# Patient Record
Sex: FEMALE | Race: WHITE | Employment: OTHER | URBAN - METROPOLITAN AREA
[De-identification: names, ages, dates, MRNs, and addresses within clinical notes are randomized per-mention and may not be internally consistent; named-entity substitution may affect disease eponyms.]

---

## 2017-03-07 DIAGNOSIS — F41.9 ANXIETY: ICD-10-CM

## 2017-03-07 NOTE — TELEPHONE ENCOUNTER
sertraline (ZOLOFT) 25 MG     Last Written Prescription Date: 8/2/16  Last Fill Quantity: 90, # refills: 1  Last Office Visit with G primary care provider:  8/2/16        Last PHQ-9 score on record=   PHQ-9 SCORE 8/2/2016   Total Score 3

## 2017-03-08 RX ORDER — SERTRALINE HYDROCHLORIDE 25 MG/1
25 TABLET, FILM COATED ORAL DAILY
Qty: 90 TABLET | Refills: 1 | Status: SHIPPED | OUTPATIENT
Start: 2017-03-08 | End: 2017-09-18

## 2017-03-08 NOTE — TELEPHONE ENCOUNTER
Med for anxiety.  Depression not on problem list  .Prescription approved per [facility] Refill Protocol.  Kate Santillan RN

## 2017-04-20 ENCOUNTER — OFFICE VISIT (OUTPATIENT)
Dept: FAMILY MEDICINE | Facility: CLINIC | Age: 67
End: 2017-04-20
Payer: COMMERCIAL

## 2017-04-20 VITALS
HEART RATE: 60 BPM | SYSTOLIC BLOOD PRESSURE: 118 MMHG | BODY MASS INDEX: 23.57 KG/M2 | TEMPERATURE: 98.3 F | OXYGEN SATURATION: 99 % | WEIGHT: 135.2 LBS | DIASTOLIC BLOOD PRESSURE: 74 MMHG

## 2017-04-20 DIAGNOSIS — R10.13 ABDOMINAL PAIN, EPIGASTRIC: Primary | ICD-10-CM

## 2017-04-20 PROCEDURE — 99213 OFFICE O/P EST LOW 20 MIN: CPT | Performed by: NURSE PRACTITIONER

## 2017-04-20 NOTE — PATIENT INSTRUCTIONS
Monitor symptoms closely.  If symptoms persist you can complete the labs we discussed and see Dr. Purcell for follow up.

## 2017-04-20 NOTE — NURSING NOTE
"Chief Complaint   Patient presents with     Gastrointestinal Problem       Initial /74  Pulse 60  Temp 98.3  F (36.8  C) (Oral)  Wt 135 lb 3.2 oz (61.3 kg)  SpO2 99%  BMI 23.57 kg/m2 Estimated body mass index is 23.57 kg/(m^2) as calculated from the following:    Height as of 8/2/16: 5' 3.5\" (1.613 m).    Weight as of this encounter: 135 lb 3.2 oz (61.3 kg).  Medication Reconciliation: complete   Marjorie HOPKINS M.A.      "

## 2017-04-20 NOTE — MR AVS SNAPSHOT
After Visit Summary   4/20/2017    Anali Sanchez    MRN: [id number]           Patient Information     Date Of Birth          1950        Visit Information        Provider Department      4/20/2017 9:00 AM Summer Stevens Ra, APRN CNP [facility]        Today's Diagnoses     Abdominal pain, epigastric    -  1      Care Instructions    Monitor symptoms closely.  If symptoms persist you can complete the labs we discussed and see Dr. Purcell for follow up.        Follow-ups after your visit        Future tests that were ordered for you today     Open Future Orders        Priority Expected Expires Ordered    CBC with platelets Routine  4/20/2018 4/20/2017    H Pylori antigen stool Routine  5/20/2017 4/20/2017    Comprehensive metabolic panel Routine  4/20/2018 4/20/2017            Who to contact     If you have questions or need follow up information about today's clinic visit or your schedule please contact [facility] directly at [phone].  Normal or non-critical lab and imaging results will be communicated to you by MyChart, letter or phone within 4 business days after the clinic has received the results. If you do not hear from us within 7 days, please contact the clinic through [employer] or phone. If you have a critical or abnormal lab result, we will notify you by phone as soon as possible.  Submit refill requests through [employer] or call your pharmacy and they will forward the refill request to us. Please allow 3 business days for your refill to be completed.          Additional Information About Your Visit        [employer] Information     [employer] gives you secure access to your electronic health record. If you see a primary care provider, you can also send messages to your care team and make appointments. If you have questions, please call your primary care clinic.  If you do not have a primary care provider, please call [phone] and they will assist  you.        Care EveryWhere ID     This is your Care EveryWhere ID. This could be used by other organizations to access your [place] medical records  [id number]        Your Vitals Were     Pulse Temperature Pulse Oximetry BMI (Body Mass Index)          60 98.3  F (36.8  C) (Oral) 99% 23.57 kg/m2         Blood Pressure from Last 3 Encounters:   04/20/17 118/74   08/02/16 126/66   05/03/16 118/68    Weight from Last 3 Encounters:   04/20/17 135 lb 3.2 oz (61.3 kg)   08/02/16 136 lb 6.4 oz (61.9 kg)   05/03/16 139 lb 4.8 oz (63.2 kg)               Primary Care Provider Office Phone # Fax #    Vianey Purcell [phone]       [facility] [address]  [facility] [address]        Thank you!     Thank you for choosing [facility]  for your care. Our goal is always to provide you with excellent care. Hearing back from our patients is one way we can continue to improve our services. Please take a few minutes to complete the written survey that you may receive in the mail after your visit with us. Thank you!             Your Updated Medication List - Protect others around you: Learn how to safely use, store and throw away your medicines at www.disposemymeds.org.          This list is accurate as of: 4/20/17 11:50 AM.  Always use your most recent med list.                   Brand Name Dispense Instructions for use    Calcium-Vitamin D-Vitamin K 500-200-40 MG-UNT-MCG Chew      Takes 3 chewables daily       conjugated estrogens cream    PREMARIN    30 g    Place 0.5 g vaginally twice a week       sertraline 25 MG tablet    ZOLOFT    90 tablet    Take 1 tablet (25 mg) by mouth daily

## 2017-04-20 NOTE — PROGRESS NOTES
SUBJECTIVE:                                                    Anali Sanchez is a 66 year old female who presents to clinic today for the following health issues:      GERD/Heartburn      Duration: 2 weeks    Description (location/character/radiation): Upper gastric area    Intensity:  moderate    Accompanying signs and symptoms:  food getting stuck: no   nausea/vomiting/blood: no  abdominal pain: no   black/tarry or bloody stools: no :    History (similar episodes/previous evaluation): No colon    Precipitating or alleviating factors:  worse with no particular food or drink.  current NSAID/Aspirin use: no     Therapies tried and outcome: antacids without relief     Pt presents with 2 weeks of symptoms.  Changing over time.  Started with reflux.  Noted she was with her father in the hospital, lots of stress.  The discomfort resolved, but she still felt some regurgitation on occasion.  This too has resolved.  Now just feels a little uneasiness in the epigastric area.  No pain.  She has a distant hx of a colectomy due to slow transit.  She now reports loose bowel movements, several per day.  This is her normal and it has not changed.  No black or bloody stools.  No vomiting.  No fevers.  No weight change.  No chronic reflux.  She has not treated.  She is eating and drinking without problem.  [place] in the near future and reports she would've waited, but thought because of her trip she should check in.    Problem list and histories reviewed & adjusted, as indicated.  Additional history: as documented    Patient Active Problem List   Diagnosis     Anxiety     CARDIOVASCULAR SCREENING; LDL GOAL LESS THAN 160     Past Surgical History:   Procedure Laterality Date     APPENDECTOMY       CARDIAC SURGERY  age 37    WPW surg; open ablation; complicated with hemorrhage     COLECTOMY  age 52    7-8 inches rectum remain. cx with hemorrhage. no cancer. slow transit.     DILATION AND  CURETTAGE, HYSTEROSCOPY DIAGNOSTIC, COMBINED  2/21/2014    Procedure: COMBINED DILATION AND CURETTAGE, HYSTEROSCOPY DIAGNOSTIC;   DILATION AND CURETTAGE, HYSTEROSCOPY DIAGNOSTIC ;  Surgeon: Willie Osborn MD;  Location: [facility] OR     ENT SURGERY      t and a       Social History   Substance Use Topics     Smoking status: Never Smoker     Smokeless tobacco: Not on file     Alcohol use Yes      Comment: occas     Family History   Problem Relation Age of Onset     Bronchitis Mother      copd     Alcoholism Mother      DIABETES Father      Coronary Artery Disease Father            Reviewed and updated as needed this visit by clinical staff       Reviewed and updated as needed this visit by Provider         ROS:  SEE HPI.    OBJECTIVE:                                                    /74  Pulse 60  Temp 98.3  F (36.8  C) (Oral)  Wt 135 lb 3.2 oz (61.3 kg)  SpO2 99%  BMI 23.57 kg/m2  Body mass index is 23.57 kg/(m^2).  GENERAL: healthy, alert and no distress  EYES: Eyes grossly normal to inspection, PERRL and conjunctivae and sclerae normal  HENT: ear canals and TM's normal, nose and mouth without ulcers or lesions  NECK: no adenopathy, no asymmetry, masses, or scars and thyroid normal to palpation  RESP: lungs clear to auscultation - no rales, rhonchi or wheezes  CV: regular rate and rhythm, normal S1 S2, no S3 or S4, no murmur, click or rub, no peripheral edema and peripheral pulses strong  ABDOMEN: soft, nontender, no hepatosplenomegaly, no masses and bowel sounds normal  NEURO: Normal strength and tone, mentation intact and speech normal  PSYCH: mentation appears normal, affect normal/bright    Diagnostic Test Results:  none      ASSESSMENT/PLAN:                                                    1. Abdominal pain, epigastric  66 y.o. Female, hx of colectomy, slow transit, here today with concerns with recent onset reflux, epigastric symptoms.  Tolerating well.  No bowel changes.  If needed, could try  zantac prn for a brief time if reflux recurs.  She does use probiotics on occasion but finds this causes some GI issues so will use with caution.  Labs were placed in case the symptoms did persist after her trip.  She will then complete and follow up with PCP.  Pt agrees with plan and verbalized understanding.  - CBC with platelets; Future  - H Pylori antigen stool; Future  - Comprehensive metabolic panel; Future    LEONOR Marrufo Ra, CNP  [facility]

## 2017-06-01 ENCOUNTER — MYC MEDICAL ADVICE (OUTPATIENT)
Dept: FAMILY MEDICINE | Facility: CLINIC | Age: 67
End: 2017-06-01

## 2017-06-01 DIAGNOSIS — Z78.0 MENOPAUSE: ICD-10-CM

## 2017-06-01 DIAGNOSIS — Z13.820 SCREENING FOR OSTEOPOROSIS: Primary | ICD-10-CM

## 2017-06-01 NOTE — TELEPHONE ENCOUNTER
Please let her know that they have moved the machine to [place].    Can you help her schedule?   Or give her the phone number?    (this is what I have:):    Please call [phone] to schedule your bone density exam, or DXA, at the [facility].

## 2017-06-21 ENCOUNTER — RADIANT APPOINTMENT (OUTPATIENT)
Dept: BONE DENSITY | Facility: CLINIC | Age: 67
End: 2017-06-21
Attending: FAMILY MEDICINE
Payer: COMMERCIAL

## 2017-06-21 ENCOUNTER — RADIANT APPOINTMENT (OUTPATIENT)
Dept: MAMMOGRAPHY | Facility: CLINIC | Age: 67
End: 2017-06-21
Attending: FAMILY MEDICINE
Payer: COMMERCIAL

## 2017-06-21 DIAGNOSIS — Z12.31 VISIT FOR SCREENING MAMMOGRAM: ICD-10-CM

## 2017-06-21 DIAGNOSIS — Z13.820 SCREENING FOR OSTEOPOROSIS: ICD-10-CM

## 2017-06-21 DIAGNOSIS — Z78.0 MENOPAUSE: ICD-10-CM

## 2017-06-21 PROCEDURE — 77080 DXA BONE DENSITY AXIAL: CPT | Performed by: FAMILY MEDICINE

## 2017-06-21 PROCEDURE — G0202 SCR MAMMO BI INCL CAD: HCPCS | Mod: TC

## 2017-06-21 PROCEDURE — 77063 BREAST TOMOSYNTHESIS BI: CPT | Mod: TC

## 2017-08-03 ENCOUNTER — OFFICE VISIT (OUTPATIENT)
Dept: FAMILY MEDICINE | Facility: CLINIC | Age: 67
End: 2017-08-03
Payer: COMMERCIAL

## 2017-08-03 VITALS
HEIGHT: 63 IN | BODY MASS INDEX: 23.87 KG/M2 | SYSTOLIC BLOOD PRESSURE: 120 MMHG | HEART RATE: 55 BPM | OXYGEN SATURATION: 98 % | RESPIRATION RATE: 16 BRPM | WEIGHT: 134.7 LBS | TEMPERATURE: 97.9 F | DIASTOLIC BLOOD PRESSURE: 66 MMHG

## 2017-08-03 DIAGNOSIS — Z13.1 SCREENING FOR DIABETES MELLITUS: ICD-10-CM

## 2017-08-03 DIAGNOSIS — Z00.00 ENCOUNTER FOR ROUTINE ADULT HEALTH EXAMINATION WITHOUT ABNORMAL FINDINGS: Primary | ICD-10-CM

## 2017-08-03 DIAGNOSIS — Z13.6 CARDIOVASCULAR SCREENING; LDL GOAL LESS THAN 160: ICD-10-CM

## 2017-08-03 DIAGNOSIS — N95.2 ATROPHIC VAGINITIS: ICD-10-CM

## 2017-08-03 PROCEDURE — G0439 PPPS, SUBSEQ VISIT: HCPCS | Performed by: FAMILY MEDICINE

## 2017-08-03 PROCEDURE — G0009 ADMIN PNEUMOCOCCAL VACCINE: HCPCS | Performed by: FAMILY MEDICINE

## 2017-08-03 PROCEDURE — 90471 IMMUNIZATION ADMIN: CPT | Performed by: FAMILY MEDICINE

## 2017-08-03 PROCEDURE — 90670 PCV13 VACCINE IM: CPT | Performed by: FAMILY MEDICINE

## 2017-08-03 PROCEDURE — 99212 OFFICE O/P EST SF 10 MIN: CPT | Mod: 25 | Performed by: FAMILY MEDICINE

## 2017-08-03 ASSESSMENT — ANXIETY QUESTIONNAIRES
2. NOT BEING ABLE TO STOP OR CONTROL WORRYING: SEVERAL DAYS
6. BECOMING EASILY ANNOYED OR IRRITABLE: NOT AT ALL
5. BEING SO RESTLESS THAT IT IS HARD TO SIT STILL: NEARLY EVERY DAY
IF YOU CHECKED OFF ANY PROBLEMS ON THIS QUESTIONNAIRE, HOW DIFFICULT HAVE THESE PROBLEMS MADE IT FOR YOU TO DO YOUR WORK, TAKE CARE OF THINGS AT HOME, OR GET ALONG WITH OTHER PEOPLE: SOMEWHAT DIFFICULT
7. FEELING AFRAID AS IF SOMETHING AWFUL MIGHT HAPPEN: SEVERAL DAYS
3. WORRYING TOO MUCH ABOUT DIFFERENT THINGS: SEVERAL DAYS
1. FEELING NERVOUS, ANXIOUS, OR ON EDGE: SEVERAL DAYS
GAD7 TOTAL SCORE: 10

## 2017-08-03 ASSESSMENT — PATIENT HEALTH QUESTIONNAIRE - PHQ9: 5. POOR APPETITE OR OVEREATING: NEARLY EVERY DAY

## 2017-08-03 NOTE — NURSING NOTE
"Chief Complaint   Patient presents with     Physical       Initial /66 (BP Location: Right arm, Cuff Size: Adult Regular)  Pulse 55  Temp 97.9  F (36.6  C) (Oral)  Resp 16  Ht 5' 3.25\" (1.607 m)  Wt 134 lb 11.2 oz (61.1 kg)  SpO2 98%  BMI 23.67 kg/m2 Estimated body mass index is 23.67 kg/(m^2) as calculated from the following:    Height as of this encounter: 5' 3.25\" (1.607 m).    Weight as of this encounter: 134 lb 11.2 oz (61.1 kg).  Medication Reconciliation: complete   Brunilda Solano, CMA'    "

## 2017-08-03 NOTE — MR AVS SNAPSHOT
After Visit Summary   8/3/2017    Anali Sanchez    MRN: [id number]           Patient Information     Date Of Birth          1950        Visit Information        Provider Department      8/3/2017 10:10 AM Vianey Purcell MD [facility]        Today's Diagnoses     Encounter for routine adult health examination without abnormal findings    -  1    Atrophic vaginitis        CARDIOVASCULAR SCREENING; LDL GOAL LESS THAN 160        Screening for diabetes mellitus          Care Instructions      Preventive Health Recommendations    Female Ages 65 +    Yearly exam:     See your health care provider every year in order to  o Review health changes.   o Discuss preventive care.    o Review your medicines if your doctor has prescribed any.      You no longer need a yearly Pap test unless you've had an abnormal Pap test in the past 10 years. If you have vaginal symptoms, such as bleeding or discharge, be sure to talk with your provider about a Pap test.      Every 1 to 2 years, have a mammogram.  If you are over 69, talk with your health care provider about whether or not you want to continue having screening mammograms.      Every 10 years, have a colonoscopy. Or, have a yearly FIT test (stool test). These exams will check for colon cancer.       Have a cholesterol test every 5 years, or more often if your doctor advises it.       Have a diabetes test (fasting glucose) every three years. If you are at risk for diabetes, you should have this test more often.       At age 65, have a bone density scan (DEXA) to check for osteoporosis (brittle bone disease).    Shots:    Get a flu shot each year.    Get a tetanus shot every 10 years.    Talk to your doctor about your pneumonia vaccines. There are now two you should receive - Pneumovax (PPSV 23) and Prevnar (PCV 13).    Talk to your doctor about the shingles vaccine.    Talk to your doctor about the hepatitis B vaccine.    Nutrition:     Eat  at least 5 servings of fruits and vegetables each day.      Eat whole-grain bread, whole-wheat pasta and brown rice instead of white grains and rice.      Talk to your provider about Calcium and Vitamin D.     Lifestyle    Exercise at least 150 minutes a week (30 minutes a day, 5 days a week). This will help you control your weight and prevent disease.      Limit alcohol to one drink per day.      No smoking.       Wear sunscreen to prevent skin cancer.       See your dentist twice a year for an exam and cleaning.      See your eye doctor every 1 to 2 years to screen for conditions such as glaucoma, macular degeneration and cataracts.    ----------------------------------------------------------------------    General recommendations for sleep problems (Insomnia)   (Allow 2-4 weeks to see results)   Establish a regular sleep schedule   Go to bed at same time each night   Get up at same time each day   Avoid sleeping-in on Sunday morning   Cut down time in bed (if not asleep, get up)   Avoid trying to force yourself to sleep   Use your bed only for sleep and sex   Do not read or watch Television in bed   Make the bedroom comfortable   Keep temperature in your bedroom comfortable   Keep bedroom quiet when sleeping   Consider ear plugs (silicon)   Keep bedroom dark enough   Use dark blinds or wear an eye mask if needed   Relax at bedtime   Relax each muscle group individually   Begin with your feet   Work toward your head   Deal with your worries before bedtime   Set aside a worry time for 30 minutes earlier   Listen to relaxation tapes   Classical Music or Nature sounds   Imagine a tranquil scene (e.g. waterfall or beach)   Back Massage   Gentle 5-minute back rub prior to bedtime   Perform measures to make you tired at bedtime   Get regular Exercise each day (6 hours before bedtime)   Take medications only as directed   Eat a light bedtime snack or warm drink   Warm milk   Warm herbal tea (non-caffeinated)   Special  Therapy Measures   Sleep Restriction Therapy   Limit time in bed for 15 minutes more than sleep   Do not set limit under 4 hours   Increase time by 15 minutes per effective sleep trial   Effective sleep suggests >90% of bedtime asleep   Stimulus Control   Do not lie awake for more than 30 minutes   Get out of bed and perform a quiet activity   Return to bed when sleepy   Repeat as many times per night as needed   No napping during day   Things to avoid   Do not Exercise just before bedtime   No overstimulating activities just before bed   No competitive games before bedtime   No exciting Television programs before bedtime   Avoid caffeine after lunchtime   Avoid chocolate   Avoid coffee, tea, or soda   Do not use alcohol to induce sleep (worsens Insomnia)   Do not take someone else's sleeping pills   Do not look at the clock when awakening   Do not turn on light when getting up to use bathroom   Read the book Say Good Night To Insomnia                Follow-ups after your visit        Future tests that were ordered for you today     Open Future Orders        Priority Expected Expires Ordered    **Lipid panel reflex to direct LDL FUTURE anytime Routine 8/3/2017 8/3/2018 8/3/2017    Glucose Routine  12/1/2017 8/3/2017            Who to contact     If you have questions or need follow up information about today's clinic visit or your schedule please contact [facility] directly at [phone].  Normal or non-critical lab and imaging results will be communicated to you by [employer], letter or phone within 4 business days after the clinic has received the results. If you do not hear from us within 7 days, please contact the clinic through [employer] or phone. If you have a critical or abnormal lab result, we will notify you by phone as soon as possible.  Submit refill requests through [employer] or call your pharmacy and they will forward the refill request to us. Please allow 3 business days for your refill to  "be completed.          Additional Information About Your Visit        [employer] Information     [employer] gives you secure access to your electronic health record. If you see a primary care provider, you can also send messages to your care team and make appointments. If you have questions, please call your primary care clinic.  If you do not have a primary care provider, please call [phone] and they will assist you.        Care EveryWhere ID     This is your Care EveryWhere ID. This could be used by other organizations to access your [place] medical records  [id number]        Your Vitals Were     Pulse Temperature Respirations Height Pulse Oximetry BMI (Body Mass Index)    55 97.9  F (36.6  C) (Oral) 16 5' 3.25\" (1.607 m) 98% 23.67 kg/m2       Blood Pressure from Last 3 Encounters:   08/03/17 120/66   04/20/17 118/74   08/02/16 126/66    Weight from Last 3 Encounters:   08/03/17 134 lb 11.2 oz (61.1 kg)   04/20/17 135 lb 3.2 oz (61.3 kg)   08/02/16 136 lb 6.4 oz (61.9 kg)               Primary Care Provider Office Phone # Fax #    Vianey Purcell [phone]       [facility] [address] [facility] [address]        Equal Access to Services     ENDY [place] : Hadii aad ku hadasho Soomaali, waaxda luqadaha, qaybta kaalmada adeegyada, waxay tasha haydara diaz ah. So [facility] [phone].    ATENCIÓN: Si habla español, tiene a guajardo disposición servicios gratuitos de asistencia lingüística. Llame al [phone].    We comply with applicable federal civil rights laws and [place] laws. We do not discriminate on the basis of race, color, national origin, age, disability sex, sexual orientation or gender identity.            Thank you!     Thank you for choosing [facility]  for your care. Our goal is always to provide you with excellent care. Hearing back from our patients is one way we can continue to improve our services. Please take a few minutes to " complete the written survey that you may receive in the mail after your visit with us. Thank you!             Your Updated Medication List - Protect others around you: Learn how to safely use, store and throw away your medicines at www.disposemymeds.org.          This list is accurate as of: 8/3/17 11:09 AM.  Always use your most recent med list.                   Brand Name Dispense Instructions for use Diagnosis    Calcium-Vitamin D-Vitamin K 500-200-40 MG-UNT-MCG Chew      Takes 3 chewables daily        sertraline 25 MG tablet    ZOLOFT    90 tablet    Take 1 tablet (25 mg) by mouth daily    Anxiety

## 2017-08-03 NOTE — PROGRESS NOTES
SUBJECTIVE:   Anali Sanchez is a 66 year old female who presents for Preventive Visit.      Are you in the first 12 months of your Medicare coverage?  No    Physical   Annual:     Getting at least 3 servings of Calcium per day::  Yes    Bi-annual eye exam::  Yes    Dental care twice a year::  Yes    Sleep apnea or symptoms of sleep apnea::  None    Diet::  Regular (no restrictions)    Frequency of exercise::  2-3 days/week    Duration of exercise::  30-45 minutes    Taking medications regularly::  Yes    Medication side effects::  None    Additional concerns today::  YES      COGNITIVE SCREEN  1) Repeat 3 items (Banana, Sunrise, Chair)    2) Clock draw:   3) 3 item recall: Recalls 3 objects  Results: 3 items recalled: COGNITIVE IMPAIRMENT LESS LIKELY    Mini-CogTM Copyright S Swati. Licensed by the author for use in [facility]; reprinted with permission (apple@[facility]). All rights reserved.          Reviewed and updated as needed this visit by clinical staff         Reviewed and updated as needed this visit by Provider      Social History   Substance Use Topics     Smoking status: Never Smoker     Smokeless tobacco: Not on file     Alcohol use Yes      Comment: occas       The patient does not drink >3 drinks per day nor >7 drinks per week.          Discuss Premarin.    Today's PHQ-2 Score: PHQ-2 ( 1999 Pfizer) 8/2/2017   Q1: Little interest or pleasure in doing things 0   Q2: Feeling down, depressed or hopeless 0   PHQ-2 Score 0   Q1: Little interest or pleasure in doing things Not at all   Q2: Feeling down, depressed or hopeless Not at all   PHQ-2 Score 0       Do you feel safe in your environment - Yes    Do you have a Health Care Directive?: Yes: Patient states has Advance Directive and will bring in a copy to clinic.    Current providers sharing in care for this patient include:   Patient Care Team:  Vianey Purcell MD as PCP - General (Family Practice)      Hearing impairment: No    Ability  to successfully perform activities of daily living: Yes, no assistance needed     Fall risk:         Home safety:  none identified      The following health maintenance items are reviewed in Epic and correct as of today:Health Maintenance   Topic Date Due     HEPATITIS C SCREENING  10/31/1968     LIPID SCREEN Q5 YR FEMALE (SYSTEM ASSIGNED)  10/31/1995     ADVANCE DIRECTIVE PLANNING Q5 YRS  10/31/2005     PNEUMOCOCCAL (1 of 2 - PCV13) 10/31/2015     FALL RISK ASSESSMENT  05/03/2017     INFLUENZA VACCINE (SYSTEM ASSIGNED)  09/01/2017     TETANUS IMMUNIZATION (SYSTEM ASSIGNED)  10/21/2018     MAMMO SCREEN Q2 YR (SYSTEM ASSIGNED)  06/21/2019     DEXA SCAN SCREENING (SYSTEM ASSIGNED)  Completed     Patient Active Problem List   Diagnosis     Anxiety     CARDIOVASCULAR SCREENING; LDL GOAL LESS THAN 160       Past Medical History:   Diagnosis Date     Anemia      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 cx with hemorrhage. no cancer. slow transit.     DILATION AND CURETTAGE, HYSTEROSCOPY DIAGNOSTIC, COMBINED  2/21/2014    Procedure: COMBINED DILATION AND CURETTAGE, HYSTEROSCOPY DIAGNOSTIC;   DILATION AND CURETTAGE, HYSTEROSCOPY DIAGNOSTIC ;  Surgeon: Willie Osborn MD;  Location: [facility] OR     ENT SURGERY      t and a       Obstetric History     No data available          Current Outpatient Prescriptions   Medication Sig Dispense Refill     sertraline (ZOLOFT) 25 MG tablet Take 1 tablet (25 mg) by mouth daily 90 tablet 1     Calcium-Vitamin D-Vitamin K 500-200-40 MG-UNT-MCG CHEW  "Takes 3 chewables daily         Family History   Problem Relation Age of Onset     Bronchitis Mother      copd     Alcoholism Mother      DIABETES Father      Coronary Artery Disease Father        Social History   Substance Use Topics     Smoking status: Never Smoker     Smokeless tobacco: Never Used     Alcohol use Yes      Comment: occas; glass of wine with dinner; usually one.        Immunization History   Administered Date(s) Administered     Influenza (IIV3) 09/25/1992, 09/30/1993, 10/17/1994, 12/01/1995, 12/30/1996, 12/18/2001, 01/31/2005, 11/07/2006, 10/14/2008, 10/13/2009     Influenza Vaccine IM 3yrs+ 4 Valent IIV4 02/09/2017     Pneumococcal (PCV 13) 08/03/2017     TD (ADULT, 7+) 04/13/1988, 09/24/1999     Tdap (Adacel,Boostrix) 10/21/2008           ROS:  C: NEGATIVE for fever, chills, change in weight  E/M: NEGATIVE for ear, mouth and throat problems  R: NEGATIVE for significant cough or short of breath.  Had been having cough at night; now better. It does happen intermittently.  CV: NEGATIVE for chest pain, palpitations or peripheral edema  No nausea, vomitting or change in bowel habits. (does have to get to br p eating)  No urinary symptoms.    Recently went off premarin. She notes she kept getting notes from insurance regarding concern.   It was the cream she was on.    [place] more challenging. Some discomfort.    Some challenge with falling asleep.  Melatonin is helping.    Anticipating starting Silver Sneakers.    OBJECTIVE:   /66 (BP Location: Right arm, Cuff Size: Adult Regular)  Pulse 55  Temp 97.9  F (36.6  C) (Oral)  Resp 16  Ht 5' 3.25\" (1.607 m)  Wt 134 lb 11.2 oz (61.1 kg)  SpO2 98%  BMI 23.67 kg/m2 Estimated body mass index is 23.67 kg/(m^2) as calculated from the following:    Height as of this encounter: 5' 3.25\" (1.607 m).    Weight as of this encounter: 134 lb 11.2 oz (61.1 kg).  EXAM:   GENERAL APPEARANCE: alert and no distress  EYES: Eyes grossly normal to inspection, " PERRL and conjunctivae and sclerae normal  HENT: ear canals and TM's normal, nose and mouth without ulcers or lesions, oropharynx clear and oral mucous membranes moist  NECK: no adenopathy, no asymmetry, masses, or scars and thyroid normal to palpation  RESP: lungs clear to auscultation - no rales, rhonchi or wheezes  BREAST: normal without masses, tenderness or nipple discharge and no palpable axillary masses or adenopathy  CV: regular rates and rhythm, no peripheral edema and peripheral pulses strong  ABDOMEN: soft, nontender, no hepatosplenomegaly, no masses and bowel sounds normal  MS: no musculoskeletal defects are noted and gait is age appropriate without ataxia  SKIN: no suspicious lesions or rashes  NEURO: Normal strength and tone, sensory exam grossly normal, mentation intact and speech normal  PSYCH: mentation appears normal and affect normal/bright    No results for input(s): CHOL, HDL, LDL, TRIG, CHOLHDLRATIO in the last 47017 hours.      ASSESSMENT / PLAN:       Encounter for routine adult health examination without abnormal findings    - Pneumococcal vaccine 13 valent PCV13 IM (Prevnar) [47475]  - ADMIN: Vaccine, Initial (16908)    Atrophic vaginitis  discussed vaginal estrogen. Discussed moisturizers and lubricants. She will try this; consider estrogen again in future.  Given patient education materials from UpToDate    CARDIOVASCULAR SCREENING; LDL GOAL LESS THAN 160    - **Lipid panel reflex to direct LDL FUTURE anytime; Future    Screening for diabetes mellitus    - Glucose; Future          End of Life Planning:  Patient currently has an advanced directive: Yes.  Will bring to clinic.    COUNSELING:  Reviewed preventive health counseling, as reflected in patient instructions       Regular exercise       Healthy diet/nutrition       Vision screening       Dental care       Osteoporosis Prevention/Bone Health      BP Screening:   Last 3 BP Readings:    BP Readings from Last 3 Encounters:   08/03/17  "120/66   04/20/17 118/74   08/02/16 126/66       The following was recommended to the patient:  Re-screen BP within a year and recommended lifestyle modifications  Estimated body mass index is 23.67 kg/(m^2) as calculated from the following:    Height as of this encounter: 5' 3.25\" (1.607 m).    Weight as of this encounter: 134 lb 11.2 oz (61.1 kg).     reports that she has never smoked. She does not have any smokeless tobacco history on file.        Appropriate preventive services were discussed with this patient, including applicable screening as appropriate for cardiovascular disease, diabetes, osteopenia/osteoporosis, and glaucoma.  As appropriate for age/gender, discussed screening for colorectal cancer, prostate cancer, breast cancer, and cervical cancer. Checklist reviewing preventive services available has been given to the patient.    Reviewed patients plan of care and provided an AVS. The Basic Care Plan (routine screening as documented in Health Maintenance) for Anali meets the Care Plan requirement. This Care Plan has been established and reviewed with the Patient.    Counseling Resources:  ATP IV Guidelines  Pooled Cohorts Equation Calculator  Breast Cancer Risk Calculator  FRAX Risk Assessment  ICSI Preventive Guidelines  Dietary Guidelines for Americans, 2010  [employer]'s MyPlate  ASA Prophylaxis  Lung CA Screening    Vianey Purcell MD, MD  [facility] [place] for HPI/ROS submitted by the patient on 8/2/2017   PHQ-2 Score: 0    "

## 2017-08-03 NOTE — PATIENT INSTRUCTIONS
Preventive Health Recommendations    Female Ages 65 +    Yearly exam:     See your health care provider every year in order to  o Review health changes.   o Discuss preventive care.    o Review your medicines if your doctor has prescribed any.      You no longer need a yearly Pap test unless you've had an abnormal Pap test in the past 10 years. If you have vaginal symptoms, such as bleeding or discharge, be sure to talk with your provider about a Pap test.      Every 1 to 2 years, have a mammogram.  If you are over 69, talk with your health care provider about whether or not you want to continue having screening mammograms.      Every 10 years, have a colonoscopy. Or, have a yearly FIT test (stool test). These exams will check for colon cancer.       Have a cholesterol test every 5 years, or more often if your doctor advises it.       Have a diabetes test (fasting glucose) every three years. If you are at risk for diabetes, you should have this test more often.       At age 65, have a bone density scan (DEXA) to check for osteoporosis (brittle bone disease).    Shots:    Get a flu shot each year.    Get a tetanus shot every 10 years.    Talk to your doctor about your pneumonia vaccines. There are now two you should receive - Pneumovax (PPSV 23) and Prevnar (PCV 13).    Talk to your doctor about the shingles vaccine.    Talk to your doctor about the hepatitis B vaccine.    Nutrition:     Eat at least 5 servings of fruits and vegetables each day.      Eat whole-grain bread, whole-wheat pasta and brown rice instead of white grains and rice.      Talk to your provider about Calcium and Vitamin D.     Lifestyle    Exercise at least 150 minutes a week (30 minutes a day, 5 days a week). This will help you control your weight and prevent disease.      Limit alcohol to one drink per day.      No smoking.       Wear sunscreen to prevent skin cancer.       See your dentist twice a year for an exam and cleaning.      See your  eye doctor every 1 to 2 years to screen for conditions such as glaucoma, macular degeneration and cataracts.    ----------------------------------------------------------------------    General recommendations for sleep problems (Insomnia)   (Allow 2-4 weeks to see results)   Establish a regular sleep schedule   Go to bed at same time each night   Get up at same time each day   Avoid sleeping-in on [place] morning   Cut down time in bed (if not asleep, get up)   Avoid trying to force yourself to sleep   Use your bed only for sleep and sex   Do not read or watch Television in bed   Make the bedroom comfortable   Keep temperature in your bedroom comfortable   Keep bedroom quiet when sleeping   Consider ear plugs (silicon)   Keep bedroom dark enough   Use dark blinds or wear an eye mask if needed   Relax at bedtime   Relax each muscle group individually   Begin with your feet   Work toward your head   Deal with your worries before bedtime   Set aside a worry time for 30 minutes earlier   Listen to relaxation tapes   Classical Music or Nature sounds   Imagine a tranquil scene (e.g. waterfall or beach)   Back Massage   Gentle 5-minute back rub prior to bedtime   Perform measures to make you tired at bedtime   Get regular Exercise each day (6 hours before bedtime)   Take medications only as directed   Eat a light bedtime snack or warm drink   Warm milk   Warm herbal tea (non-caffeinated)   Special Therapy Measures   Sleep Restriction Therapy   Limit time in bed for 15 minutes more than sleep   Do not set limit under 4 hours   Increase time by 15 minutes per effective sleep trial   Effective sleep suggests >90% of bedtime asleep   Stimulus Control   Do not lie awake for more than 30 minutes   Get out of bed and perform a quiet activity   Return to bed when sleepy   Repeat as many times per night as needed   No napping during day   Things to avoid   Do not Exercise just before bedtime   No overstimulating activities just  before bed   No competitive games before bedtime   No exciting Television programs before bedtime   Avoid caffeine after lunchtime   Avoid chocolate   Avoid coffee, tea, or soda   Do not use alcohol to induce sleep (worsens Insomnia)   Do not take someone else's sleeping pills   Do not look at the clock when awakening   Do not turn on light when getting up to use bathroom   Read the book Say Good Night To Insomnia        Preventive Health Recommendations    Female Ages 65 +    Yearly exam:     See your health care provider every year in order to  o Review health changes.   o Discuss preventive care.    o Review your medicines if your doctor has prescribed any.      You no longer need a yearly Pap test unless you've had an abnormal Pap test in the past 10 years. If you have vaginal symptoms, such as bleeding or discharge, be sure to talk with your provider about a Pap test.      Every 1 to 2 years, have a mammogram.  If you are over 69, talk with your health care provider about whether or not you want to continue having screening mammograms.      Every 10 years, have a colonoscopy. Or, have a yearly FIT test (stool test). These exams will check for colon cancer.       Have a cholesterol test every 5 years, or more often if your doctor advises it.       Have a diabetes test (fasting glucose) every three years. If you are at risk for diabetes, you should have this test more often.       At age 65, have a bone density scan (DEXA) to check for osteoporosis (brittle bone disease).    Shots:    Get a flu shot each year.    Get a tetanus shot every 10 years.    Talk to your doctor about your pneumonia vaccines. There are now two you should receive - Pneumovax (PPSV 23) and Prevnar (PCV 13).    Talk to your doctor about the shingles vaccine.    Talk to your doctor about the hepatitis B vaccine.    Nutrition:     Eat at least 5 servings of fruits and vegetables each day.      Eat whole-grain bread, whole-wheat pasta and brown  rice instead of white grains and rice.      Talk to your provider about Calcium and Vitamin D.     Lifestyle    Exercise at least 150 minutes a week (30 minutes a day, 5 days a week). This will help you control your weight and prevent disease.      Limit alcohol to one drink per day.      No smoking.       Wear sunscreen to prevent skin cancer.       See your dentist twice a year for an exam and cleaning.      See your eye doctor every 1 to 2 years to screen for conditions such as glaucoma, macular degeneration and cataracts.

## 2017-08-04 ASSESSMENT — PATIENT HEALTH QUESTIONNAIRE - PHQ9: SUM OF ALL RESPONSES TO PHQ QUESTIONS 1-9: 7

## 2017-08-04 ASSESSMENT — ANXIETY QUESTIONNAIRES: GAD7 TOTAL SCORE: 10

## 2017-09-18 DIAGNOSIS — F41.9 ANXIETY: ICD-10-CM

## 2017-09-18 NOTE — TELEPHONE ENCOUNTER
sertraline (ZOLOFT) 25 MG     Last Written Prescription Date: 3/8/17  Last Fill Quantity: 90, # refills: 1  Last Office Visit with G primary care provider:  8/3/17        Last PHQ-9 score on record=   PHQ-9 SCORE 8/3/2017   Total Score 7

## 2017-09-18 NOTE — TELEPHONE ENCOUNTER
Pt is calling about the medication. She needs it filled ASAP because she is leaving town on Wednesday.

## 2017-09-19 RX ORDER — SERTRALINE HYDROCHLORIDE 25 MG/1
TABLET, FILM COATED ORAL
Qty: 90 TABLET | Refills: 3 | Status: SHIPPED | OUTPATIENT
Start: 2017-09-19 | End: 2018-09-19

## 2017-10-25 ENCOUNTER — ALLIED HEALTH/NURSE VISIT (OUTPATIENT)
Dept: NURSING | Facility: CLINIC | Age: 67
End: 2017-10-25
Payer: COMMERCIAL

## 2017-10-25 DIAGNOSIS — Z13.6 CARDIOVASCULAR SCREENING; LDL GOAL LESS THAN 160: ICD-10-CM

## 2017-10-25 DIAGNOSIS — Z13.1 SCREENING FOR DIABETES MELLITUS: ICD-10-CM

## 2017-10-25 DIAGNOSIS — Z23 NEED FOR PROPHYLACTIC VACCINATION AND INOCULATION AGAINST INFLUENZA: Primary | ICD-10-CM

## 2017-10-25 LAB
CHOLEST SERPL-MCNC: 217 MG/DL
GLUCOSE SERPL-MCNC: 88 MG/DL (ref 70–99)
HDLC SERPL-MCNC: 120 MG/DL
LDLC SERPL CALC-MCNC: 85 MG/DL
NONHDLC SERPL-MCNC: 97 MG/DL
TRIGL SERPL-MCNC: 62 MG/DL

## 2017-10-25 PROCEDURE — G0008 ADMIN INFLUENZA VIRUS VAC: HCPCS

## 2017-10-25 PROCEDURE — 82947 ASSAY GLUCOSE BLOOD QUANT: CPT | Performed by: FAMILY MEDICINE

## 2017-10-25 PROCEDURE — 99207 ZZC NO CHARGE NURSE ONLY: CPT

## 2017-10-25 PROCEDURE — 90662 IIV NO PRSV INCREASED AG IM: CPT

## 2017-10-25 PROCEDURE — 80061 LIPID PANEL: CPT | Performed by: FAMILY MEDICINE

## 2017-10-25 PROCEDURE — 36415 COLL VENOUS BLD VENIPUNCTURE: CPT | Performed by: FAMILY MEDICINE

## 2017-10-25 NOTE — PROGRESS NOTES

## 2017-10-25 NOTE — MR AVS SNAPSHOT
After Visit Summary   10/25/2017    Anali Sanchez    MRN: [id number]           Patient Information     Date Of Birth          1950        Visit Information        Provider Department      10/25/2017 9:30 AM  NURSE [facility]        Today's Diagnoses     Need for prophylactic vaccination and inoculation against influenza    -  1       Follow-ups after your visit        Follow-up notes from your care team     Return for Lab Work, flu shot.      Your next 10 appointments already scheduled     Oct 25, 2017  9:30 AM CDT   Nurse Only with  NURSE   [facility] ([facility])    [address] [address] [phone]              Who to contact     If you have questions or need follow up information about today's clinic visit or your schedule please contact [facility] directly at [phone].  Normal or non-critical lab and imaging results will be communicated to you by [employer], letter or phone within 4 business days after the clinic has received the results. If you do not hear from us within 7 days, please contact the clinic through [employer] or phone. If you have a critical or abnormal lab result, we will notify you by phone as soon as possible.  Submit refill requests through [employer] or call your pharmacy and they will forward the refill request to us. Please allow 3 business days for your refill to be completed.          Additional Information About Your Visit        MyChart Information     [employer] gives you secure access to your electronic health record. If you see a primary care provider, you can also send messages to your care team and make appointments. If you have questions, please call your primary care clinic.  If you do not have a primary care provider, please call [phone] and they will assist you.        Care EveryWhere ID     This is your Care EveryWhere ID. This could be used by other  organizations to access your [facility] medical records  [id number]         Blood Pressure from Last 3 Encounters:   08/03/17 120/66   04/20/17 118/74   08/02/16 126/66    Weight from Last 3 Encounters:   08/03/17 134 lb 11.2 oz (61.1 kg)   04/20/17 135 lb 3.2 oz (61.3 kg)   08/02/16 136 lb 6.4 oz (61.9 kg)              We Performed the Following     ADMIN INFLUENZA (For MEDICARE Patients ONLY) []     FLU VACCINE, INCREASED ANTIGEN, PRESV FREE, AGE 65+ [40962]        Primary Care Provider Office Phone # Fax #    Vianey Purcell [phone]       [address] [facility] [address]        Equal Access to Services     ENDY [place] : Hadii jennifer martínez hadasho Soomaali, waaxda luqadaha, qaybta kaalmada adeegyada, polly diaz . So [facility] [phone].    ATENCIÓN: Si habla español, tiene a guajardo disposición servicios gratuitos de asistencia lingüística. Llame al [phone].    We comply with applicable federal civil rights laws and [place] laws. We do not discriminate on the basis of race, color, national origin, age, disability, sex, sexual orientation, or gender identity.            Thank you!     Thank you for choosing [facility]  for your care. Our goal is always to provide you with excellent care. Hearing back from our patients is one way we can continue to improve our services. Please take a few minutes to complete the written survey that you may receive in the mail after your visit with us. Thank you!             Your Updated Medication List - Protect others around you: Learn how to safely use, store and throw away your medicines at www.disposemymeds.org.          This list is accurate as of: 10/25/17  9:07 AM.  Always use your most recent med list.                   Brand Name Dispense Instructions for use Diagnosis    Calcium-Vitamin D-Vitamin K 500-200-40 MG-UNT-MCG Chew      Takes 3 chewables daily        sertraline 25 MG tablet    ZOLOFT    90  tablet    TAKE 1 TABLET BY MOUTH DAILY    Anxiety

## 2018-09-19 ENCOUNTER — MYC MEDICAL ADVICE (OUTPATIENT)
Dept: FAMILY MEDICINE | Facility: CLINIC | Age: 68
End: 2018-09-19

## 2018-09-19 DIAGNOSIS — F41.9 ANXIETY: ICD-10-CM

## 2018-09-20 NOTE — TELEPHONE ENCOUNTER
Routing refill request to provider for review/approval because:  Pt is over due for an office visit.    Kristin Salmon RN

## 2018-09-21 RX ORDER — SERTRALINE HYDROCHLORIDE 25 MG/1
TABLET, FILM COATED ORAL
Qty: 90 TABLET | Refills: 0 | Status: SHIPPED | OUTPATIENT
Start: 2018-09-21 | End: 2018-12-04

## 2018-09-21 NOTE — TELEPHONE ENCOUNTER
1st attempt, called and talked to pt.  Notified one refill sent and px scheduled for 10/29 with MH.  Katie Best CMA ([facility])

## 2018-10-30 ENCOUNTER — HOSPITAL ENCOUNTER (OUTPATIENT)
Dept: MAMMOGRAPHY | Facility: CLINIC | Age: 68
Discharge: HOME | End: 2018-10-30
Attending: FAMILY MEDICINE | Admitting: FAMILY MEDICINE
Payer: MEDICARE

## 2018-10-30 DIAGNOSIS — Z12.31 VISIT FOR SCREENING MAMMOGRAM: ICD-10-CM

## 2018-10-30 PROCEDURE — 77063 BREAST TOMOSYNTHESIS BI: CPT

## 2018-10-31 NOTE — PATIENT INSTRUCTIONS
Preventive Health Recommendations    See your health care provider every year to    Review health changes.     Discuss preventive care.      Review your medicines if your doctor has prescribed any.      You no longer need a yearly Pap test unless you've had an abnormal Pap test in the past 10 years. If you have vaginal symptoms, such as bleeding or discharge, be sure to talk with your provider about a Pap test.      Every 1 to 2 years, have a mammogram.  If you are over 69, talk with your health care provider about whether or not you want to continue having screening mammograms.      Every 10 years, have a colonoscopy. Or, have a yearly FIT test (stool test). These exams will check for colon cancer.       Have a cholesterol test every 5 years, or more often if your doctor advises it.       Have a diabetes test (fasting glucose) every three years. If you are at risk for diabetes, you should have this test more often.       At age 65, have a bone density scan (DEXA) to check for osteoporosis (brittle bone disease).    Shots:    Get a flu shot each year.    Get a tetanus shot every 10 years.    Talk to your doctor about your pneumonia vaccines. There are now two you should receive - Pneumovax (PPSV 23) and Prevnar (PCV 13).    Talk to your pharmacist about the shingles vaccine.    Talk to your doctor about the hepatitis B vaccine.    Nutrition:     Eat at least 5 servings of fruits and vegetables each day.      Eat whole-grain bread, whole-wheat pasta and brown rice instead of white grains and rice.      Get adequate Calcium and Vitamin D.     Lifestyle    Exercise at least 150 minutes a week (30 minutes a day, 5 days a week). This will help you control your weight and prevent disease.      Limit alcohol to one drink per day.      No smoking.       Wear sunscreen to prevent skin cancer.       See your dentist twice a year for an exam and cleaning.      See your eye doctor every 1 to 2 years to screen for conditions  such as glaucoma, macular degeneration and cataracts.    Personalized Prevention Plan  You are due for the preventive services outlined below.  Your care team is available to assist you in scheduling these services.  If you have already completed any of these items, please share that information with your care team to update in your medical record.  Health Maintenance Due   Topic Date Due     Hepatitis C Screening  10/31/1968     Discuss Advance Directive Planning  10/31/2005     FALL RISK ASSESSMENT  08/03/2018     Pneumococcal Vaccine (2 of 2 - PPSV23) 08/03/2018     Depression Assessment 2 - yearly  08/03/2018     Flu Vaccine (1) 09/01/2018     Tetanus Vaccine - every 10 years  10/21/2018       Preventive Health Recommendations    See your health care provider every year to    Review health changes.     Discuss preventive care.      Review your medicines if your doctor has prescribed any.      You no longer need a yearly Pap test unless you've had an abnormal Pap test in the past 10 years. If you have vaginal symptoms, such as bleeding or discharge, be sure to talk with your provider about a Pap test.      Every 1 to 2 years, have a mammogram.  If you are over 69, talk with your health care provider about whether or not you want to continue having screening mammograms.      Every 10 years, have a colonoscopy. Or, have a yearly FIT test (stool test). These exams will check for colon cancer.       Have a cholesterol test every 5 years, or more often if your doctor advises it.       Have a diabetes test (fasting glucose) every three years. If you are at risk for diabetes, you should have this test more often.       At age 65, have a bone density scan (DEXA) to check for osteoporosis (brittle bone disease).    Shots:    Get a flu shot each year.    Get a tetanus shot every 10 years.    Talk to your doctor about your pneumonia vaccines. There are now two you should receive - Pneumovax (PPSV 23) and Prevnar (PCV  13).    Talk to your pharmacist about the shingles vaccine.    Talk to your doctor about the hepatitis B vaccine.    Nutrition:     Eat at least 5 servings of fruits and vegetables each day.      Eat whole-grain bread, whole-wheat pasta and brown rice instead of white grains and rice.      Get adequate Calcium and Vitamin D.     Lifestyle    Exercise at least 150 minutes a week (30 minutes a day, 5 days a week). This will help you control your weight and prevent disease.      Limit alcohol to one drink per day.      No smoking.       Wear sunscreen to prevent skin cancer.       See your dentist twice a year for an exam and cleaning.      See your eye doctor every 1 to 2 years to screen for conditions such as glaucoma, macular degeneration and cataracts.    Personalized Prevention Plan  You are due for the preventive services outlined below.  Your care team is available to assist you in scheduling these services.  If you have already completed any of these items, please share that information with your care team to update in your medical record.  Health Maintenance Due   Topic Date Due     Hepatitis C Screening  10/31/1968     Discuss Advance Directive Planning  10/31/2005     FALL RISK ASSESSMENT  08/03/2018     Pneumococcal Vaccine (2 of 2 - PPSV23) 08/03/2018     Depression Assessment 2 - yearly  08/03/2018     Flu Vaccine (1) 09/01/2018     Tetanus Vaccine - every 10 years  10/21/2018

## 2018-10-31 NOTE — PROGRESS NOTES
"SUBJECTIVE:   Anali Sanchez is a 68 year old female who presents for Preventive Visit.  {PVP to remind patient that this is not necessarily a physical exam; physical exam may or may not be done:495368::\"click delete button to remove this line now\"}  {PVP to inform patient that additional E&M charge may apply, if additional problems addressed:630555::\"click delete button to remove this line now\"}  Are you in the first 12 months of your Medicare coverage?  {No Yes:773477::\"No\"}    Annual Wellness Visit     In general, how would you rate your overall health?  Good    Frequency of exercise:  2-3 days/week    Duration of exercise:  30-45 minutes    Do you usually eat at least 4 servings of fruit and vegetables a day, include whole grains    & fiber and avoid regularly eating high fat or \"junk\" foods?  Yes    Taking medications regularly:  Yes    Medication side effects:  Not applicable    Ability to successfully perform activities of daily living:  No assistance needed    Home Safety:  No safety concerns identified    Hearing Impairment:  Need to ask people to speak up or repeat themselves    In the past 6 months, have you been bothered by leaking of urine?  No    In general, how would you rate your overall mental or emotional health?  Good    PHQ-2 Total Score: 0    Additional concerns today:  No    {Hearing Test Done (Optional):510083}  {Add if <65 person on Medicare  - Required Questions (Optional):672658}  Fall risk:  {Document Fall Risk in the Assessments Section of the Navigator:661643}  {If any of the above assessments are answered yes, consider ordering appropriate referrals (Optional):022746::\"click delete button to remove this line now\"}  {AWV Cognitive Screenin}    Reviewed and updated as needed this visit by clinical staff         Reviewed and updated as needed this visit by Provider        Social History   Substance Use Topics     Smoking status: Never Smoker     Smokeless tobacco: Never Used "     Alcohol use Yes      Comment: occas; glass of wine with dinner; usually one.        No flowsheet data found.{add AUDIT responses (Optional) (A score of 7 for adult men is an indication of hazardous drinking; a score of 8 or more is an indication of an alcohol use disorder.  A score of 7 or more for adult women is an indication of hazardous drinking or an alchohol use disorder):726840}    {Outside tests to abstract? :364246}    {additional problems to add (Optional):667332}    Do you feel safe in your environment - {YES/NO/NA:454313}    Do you have a Health Care Directive?: {HEALTHCARE DIRECTIVE STATUS:819732}    Current providers sharing in care for this patient include:   Patient Care Team:  Vianey Purcell MD as PCP - General (Family Practice)    The following health maintenance items are reviewed in Epic and correct as of today:  Health Maintenance   Topic Date Due     HEPATITIS C SCREENING  10/31/1968     ADVANCE DIRECTIVE PLANNING Q5 YRS  10/31/2005     FALL RISK ASSESSMENT  08/03/2018     PNEUMOCOCCAL (2 of 2 - PPSV23) 08/03/2018     PHQ-2 Q1 YR  08/03/2018     INFLUENZA VACCINE (1) 09/01/2018     TETANUS IMMUNIZATION (SYSTEM ASSIGNED)  10/21/2018     MAMMO SCREEN Q2 YR (SYSTEM ASSIGNED)  10/30/2020     LIPID SCREEN Q5 YR FEMALE (SYSTEM ASSIGNED)  10/25/2022     DEXA SCAN SCREENING (SYSTEM ASSIGNED)  Completed     {Chronicprobdata (Optional):148549}    {Decision Support (Optional):820375}    Review of Systems   Constitutional: Negative for chills and fever.   HENT: Negative for congestion, ear pain, hearing loss and sore throat.    Eyes: Negative for pain and visual disturbance.   Respiratory: Negative for cough and shortness of breath.    Cardiovascular: Negative for chest pain, palpitations and peripheral edema.   Gastrointestinal: Negative for abdominal pain, constipation, diarrhea, heartburn, hematochezia and nausea.   Breasts:  Negative for tenderness, breast mass and discharge.   Genitourinary:  "Negative for dysuria, frequency, genital sores, hematuria, pelvic pain, urgency, vaginal bleeding and vaginal discharge.   Musculoskeletal: Negative for arthralgias, joint swelling and myalgias.   Skin: Positive for rash.   Neurological: Negative for dizziness, weakness, headaches and paresthesias.   Psychiatric/Behavioral: Negative for mood changes. The patient is nervous/anxious.      {ROS COMP (Optional):387864}    OBJECTIVE:   There were no vitals taken for this visit. Estimated body mass index is 23.67 kg/(m^2) as calculated from the following:    Height as of 8/3/17: 5' 3.25\" (1.607 m).    Weight as of 8/3/17: 134 lb 11.2 oz (61.1 kg).  Physical Exam  {Exam (Optional) :453547}    {Diagnostic Test Results (Optional):924108::\"Diagnostic Test Results:\",\"none \"}    ASSESSMENT / PLAN:   {Dia Picklist:255008}    End of Life Planning:  Patient currently has an advanced directive: { :844309}    COUNSELING:  {Medicare Counselin}    BP Readings from Last 1 Encounters:   17 120/66     Estimated body mass index is 23.67 kg/(m^2) as calculated from the following:    Height as of 8/3/17: 5' 3.25\" (1.607 m).    Weight as of 8/3/17: 134 lb 11.2 oz (61.1 kg).    {BP Counseling- Complete if BP >= 120/80  (Optional):497081}  {Weight Management Plan (ACO) Complete if BMI is abnormal-  Ages 18-64  BMI >24.9.  Age 65+ with BMI <23 or >30 (Optional):912180}     reports that she has never smoked. She has never used smokeless tobacco.  {Tobacco Cessation -- Complete if patient is a smoker (Optional):275767}    Appropriate preventive services were discussed with this patient, including applicable screening as appropriate for cardiovascular disease, diabetes, osteopenia/osteoporosis, and glaucoma.  As appropriate for age/gender, discussed screening for colorectal cancer, prostate cancer, breast cancer, and cervical cancer. Checklist reviewing preventive services available has been given to the patient.    Reviewed " patients plan of care and provided an AVS. The {CarePlan:626247} for Anali meets the Care Plan requirement. This Care Plan has been established and reviewed with the {PATIENT, FAMILY MEMBER, CAREGIVER:975098}.    Counseling Resources:  ATP IV Guidelines  Pooled Cohorts Equation Calculator  Breast Cancer Risk Calculator  FRAX Risk Assessment  ICSI Preventive Guidelines  Dietary Guidelines for Americans, 2010  [employer]'s MyPlate  ASA Prophylaxis  Lung CA Screening    Vianey Purcell MD, MD  [facility]

## 2018-11-12 ENCOUNTER — OFFICE VISIT (OUTPATIENT)
Dept: FAMILY MEDICINE | Facility: CLINIC | Age: 68
End: 2018-11-12
Payer: COMMERCIAL

## 2018-11-12 VITALS — DIASTOLIC BLOOD PRESSURE: 78 MMHG | SYSTOLIC BLOOD PRESSURE: 122 MMHG | OXYGEN SATURATION: 99 % | HEART RATE: 56 BPM

## 2018-11-12 DIAGNOSIS — L72.0 EPIDERMOID CYST OF FACE: ICD-10-CM

## 2018-11-12 DIAGNOSIS — D18.01 HEMANGIOMA OF SKIN AND SUBCUTANEOUS TISSUE: ICD-10-CM

## 2018-11-12 DIAGNOSIS — L82.1 SEBORRHEIC KERATOSES: ICD-10-CM

## 2018-11-12 DIAGNOSIS — D48.5 NEOPLASM OF UNCERTAIN BEHAVIOR OF SKIN: Primary | ICD-10-CM

## 2018-11-12 DIAGNOSIS — D22.9 MULTIPLE MELANOCYTIC NEVI: ICD-10-CM

## 2018-11-12 DIAGNOSIS — L81.4 SOLAR LENTIGO: ICD-10-CM

## 2018-11-12 DIAGNOSIS — I83.93 VARICOSE VEINS OF BOTH LOWER EXTREMITIES, UNSPECIFIED WHETHER COMPLICATED: ICD-10-CM

## 2018-11-12 PROCEDURE — 11305 SHAVE SKIN LESION 0.5 CM/<: CPT | Performed by: FAMILY MEDICINE

## 2018-11-12 PROCEDURE — 88305 TISSUE EXAM BY PATHOLOGIST: CPT | Mod: TC | Performed by: FAMILY MEDICINE

## 2018-11-12 PROCEDURE — 99213 OFFICE O/P EST LOW 20 MIN: CPT | Mod: 25 | Performed by: FAMILY MEDICINE

## 2018-11-12 NOTE — MR AVS SNAPSHOT
"              After Visit Summary   11/12/2018    Anali Sanchez    MRN: [id number]           Patient Information     Date Of Birth          1950        Visit Information        Provider Department      11/12/2018 10:40 AM Leticia Madrigal MD [facility] [place]        Today's Diagnoses     Neoplasm of uncertain behavior of skin    -  1    Hemangioma of skin and subcutaneous tissue        Epidermoid cyst of face        Seborrheic keratoses        Multiple melanocytic nevi        Solar lentigo        Varicose veins of both lower extremities, unspecified whether complicated          Care Instructions    FUTURE APPOINTMENTS  Follow up per pathology report.    WOUND CARE INSTRUCTIONS  1. Wash hands before every dressing change.  2. After 24 hours, change dressing daily.  3. Wash the wound area with a mild soap, then rinse.  4. Gently pat dry with a sterile gauze or Q-tip.  5. Using a Q-tip, apply Vaseline or Aquaphor only over entire wound. Do NOT use Neosporin - as many people react to neomycin.  6. Finally, cover with a bandage or sterile non-stick gauze with micropore paper tape.  7. Repeat once daily until wound has healed.      Soap, water and shampoo will not hurt this area.    Do not go swimming or take baths, but showering is encouraged.    Limit use of the area where the procedure was done for a few days to allow for optimal healing.    If you experience bleeding:  Wash hands and hold firm pressure on the area for 10 minutes without checking to see if the bleeding has stopped. \"Checking\" pulls off the protective wound clot and restarts the bleeding all over again. Re-apply pressure for 10 minutes if necessary to stop bleeding.  Use additional sterile gauze and tape to maintain pressure once bleeding has stopped.  If bleeding continues, then call back to clinic at [phone].    Signs of Infection:  Infection can occur in any area where skin has been disrupted.  If you notice " persistent redness, swelling, colored drainage, increasing pain, fever or other signs of infection, please call us at: [phone] and ask to have me or my colleague paged. We will call you back to discuss.    Pathology Results:  You will be notified, generally via letter or MyChart, in approximately 10 days. If there is anything we need to discuss or further treatment needed, I will call you to discuss it.    PATIENT INFORMATION : WOUNDS  During the healing process you will notice a number of changes. All wounds develop a small halo of redness surrounding the wound.  This means healing is occurring. Severe itching with extensive redness usually indicates sensitivity to the ointment or bandage tape used to dress the wound.  You should call our office if this develops.      Swelling  and/or discoloration around your surgical site is common, particularly when performed around the eye.    All wounds normally drain.  The larger the wound the more drainage there will be.  After 7-10 days, you will notice the wound beginning to shrink and new skin will begin to grow.  The wound is healed when you can see skin has formed over the entire area.  A healed wound has a healthy, shiny look to the surface and is red to dark pink in color to normalize.  Wounds may take approximately 4-6 weeks to heal.  Larger wounds may take 6-8 weeks. After the wound is healed you may discontinue dressing changes.    You may experience a sensation of tightness as your wound heals. This is normal and will gradually subside.    Your healed wound may be sensitive to temperature changes. This sensitivity improves with time, but if you re having a lot of discomfort, try to avoid temperature extremes.    Patients frequently experience itching after their wound appears to have healed because of the continue healing under the skin.  Plain Vaseline will help relieve the itching.    SUN PROTECTION INSTRUCTIONS  Sun damage can lead to skin cancer and  "premature aging of the skin.      The best way to protect from sun damage to your skin is to avoid the sun during peak hours (10 am - 2 pm) even on overcast days.      Use UPF sun-protective clothing, which while more expensive initially provides longer lasting coverage without having to worry about remembering to re-apply.  1. Wear a wide-brimmed hat and sunglasses.   2. Wear sun-protective clothing.  [employer] and other [employer] make sun protective clothing that are stylish, comfortable and cool. [employer] and other [employer] make UV arm sleeves suitable for golfing, gardening and other activities.      Sunscreen instructions:  1. Use sunscreens with Zinc Oxide, Titanium Dioxide or Avobenzone to protect from UVA rays.  2. Use SPF 30-50+ to protect from UVB rays.  3. Re-apply every 2 hours even if water resistant.  4. Apply on your face every day even when cloudy and even in the winter. UVA \"aging rays\" penetrate window glass and are just as strong in the winter as in the summer.    Product Recommendations:    Consider use of sunscreen sticks with Zinc Oxide and Titanium Dioxide active ingredients such as Neutrogena Pure&Free Baby Sunscreen Stick.    Good examples include: Blue Lizard, EltaMD, Solbar    Good daily moisturizers with SPF: Vanicream, CeraVe.    For sensitive skin, consider : SkinMedica Essential Defense Mineral Shield Broad Spectrum SPF 35    Men: consider use of Neutrogena Triple Protect Facial Lotion      Never use tanning beds. Tanning beds are associated with much higher risks of skin cancer.    All tanning damages the skin. Aim for ivory skin year round and you will have less trouble with your skin in years to come. There is no merit in getting \"a base tan\" before a warm weather vacation, as any tanning indicates your body's response to sun damage.    Stop smoking. Smokers have higher rates of skin cancer and also have premature skin wrinkling.    FYI  You should use about 3 " tablespoons of sunscreen to protect your whole body. Thus a typical eight ounce bottle of sunscreen should last 4 applications. Remember, that the SPF rating is compromised if you don t apply enough. Most people only apply 1/2 - 1/3 of the amount they need. Also don t forget areas such as your ears, feet, upper back and harder to reach places. Keep in mind that these amounts should be increased for larger body sizes.    Sunscreens with titanium dioxide and/or zinc oxide in the active ingredients are physical blockers as opposed to chemical blockers. Chemical-free sunscreens should not irritate the skin.    Spray-on sunscreens may be used for touch-up application only, not as a base layer. Also, use with caution around small children due to inhalation risk.    Avoid retinyl palmitate products.    Avoid combination products that include both sunscreen and insect repellant, as sunscreen should be applied every 2 hours, but insect repellant should not be applied as frequently.    SPF means sun protection factor, which is just the degree to which the sunscreen can protect against UVB rays. There is no rating system for UVA rays. SPF is calculated as the time skin will burn when sunscreen is applied vs. skin without sunscreen.    Water resistant sunscreens should be re-applied every 1-2 hours.    For more information:  http://www.skincancer.org/prevention/sun-protection/sunscreen/sunscreens-safe-and-effective          Follow-ups after your visit        Your next 10 appointments already scheduled     Dec 04, 2018 10:30 AM CST   PHYSICAL with Vianey Purcell MD   [facility] ([facility]    [address] [address] [address] [phone]              Who to contact     If you have questions or need follow up information about today's clinic visit or your schedule please contact [facility] [place] directly at [phone].  Normal or non-critical lab and imaging results  will be communicated to you by MyChart, letter or phone within 4 business days after the clinic has received the results. If you do not hear from us within 7 days, please contact the clinic through [employer] or phone. If you have a critical or abnormal lab result, we will notify you by phone as soon as possible.  Submit refill requests through [employer] or call your pharmacy and they will forward the refill request to us. Please allow 3 business days for your refill to be completed.          Additional Information About Your Visit        [employer] Information     [employer] gives you secure access to your electronic health record. If you see a primary care provider, you can also send messages to your care team and make appointments. If you have questions, please call your primary care clinic.  If you do not have a primary care provider, please call [phone] and they will assist you.        Care EveryWhere ID     This is your Care EveryWhere ID. This could be used by other organizations to access your [place] medical records  [id number]        Your Vitals Were     Pulse Pulse Oximetry                56 99%           Blood Pressure from Last 3 Encounters:   11/12/18 122/78   08/03/17 120/66   04/20/17 118/74    Weight from Last 3 Encounters:   08/03/17 134 lb 11.2 oz (61.1 kg)   04/20/17 135 lb 3.2 oz (61.3 kg)   08/02/16 136 lb 6.4 oz (61.9 kg)              We Performed the Following     Dermatological path order and indications     SHAV SKIN LESION SCLP/NCK/HNDS/FEET/GEN <=0.5 CM        Primary Care Provider Office Phone # Fax #    Vianey Purcell [phone]       [address]  [facility] [address]        Equal Access to Services     [facility]: Hadii aad ku hadasho Soomaali, waaxda luqadaha, qaybta kaalmada adeegyada, polly sanchez. So [facility] [phone].    ATENCIÓN: Si habla español, tiene a guajardo disposición servicios gratuitos de asistencia lingüística. Llame al  [phone].    We comply with applicable federal civil rights laws and [place] laws. We do not discriminate on the basis of race, color, national origin, age, disability, sex, sexual orientation, or gender identity.            Thank you!     Thank you for choosing [facility] [place]  for your care. Our goal is always to provide you with excellent care. Hearing back from our patients is one way we can continue to improve our services. Please take a few minutes to complete the written survey that you may receive in the mail after your visit with us. Thank you!             Your Updated Medication List - Protect others around you: Learn how to safely use, store and throw away your medicines at www.disposemymeds.org.          This list is accurate as of 11/12/18 10:41 AM.  Always use your most recent med list.                   Brand Name Dispense Instructions for use Diagnosis    Calcium-Vitamin D-Vitamin K 500-200-40 MG-UNT-MCG Chew      Takes 3 chewables daily        sertraline 25 MG tablet    ZOLOFT    90 tablet    TAKE 1 TABLET BY MOUTH DAILY    Anxiety

## 2018-11-12 NOTE — PATIENT INSTRUCTIONS
"FUTURE APPOINTMENTS  Follow up per pathology report.    WOUND CARE INSTRUCTIONS  1. Wash hands before every dressing change.  2. After 24 hours, change dressing daily.  3. Wash the wound area with a mild soap, then rinse.  4. Gently pat dry with a sterile gauze or Q-tip.  5. Using a Q-tip, apply Vaseline or Aquaphor only over entire wound. Do NOT use Neosporin - as many people react to neomycin.  6. Finally, cover with a bandage or sterile non-stick gauze with micropore paper tape.  7. Repeat once daily until wound has healed.      Soap, water and shampoo will not hurt this area.    Do not go swimming or take baths, but showering is encouraged.    Limit use of the area where the procedure was done for a few days to allow for optimal healing.    If you experience bleeding:  Wash hands and hold firm pressure on the area for 10 minutes without checking to see if the bleeding has stopped. \"Checking\" pulls off the protective wound clot and restarts the bleeding all over again. Re-apply pressure for 10 minutes if necessary to stop bleeding.  Use additional sterile gauze and tape to maintain pressure once bleeding has stopped.  If bleeding continues, then call back to clinic at [phone].    Signs of Infection:  Infection can occur in any area where skin has been disrupted.  If you notice persistent redness, swelling, colored drainage, increasing pain, fever or other signs of infection, please call us at: [phone] and ask to have me or my colleague paged. We will call you back to discuss.    Pathology Results:  You will be notified, generally via letter or MyChart, in approximately 10 days. If there is anything we need to discuss or further treatment needed, I will call you to discuss it.    PATIENT INFORMATION : WOUNDS  During the healing process you will notice a number of changes. All wounds develop a small halo of redness surrounding the wound.  This means healing is occurring. Severe itching with extensive " redness usually indicates sensitivity to the ointment or bandage tape used to dress the wound.  You should call our office if this develops.      Swelling  and/or discoloration around your surgical site is common, particularly when performed around the eye.    All wounds normally drain.  The larger the wound the more drainage there will be.  After 7-10 days, you will notice the wound beginning to shrink and new skin will begin to grow.  The wound is healed when you can see skin has formed over the entire area.  A healed wound has a healthy, shiny look to the surface and is red to dark pink in color to normalize.  Wounds may take approximately 4-6 weeks to heal.  Larger wounds may take 6-8 weeks. After the wound is healed you may discontinue dressing changes.    You may experience a sensation of tightness as your wound heals. This is normal and will gradually subside.    Your healed wound may be sensitive to temperature changes. This sensitivity improves with time, but if you re having a lot of discomfort, try to avoid temperature extremes.    Patients frequently experience itching after their wound appears to have healed because of the continue healing under the skin.  Plain Vaseline will help relieve the itching.    SUN PROTECTION INSTRUCTIONS  Sun damage can lead to skin cancer and premature aging of the skin.      The best way to protect from sun damage to your skin is to avoid the sun during peak hours (10 am - 2 pm) even on overcast days.      Use UPF sun-protective clothing, which while more expensive initially provides longer lasting coverage without having to worry about remembering to re-apply.  1. Wear a wide-brimmed hat and sunglasses.   2. Wear sun-protective clothing.  [employer] and other Admitt[employer] make sun protective clothing that are stylish, comfortable and cool. [employer] and other Admittor make UV arm sleeves suitable for golfing, gardening and other activities.      Sunscreen  "instructions:  1. Use sunscreens with Zinc Oxide, Titanium Dioxide or Avobenzone to protect from UVA rays.  2. Use SPF 30-50+ to protect from UVB rays.  3. Re-apply every 2 hours even if water resistant.  4. Apply on your face every day even when cloudy and even in the winter. UVA \"aging rays\" penetrate window glass and are just as strong in the winter as in the summer.    Product Recommendations:    Consider use of sunscreen sticks with Zinc Oxide and Titanium Dioxide active ingredients such as Neutrogena Pure&Free Baby Sunscreen Stick.    Good examples include: Blue Lizard, EltaMD, Solbar    Good daily moisturizers with SPF: Vanicream, CeraVe.    For sensitive skin, consider : SkinMedica Essential Defense Mineral Shield Broad Spectrum SPF 35    Men: consider use of Neutrogena Triple Protect Facial Lotion      Never use tanning beds. Tanning beds are associated with much higher risks of skin cancer.    All tanning damages the skin. Aim for ivory skin year round and you will have less trouble with your skin in years to come. There is no merit in getting \"a base tan\" before a warm weather vacation, as any tanning indicates your body's response to sun damage.    Stop smoking. Smokers have higher rates of skin cancer and also have premature skin wrinkling.    FYI  You should use about 3 tablespoons of sunscreen to protect your whole body. Thus a typical eight ounce bottle of sunscreen should last 4 applications. Remember, that the SPF rating is compromised if you don t apply enough. Most people only apply 1/2 - 1/3 of the amount they need. Also don t forget areas such as your ears, feet, upper back and harder to reach places. Keep in mind that these amounts should be increased for larger body sizes.    Sunscreens with titanium dioxide and/or zinc oxide in the active ingredients are physical blockers as opposed to chemical blockers. Chemical-free sunscreens should not irritate the skin.    Spray-on sunscreens may be " used for touch-up application only, not as a base layer. Also, use with caution around small children due to inhalation risk.    Avoid retinyl palmitate products.    Avoid combination products that include both sunscreen and insect repellant, as sunscreen should be applied every 2 hours, but insect repellant should not be applied as frequently.    SPF means sun protection factor, which is just the degree to which the sunscreen can protect against UVB rays. There is no rating system for UVA rays. SPF is calculated as the time skin will burn when sunscreen is applied vs. skin without sunscreen.    Water resistant sunscreens should be re-applied every 1-2 hours.    For more information:  http://www.skincancer.org/prevention/sun-protection/sunscreen/sunscreens-safe-and-effective

## 2018-11-12 NOTE — LETTER
"    11/12/2018         RE: Anali Sanchez  [address] [facility] [address]        Dear Colleague,    Thank you for referring your patient, Anali Sanchez, to the [facility] [place]. Please see a copy of my visit note below.    [facility] - PRIMARY CARE SKIN    CC : skin cancer screening (full-body)  SUBJECTIVE:                                                    Anali Sanchez is a 68 year old female who presents to clinic today for a full-body skin exam because of lesion on the face and neck.    Bothersome lesions noticed by the patient or other skin concerns :  Issue One : Lesions on face and neck. A lesion on the left neck has been irritated by friction from clothing.  Onset : present for years.  Enlarging : NO.  Bleeding : NO  Itchy or irritating : NO.  Pain or tenderness : NO.  Changing color : NO.    Issue Two : A bump on the right scalp has been present since age 19. It was diagnosed at that time has \"a mass of blood vessels.\" It has not changed since the initial onset.    Personal history of skin cancer : NO.  Family history of skin cancer : NO.    Personal Medical History  Eczema Psoriasis Autoimmune   NO NO NO     Family Medical History  Eczema Psoriasis Autoimmune   NO NO NO     Occupation : retired, previously teacher (indoor).    Refer to electronic medical record (EMR) for past medical history and medications.    INTEGUMENTARY/SKIN: NEGATIVE for worrisome rashes, moles or lesions  ROS : 14 point review of systems was negative except the symptoms listed above in the HPI.    This document serves as a record of the services and decisions personally performed and made by Mouna Madrigal MD. It was created on her behalf by Marquis Mcpherson, a trained medical scribe.  The creation of this document is based on the scribe's personal observations and the provider's statements to the medical scribe.  aMrquis Mcpherson, November 12, 2018 10:30 AM      OBJECTIVE:                          "                           GENERAL: healthy, alert and no distress  SKIN: [place] Skin Type - I-II.  This patient was examined from the top of the head to the bottom of the feet  including scalp, face, neck, back, chest, breasts, buttocks, both arms, both legs, both hands, both feet, all 10 fingers and all 10 toes. The dermatoscope was used to help evaluate pigmented lesions.  Skin Pertinent Findings:  Right lateral frontal scalp : 4 x 3 cm soft smooth subepidermal mass present for years. Previously diagnosed as hemangioma.    Mid-forehead, left of midline : 6 mm in size subepidermal mass most consistent with cyst.    Left lateral forehead : 7 mm in size stuck-on appearing papules, raised, brown, coarse-textured, round lesion(s) most consistent with seborrheic keratoses.    Right arm(s) and left arm(s) : brown, macule(s) most consistent with benign solar lentigo. brown macules of various sizes and shapes most consistent with (benign) melanocytic nevi.    Abdomen : Scattered stuck-on appearing papules, raised, brown, coarse-textured, round lesion(s) most consistent with seborrheic keratoses.    Lower legs and dorsal foot : Multiple varicosities    Back : Few scattered brown macules of various sizes and shapes most consistent with (benign) melanocytic nevi.    Significant Findings:  Left mid-lateral neck : 5 mm in size, smooth, raised, benign-appearing lesion. ? Benign compound nevus ? Neuroma    Diagnostic Test Results:  none           ASSESSMENT:                                                      Encounter Diagnoses   Name Primary?     Neoplasm of uncertain behavior of skin Yes     Hemangioma of skin and subcutaneous tissue      Epidermoid cyst of face      Seborrheic keratoses      Multiple melanocytic nevi      Solar lentigo      Varicose veins of both lower extremities, unspecified whether complicated          PLAN:                                                    Patient Instructions   FUTURE  "APPOINTMENTS  Follow up per pathology report.    WOUND CARE INSTRUCTIONS  1. Wash hands before every dressing change.  2. After 24 hours, change dressing daily.  3. Wash the wound area with a mild soap, then rinse.  4. Gently pat dry with a sterile gauze or Q-tip.  5. Using a Q-tip, apply Vaseline or Aquaphor only over entire wound. Do NOT use Neosporin - as many people react to neomycin.  6. Finally, cover with a bandage or sterile non-stick gauze with micropore paper tape.  7. Repeat once daily until wound has healed.      Soap, water and shampoo will not hurt this area.    Do not go swimming or take baths, but showering is encouraged.    Limit use of the area where the procedure was done for a few days to allow for optimal healing.    If you experience bleeding:  Wash hands and hold firm pressure on the area for 10 minutes without checking to see if the bleeding has stopped. \"Checking\" pulls off the protective wound clot and restarts the bleeding all over again. Re-apply pressure for 10 minutes if necessary to stop bleeding.  Use additional sterile gauze and tape to maintain pressure once bleeding has stopped.  If bleeding continues, then call back to clinic at [phone].    Signs of Infection:  Infection can occur in any area where skin has been disrupted.  If you notice persistent redness, swelling, colored drainage, increasing pain, fever or other signs of infection, please call us at: [phone] and ask to have me or my colleague paged. We will call you back to discuss.    Pathology Results:  You will be notified, generally via letter or MyChart, in approximately 10 days. If there is anything we need to discuss or further treatment needed, I will call you to discuss it.    PATIENT INFORMATION : WOUNDS  During the healing process you will notice a number of changes. All wounds develop a small halo of redness surrounding the wound.  This means healing is occurring. Severe itching with extensive redness " usually indicates sensitivity to the ointment or bandage tape used to dress the wound.  You should call our office if this develops.      Swelling  and/or discoloration around your surgical site is common, particularly when performed around the eye.    All wounds normally drain.  The larger the wound the more drainage there will be.  After 7-10 days, you will notice the wound beginning to shrink and new skin will begin to grow.  The wound is healed when you can see skin has formed over the entire area.  A healed wound has a healthy, shiny look to the surface and is red to dark pink in color to normalize.  Wounds may take approximately 4-6 weeks to heal.  Larger wounds may take 6-8 weeks. After the wound is healed you may discontinue dressing changes.    You may experience a sensation of tightness as your wound heals. This is normal and will gradually subside.    Your healed wound may be sensitive to temperature changes. This sensitivity improves with time, but if you re having a lot of discomfort, try to avoid temperature extremes.    Patients frequently experience itching after their wound appears to have healed because of the continue healing under the skin.  Plain Vaseline will help relieve the itching.    SUN PROTECTION INSTRUCTIONS  Sun damage can lead to skin cancer and premature aging of the skin.      The best way to protect from sun damage to your skin is to avoid the sun during peak hours (10 am - 2 pm) even on overcast days.      Use UPF sun-protective clothing, which while more expensive initially provides longer lasting coverage without having to worry about remembering to re-apply.  1. Wear a wide-brimmed hat and sunglasses.   2. Wear sun-protective clothing.  [employer] and other [employer] make sun protective clothing that are stylish, comfortable and cool. [employer] and other [employer] make UV arm sleeves suitable for golfing, gardening and other activities.      Sunscreen  "instructions:  1. Use sunscreens with Zinc Oxide, Titanium Dioxide or Avobenzone to protect from UVA rays.  2. Use SPF 30-50+ to protect from UVB rays.  3. Re-apply every 2 hours even if water resistant.  4. Apply on your face every day even when cloudy and even in the winter. UVA \"aging rays\" penetrate window glass and are just as strong in the winter as in the summer.    Product Recommendations:    Consider use of sunscreen sticks with Zinc Oxide and Titanium Dioxide active ingredients such as Neutrogena Pure&Free Baby Sunscreen Stick.    Good examples include: Blue Lizard, EltaMD, Solbar    Good daily moisturizers with SPF: Vanicream, CeraVe.    For sensitive skin, consider : SkinMedica Essential Defense Mineral Shield Broad Spectrum SPF 35    Men: consider use of Neutrogena Triple Protect Facial Lotion      Never use tanning beds. Tanning beds are associated with much higher risks of skin cancer.    All tanning damages the skin. Aim for ivory skin year round and you will have less trouble with your skin in years to come. There is no merit in getting \"a base tan\" before a warm weather vacation, as any tanning indicates your body's response to sun damage.    Stop smoking. Smokers have higher rates of skin cancer and also have premature skin wrinkling.    FYI  You should use about 3 tablespoons of sunscreen to protect your whole body. Thus a typical eight ounce bottle of sunscreen should last 4 applications. Remember, that the SPF rating is compromised if you don t apply enough. Most people only apply 1/2 - 1/3 of the amount they need. Also don t forget areas such as your ears, feet, upper back and harder to reach places. Keep in mind that these amounts should be increased for larger body sizes.    Sunscreens with titanium dioxide and/or zinc oxide in the active ingredients are physical blockers as opposed to chemical blockers. Chemical-free sunscreens should not irritate the skin.    Spray-on sunscreens may be " used for touch-up application only, not as a base layer. Also, use with caution around small children due to inhalation risk.    Avoid retinyl palmitate products.    Avoid combination products that include both sunscreen and insect repellant, as sunscreen should be applied every 2 hours, but insect repellant should not be applied as frequently.    SPF means sun protection factor, which is just the degree to which the sunscreen can protect against UVB rays. There is no rating system for UVA rays. SPF is calculated as the time skin will burn when sunscreen is applied vs. skin without sunscreen.    Water resistant sunscreens should be re-applied every 1-2 hours.    For more information:  http://www.skincancer.org/prevention/sun-protection/sunscreen/sunscreens-safe-and-effective    The patient was counseled about sunscreens and sun avoidance. The patient was counseled to check the skin regularly and report any lesion that is new, changing, itching, scabbing, bleeding or otherwise bothersome. The patient was discharged ambulatory and in stable condition.      PROCEDURES:                                                    Name : Shave Excision  Indication : Excision of tissue for pathology evaluation.  Location(s) : Left mid-lateral neck : 5 mm in size, smooth, raised, benign-appearing lesion. ? Benign compound nevus ? Neuroma.  Completed by : Mouna Madrigal MD  Photo Taken : no.  Anesthesia : Patient was anesthetized by infiltrating the area surrounding the lesion with 1% lidocaine.   epinephrine [id number] : Yes.  Note : Discussed the risk of pain, infection, scarring, hypo- or hyperpigmentation and recurrence or need for re-treatment. The benefits of treatment and alternative treatments were also discussed.    During this procedure, the universal protocol was utilized. The patient's identity was confirmed by no less than two patient identifiers, correct procedure was verified, correct site was verified and marked as  applicable and a final pause was completed.    Sterile technique was used throughout the procedure. The skin was cleaned and prepped with surgical cleanser. Once adequate anesthesia was obtained, the lesion was removed with a deep scallop shave procedure. The specimen was sent to pathology.    Direct pressure and aluminum chloride and monopolar cautery was applied for hemostasis. No bleeding was present upon the completion of the procedure. The wound was coated with antibacterial ointment. A dry sterile dressing was applied. Patient tolerated the procedure well and left in satisfactory condition.    Primary provider and referring provider will be informed regarding the tissue report when it returns.      The information in this document, created by the medical scribe for me, accurately reflects the services I personally performed and the decisions made by me. I have reviewed and approved this document for accuracy prior to leaving the patient care area.  November 12, 2018 10:30 AM  Leticia Madrigal MD  [facility] [place]    Again, thank you for allowing me to participate in the care of your patient.        Sincerely,        Leticia Madrigal MD

## 2018-11-14 LAB — COPATH REPORT: NORMAL

## 2018-11-15 ENCOUNTER — TELEPHONE (OUTPATIENT)
Dept: FAMILY MEDICINE | Facility: CLINIC | Age: 68
End: 2018-11-15

## 2018-11-15 NOTE — TELEPHONE ENCOUNTER
Left message on answering machine for patient to call back.    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     Thank you,  Leticia Madrigal M.D.

## 2018-12-04 ENCOUNTER — TELEPHONE (OUTPATIENT)
Dept: FAMILY MEDICINE | Facility: CLINIC | Age: 68
End: 2018-12-04

## 2018-12-04 ENCOUNTER — OFFICE VISIT (OUTPATIENT)
Dept: FAMILY MEDICINE | Facility: CLINIC | Age: 68
End: 2018-12-04
Payer: COMMERCIAL

## 2018-12-04 VITALS
TEMPERATURE: 97.9 F | HEIGHT: 63 IN | WEIGHT: 133.3 LBS | HEART RATE: 69 BPM | OXYGEN SATURATION: 100 % | BODY MASS INDEX: 23.62 KG/M2 | RESPIRATION RATE: 16 BRPM | SYSTOLIC BLOOD PRESSURE: 120 MMHG | DIASTOLIC BLOOD PRESSURE: 78 MMHG

## 2018-12-04 DIAGNOSIS — Z11.59 NEED FOR HEPATITIS C SCREENING TEST: ICD-10-CM

## 2018-12-04 DIAGNOSIS — Z23 NEED FOR TDAP VACCINATION: ICD-10-CM

## 2018-12-04 DIAGNOSIS — Z00.00 ENCOUNTER FOR ROUTINE ADULT HEALTH EXAMINATION WITHOUT ABNORMAL FINDINGS: Primary | ICD-10-CM

## 2018-12-04 DIAGNOSIS — F41.9 ANXIETY: ICD-10-CM

## 2018-12-04 PROCEDURE — 90471 IMMUNIZATION ADMIN: CPT | Performed by: FAMILY MEDICINE

## 2018-12-04 PROCEDURE — 99397 PER PM REEVAL EST PAT 65+ YR: CPT | Mod: 25 | Performed by: FAMILY MEDICINE

## 2018-12-04 PROCEDURE — 90715 TDAP VACCINE 7 YRS/> IM: CPT | Performed by: FAMILY MEDICINE

## 2018-12-04 RX ORDER — SERTRALINE HYDROCHLORIDE 25 MG/1
25 TABLET, FILM COATED ORAL DAILY
Qty: 90 TABLET | Refills: 3 | Status: SHIPPED | OUTPATIENT
Start: 2018-12-04 | End: 2019-11-18

## 2018-12-04 ASSESSMENT — ENCOUNTER SYMPTOMS
MYALGIAS: 0
NAUSEA: 0
EYE PAIN: 0
ABDOMINAL PAIN: 0
PALPITATIONS: 0
ARTHRALGIAS: 0
SHORTNESS OF BREATH: 0
DIZZINESS: 0
BREAST MASS: 0
COUGH: 0
HEADACHES: 0
PARESTHESIAS: 0
CONSTIPATION: 0
HEMATURIA: 0
DYSURIA: 0
HEARTBURN: 0
FEVER: 0
SORE THROAT: 0
HEMATOCHEZIA: 0
WEAKNESS: 0
DIARRHEA: 0
NERVOUS/ANXIOUS: 1
JOINT SWELLING: 0
CHILLS: 0
FREQUENCY: 0

## 2018-12-04 ASSESSMENT — ACTIVITIES OF DAILY LIVING (ADL): CURRENT_FUNCTION: NO ASSISTANCE NEEDED

## 2018-12-04 NOTE — TELEPHONE ENCOUNTER
Order for fasting lab appt at 8:10am 12/4/18. Patient is coming back for physical at 10:30am and would like to do labs first.

## 2018-12-04 NOTE — PROGRESS NOTES
"SUBJECTIVE:   Anali Sanchez is a 68 year old female who presents for Preventive Visit.      Are you in the first 12 months of your Medicare coverage?  No    Annual Wellness Visit     In general, how would you rate your overall health?  Good    Frequency of exercise:  2-3 days/week    Do you usually eat at least 4 servings of fruit and vegetables a day, include whole grains    & fiber and avoid regularly eating high fat or \"junk\" foods?  Yes    Taking medications regularly:  Yes    Medication side effects:  Not applicable    Ability to successfully perform activities of daily living:  No assistance needed    Home Safety:  No safety concerns identified    Hearing Impairment:  Need to ask people to speak up or repeat themselves    In the past 6 months, have you been bothered by leaking of urine?  No    In general, how would you rate your overall mental or emotional health?  Good    PHQ-2 Total Score: 0    Additional concerns today:  No    Do you feel safe in your environment? Yes    Do you have a Health Care Directive? Yes: Advance Directive has been received and scanned.      Fall risk  Fallen 2 or more times in the past year?: No  Any fall with injury in the past year?: No    Cognitive Screening   1) Repeat 3 items (Leader, Season, Table)    2) Clock draw: NORMAL  3) 3 item recall: Recalls 3 objects  Results: 3 items recalled: COGNITIVE IMPAIRMENT LESS LIKELY    Mini-CogTM Copyright TY Longoria. Licensed by the author for use in [facility]; reprinted with permission (apple@.[email or web address]). All rights reserved.      Do you have sleep apnea, excessive snoring or daytime drowsiness?: no    Reviewed and updated as needed this visit by clinical staff  Tobacco  Allergies  Meds  Med Hx  Surg Hx  Fam Hx  Soc Hx        Reviewed and updated as needed this visit by Provider        Social History   Substance Use Topics     Smoking status: Never Smoker     Smokeless tobacco: Never Used     Alcohol use Yes      " Comment: occas; glass of wine with dinner; usually one.        Alcohol Use 12/4/2018   If you drink alcohol do you typically have greater than 3 drinks per day OR greater than 7 drinks per week? No         Current providers sharing in care for this patient include:   Patient Care Team:  Vianey Purcell MD as PCP - General (Family Practice)    The following health maintenance items are reviewed in Epic and correct as of today:  Health Maintenance   Topic Date Due     HEPATITIS C SCREENING  10/31/1968     ADVANCE DIRECTIVE PLANNING Q5 YRS  10/31/2005     FALL RISK ASSESSMENT  08/03/2018     PNEUMOCOCCAL (2 of 2 - PPSV23) 08/03/2018     PHQ-2 Q1 YR  08/03/2018     INFLUENZA VACCINE (1) 09/01/2018     TETANUS IMMUNIZATION (SYSTEM ASSIGNED)  10/21/2018     MAMMO SCREEN Q2 YR (SYSTEM ASSIGNED)  10/30/2020     LIPID SCREEN Q5 YR FEMALE (SYSTEM ASSIGNED)  10/25/2022     DEXA SCAN SCREENING (SYSTEM ASSIGNED)  Completed     Patient Active Problem List   Diagnosis     Anxiety     CARDIOVASCULAR SCREENING; LDL GOAL LESS THAN 160       Past Medical History:   Diagnosis Date     Anemia      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 cx with hemorrhage. no cancer. slow transit.(2 surgeries; first for low transit; second due to the bleeding)     DILATION AND CURETTAGE, HYSTEROSCOPY DIAGNOSTIC, COMBINED  2/21/2014    Procedure: COMBINED DILATION AND CURETTAGE, HYSTEROSCOPY DIAGNOSTIC;   DILATION AND CURETTAGE, HYSTEROSCOPY DIAGNOSTIC ;  Surgeon: Willie Osborn MD;  Location:  OR      ENT SURGERY      t and a       Obstetric History     No data available          Current Outpatient Medications   Medication Sig Dispense Refill     Calcium-Vitamin D-Vitamin K 500-200-40 MG-UNT-MCG CHEW Takes 3 chewables daily       sertraline (ZOLOFT) 25 MG tablet Take 1 tablet (25 mg) by mouth daily 90 tablet 3       Family History   Problem Relation Age of Onset     Bronchitis Mother         copd     Alcoholism Mother      Diabetes Father      Coronary Artery Disease Father      Cancer Brother         myelofibrosis; will get BMT 2019     Leukemia Sister         chronic       Social History     Tobacco Use     Smoking status: Never Smoker     Smokeless tobacco: Never Used   Substance Use Topics     Alcohol use: Yes     Comment: occas; glass of wine with dinner; usually one.        Immunization History   Administered Date(s) Administered     Influenza (High Dose) 3 valent vaccine 10/25/2017, 11/09/2018     Influenza (IIV3) PF 09/25/1992, 09/30/1993, 10/17/1994, 12/01/1995, 12/30/1996, 12/18/2001, 01/31/2005, 11/07/2006, 10/14/2008, 10/13/2009     Influenza Vaccine IM 3yrs+ 4 Valent IIV4 02/09/2017     Pneumo Conj 13-V (2010&after) 08/03/2017     TD (ADULT, 7+) 04/13/1988, 09/24/1999     TDAP Vaccine (Adacel) 12/04/2018     Tdap (Adacel,Boostrix) 10/21/2008           Review of Systems   Constitutional: Negative for chills and fever.   HENT: Negative for congestion, ear pain, hearing loss and sore throat.    Eyes: Negative for pain and visual disturbance.   Respiratory: Negative for cough and shortness of breath.    Cardiovascular: Negative for chest pain, palpitations and peripheral edema.   Gastrointestinal: Negative for abdominal pain, constipation, diarrhea, heartburn, hematochezia and nausea.   Breasts:  Negative for tenderness, breast mass and discharge.   Genitourinary: Negative for dysuria, frequency, genital sores, hematuria, pelvic pain, urgency, vaginal bleeding and vaginal discharge.   Musculoskeletal:  "Negative for arthralgias, joint swelling and myalgias.   Skin: Positive for rash (Around neck; improving. ).   Neurological: Negative for dizziness, weakness, headaches and paresthesias.   Psychiatric/Behavioral: Negative for mood changes. The patient is nervous/anxious.          OBJECTIVE:   /78 (BP Location: Right arm, Cuff Size: Adult Regular)  Pulse 69  Temp 97.9  F (36.6  C) (Oral)  Resp 16  Ht 5' 3\" (1.6 m)  Wt 133 lb 4.8 oz (60.5 kg)  SpO2 100%  BMI 23.61 kg/m2 Estimated body mass index is 23.61 kg/(m^2) as calculated from the following:    Height as of this encounter: 5' 3\" (1.6 m).    Weight as of this encounter: 133 lb 4.8 oz (60.5 kg).  Physical Exam  GENERAL APPEARANCE: alert and no distress  EYES: Eyes grossly normal to inspection, PERRL and conjunctivae and sclerae normal  HENT: ear canals and TM's normal, nose and mouth without ulcers or lesions, oropharynx clear and oral mucous membranes moist  NECK: no adenopathy, no asymmetry, masses, or scars and thyroid normal to palpation  RESP: lungs clear to auscultation - no rales, rhonchi or wheezes  BREAST: normal without masses, tenderness or nipple discharge and no palpable axillary masses or adenopathy  CV: regular rates and rhythm and no peripheral edema  ABDOMEN: soft, nontender, no hepatosplenomegaly, no masses and bowel sounds normal  MS: no musculoskeletal defects are noted and gait is age appropriate without ataxia  SKIN: no suspicious lesions or rashes  NEURO: Normal strength and tone, sensory exam grossly normal, mentation intact and speech normal  PSYCH: mentation appears normal and affect normal/bright    Recent Labs   Lab Test  10/25/17   0857   CHOL  217*   HDL  120   LDL  85   TRIG  62     The ASCVD Risk score (Artemas PEPE Jr, et al., 2013) failed to calculate for the following reasons:    The valid HDL cholesterol range is 20 to 100 mg/dL            ASSESSMENT / PLAN:     1. Encounter for routine adult health examination without " "abnormal findings      2. Anxiety  Stable. Would like to continue medication  - sertraline (ZOLOFT) 25 MG tablet; Take 1 tablet (25 mg) by mouth daily  Dispense: 90 tablet; Refill: 3    3. Need for hepatitis C screening test    - **Hepatitis C Screen Reflex to RNA FUTURE anytime; Future    4. Need for Tdap vaccination    - TDAP, IM (10 - 64 YRS) - Adacel      End of Life Planning:  Patient currently has an advanced directive: Yes.  Practitioner is supportive of decision.    COUNSELING:  Reviewed preventive health counseling, as reflected in patient instructions       Regular exercise       Healthy diet/nutrition       Vision screening       Dental care       Osteoporosis Prevention/Bone Health    BP Readings from Last 1 Encounters:   12/04/18 120/78     Estimated body mass index is 23.61 kg/m  as calculated from the following:    Height as of this encounter: 1.6 m (5' 3\").    Weight as of this encounter: 60.5 kg (133 lb 4.8 oz).           reports that  has never smoked. she has never used smokeless tobacco.      Appropriate preventive services were discussed with this patient, including applicable screening as appropriate for cardiovascular disease, diabetes, osteopenia/osteoporosis, and glaucoma.  As appropriate for age/gender, discussed screening for colorectal cancer, prostate cancer, breast cancer, and cervical cancer. Checklist reviewing preventive services available has been given to the patient.    Reviewed patients plan of care and provided an AVS. The Basic Care Plan (routine screening as documented in Health Maintenance) for Anali meets the Care Plan requirement. This Care Plan has been established and reviewed with the Patient.    Counseling Resources:  ATP IV Guidelines  Pooled Cohorts Equation Calculator  Breast Cancer Risk Calculator  FRAX Risk Assessment  ICSI Preventive Guidelines  Dietary Guidelines for Americans, 2010  USDA's MyPlate  ASA Prophylaxis  Lung CA Screening    Vianey Purcell MD, " MD  [facility]

## 2019-02-14 ENCOUNTER — TELEPHONE (OUTPATIENT)
Dept: FAMILY MEDICINE | Facility: CLINIC | Age: 69
End: 2019-02-14

## 2019-02-14 NOTE — TELEPHONE ENCOUNTER
Pt called an dwould like nurse to call  She injured her leg last week and deep pain in right leg that is consistant. Would like to discuss this with a nurse today. Please call patient back

## 2019-02-14 NOTE — TELEPHONE ENCOUNTER
Called patient back: last weekend she noticed pain on Sunday (intense). Has been resting Mon/Tues. Feeling better, but pain returned on Wed. No obvious injury/fall. States she does have bad varicose veins. Pain located rt leg from groin and down front of leg to knee - that muscle but deep. Asked if she slipped on the ice and caught her self. She states she did. Pain started next day. No redness or increased warmth in that area. No hard lumps or bruises noted. Treating with Tylenol and heat therapy - notice some relief with both of these. Since Sunday. The pain keeps her from her daily activities.     Huddled with Dr. Purcell: Okay to use BRIAN for tomorrow.  Next 5 appointments (look out 90 days)    Feb 15, 2019  2:50 PM CST  Office Visit with Vianey Purcell MD  [facility] ([facility] [address] [facility] [address] [phone]        Clarisse SHEEHAN, Triage RN

## 2019-02-15 ENCOUNTER — OFFICE VISIT (OUTPATIENT)
Dept: FAMILY MEDICINE | Facility: CLINIC | Age: 69
End: 2019-02-15
Payer: COMMERCIAL

## 2019-02-15 ENCOUNTER — ANCILLARY PROCEDURE (OUTPATIENT)
Dept: GENERAL RADIOLOGY | Facility: CLINIC | Age: 69
End: 2019-02-15
Attending: FAMILY MEDICINE
Payer: COMMERCIAL

## 2019-02-15 VITALS
HEART RATE: 60 BPM | TEMPERATURE: 98.2 F | WEIGHT: 135.7 LBS | BODY MASS INDEX: 24.04 KG/M2 | SYSTOLIC BLOOD PRESSURE: 128 MMHG | RESPIRATION RATE: 16 BRPM | HEIGHT: 63 IN | DIASTOLIC BLOOD PRESSURE: 70 MMHG | OXYGEN SATURATION: 100 %

## 2019-02-15 DIAGNOSIS — R10.31 RIGHT GROIN PAIN: ICD-10-CM

## 2019-02-15 DIAGNOSIS — Z90.49 HISTORY OF COLECTOMY: ICD-10-CM

## 2019-02-15 DIAGNOSIS — M79.604 RIGHT LEG PAIN: Primary | ICD-10-CM

## 2019-02-15 PROCEDURE — 73502 X-RAY EXAM HIP UNI 2-3 VIEWS: CPT | Mod: FY

## 2019-02-15 PROCEDURE — 99213 OFFICE O/P EST LOW 20 MIN: CPT | Performed by: FAMILY MEDICINE

## 2019-02-15 ASSESSMENT — MIFFLIN-ST. JEOR: SCORE: 1114.66

## 2019-02-15 NOTE — PROGRESS NOTES
SUBJECTIVE:   Anali Sanchez is a 68 year old female who presents to clinic today for the following health issues:      Musculoskeletal problem/pain      Duration: Sunday    Description  Location: right upper leg -groin and thigh and sometimes radiate into the knee/calf     Intensity:  moderate    Accompanying signs and symptoms: none    History  Previous similar problem: no   Previous evaluation:  none    Precipitating or alleviating factors:  Trauma or overuse: YES- overuse - has been doing a lot of squatting, stairs, bending- taking care of father   Aggravating factors include: standing, walking and climbing stairs    Therapies tried and outcome: rest/inactivity, heat and acetaminophen          Problem list and histories reviewed & adjusted, as indicated.  Additional history:     See under ROS    Patient Active Problem List   Diagnosis     Anxiety     CARDIOVASCULAR SCREENING; LDL GOAL LESS THAN 160       Current Outpatient Medications   Medication Sig Dispense Refill     Calcium-Vitamin D-Vitamin K 500-200-40 MG-UNT-MCG CHEW Takes 3 chewables daily       sertraline (ZOLOFT) 25 MG tablet Take 1 tablet (25 mg) by mouth daily 90 tablet 3         Reviewed and updated as needed this visit by clinical staff       Reviewed and updated as needed this visit by Provider         ROS:  CONSTITUTIONAL:NEGATIVE for fever, chills, change in weight  MUSCULOSKELETAL: see below  PSYCHIATRIC: NEGATIVE for changes in mood or affect    Right leg pain.  Slipped last Thursday. No pain afterwards.  Friday, was caring for father; cleaning. Getting down on floor. Stairs.    On the way way home, Sunday, her right leg started hurting.   4 hour drive due to weather. ([place]).    Got up Monday, thought ok, but couldn't do as much.   That day and Tuesday, put up and heat. Improved.   Was more active on Wednesday; but developed pain at the end of the day.  Thursday, rested again. Now today, better again.    Next Friday, [facility]  [facility]    Pain starts in groin and goes to thigh, knee. Occasionally into the calf.   No back pain.      OBJECTIVE:     /70 (BP Location: Right arm, Cuff Size: Adult Regular)   Pulse 60   Temp 98.2  F (36.8  C) (Oral)   Resp 16   Ht 1.6 m (5' 3\")   Wt 61.6 kg (135 lb 11.2 oz)   SpO2 100%   BMI 24.04 kg/m    Body mass index is 24.04 kg/m .  GENERAL APPEARANCE: alert and no distress  MS: Back is nontender to palpation.  Forward bending normal.  Extension is normal. Right lateral flexion is normal.  Left lateral flexion is normal.  Rotation normal.  Negative straight leg raising test bilaterally.    Pain is in the right anterior thigh. nontender to palpation. She did have pain with flexion at the right hip and external rotation. She was more stiff at this hip than the left.   No bulge in the groin.  Did not detect abnormality at the knee.   PSYCH: mentation appears normal and affect normal/bright    X ray unremarkable.  IMPRESSION: No acute bony abnormality or degenerative changes related  to the hips. Degenerative changes are seen in the visualized lower  lumbar spine. Remainder of the pelvic bones are normal.    ASSESSMENT/PLAN:     1. Right leg pain  Right hip and thigh. Some radiation to knee. Doubt this is related to the back.   At this time, referred to physical therapy to help get her going on some exercises, body mechanics.  Discussed getting out and moving around during her drive to [facility].   - [facility] REFERRAL; Future    2. Right groin pain  As above.   - XR Pelvis and Hip Right 1 View; Future  - [facility] REFERRAL; Future      3. History of colectomy  Notes that she needs to stop frequently anyways due to this; no problem getting out and walking around during her trip.    Vianey Purcell MD, MD  [facility]  "

## 2019-02-19 ENCOUNTER — THERAPY VISIT (OUTPATIENT)
Dept: PHYSICAL THERAPY | Facility: CLINIC | Age: 69
End: 2019-02-19
Attending: FAMILY MEDICINE
Payer: COMMERCIAL

## 2019-02-19 DIAGNOSIS — M25.551 HIP PAIN, RIGHT: ICD-10-CM

## 2019-02-19 DIAGNOSIS — R10.31 RIGHT GROIN PAIN: ICD-10-CM

## 2019-02-19 PROCEDURE — 97161 PT EVAL LOW COMPLEX 20 MIN: CPT | Mod: GP | Performed by: PHYSICAL THERAPIST

## 2019-02-19 PROCEDURE — 97110 THERAPEUTIC EXERCISES: CPT | Mod: GP | Performed by: PHYSICAL THERAPIST

## 2019-02-19 NOTE — PROGRESS NOTES
"[facility] Initial Evaluation  Subjective:  The history is provided by the patient.           Onset - 2/7/2019.  Fell getting out of truck. 2/10 pain started after a lot of activity, stairs helping her dad.     Groin pain, ant. Thigh, post calf, knee would ache.  Calf is better, now mainly ant thigh.  .    Patient reports pain:  Groin and anterior (ant. thigh).  Radiates to: rt calf pain stopped.  Pain is described as aching  and reported as 2/10.     Symptoms are exacerbated by ascending stairs, descending stairs, sitting and activity (up is worse.  Sitting, 15+\", aching with activity.  Standing, pivoting. )   Since onset symptoms are gradually improving.  Special tests:  X-ray.      General health as reported by patient is good.  Pertinent medical history includes:  Heart problems (cardiac surgery 1984.  Colon surgery 2004.).  Medical allergies: no.    Medication history: Zoloft for anxiety.  Current occupation is Retired..        Barriers include:  None as reported by the patient.    Red flags:  None as reported by the patient.                        Objective:  System         Lumbar/SI Evaluation  ROM:  Arom wnl lumbar: flex wnls, flex 25%, \"stiff\", rt SG reproduced groin pain.      Lumbar Myotomes:  normal                                                              Hip Evaluation  HIP AROM:    Flexion: Left: wnls    Right:  Wnls                  Hip PROM:    Flexion: Left: wnls   Right: wnls    Abduction: Left: wnls    Right: 35 pain at end range    Internal Rotation: Left: 20    Right: 25, pain at end range  External Rotation: Left: 35    Right: 35  Knee Flexion: Left:  Wnls     Right:  Wnls  Knee Extension:  Left: wnls   Right:  Wnls          Hip Strength:  : RIT hip flex, knee ext +ve and rt SLR +ve.  Rt PNB +ve for sx on rt.  B CHD c SLS but hip abd 5/5 c MMT.                                Functional Testing:  Functional test hip: able to do SLS on rt, lt 30 sec 0 TDs.  Unable to do " SLS eyes closed.                         General     ROS    Assessment/Plan:    Patient is a 68 year old female with right side hip complaints.    Patient has the following significant findings with corresponding treatment plan.                Diagnosis 1:  Rt hip strain, rt quad strain  Pain -  manual therapy, splint/taping/bracing/orthotics, self management, education and home program  Decreased ROM/flexibility - manual therapy, therapeutic exercise and home program  Decreased joint mobility - manual therapy, therapeutic exercise, therapeutic activity and home program  Decreased strength - therapeutic exercise, therapeutic activities and home program  Decreased function - therapeutic activities and home program    Therapy Evaluation Codes:   1) Decision-Making    Low complexity using standardized patient assessment instrument and/or measureable assessment of functional outcome.  Cumulative Therapy Evaluation is: Low complexity.    Previous and current functional limitations:  (See Goal Flow Sheet for this information)    Short term and Long term goals: (See Goal Flow Sheet for this information)     Communication ability:  Patient appears to be able to clearly communicate and understand verbal and written communication and follow directions correctly.  Treatment Explanation - The following has been discussed with the patient:   RX ordered/plan of care  Anticipated outcomes  Possible risks and side effects  This patient would benefit from PT intervention to resume normal activities.   Rehab potential is good.    Frequency:  1 X week, once daily  Duration:  for 3 weeks  Discharge Plan:  Achieve all LTG.  Independent in home treatment program.  Reach maximal therapeutic benefit.    Please refer to the daily flowsheet for treatment today, total treatment time and time spent performing 1:1 timed codes.

## 2019-02-20 ASSESSMENT — ACTIVITIES OF DAILY LIVING (ADL)
WALKING_15_MINUTES_OR_GREATER: MODERATE DIFFICULTY
PUTTING_ON_SOCKS_AND_SHOES: SLIGHT DIFFICULTY
GOING_UP_1_FLIGHT_OF_STAIRS: MODERATE DIFFICULTY
STANDING_FOR_15_MINUTES: SLIGHT DIFFICULTY
HEAVY_WORK: MODERATE DIFFICULTY
WALKING_INITIALLY: SLIGHT DIFFICULTY
RECREATIONAL_ACTIVITIES: MODERATE DIFFICULTY
HOW_WOULD_YOU_RATE_YOUR_CURRENT_LEVEL_OF_FUNCTION_DURING_YOUR_USUAL_ACTIVITIES_OF_DAILY_LIVING_FROM_0_TO_100_WITH_100_BEING_YOUR_LEVEL_OF_FUNCTION_PRIOR_TO_YOUR_HIP_PROBLEM_AND_0_BEING_THE_INABILITY_TO_PERFORM_ANY_OF_YOUR_USUAL_DAILY_ACTIVITIES?: 75
HOS_ADL_COUNT: 15
WALKING_APPROXIMATELY_10_MINUTES: MODERATE DIFFICULTY
HOS_ADL_HIGHEST_POTENTIAL_SCORE: 60
GETTING_INTO_AND_OUT_OF_A_BATHTUB: SLIGHT DIFFICULTY
STEPPING_UP_AND_DOWN_CURBS: SLIGHT DIFFICULTY
HOS_ADL_ITEM_SCORE_TOTAL: 37
TWISTING/PIVOTING_ON_INVOLVED_LEG: MODERATE DIFFICULTY
GETTING_INTO_AND_OUT_OF_AN_AVERAGE_CAR: SLIGHT DIFFICULTY
ROLLING_OVER_IN_BED: SLIGHT DIFFICULTY
GOING_DOWN_1_FLIGHT_OF_STAIRS: MODERATE DIFFICULTY
DEEP_SQUATTING: MODERATE DIFFICULTY
HOS_ADL_SCORE(%): 61.67
SITTING_FOR_15_MINUTES: SLIGHT DIFFICULTY
LIGHT_TO_MODERATE_WORK: SLIGHT DIFFICULTY

## 2019-02-21 ENCOUNTER — THERAPY VISIT (OUTPATIENT)
Dept: PHYSICAL THERAPY | Facility: CLINIC | Age: 69
End: 2019-02-21
Payer: COMMERCIAL

## 2019-02-21 DIAGNOSIS — M25.551 HIP PAIN, RIGHT: ICD-10-CM

## 2019-02-21 PROCEDURE — 97110 THERAPEUTIC EXERCISES: CPT | Mod: GP | Performed by: PHYSICAL THERAPIST

## 2019-02-21 NOTE — PROGRESS NOTES
Anali Sanchez was seen 2 times for evaluation and treatment..  Due to short treatment duration, will discharge as [place].  Please see goal flow sheet from episode noted date below and initial evaluation for further information.    Linked Episodes   Type: Episode: Status: Noted: Resolved: Last update: Updated by:   PHYSICAL THERAPY Rt hip pain2/19/2019 Active 2/19/2019 2/21/2019  8:37 AM Dl Leblanc, PT      Comments:

## 2019-02-23 PROBLEM — Z90.49 HISTORY OF COLECTOMY: Status: ACTIVE | Noted: 2019-02-23

## 2019-11-06 ENCOUNTER — ALLIED HEALTH/NURSE VISIT (OUTPATIENT)
Dept: NURSING | Facility: CLINIC | Age: 69
End: 2019-11-06
Payer: COMMERCIAL

## 2019-11-06 DIAGNOSIS — Z11.59 NEED FOR HEPATITIS C SCREENING TEST: ICD-10-CM

## 2019-11-06 DIAGNOSIS — Z23 NEED FOR PROPHYLACTIC VACCINATION AND INOCULATION AGAINST INFLUENZA: Primary | ICD-10-CM

## 2019-11-06 PROCEDURE — 99207 ZZC NO CHARGE NURSE ONLY: CPT

## 2019-11-06 PROCEDURE — 36415 COLL VENOUS BLD VENIPUNCTURE: CPT | Performed by: FAMILY MEDICINE

## 2019-11-06 PROCEDURE — G0008 ADMIN INFLUENZA VIRUS VAC: HCPCS

## 2019-11-06 PROCEDURE — 90662 IIV NO PRSV INCREASED AG IM: CPT

## 2019-11-06 PROCEDURE — 86803 HEPATITIS C AB TEST: CPT | Performed by: FAMILY MEDICINE

## 2019-11-07 LAB — HCV AB SERPL QL IA: NONREACTIVE

## 2019-11-18 DIAGNOSIS — F41.9 ANXIETY: ICD-10-CM

## 2019-11-18 NOTE — TELEPHONE ENCOUNTER
"Requested Prescriptions   Pending Prescriptions Disp Refills     sertraline (ZOLOFT) 25 MG tablet [Pharmacy Med Name: Sertraline HCl Oral Tablet 25 MG] 90 tablet 2     Sig: TAKE 1 TABLET BY MOUTH ONE TIME DAILY   Last Written Prescription Date:  12/4/18  Last Fill Quantity: 90,  # refills: 3   Last office visit: 2/15/2019 with prescribing provider:  Vianey Purcell MD   Future Office Visit:   Next 5 appointments (look out 90 days)    Marquez 10, 2020  1:30 PM CST  Office Visit with Vianey Purcell MD  [facility] ([facility] [address] [facility] [address]  [phone]             SSRIs Protocol Passed - 11/18/2019  7:02 AM   PHQ-9 SCORE 5/3/2016 8/2/2016 8/3/2017   PHQ-9 Total Score 6 3 7     MATEUS-7 SCORE 5/3/2016 8/2/2016 8/3/2017   Total Score 18 6 10            Passed - Recent (12 mo) or future (30 days) visit within the authorizing provider's specialty     Patient has had an office visit with the authorizing provider or a provider within the authorizing providers department within the previous 12 mos or has a future within next 30 days. See \"Patient Info\" tab in inbasket, or \"Choose Columns\" in Meds & Orders section of the refill encounter.              Passed - Medication is active on med list        Passed - Patient is age 18 or older        Passed - No active pregnancy on record        Passed - No positive pregnancy test in last 12 months         "

## 2019-11-20 RX ORDER — SERTRALINE HYDROCHLORIDE 25 MG/1
TABLET, FILM COATED ORAL
Qty: 90 TABLET | Refills: 2 | Status: SHIPPED | OUTPATIENT
Start: 2019-11-20 | End: 2020-01-10

## 2019-12-02 ENCOUNTER — NURSE TRIAGE (OUTPATIENT)
Dept: FAMILY MEDICINE | Facility: CLINIC | Age: 69
End: 2019-12-02

## 2019-12-02 ENCOUNTER — HOSPITAL ENCOUNTER (EMERGENCY)
Facility: CLINIC | Age: 69
Discharge: HOME | End: 2019-12-02
Attending: EMERGENCY MEDICINE | Admitting: EMERGENCY MEDICINE
Payer: COMMERCIAL

## 2019-12-02 VITALS
RESPIRATION RATE: 11 BRPM | OXYGEN SATURATION: 100 % | TEMPERATURE: 97.9 F | DIASTOLIC BLOOD PRESSURE: 67 MMHG | SYSTOLIC BLOOD PRESSURE: 137 MMHG | HEART RATE: 55 BPM

## 2019-12-02 DIAGNOSIS — R00.2 PALPITATIONS: ICD-10-CM

## 2019-12-02 LAB
ANION GAP SERPL CALCULATED.3IONS-SCNC: 4 MMOL/L (ref 3–14)
BASOPHILS # BLD AUTO: 0 10E9/L (ref 0–0.2)
BASOPHILS NFR BLD AUTO: 0.5 %
BUN SERPL-MCNC: 20 MG/DL (ref 7–30)
CALCIUM SERPL-MCNC: 8.7 MG/DL (ref 8.5–10.1)
CHLORIDE SERPL-SCNC: 108 MMOL/L (ref 94–109)
CO2 SERPL-SCNC: 28 MMOL/L (ref 20–32)
CREAT SERPL-MCNC: 0.7 MG/DL (ref 0.52–1.04)
DIFFERENTIAL METHOD BLD: NORMAL
EOSINOPHIL # BLD AUTO: 0.2 10E9/L (ref 0–0.7)
EOSINOPHIL NFR BLD AUTO: 2.9 %
ERYTHROCYTE [DISTWIDTH] IN BLOOD BY AUTOMATED COUNT: 12.4 % (ref 10–15)
GFR SERPL CREATININE-BSD FRML MDRD: 88 ML/MIN/{1.73_M2}
GLUCOSE SERPL-MCNC: 92 MG/DL (ref 70–99)
HCT VFR BLD AUTO: 38.1 % (ref 35–47)
HGB BLD-MCNC: 12.6 G/DL (ref 11.7–15.7)
IMM GRANULOCYTES # BLD: 0 10E9/L (ref 0–0.4)
IMM GRANULOCYTES NFR BLD: 0.2 %
LYMPHOCYTES # BLD AUTO: 1.9 10E9/L (ref 0.8–5.3)
LYMPHOCYTES NFR BLD AUTO: 24.2 %
MCH RBC QN AUTO: 31 PG (ref 26.5–33)
MCHC RBC AUTO-ENTMCNC: 33.1 G/DL (ref 31.5–36.5)
MCV RBC AUTO: 94 FL (ref 78–100)
MONOCYTES # BLD AUTO: 0.6 10E9/L (ref 0–1.3)
MONOCYTES NFR BLD AUTO: 6.9 %
NEUTROPHILS # BLD AUTO: 5.2 10E9/L (ref 1.6–8.3)
NEUTROPHILS NFR BLD AUTO: 65.3 %
NRBC # BLD AUTO: 0 10*3/UL
NRBC BLD AUTO-RTO: 0 /100
PLATELET # BLD AUTO: 239 10E9/L (ref 150–450)
POTASSIUM SERPL-SCNC: 4 MMOL/L (ref 3.4–5.3)
RBC # BLD AUTO: 4.06 10E12/L (ref 3.8–5.2)
SODIUM SERPL-SCNC: 140 MMOL/L (ref 133–144)
TROPONIN I SERPL-MCNC: 0.02 UG/L (ref 0–0.04)
WBC # BLD AUTO: 8 10E9/L (ref 4–11)

## 2019-12-02 PROCEDURE — 84484 ASSAY OF TROPONIN QUANT: CPT | Performed by: EMERGENCY MEDICINE

## 2019-12-02 PROCEDURE — 99284 EMERGENCY DEPT VISIT MOD MDM: CPT

## 2019-12-02 PROCEDURE — 80048 BASIC METABOLIC PNL TOTAL CA: CPT | Performed by: EMERGENCY MEDICINE

## 2019-12-02 PROCEDURE — 85025 COMPLETE CBC W/AUTO DIFF WBC: CPT | Performed by: EMERGENCY MEDICINE

## 2019-12-02 PROCEDURE — 93005 ELECTROCARDIOGRAM TRACING: CPT

## 2019-12-02 ASSESSMENT — ENCOUNTER SYMPTOMS
FEVER: 0
LIGHT-HEADEDNESS: 1
FATIGUE: 1
COUGH: 1
PALPITATIONS: 1

## 2019-12-02 NOTE — TELEPHONE ENCOUNTER
"Pt calls.  She has had a cold virus.  When she coughs, it affects her heart rhythm.  She says it is irregular.  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   Five years ago she had a racing heart and she saw Dr. Knox at [facility].  See note from 5/28/14.      Denies chest pain.  She is a little light headed when that happens.  This happens every time she has a coughing spell.  This is happening several times a day.  She feels like she is not quite getting enough air.      Advised to go to the ER per protocol and have someone else drive her.        Reason for Disposition    Dizziness, lightheadedness, or weakness    Difficulty breathing     States not quite getting enough air.    Additional Information    Negative: Passed out (i.e., fainted, collapsed and was not responding)    Negative: Shock suspected (e.g., cold/pale/clammy skin, too weak to stand, low BP, rapid pulse)    Negative: Difficult to awaken or acting confused (e.g., disoriented, slurred speech)    Negative: Visible sweat on face or sweat dripping down face    Negative: Unable to walk, or can only walk with assistance (e.g., requires support)    Negative: Received SHOCK from implantable cardiac defibrillator and has persisting symptoms (i.e., palpitations, lightheadedness)    Negative: Sounds like a life-threatening emergency to the triager    Negative: Chest pain    Answer Assessment - Initial Assessment Questions  1. DESCRIPTION: \"Please describe your heart rate or heart beat that you are having\" (e.g., fast/slow, regular/irregular, skipped or extra beats, \"palpitations\")      Irregular, it just feels like it is skipping  2. ONSET: \"When did it start?\" (Minutes, hours or days)       About 3 weeks ago  3. DURATION: \"How long does it last\" (e.g., seconds, minutes, hours)      Sometimes it can take hours  4. PATTERN \"Does it come and go, or has it been constant since it started?\"  \"Does it get worse with exertion?\"   " "\"Are you feeling it now?\"      It comes and goes, it does get worse with exertion, she is feeling it right now  5. TAP: \"Using your hand, can you tap out what you are feeling on a chair or table in front of you, so that I can hear?\" (Note: not all patients can do this)        no  6. HEART RATE: \"Can you tell me your heart rate?\" \"How many beats in 15 seconds?\"  (Note: not all patients can do this)        She says she can't  7. RECURRENT SYMPTOM: \"Have you ever had this before?\" If so, ask: \"When was the last time?\" and \"What happened that time?\"       Yes, 5 years ago - has a history of figueroa parkinson white  8. CAUSE: \"What do you think is causing the palpitations?\"      History of figueroa parkinson white, had an abalation in 1987  9. CARDIAC HISTORY: \"Do you have any history of heart disease?\" (e.g., heart attack, angina, bypass surgery, angioplasty, arrhythmia)       History of figueroa parkinson white, ablation  10. OTHER SYMPTOMS: \"Do you have any other symptoms?\" (e.g., dizziness, chest pain, sweating, difficulty breathing)        A little light headed with this, feels like she is not quite getting enough air  11. PREGNANCY: \"Is there any chance you are pregnant?\" \"When was your last menstrual period?\"        no    Protocols used: HEART RATE AND HEARTBEAT [id number]      "

## 2019-12-02 NOTE — ED PROVIDER NOTES
History     Chief Complaint:  Palpitations    HPI   Anali Sanchez is a 69 year old female with a history of Gabino-Parkinson-White syndrome and anxiety who presents with palpitations. She reports that she has a history of ventricular tachycardia arrhythmia and had an open heart ablation surgery to treat this over 30 years ago. She reports that this surgery was complicated for hemorrhage into her lungs and states that she needed two more surgeries to drain this effusion but relieved her arrhythmias for a while. She reports that she had another bout of ventricular tachycardia and was hospitalized for this but that this episode resolved without medical or surgical intervention. She reports that she has been getting over a viral cold for the last 3 weeks. She states that she had been lightheaded, fatigued, coughing a lot and has experienced a feeling like her heart is jumping and skipping beats. She denies fever or chest pain. She reports that her current palpitations are different from her history of ventricular tachycardia because she doesn't feel like her heart is racing. She reports that her episodes of palpitations have been getting longer since they developed 3 weeks ago, prompting her to call her primary care provider this morning. She reports that the nurse told her to present to the emergency room to receive a EKG workup.    Allergies:  Benadryl  Morphine sulfate  Protamine  Tylox  Ciprofloxacin     Medications:    Zoloft    Past Medical History:    Anxiety  Gabino-Parkinson-White  Coagulation disorder    Past Surgical History:    Appendectomy  Cardial ablation  Colectomy  D & C  Tonsillectomy & adenoidectomy    Family History:    COPD  Alcoholism  Diabetes  CAD  Myelofibrosis  Chronic leukemia    Social History:  Smoking status: Never  Alcohol use: Occasional glass of wine with dinner    Marital Status:       Review of Systems   Constitutional: Positive for fatigue. Negative for fever.    Respiratory: Positive for cough.    Cardiovascular: Positive for palpitations. Negative for chest pain.   Neurological: Positive for light-headedness.   All other systems reviewed and are negative.    Physical Exam     Patient Vitals for the past 24 hrs:   BP Temp Temp src Pulse Heart Rate Resp SpO2   12/02/19 1130 137/67 -- -- -- -- 11 --   12/02/19 1128 137/67 -- -- -- 56 -- 100 %   12/02/19 1115 (!) 114/105 -- -- 55 54 14 98 %   12/02/19 1100 (!) 152/80 -- -- 57 60 25 100 %   12/02/19 1045 139/71 -- -- 55 53 10 100 %   12/02/19 1041 -- -- -- -- 56 9 --   12/02/19 1039 -- -- -- -- 56 9 100 %   12/02/19 1038 -- -- -- -- 55 12 100 %   12/02/19 1037 -- -- -- -- -- -- 100 %   12/02/19 1036 (!) 152/78 97.9  F (36.6  C) Oral 55 -- 18 100 %   12/02/19 1030 (!) 152/78 -- -- -- -- -- --     Physical Exam  General: Patient is alert and cooperative.  HENT:  Normal nose, oropharynx. Moist oral mucosa.  Eyes: EOMI. Normal conjunctiva.  Neck:  Normal range of motion and appearance.   Cardiovascular:  Normal rate, regular rhythm and normal heart sounds.   Pulmonary/Chest:  Effort normal. No wheezing or crackles.  Abdominal: Soft. No distension or tenderness.     Musculoskeletal: Normal range of motion. No edema or tenderness.   Neurological: oriented, normal strength, sensation, and coordination.   Skin: Warm and dry. No rash or bruising.   Psychiatric: Normal mood and affect. Normal behavior and judgement.      Emergency Department Course   ECG:  Indication: Palpitations  Time: 1033  Vent. Rate 56 bpm. IN interval 138. QRS duration 96. QT/QTc 438/422. P-R-T axis 54 74 59. Sinus bradycardia. V3 artifact. Abnormal ECG. Read time: 1242    Laboratory:  CBC: WBC: 8.0, HGB: 12.6, PLT: 239    BMP: Glucose 92, o/w WNL (Creatinine: 0.70)    1042 Troponin: 0.019    Emergency Department Course:  Nursing notes and vitals reviewed. (1041) I performed an exam of the patient as documented above.     IV inserted. Blood drawn. This was sent  to the lab for further testing, results above.    Findings and plan explained to the Patient. Patient discharged home with instructions regarding supportive care and reasons to return. The importance of close follow-up was reviewed.    I personally reviewed the laboratory results with the Patient and answered all related questions prior to discharge.    Impression & Plan    Medical Decision Making:  Anali Sanchez is a 69 year old female presented with a sensation of palpitations.  She has a remote history of Gabino-Parkinson-White syndrome, s/p ablation with no recurrences of arrhythmias since then. The work up in the Emergency Department is negative, monitoring and EKG have not shown any abnormal heart rhythms or concerning morphologies.  Low suspicion for dysrhythmia, such as recurrent WPW, or a fib. No EKG changes or troponin elevation concerning for acute coronary syndrome. No serious etiology for the palpitations were detected today during this visit and I feel the patient is safe for discharge.   Close follow up with primary care is indicated should the symptoms continue, as further work up may be performed; this was made clear to the patient, who understands.     Diagnosis:    ICD-10-CM    1. Palpitations R00.2        Disposition:  discharged to home    Chapo Palomo  12/2/2019   [facility] EMERGENCY DEPARTMENT  Scribe Disclosure:  I, Chapo Palomo, am serving as a scribe on 12/2/2019 at 10:41 AM to personally document services performed by Harshal Davenport MD based on my observations and the provider's statements to me.     Harshal Davenport MD  12/03/19 0903

## 2019-12-02 NOTE — ED TRIAGE NOTES
Pt A&Ox4. ABCs intact. Pt Hx of taylor parkinson s white syndrome and had ablasion done. Pt was suffering from cold and when she cough she experiences palpitations and arrythmias.

## 2019-12-02 NOTE — ED AVS SNAPSHOT
[facility]  [address]  [facility] [address]  Phone:  [phone]  Fax:  [phone]                                    Anali Sanchez   MRN: [id number]    Department:  [facility]   Date of Visit:  12/2/2019           After Visit Summary Signature Page    I have received my discharge instructions, and my questions have been answered. I have discussed any challenges I see with this plan with the nurse or doctor.    ..........................................................................................................................................  Patient/Patient Representative Signature      ..........................................................................................................................................  Patient Representative Print Name and Relationship to Patient    ..................................................               ................................................  Date                                   Time    ..........................................................................................................................................  Reviewed by Signature/Title    ...................................................              ..............................................  Date                                               Time          22EPIC Rev 08/18

## 2019-12-03 LAB — INTERPRETATION ECG - MUSE: NORMAL

## 2019-12-10 ENCOUNTER — HOSPITAL ENCOUNTER (OUTPATIENT)
Dept: MAMMOGRAPHY | Facility: CLINIC | Age: 69
Discharge: HOME | End: 2019-12-10
Attending: FAMILY MEDICINE | Admitting: FAMILY MEDICINE
Payer: COMMERCIAL

## 2019-12-10 DIAGNOSIS — Z12.31 VISIT FOR SCREENING MAMMOGRAM: ICD-10-CM

## 2019-12-10 PROCEDURE — 77063 BREAST TOMOSYNTHESIS BI: CPT

## 2020-01-10 ENCOUNTER — OFFICE VISIT (OUTPATIENT)
Dept: FAMILY MEDICINE | Facility: CLINIC | Age: 70
End: 2020-01-10
Payer: COMMERCIAL

## 2020-01-10 VITALS
BODY MASS INDEX: 24.82 KG/M2 | HEIGHT: 63 IN | DIASTOLIC BLOOD PRESSURE: 70 MMHG | OXYGEN SATURATION: 97 % | RESPIRATION RATE: 12 BRPM | WEIGHT: 140.1 LBS | TEMPERATURE: 97.8 F | SYSTOLIC BLOOD PRESSURE: 122 MMHG | HEART RATE: 63 BPM

## 2020-01-10 DIAGNOSIS — H91.93 DECREASED HEARING OF BOTH EARS: ICD-10-CM

## 2020-01-10 DIAGNOSIS — Z00.00 ENCOUNTER FOR MEDICARE ANNUAL WELLNESS EXAM: Primary | ICD-10-CM

## 2020-01-10 DIAGNOSIS — R00.2 PALPITATIONS: ICD-10-CM

## 2020-01-10 DIAGNOSIS — H61.23 BILATERAL IMPACTED CERUMEN: ICD-10-CM

## 2020-01-10 DIAGNOSIS — T17.308D CHOKING, SUBSEQUENT ENCOUNTER: ICD-10-CM

## 2020-01-10 DIAGNOSIS — F41.9 ANXIETY: ICD-10-CM

## 2020-01-10 PROCEDURE — 99397 PER PM REEVAL EST PAT 65+ YR: CPT | Mod: 25 | Performed by: FAMILY MEDICINE

## 2020-01-10 PROCEDURE — 69209 REMOVE IMPACTED EAR WAX UNI: CPT | Mod: 50 | Performed by: FAMILY MEDICINE

## 2020-01-10 PROCEDURE — 96127 BRIEF EMOTIONAL/BEHAV ASSMT: CPT | Performed by: FAMILY MEDICINE

## 2020-01-10 PROCEDURE — 99213 OFFICE O/P EST LOW 20 MIN: CPT | Mod: 25 | Performed by: FAMILY MEDICINE

## 2020-01-10 RX ORDER — SERTRALINE HYDROCHLORIDE 25 MG/1
TABLET, FILM COATED ORAL
Qty: 90 TABLET | Refills: 3 | Status: SHIPPED | OUTPATIENT
Start: 2020-01-10 | End: 2021-03-09

## 2020-01-10 ASSESSMENT — ENCOUNTER SYMPTOMS
PALPITATIONS: 1
GASTROINTESTINAL NEGATIVE: 1
HEMATOLOGIC/LYMPHATIC NEGATIVE: 1
CHOKING: 1
CHEST TIGHTNESS: 0
NEUROLOGICAL NEGATIVE: 1
CONSTITUTIONAL NEGATIVE: 1
SHORTNESS OF BREATH: 0
FACIAL SWELLING: 0
EYES NEGATIVE: 1
ENDOCRINE NEGATIVE: 1
RHINORRHEA: 0
SINUS PAIN: 0
TROUBLE SWALLOWING: 0
PSYCHIATRIC NEGATIVE: 1
MUSCULOSKELETAL NEGATIVE: 1
VOICE CHANGE: 0
ALLERGIC/IMMUNOLOGIC NEGATIVE: 1
SORE THROAT: 0
COUGH: 0
SINUS PRESSURE: 0

## 2020-01-10 ASSESSMENT — MIFFLIN-ST. JEOR: SCORE: 1129.62

## 2020-01-10 ASSESSMENT — ANXIETY QUESTIONNAIRES
1. FEELING NERVOUS, ANXIOUS, OR ON EDGE: SEVERAL DAYS
IF YOU CHECKED OFF ANY PROBLEMS ON THIS QUESTIONNAIRE, HOW DIFFICULT HAVE THESE PROBLEMS MADE IT FOR YOU TO DO YOUR WORK, TAKE CARE OF THINGS AT HOME, OR GET ALONG WITH OTHER PEOPLE: SOMEWHAT DIFFICULT
2. NOT BEING ABLE TO STOP OR CONTROL WORRYING: NOT AT ALL
5. BEING SO RESTLESS THAT IT IS HARD TO SIT STILL: SEVERAL DAYS
GAD7 TOTAL SCORE: 4
3. WORRYING TOO MUCH ABOUT DIFFERENT THINGS: NOT AT ALL
7. FEELING AFRAID AS IF SOMETHING AWFUL MIGHT HAPPEN: NOT AT ALL
6. BECOMING EASILY ANNOYED OR IRRITABLE: SEVERAL DAYS

## 2020-01-10 ASSESSMENT — PATIENT HEALTH QUESTIONNAIRE - PHQ9
SUM OF ALL RESPONSES TO PHQ QUESTIONS 1-9: 4
5. POOR APPETITE OR OVEREATING: SEVERAL DAYS

## 2020-01-10 ASSESSMENT — ACTIVITIES OF DAILY LIVING (ADL): CURRENT_FUNCTION: NO ASSISTANCE NEEDED

## 2020-01-10 NOTE — NURSING NOTE
Patient identified using two patient identifiers.  Ear exam showing wax occlusion completed by provider.  H202/H20 was placed in the bilateral ear(s) via irrigation tool: elephant ear.  Left ear successful - right ear started to give pt discomfort while doing procedure-stopped  Instructed pt to use Debrox at home and if ear flush still needed make nurse only appointment. Also suggested if too uncomfortable could try ENT -they use a vacuum instead of water flushing.   Pt verbalized understanding.  Brunilda Solano, CMA

## 2020-01-10 NOTE — PATIENT INSTRUCTIONS
Patient Education   Personalized Prevention Plan  You are due for the preventive services outlined below.  Your care team is available to assist you in scheduling these services.  If you have already completed any of these items, please share that information with your care team to update in your medical record.  Health Maintenance Due   Topic Date Due     Discuss Advance Care Planning  1950     Zoster (Shingles) Vaccine (1 of 2) 10/31/2000     Pneumococcal Vaccine (2 of 2 - PPSV23) 08/03/2018     Annual Wellness Visit  12/04/2019     FALL RISK ASSESSMENT  12/04/2019     PHQ-2  01/01/2020       Signs of Hearing Loss     Hearing much better with one ear can be a sign of hearing loss.     Hearing loss is a problem shared by many people. In fact, it is one of the most common health conditions, particularly as people age. Most people over age 65 have some hearing loss, and by age 80, almost everyone does. Because hearing loss usually occurs slowly over the years, you may not realize your hearing ability has gotten worse.  Have your hearing checked  Contact your healthcare provider if you:    Have to strain to hear normal conversation    Have to watch other people s faces very carefully to follow what they re saying    Need to ask people to repeat what they ve said    Often misunderstand what people are saying    Turn the volume of the television or radio up so high that others complain    Feel that people are mumbling when they re talking to you    Find that the effort to hear leaves you feeling tired and irritated    Notice, when using the phone, that you hear better with one ear than the other  Date Last Reviewed: 12/1/2016 2000-2019 [employer]. [address] [place] [address], [place] [address]. All rights reserved. This information is not intended as a substitute for professional medical care. Always follow your healthcare professional's instructions.

## 2020-01-10 NOTE — PROGRESS NOTES
"SUBJECTIVE:   Anali Sanchez is a 69 year old female who presents for Preventive Visit.      Are you in the first 12 months of your Medicare coverage?  No    Healthy Habits:    In general, how would you rate your overall health?  Very good    Frequency of exercise:  2-3 days/week    Duration of exercise:  45-60 minutes    Do you usually eat at least 4 servings of fruit and vegetables a day, include whole grains    & fiber and avoid regularly eating high fat or \"junk\" foods?  Yes    Taking medications regularly:  Yes    Barriers to taking medications:  None    Medication side effects:  None    Ability to successfully perform activities of daily living:  No assistance needed    Home Safety:  Lack of grab bars in the bathroom    Hearing Impairment:  Difficulty understanding speech on the telephone, need to ask people to speak up or repeat themselves and difficulty understanding soft or whispered speech    In the past 6 months, have you been bothered by leaking of urine?  No    In general, how would you rate your overall mental or emotional health?  Very good      PHQ-2 Total Score:    Additional concerns today:  Yes    Do you feel safe in your environment? Yes    Have you ever done Advance Care Planning? (For example, a Health Directive, POLST, or a discussion with a medical provider or your loved ones about your wishes): Yes, advance care planning is on file.      Fall risk  Fallen 2 or more times in the past year?: No  Any fall with injury in the past year?: No    Cognitive Screening   1) Repeat 3 items (Leader, Season, Table)    2) Clock draw: NORMAL  3) 3 item recall: Recalls 3 objects  Results: 3 items recalled: COGNITIVE IMPAIRMENT LESS LIKELY    Mini-CogTM Copyright TY Longoria. Licensed by the author for use in [facility]; reprinted with permission (apple@.[facility]). All rights reserved.      Do you have sleep apnea, excessive snoring or daytime drowsiness?: no    Reviewed and updated as needed " this visit by clinical staff         Reviewed and updated as needed this visit by Provider        Social History     Tobacco Use     Smoking status: Never Smoker     Smokeless tobacco: Never Used   Substance Use Topics     Alcohol use: Yes     Comment: occas; glass of wine with dinner; usually one.      If you drink alcohol do you typically have >3 drinks per day or >7 drinks per week? No    Alcohol Use 12/4/2018   Prescreen: >3 drinks/day or >7 drinks/week? No   Prescreen: >3 drinks/day or >7 drinks/week? -           Would like discuss choking on liquids x 1 year. Comes and goes. Is having difficulty hearing    Current providers sharing in care for this patient include:   Patient Care Team:  Vianey Purcell MD as PCP - General (Family Practice)  Vianey Purcell MD as Assigned PCP    The following health maintenance items are reviewed in Epic and correct as of today:  Health Maintenance   Topic Date Due     ADVANCE CARE PLANNING  1950     ZOSTER IMMUNIZATION (1 of 2) 10/31/2000     PNEUMOCOCCAL IMMUNIZATION 65+ LOW/MEDIUM RISK (2 of 2 - PPSV23) 08/03/2018     MEDICARE ANNUAL WELLNESS VISIT  12/04/2019     FALL RISK ASSESSMENT  12/04/2019     PHQ-2  01/01/2020     MAMMO SCREENING  12/10/2021     LIPID  10/25/2022     DTAP/TDAP/TD IMMUNIZATION (5 - Td) 12/04/2028     DEXA  Completed     HEPATITIS C SCREENING  Completed     INFLUENZA VACCINE  Completed     IPV IMMUNIZATION  Aged Out     MENINGITIS IMMUNIZATION  Aged Out     COLONOSCOPY  Discontinued       Patient Active Problem List   Diagnosis     Anxiety     CARDIOVASCULAR SCREENING; LDL GOAL LESS THAN 160     History of colectomy       Past Medical History:   Diagnosis Date     Anemia      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37     Coagulation disorder (H)     hx hemorrhage after heart and after colon surg     History of blood transfusion      Other and unspecified nonspecific immunological findings     anti Jka red cell  antibody       Past Surgical History:   Procedure Laterality Date     APPENDECTOMY  1968     CARDIAC SURGERY  age 37    [facility] surg; open ablation; complicated with hemorrhage     COLECTOMY  age 52    7-8 inches rectum remain. cx with hemorrhage. no cancer. slow transit.(2 surgeries; first for low transit; second due to the bleeding)     DILATION AND CURETTAGE, HYSTEROSCOPY DIAGNOSTIC, COMBINED  2/21/2014    Procedure: COMBINED DILATION AND CURETTAGE, HYSTEROSCOPY DIAGNOSTIC;   DILATION AND CURETTAGE, HYSTEROSCOPY DIAGNOSTIC ;  Surgeon: Willie Osborn MD;  Location: [facility] OR     ENT SURGERY      t and a       OB History   No obstetric history on file.       Current Outpatient Medications   Medication Sig Dispense Refill     sertraline (ZOLOFT) 25 MG tablet TAKE 1 TABLET BY MOUTH ONE TIME DAILY 90 tablet 3     Calcium-Vitamin D-Vitamin K 500-200-40 MG-UNT-MCG CHEW Takes 3 chewables daily         Family History   Problem Relation Age of Onset     Bronchitis Mother         copd     Alcoholism Mother      Diabetes Father      Coronary Artery Disease Father      Cancer Brother         myelofibrosis; got BMT 2019     Leukemia Sister         chronic       Social History     Tobacco Use     Smoking status: Never Smoker     Smokeless tobacco: Never Used   Substance Use Topics     Alcohol use: Yes     Comment: occas; glass of wine with dinner; usually one.        Immunization History   Administered Date(s) Administered     Influenza (High Dose) 3 valent vaccine 10/25/2017, 11/09/2018, 11/06/2019     Influenza (IIV3) PF 09/25/1992, 09/30/1993, 10/17/1994, 12/01/1995, 12/30/1996, 12/18/2001, 01/31/2005, 11/07/2006, 10/14/2008, 10/13/2009     Influenza Vaccine IM > 6 months Valent IIV4 02/09/2017     Pneumo Conj 13-V (2010&after) 08/03/2017     TD (ADULT, 7+) 04/13/1988, 09/24/1999     TDAP Vaccine (Adacel) 12/04/2018     Tdap (Adacel,Boostrix) 10/21/2008       Review of Systems   Constitutional: Negative.    HENT: Positive for  "hearing loss (harder time on phone; asking folks to repeat.). Negative for congestion, dental problem, drooling, ear discharge, ear pain, facial swelling, nosebleeds, postnasal drip, rhinorrhea, sinus pressure, sinus pain, sneezing, sore throat, tinnitus, trouble swallowing and voice change.    Eyes: Negative.    Respiratory: Positive for choking (with fluids at times. ). Negative for cough, chest tightness and shortness of breath.    Cardiovascular: Positive for palpitations (this was more when she had a URI; has cleared up since. did go to ER.). Negative for chest pain and peripheral edema.   Gastrointestinal: Negative.    Endocrine: Negative.    Breasts:  negative.    Genitourinary: Negative.    Musculoskeletal: Negative.    Skin: Negative.    Allergic/Immunologic: Negative.    Neurological: Negative.    Hematological: Negative.    Psychiatric/Behavioral: Negative.      Hearing, choking. Has been with fluids only; not consistently.    Had colonoscopy 10 years ago. Due now.     OBJECTIVE:   /70 (BP Location: Right arm, Cuff Size: Adult Regular)   Pulse 63   Temp 97.8  F (36.6  C) (Oral)   Resp 12   Ht 1.6 m (5' 3\")   Wt 63.5 kg (140 lb 1.6 oz)   SpO2 97%   BMI 24.82 kg/m   Estimated body mass index is 24.82 kg/m  as calculated from the following:    Height as of this encounter: 1.6 m (5' 3\").    Weight as of this encounter: 63.5 kg (140 lb 1.6 oz).  Physical Exam  GENERAL APPEARANCE: healthy, alert and no distress  EYES: Eyes grossly normal to inspection, PERRL and conjunctivae and sclerae normal  HENT: ear canals and TM's normal, nose and mouth without ulcers or lesions, oropharynx clear and oral mucous membranes moist  X small amount wax; not obscuring when pulling her ear back, but I suspect it could be otherwise.  NECK: no adenopathy, no asymmetry, masses, or scars and thyroid normal to palpation  RESP: lungs clear to auscultation - no rales, rhonchi or wheezes  BREAST: normal without masses, " tenderness or nipple discharge and no palpable axillary masses or adenopathy  CV: regular rates and rhythm and no peripheral edema  ABDOMEN: soft, nontender, no hepatosplenomegaly, no masses and bowel sounds normal  MS: no musculoskeletal defects are noted and gait is age appropriate without ataxia  SKIN: no suspicious lesions or rashes  NEURO: Normal strength and tone, sensory exam grossly normal, mentation intact and speech normal  PSYCH: mentation appears normal and affect normal/bright        PHQ-9 SCORE 8/2/2016 8/3/2017 1/10/2020   PHQ-9 Total Score 3 7 4       MATEUS-7 SCORE 8/2/2016 8/3/2017 1/10/2020   Total Score 6 10 4       Recent Labs   Lab Test 10/25/17  0857   CHOL 217*      LDL 85   TRIG 62     The ASCVD Risk score (Robert CANTU Jr., et al., 2013) failed to calculate for the following reasons:    The valid HDL cholesterol range is 20 to 100 mg/dL  CV risk 7%. (done on the sandi on my phone)        ASSESSMENT / PLAN:   1. Encounter for Medicare annual wellness exam  She did get a note from GI regarding screening; she is considering. She notes she does not like having to do the full prep when she does not have much colon.   Did review; she did have a flex sig 10 years ago.    2. Anxiety  Doing well on low dose sertraline. She would like to continue.   She is scoring satisfactorily.  - sertraline (ZOLOFT) 25 MG tablet; TAKE 1 TABLET BY MOUTH ONE TIME DAILY  Dispense: 90 tablet; Refill: 3    3. Decreased hearing of both ears  Uncertain etiology. Possibly wax. We did try to clear.  She does not feel bad enough to pursue ENT or audiology.   May consider returning for a hearing test.    4. Bilateral impacted cerumen  As above. Was difficult from one ear. She could return for more attempts.  - REMOVE IMPACTED CERUMEN    5. Palpitations  She did have URI at the time. After discussion, she might have had a decongestant.   Currently not an issue.    6. Choking, subsequent encounter  Discussed some possibilities.  "It is with fluids; not solids; nothing gets stuck.  At this time, she will pay more attention; it might be more when distracted. Discussed head position as well.  Could consider a swallow study if more consistent, concerning in the future.       COUNSELING:  Reviewed preventive health counseling, as reflected in patient instructions       Regular exercise       Healthy diet/nutrition       Vision screening       Dental care       Colon cancer screening    Estimated body mass index is 24.82 kg/m  as calculated from the following:    Height as of this encounter: 1.6 m (5' 3\").    Weight as of this encounter: 63.5 kg (140 lb 1.6 oz).         reports that she has never smoked. She has never used smokeless tobacco.      Appropriate preventive services were discussed with this patient, including applicable screening as appropriate for cardiovascular disease, diabetes, osteopenia/osteoporosis, and glaucoma.  As appropriate for age/gender, discussed screening for colorectal cancer, prostate cancer, breast cancer, and cervical cancer. Checklist reviewing preventive services available has been given to the patient.    Reviewed patients plan of care and provided an AVS. The Basic Care Plan (routine screening as documented in Health Maintenance) for Anali meets the Care Plan requirement. This Care Plan has been established and reviewed with the Patient.    Counseling Resources:  ATP IV Guidelines  Pooled Cohorts Equation Calculator  Breast Cancer Risk Calculator  FRAX Risk Assessment  ICSI Preventive Guidelines  Dietary Guidelines for Americans, 2010  USDA's MyPlate  ASA Prophylaxis  Lung CA Screening    Vianey Purcell MD, MD  [facility]    Identified Health Risks:    The patient was provided with written information regarding signs of hearing loss.  "

## 2020-01-11 ASSESSMENT — ANXIETY QUESTIONNAIRES: GAD7 TOTAL SCORE: 4

## 2021-01-09 ENCOUNTER — HEALTH MAINTENANCE LETTER (OUTPATIENT)
Age: 71
End: 2021-01-09

## 2021-01-16 ENCOUNTER — TELEPHONE (OUTPATIENT)
Dept: FAMILY MEDICINE | Facility: CLINIC | Age: 71
End: 2021-01-16

## 2021-01-16 NOTE — TELEPHONE ENCOUNTER
"Patient sent a web request:    \"Chronic coughing. Started just during the night, but now is a problem during the day as well. I wrote to the nurse and she suggested an appointment.\"    Patient would like to schedule at  Clinic Wednesday or Thursday of next week..    virual or in office?    Please contact patient.    Thank you.    Central Scheduling  Promise LOPEZ.  "

## 2021-01-20 ENCOUNTER — OFFICE VISIT (OUTPATIENT)
Dept: FAMILY MEDICINE | Facility: CLINIC | Age: 71
End: 2021-01-20
Payer: COMMERCIAL

## 2021-01-20 VITALS
DIASTOLIC BLOOD PRESSURE: 70 MMHG | OXYGEN SATURATION: 98 % | HEART RATE: 59 BPM | RESPIRATION RATE: 16 BRPM | SYSTOLIC BLOOD PRESSURE: 126 MMHG | TEMPERATURE: 98.4 F | HEIGHT: 64 IN | WEIGHT: 142.3 LBS | BODY MASS INDEX: 24.3 KG/M2

## 2021-01-20 DIAGNOSIS — R05.3 CHRONIC COUGH: Primary | ICD-10-CM

## 2021-01-20 PROCEDURE — 99213 OFFICE O/P EST LOW 20 MIN: CPT | Performed by: NURSE PRACTITIONER

## 2021-01-20 ASSESSMENT — MIFFLIN-ST. JEOR: SCORE: 1146.5

## 2021-01-20 NOTE — NURSING NOTE
"Chief Complaint   Patient presents with     Cough     Initial /70 (BP Location: Right arm, Patient Position: Sitting, Cuff Size: Adult Regular)   Pulse 59   Temp 98.4  F (36.9  C) (Oral)   Resp 16   Ht 1.619 m (5' 3.75\")   Wt 64.5 kg (142 lb 4.8 oz)   SpO2 98%   BMI 24.62 kg/m   Estimated body mass index is 24.62 kg/m  as calculated from the following:    Height as of this encounter: 1.619 m (5' 3.75\").    Weight as of this encounter: 64.5 kg (142 lb 4.8 oz).  BP completed using cuff size regular right arm    Lisa Magill, CMA    "

## 2021-01-20 NOTE — PROGRESS NOTES
Assessment & Plan     Chronic cough  Based on hx suspect GERD is the culprit.  Will trial omeprazole.  Ensure plenty of fluids, can take frequent small sips. If any worsening will let me know.   - omeprazole (PRILOSEC) 20 MG DR capsule; Take 1 capsule (20 mg) by mouth daily                   Return in about 2 months (around 3/20/2021) for Preventive Visit, cough as shceduled in March .    Ivelisse Jenkins, LEONOR CNP  [facility]     Anali Sanchez is a 70 year old female who presents to clinic today for the following health issues accompanied by her :    HPI     Acute Illness  Acute illness concerns: dry cough  Onset/Duration: 2 months   Symptoms:  Fever: no  Chills/Sweats: no  Headache (location?): YES- sometimes at night   Sinus Pressure: no  Conjunctivitis:  no  Ear Pain: no  Rhinorrhea: YES  Congestion: no  Sore Throat: no  Cough: YES-non-productive, worsening over time  Wheeze: no  Decreased Appetite: no  Nausea: no  Vomiting: no  Diarrhea: no  Dysuria/Freq.: no  Dysuria or Hematuria: no  Fatigue/Achiness: YES- both  Sick/Strep Exposure: no  Therapies tried and outcome: night quil cough syrup-did NOT help.      Remembers having similar trouble last year.  Restarted since being in the house more and turned the furnace on.  Historically was only at night, but now having cough during the day.  Clearing throat often.  Wondering if she is dehydrated?  Tried benadryl once thinking it could be allergies, but no change.  Dry cough, starts as a tickle.  Has found cough drops help, but can't use these at night.  No CP or SOB.  No smoking history.  No hx of asthma or other lung problems.  Occasionally throat is sore, though minor and resolves.  Has had trouble with sensation of something in the back of the throat on and off.  Chokes easily and has for some time.  Burps daily, but doesn't seem unusual.  No bitter or acid taste.  Has had mild heartburn.  Mouth doesn't feel dry.   "Drinks 2 cups of coffee in the morning.  No carbonated beverages.  Minimal acidic foods.  Doesn't eat spicy foods.  Has tried using a humidifier at night without improvement.  Does snore if sleeping on her back, not new.  Hx of GERD in the past and had a severe bout 4 years ago.        Review of Systems   Constitutional, HEENT, cardiovascular, pulmonary, gi and gu systems are negative, except as otherwise noted.      Objective    /70 (BP Location: Right arm, Patient Position: Sitting, Cuff Size: Adult Regular)   Pulse 59   Temp 98.4  F (36.9  C) (Oral)   Resp 16   Ht 1.619 m (5' 3.75\")   Wt 64.5 kg (142 lb 4.8 oz)   SpO2 98%   BMI 24.62 kg/m    Body mass index is 24.62 kg/m .  Physical Exam   GENERAL: healthy, alert and no distress  EYES: Eyes grossly normal to inspection and conjunctivae and sclerae normal  HENT: ear canals and TM's normal, nose and mouth without ulcers or lesions, moist mucous membranes  NECK: no adenopathy, no asymmetry, masses, or scars and thyroid normal to palpation  RESP: lungs clear to auscultation - no rales, rhonchi or wheezes  CV: regular rate and rhythm, normal S1 S2, no S3 or S4, no murmur, click or rub  ABDOMEN: soft, nontender, no hepatosplenomegaly, no masses and bowel sounds normal  SKIN: no suspicious lesions or rashes    No results found for this or any previous visit (from the past 24 hour(s)).      "

## 2021-02-15 ENCOUNTER — OFFICE VISIT (OUTPATIENT)
Dept: FAMILY MEDICINE | Facility: CLINIC | Age: 71
End: 2021-02-15
Payer: COMMERCIAL

## 2021-02-15 ENCOUNTER — NURSE TRIAGE (OUTPATIENT)
Dept: NURSING | Facility: CLINIC | Age: 71
End: 2021-02-15

## 2021-02-15 ENCOUNTER — ANCILLARY PROCEDURE (OUTPATIENT)
Dept: GENERAL RADIOLOGY | Facility: CLINIC | Age: 71
End: 2021-02-15
Attending: FAMILY MEDICINE
Payer: COMMERCIAL

## 2021-02-15 VITALS
OXYGEN SATURATION: 98 % | DIASTOLIC BLOOD PRESSURE: 74 MMHG | WEIGHT: 142.3 LBS | SYSTOLIC BLOOD PRESSURE: 124 MMHG | BODY MASS INDEX: 24.62 KG/M2 | HEART RATE: 61 BPM | RESPIRATION RATE: 16 BRPM | TEMPERATURE: 98 F

## 2021-02-15 DIAGNOSIS — M25.562 ACUTE PAIN OF LEFT KNEE: Primary | ICD-10-CM

## 2021-02-15 DIAGNOSIS — R05.3 CHRONIC COUGH: ICD-10-CM

## 2021-02-15 PROCEDURE — 99213 OFFICE O/P EST LOW 20 MIN: CPT | Performed by: FAMILY MEDICINE

## 2021-02-15 PROCEDURE — 73562 X-RAY EXAM OF KNEE 3: CPT | Mod: LT | Performed by: RADIOLOGY

## 2021-02-15 NOTE — TELEPHONE ENCOUNTER
Patient is calling in stating one week ago she injured the left knee while exercising. The left knee is painful and swollen. The swelling is around the top of the knee and the pain is in the back of the knee, veins are protruded in that area also. She has varicose veins. Snapping and popping of the left knee. Sometimes it feels like the knee is catching on something. Advised an appointment to speak to a provider in person. Home care suggestions reviewed with patient per RN protocol. Transferred to scheduling.     Annetta Cordoba RN on 2/15/2021 at 12:26 PM      Additional Information    Negative: Major bleeding (actively dripping or spurting) that can't be stopped    Negative: Bullet, stabbed by knife or other serious penetrating wound    Negative: Looks like a dislocated joint (crooked or deformed)    Negative: Sounds like a life-threatening emergency to the triager    Negative: Wound looks infected    Negative: Can't stand (bear weight) or walk    Negative: Skin is split open or gaping (length > 1/2 inch or 12 mm)    Negative: Bleeding won't stop after 10 minutes of direct pressure (using correct technique)    Negative: Dirt in the wound and not removed after 15 minutes of scrubbing    Negative: Sounds like a serious injury to the triager    Negative: Looks infected (e.g., spreading redness, pus, red streak)    Negative: SEVERE pain (e.g., excruciating)    Negative: No prior tetanus shots (or is not fully vaccinated) and any wound (e.g., cut or scrape)    Negative: Patient wants to be seen    Injury interferes with work or school    High-risk adult (e.g., age > 60, osteoporosis, chronic steroid use)    Negative: Large swelling or bruise and size > palm of person's hand    A 'snap' or 'pop' was heard at the time of injury    Negative: Followed a knee injury    Negative: Thigh or calf swelling is main symptom    Negative: Knee pain is main symptom    Negative: Fever    Negative: Patient sounds very sick or weak to  the triager    Negative: [1] SEVERE pain (e.g., excruciating, unable to walk) AND [2] not improved after 2 hours of pain medicine    Negative: [1] Can't move swollen joint at all AND [2] no fever    Negative: [1] Redness AND [2] painful when touched AND [3] no fever    Negative: [1] Thigh or calf pain AND [2] only 1 side AND [3] present > 1 hour    Negative: [1] Thigh, calf, or ankle swelling AND [2] only 1 side    [1] MODERATE pain (e.g., interferes with normal activities, limping) AND [2] present > 3 days    Negative: [1] Very swollen joint AND [2] no fever    Negative: Can't move joint normally (bend and straighten completely)    Negative: [1] Redness of the skin AND [2] no fever    Protocols used: KNEE INJURY-A-OH, KNEE SWELLING-A-AH

## 2021-02-15 NOTE — TELEPHONE ENCOUNTER
"Requested Prescriptions   Pending Prescriptions Disp Refills     omeprazole (PRILOSEC) 20 MG DR capsule    Last Written Prescription Date:  01/20/2021  Last Fill Quantity: 30 capsule,  # refills: 1   Last office visit: 1/20/2021 with prescribing provider:  Strong   Future Office Visit:       30 capsule 1     Sig: Take 1 capsule (20 mg) by mouth daily       PPI Protocol Passed - 2/15/2021  1:32 PM        Passed - Not on Clopidogrel (unless Pantoprazole ordered)        Passed - No diagnosis of osteoporosis on record        Passed - Recent (12 mo) or future (30 days) visit within the authorizing provider's specialty     Patient has had an office visit with the authorizing provider or a provider within the authorizing providers department within the previous 12 mos or has a future within next 30 days. See \"Patient Info\" tab in inbasket, or \"Choose Columns\" in Meds & Orders section of the refill encounter.              Passed - Medication is active on med list        Passed - Patient is age 18 or older        Passed - No active pregnacy on record        Passed - No positive pregnancy test in past 12 months              "

## 2021-02-15 NOTE — PROGRESS NOTES
Assessment & Plan     Acute pain of left knee  -pain appears to be muscular in nature   - XR Knee Left 3 Views- no acute changes   Mild medial arthritis .  - we discussed non weight bearing exercise .  - we discussed avoiding bending and twisting on knee .  - discussed tylenol prn for pain .  -recommend ice .    Return in about 6 months (around 8/15/2021) for Routine preventive, appointment already scheduled.    Yady Fields MD  [facility] LEEROY Kahn is a 70 year old who presents for the following health issues     History of Present Illness       She eats 2-3 servings of fruits and vegetables daily.She consumes 0 sweetened beverage(s) daily.She exercises with enough effort to increase her heart rate 20 to 29 minutes per day.  She exercises with enough effort to increase her heart rate 3 or less days per week.   She is taking medications regularly.         Musculoskeletal problem/pain  Onset/Duration: 1 week  Description  Location: knee - left  Joint Swelling: YES  Redness: no  Pain: YES  Warmth: no  Intensity:  6-7/10  Progression of Symptoms:  same and intermittent  Accompanying signs and symptoms:   Fevers: no  Numbness/tingling/weakness: YES- weakness, feels like it may give out  History  Trauma to the area: YES- patient was trying out a new dance exercise video  Recent illness:  no  Previous similar problem: no  Previous evaluation:  no  Precipitating or alleviating factors:  Aggravating factors include: kneeling, walking, stairs, stepping wrong  Therapies tried and outcome: heat and acetaminophen        Review of Systems   Constitutional: Negative for fatigue and fever.   Musculoskeletal: Positive for arthralgias. Negative for back pain and gait problem.   Neurological: Negative for headaches.   Psychiatric/Behavioral: Negative for behavioral problems.            Objective    /74   Pulse 61   Temp 98  F (36.7  C) (Oral)   Resp 16   Wt 64.5 kg (142 lb 4.8 oz)    SpO2 98%   BMI 24.62 kg/m    Body mass index is 24.62 kg/m .  Physical Exam  Nursing note reviewed.   Musculoskeletal: Normal range of motion.         General: No swelling or tenderness.      Comments: Normal range of movement left knee .   Neurological:      Mental Status: She is alert.

## 2021-02-17 ASSESSMENT — ENCOUNTER SYMPTOMS
FATIGUE: 0
HEADACHES: 0
BACK PAIN: 0
ARTHRALGIAS: 1
FEVER: 0

## 2021-03-09 ENCOUNTER — OFFICE VISIT (OUTPATIENT)
Dept: FAMILY MEDICINE | Facility: CLINIC | Age: 71
End: 2021-03-09
Payer: COMMERCIAL

## 2021-03-09 VITALS
HEART RATE: 74 BPM | WEIGHT: 140 LBS | SYSTOLIC BLOOD PRESSURE: 120 MMHG | DIASTOLIC BLOOD PRESSURE: 68 MMHG | TEMPERATURE: 97.4 F | OXYGEN SATURATION: 100 % | HEIGHT: 62 IN | BODY MASS INDEX: 25.76 KG/M2

## 2021-03-09 DIAGNOSIS — F41.9 ANXIETY: ICD-10-CM

## 2021-03-09 DIAGNOSIS — Z00.00 ENCOUNTER FOR MEDICARE ANNUAL WELLNESS EXAM: Primary | ICD-10-CM

## 2021-03-09 DIAGNOSIS — K21.9 GASTROESOPHAGEAL REFLUX DISEASE, UNSPECIFIED WHETHER ESOPHAGITIS PRESENT: ICD-10-CM

## 2021-03-09 DIAGNOSIS — H91.93 DECREASED HEARING OF BOTH EARS: ICD-10-CM

## 2021-03-09 DIAGNOSIS — Z12.31 ENCOUNTER FOR SCREENING MAMMOGRAM FOR BREAST CANCER: ICD-10-CM

## 2021-03-09 DIAGNOSIS — Z13.1 SCREENING FOR DIABETES MELLITUS: ICD-10-CM

## 2021-03-09 DIAGNOSIS — Z12.11 SCREEN FOR COLON CANCER: ICD-10-CM

## 2021-03-09 DIAGNOSIS — R05.9 COUGH: ICD-10-CM

## 2021-03-09 DIAGNOSIS — H61.21 IMPACTED CERUMEN OF RIGHT EAR: ICD-10-CM

## 2021-03-09 LAB — GLUCOSE SERPL-MCNC: 100 MG/DL (ref 70–99)

## 2021-03-09 PROCEDURE — 96127 BRIEF EMOTIONAL/BEHAV ASSMT: CPT | Performed by: FAMILY MEDICINE

## 2021-03-09 PROCEDURE — 82947 ASSAY GLUCOSE BLOOD QUANT: CPT | Performed by: FAMILY MEDICINE

## 2021-03-09 PROCEDURE — 36415 COLL VENOUS BLD VENIPUNCTURE: CPT | Performed by: FAMILY MEDICINE

## 2021-03-09 PROCEDURE — 99213 OFFICE O/P EST LOW 20 MIN: CPT | Mod: 25 | Performed by: FAMILY MEDICINE

## 2021-03-09 PROCEDURE — 69209 REMOVE IMPACTED EAR WAX UNI: CPT | Mod: RT | Performed by: FAMILY MEDICINE

## 2021-03-09 PROCEDURE — 99397 PER PM REEVAL EST PAT 65+ YR: CPT | Mod: 25 | Performed by: FAMILY MEDICINE

## 2021-03-09 RX ORDER — SERTRALINE HYDROCHLORIDE 25 MG/1
TABLET, FILM COATED ORAL
Qty: 90 TABLET | Refills: 3 | Status: SHIPPED | OUTPATIENT
Start: 2021-03-09 | End: 2022-01-27

## 2021-03-09 SDOH — HEALTH STABILITY: MENTAL HEALTH: HOW OFTEN DO YOU HAVE A DRINK CONTAINING ALCOHOL?: NOT ASKED

## 2021-03-09 SDOH — HEALTH STABILITY: MENTAL HEALTH: HOW OFTEN DO YOU HAVE 6 OR MORE DRINKS ON ONE OCCASION?: NOT ASKED

## 2021-03-09 SDOH — HEALTH STABILITY: MENTAL HEALTH: HOW MANY STANDARD DRINKS CONTAINING ALCOHOL DO YOU HAVE ON A TYPICAL DAY?: NOT ASKED

## 2021-03-09 ASSESSMENT — ANXIETY QUESTIONNAIRES
2. NOT BEING ABLE TO STOP OR CONTROL WORRYING: SEVERAL DAYS
7. FEELING AFRAID AS IF SOMETHING AWFUL MIGHT HAPPEN: SEVERAL DAYS
IF YOU CHECKED OFF ANY PROBLEMS ON THIS QUESTIONNAIRE, HOW DIFFICULT HAVE THESE PROBLEMS MADE IT FOR YOU TO DO YOUR WORK, TAKE CARE OF THINGS AT HOME, OR GET ALONG WITH OTHER PEOPLE: SOMEWHAT DIFFICULT
GAD7 TOTAL SCORE: 6
3. WORRYING TOO MUCH ABOUT DIFFERENT THINGS: SEVERAL DAYS
5. BEING SO RESTLESS THAT IT IS HARD TO SIT STILL: SEVERAL DAYS
6. BECOMING EASILY ANNOYED OR IRRITABLE: NOT AT ALL
1. FEELING NERVOUS, ANXIOUS, OR ON EDGE: SEVERAL DAYS

## 2021-03-09 ASSESSMENT — PATIENT HEALTH QUESTIONNAIRE - PHQ9
SUM OF ALL RESPONSES TO PHQ QUESTIONS 1-9: 3
5. POOR APPETITE OR OVEREATING: SEVERAL DAYS

## 2021-03-09 ASSESSMENT — MIFFLIN-ST. JEOR: SCORE: 1112.26

## 2021-03-09 NOTE — PROGRESS NOTES
SUBJECTIVE:   Anali Sanchez is a 70 year old female who presents for Preventive Visit.      Patient has been advised of split billing requirements and indicates understanding: Yes   Are you in the first 12 months of your Medicare coverage?  No    HPI  Do you feel safe in your environment? Yes    Have you ever done Advance Care Planning? (For example, a Health Directive, POLST, or a discussion with a medical provider or your loved ones about your wishes): Yes, advance care planning is on file.       Fall risk       Cognitive Screening   1) Repeat 3 items (Leader, Season, Table)    2) Clock draw: NORMAL  3) 3 item recall: Recalls 3 objects  Results: 3 items recalled: COGNITIVE IMPAIRMENT LESS LIKELY    Mini-CogTM Copyright S Swati. Licensed by the author for use in [place] [employer]; reprinted with permission (apple@.[facility]). All rights reserved.      Do you have sleep apnea, excessive snoring or daytime drowsiness?: no    Reviewed and updated as needed this visit by clinical staff                 Reviewed and updated as needed this visit by Provider                Social History     Tobacco Use     Smoking status: Never Smoker     Smokeless tobacco: Never Used   Substance Use Topics     Alcohol use: Yes     Comment: occas; glass of wine with dinner; usually one.      If you drink alcohol do you typically have >3 drinks per day or >7 drinks per week? No    Alcohol Use 1/10/2020   Prescreen: >3 drinks/day or >7 drinks/week? -   Prescreen: >3 drinks/day or >7 drinks/week? No         Current providers sharing in care for this patient include:   Patient Care Team:  Vianey Purcell MD as PCP - General (Family Practice)  Yady Fields MD as Assigned PCP    The following health maintenance items are reviewed in Epic and correct as of today:  Health Maintenance   Topic Date Due     ANNUAL REVIEW OF HM ORDERS  Never done     ZOSTER IMMUNIZATION (1 of 2) Never done     MEDICARE ANNUAL WELLNESS VISIT  01/10/2021      COVID-19 Vaccine (2 of 2 - Moderna series) 03/25/2021     MAMMO SCREENING  12/10/2021     FALL RISK ASSESSMENT  01/20/2022     LIPID  10/25/2022     ADVANCE CARE PLANNING  01/12/2025     DTAP/TDAP/TD IMMUNIZATION (5 - Td) 12/04/2028     DEXA  06/21/2032     HEPATITIS C SCREENING  Completed     PHQ-2  Completed     INFLUENZA VACCINE  Completed     Pneumococcal Vaccine: 65+ Years  Completed     Pneumococcal Vaccine: Pediatrics (0 to 5 Years) and At-Risk Patients (6 to 64 Years)  Aged Out     IPV IMMUNIZATION  Aged Out     MENINGITIS IMMUNIZATION  Aged Out     HEPATITIS B IMMUNIZATION  Aged Out     COLORECTAL CANCER SCREENING  Discontinued       Patient Active Problem List   Diagnosis     Anxiety     CARDIOVASCULAR SCREENING; LDL GOAL LESS THAN 160     History of colectomy       Past Medical History:   Diagnosis Date     Anemia      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 cx with hemorrhage. no cancer. slow transit.(2 surgeries; first for low transit; second due to the bleeding)     DILATION AND CURETTAGE, HYSTEROSCOPY DIAGNOSTIC, COMBINED  2/21/2014    Procedure: COMBINED DILATION AND CURETTAGE, HYSTEROSCOPY DIAGNOSTIC;   DILATION AND CURETTAGE, HYSTEROSCOPY DIAGNOSTIC ;  Surgeon: Willie Osborn MD;  Location: [facility] OR     ENT SURGERY      t and a       OB History   No obstetric history on file.       Current Outpatient Medications   Medication Sig Dispense Refill     Calcium-Vitamin D-Vitamin K 500-200-40 MG-UNT-MCG CHEW Takes 3 chewables daily       omeprazole  (PRILOSEC) 20 MG DR capsule Take 1 capsule (20 mg) by mouth daily 30 capsule 3     sertraline (ZOLOFT) 25 MG tablet TAKE 1 TABLET BY MOUTH ONE TIME DAILY 90 tablet 3       Family History   Problem Relation Age of Onset     Bronchitis Mother         copd     Alcoholism Mother      Diabetes Father      Coronary Artery Disease Father      Cancer Brother         myelofibrosis; got BMT 2019     Diabetes Type 2  Brother      Leukemia Sister         chronic     Impaired Fasting Glucose Sister      Impaired Fasting Glucose Brother        Social History     Tobacco Use     Smoking status: Never Smoker     Smokeless tobacco: Never Used   Substance Use Topics     Alcohol use: Yes     Comment: occas; glass of wine with dinner; usually one.        Immunization History   Administered Date(s) Administered     COVID-19,PF,Moderna 02/25/2021     Influenza (High Dose) 3 valent vaccine 10/25/2017, 11/09/2018, 11/06/2019     Influenza (IIV3) PF 09/25/1992, 09/30/1993, 10/17/1994, 12/01/1995, 12/30/1996, 12/18/2001, 01/31/2005, 11/07/2006, 10/14/2008, 10/13/2009     Influenza Vaccine IM > 6 months Valent IIV4 02/09/2017     Influenza, Quad, High Dose, Pf, 65yr + 10/23/2020     Pneumo Conj 13-V (2010&after) 08/03/2017     Pneumococcal 23 valent 02/19/2020     TD (ADULT, 7+) 04/13/1988, 09/24/1999     TDAP Vaccine (Adacel) 12/04/2018     Tdap (Adacel,Boostrix) 10/21/2008         Review of Systems  CONSTITUTIONAL:NEGATIVE for fever, chills, change in weight  X some weight gain.  INTEGUMENTARY/SKIN: NEGATIVE for worrisome rashes, moles or lesions  EYES: NEGATIVE for vision changes or irritation  ENT/MOUTH: NEGATIVE for ear, mouth and throat problems  RESP:NEGATIVE for significant cough or SOB x has had chronic cough. Cough cleared p prilosec.  (does she need to continue?)  BREAST: NEGATIVE for masses, tenderness or discharge  CV: NEGATIVE for chest pain, palpitations or peripheral edema  GI: NEGATIVE for nausea, abdominal pain, heartburn,  "or change in bowel habits  : no vaginal bleeding. No urinary concerns.  MUSCULOSKELETAL: NEGATIVE for significant arthralgias or myalgia . Did have some left knee pan after twisting. Improving.   NEURO: NEGATIVE for weakness, dizziness or paresthesias  ENDOCRINE: NEGATIVE for temperature intolerance, skin/hair changes  PSYCHIATRIC: NEGATIVE for changes in mood or affect. She does feel that the sertraline is helpful and would like to continue.     Due for sigmoidoscopy. She is not anxious to do this. She did have quite a few colonoscopies prior to her colectomy and she really dislikes the prep.     Still with hearing difficulty; did not improve with prior cerumen removal.  Things have become more difficult with all the masks. She has had problems with the phone etc.     OBJECTIVE:   /68   Pulse 74   Temp 97.4  F (36.3  C) (Oral)   Ht 1.581 m (5' 2.25\")   Wt 63.5 kg (140 lb)   SpO2 100%   BMI 25.40 kg/m   Estimated body mass index is 25.4 kg/m  as calculated from the following:    Height as of this encounter: 1.581 m (5' 2.25\").    Weight as of this encounter: 63.5 kg (140 lb).  Physical Exam  GENERAL APPEARANCE: healthy, alert and no distress  EYES: Eyes grossly normal to inspection, PERRL and conjunctivae and sclerae normal  HENT: ear canals and TM's normal, nose and mouth without ulcers or lesions, oropharynx clear and oral mucous membranes moist x right TM partially obscured by cerumen  NECK: no adenopathy, no asymmetry, masses, or scars and thyroid normal to palpation  RESP: lungs clear to auscultation - no rales, rhonchi or wheezes  BREAST: normal without masses, tenderness or nipple discharge and no palpable axillary masses or adenopathy  CV: regular rates and rhythm and no peripheral edema  ABDOMEN: soft, nontender, no hepatosplenomegaly, no masses and bowel sounds normal  MS: no musculoskeletal defects are noted and gait is age appropriate without ataxia  SKIN: no suspicious lesions or " rashes  NEURO: Normal strength and tone, sensory exam grossly normal, mentation intact and speech normal  PSYCH: mentation appears normal and affect normal/bright    PHQ-9 SCORE 8/3/2017 1/10/2020 3/9/2021   PHQ-9 Total Score 7 4 3       MATEUS-7 SCORE 8/3/2017 1/10/2020 3/9/2021   Total Score 10 4 6       Recent Labs   Lab Test 10/25/17  0857   CHOL 217*      LDL 85   TRIG 62               ASSESSMENT / PLAN:   1. Encounter for Medicare annual wellness exam  Reviewed screenings.   Aware of shingrix.  She may look into the cost of this after completing the COVID shots.     2. Anxiety  She is doing well on low dose sertraline. Will continue.   Anxiety score is a little higher than last year, but she feels that things are going well.   - sertraline (ZOLOFT) 25 MG tablet; TAKE 1 TABLET BY MOUTH ONE TIME DAILY  Dispense: 90 tablet; Refill: 3    3. Cough  She found this went away with PPI. Now wondering if she needs to continue.  discussed some of the potential long term effects, including effects on bones and increased risk of clostridium dificil and pneumonia.  Recommend trial of weaning off or onto famotidine. If this does not work, then recommend PPI.   See AVS for instructions regarding weaning.   (will put GERD in problem list with notation).     4. Gastroesophageal reflux disease, unspecified whether esophagitis present  As above.    5. Decreased hearing of both ears  Discussed some options. Could do screen here. Could see ENT; she would like to do the latter.   - OTOLARYNGOLOGY REFERRAL    6. Impacted cerumen of right ear  Will have removed.  - REMOVE IMPACTED CERUMEN    7. Screen for colon cancer  Discussed some options. She was ok with doing FIT and would be more apt to pursue colonoscopy/sigmoidoscopy if abnormal.   - Fecal colorectal cancer screen (FIT); Future    8. Encounter for screening mammogram for breast cancer    - *MA Screening Digital Bilateral; Future    9. Screening for diabetes mellitus    -  "Glucose        Patient has been advised of split billing requirements and indicates understanding: as above  COUNSELING:  Reviewed preventive health counseling, as reflected in patient instructions       Regular exercise       Healthy diet/nutrition       Vision screening       Dental care    Estimated body mass index is 25.4 kg/m  as calculated from the following:    Height as of this encounter: 1.581 m (5' 2.25\").    Weight as of this encounter: 63.5 kg (140 lb).        She reports that she has never smoked. She has never used smokeless tobacco.      Appropriate preventive services were discussed with this patient, including applicable screening as appropriate for cardiovascular disease, diabetes, osteopenia/osteoporosis, and glaucoma.  As appropriate for age/gender, discussed screening for colorectal cancer, prostate cancer, breast cancer, and cervical cancer. Checklist reviewing preventive services available has been given to the patient.    Reviewed patients plan of care and provided an AVS. The Basic Care Plan (routine screening as documented in Health Maintenance) for Anali meets the Care Plan requirement. This Care Plan has been established and reviewed with the Patient.    Counseling Resources:  ATP IV Guidelines  Pooled Cohorts Equation Calculator  Breast Cancer Risk Calculator  Breast Cancer: Medication to Reduce Risk  FRAX Risk Assessment  ICSI Preventive Guidelines  Dietary Guidelines for Americans, 2010  [employer]'s MyPlate  ASA Prophylaxis  Lung CA Screening    Vianey Purcell MD, MD  [facility]    Identified Health Risks:  "

## 2021-03-09 NOTE — NURSING NOTE
Patient identified using two patient identifiers.  Ear exam showing wax occlusion completed by provider.  Solution: warm water was placed in the right ear(s) via irrigation tool: elephant ear.

## 2021-03-09 NOTE — PATIENT INSTRUCTIONS
Patient Education   Personalized Prevention Plan  You are due for the preventive services outlined below.  Your care team is available to assist you in scheduling these services.  If you have already completed any of these items, please share that information with your care team to update in your medical record.  Health Maintenance Due   Topic Date Due     ANNUAL REVIEW OF HM ORDERS  Never done     Zoster (Shingles) Vaccine (1 of 2) Never done           -----------------------------------------------------------    You may want to try weaning down on the Omeprazole. Go to one every other day for at least 2 weeks. If this is going well, you can try going to every three days. If going well, you can stop it.  Pepcid or famotidine can be used in place of the Omeprazole. It is not as strong.  If this is not controlling symptoms, you may need the Omeprazole.

## 2021-03-10 PROBLEM — R73.01 IMPAIRED FASTING GLUCOSE: Status: ACTIVE | Noted: 2021-03-10

## 2021-03-10 ASSESSMENT — ANXIETY QUESTIONNAIRES: GAD7 TOTAL SCORE: 6

## 2021-03-11 DIAGNOSIS — Z12.11 SCREEN FOR COLON CANCER: ICD-10-CM

## 2021-03-11 LAB — HEMOCCULT STL QL IA: NEGATIVE

## 2021-03-11 PROCEDURE — 82274 ASSAY TEST FOR BLOOD FECAL: CPT | Performed by: FAMILY MEDICINE

## 2021-04-23 ENCOUNTER — ANCILLARY PROCEDURE (OUTPATIENT)
Dept: MAMMOGRAPHY | Facility: CLINIC | Age: 71
End: 2021-04-23
Attending: FAMILY MEDICINE
Payer: COMMERCIAL

## 2021-04-23 DIAGNOSIS — Z12.31 ENCOUNTER FOR SCREENING MAMMOGRAM FOR BREAST CANCER: ICD-10-CM

## 2021-04-23 PROCEDURE — 77063 BREAST TOMOSYNTHESIS BI: CPT | Performed by: RADIOLOGY

## 2021-04-23 PROCEDURE — 77067 SCR MAMMO BI INCL CAD: CPT | Mod: TC | Performed by: RADIOLOGY

## 2021-05-10 DIAGNOSIS — R05.3 CHRONIC COUGH: ICD-10-CM

## 2021-05-10 NOTE — TELEPHONE ENCOUNTER
Prescription approved per [facility] Refill Protocol.  Josefina Huff RN

## 2021-07-07 ENCOUNTER — MYC MEDICAL ADVICE (OUTPATIENT)
Dept: FAMILY MEDICINE | Facility: CLINIC | Age: 71
End: 2021-07-07

## 2021-07-12 NOTE — TELEPHONE ENCOUNTER
Called and spoke with patient- she said her cough is better since being on the omeprazole. She is going on a trip soon so is going to stay on the omeprazole, but then will wean off and try the pepcid/famotidine. She will call if she has any questions/concerns.     Marybeth ALVAREZ RN

## 2021-12-20 ENCOUNTER — OFFICE VISIT (OUTPATIENT)
Dept: FAMILY MEDICINE | Facility: CLINIC | Age: 71
End: 2021-12-20
Payer: COMMERCIAL

## 2021-12-20 VITALS
TEMPERATURE: 99.1 F | BODY MASS INDEX: 25.4 KG/M2 | RESPIRATION RATE: 16 BRPM | DIASTOLIC BLOOD PRESSURE: 76 MMHG | SYSTOLIC BLOOD PRESSURE: 136 MMHG | HEART RATE: 60 BPM | WEIGHT: 140 LBS

## 2021-12-20 DIAGNOSIS — Z78.0 POST-MENOPAUSAL: ICD-10-CM

## 2021-12-20 DIAGNOSIS — R05.3 CHRONIC COUGH: ICD-10-CM

## 2021-12-20 DIAGNOSIS — F41.9 ANXIETY: Primary | ICD-10-CM

## 2021-12-20 PROCEDURE — 99214 OFFICE O/P EST MOD 30 MIN: CPT | Performed by: PHYSICIAN ASSISTANT

## 2021-12-20 RX ORDER — SERTRALINE HYDROCHLORIDE 25 MG/1
TABLET, FILM COATED ORAL
Qty: 90 TABLET | Refills: 3 | Status: CANCELLED | OUTPATIENT
Start: 2021-12-20

## 2021-12-20 ASSESSMENT — ANXIETY QUESTIONNAIRES
1. FEELING NERVOUS, ANXIOUS, OR ON EDGE: NEARLY EVERY DAY
2. NOT BEING ABLE TO STOP OR CONTROL WORRYING: NEARLY EVERY DAY
7. FEELING AFRAID AS IF SOMETHING AWFUL MIGHT HAPPEN: MORE THAN HALF THE DAYS
3. WORRYING TOO MUCH ABOUT DIFFERENT THINGS: NEARLY EVERY DAY
IF YOU CHECKED OFF ANY PROBLEMS ON THIS QUESTIONNAIRE, HOW DIFFICULT HAVE THESE PROBLEMS MADE IT FOR YOU TO DO YOUR WORK, TAKE CARE OF THINGS AT HOME, OR GET ALONG WITH OTHER PEOPLE: SOMEWHAT DIFFICULT
6. BECOMING EASILY ANNOYED OR IRRITABLE: SEVERAL DAYS
GAD7 TOTAL SCORE: 18
5. BEING SO RESTLESS THAT IT IS HARD TO SIT STILL: NEARLY EVERY DAY

## 2021-12-20 ASSESSMENT — PATIENT HEALTH QUESTIONNAIRE - PHQ9
5. POOR APPETITE OR OVEREATING: NEARLY EVERY DAY
SUM OF ALL RESPONSES TO PHQ QUESTIONS 1-9: 7

## 2021-12-20 ASSESSMENT — PAIN SCALES - GENERAL: PAINLEVEL: MODERATE PAIN (5)

## 2021-12-20 NOTE — PATIENT INSTRUCTIONS
To help prevent loss of bone, the following is recommended:       Take in adequate amounts of Calcium and Vitamin D. These are the building blocks for bones. Most women will need 1200mg of Calcium and 800-1000 international units of Vitamin D daily.       Exercise daily to keep your bones strong. Thirty minutes of moderate walking or other aerobic activity is recommended.    Increase zoloft to 50 mg daily. Follow-up for virtual med check in 1 month.

## 2021-12-20 NOTE — PROGRESS NOTES
"  Assessment & Plan     Anxiety  Chronic, not well controlled but seems to be complicated by stressful situation of caring for her elderly father. She was on lexapro in the past and felt like it worked well for her but had to switch due to cost. Doesn't feel like zoloft works quite as well but is open to increasing the dose. Will increase to 50 mg daily and recheck in 1 month.  - sertraline (ZOLOFT) 50 MG tablet; Take 1 tablet (50 mg) by mouth daily    Chronic cough  Chronic, stable. Has tried to wean off prilosec in the past a few different times but has symptoms return each time. Recommend she maximize vitamin D and calcium while on PPI. Will check DEXA as well.  - omeprazole (PRILOSEC) 20 MG DR capsule; TAKE 1 CAPSULE BY MOUTH EVERY DAY    Post-menopausal  - DX Hip/Pelvis/Spine; Future    Return in about 1 month (around 1/20/2022) for Med Check, Mood Recheck.    [facility] DANNYMOELIER Kahn is a 71 year old who presents for the following health issues     HPI     Anxiety Follow-Up    How are you doing with your anxiety since your last visit? Worsened    Are you having other symptoms that might be associated with anxiety? No    Have you had a significant life event? OTHER: Patient states that she is taking care of her elderly father.     Are you feeling depressed? No    Do you have any concerns with your use of alcohol or other drugs? No    Increased anxiety   Caring for elderly father and stress with this  She does have support to help care for her father  Anxiety consuming thought process  Was on lexapro in the past and it worked well, but switched to prozac due to cost of lexapro  No HI/SI    Description of symptoms:  Acid Reflux   food getting stuck: YES- 'sometimes\"  nausea/vomiting: no   abdominal pain: no   black/tarry or bloody stools: no :  worse with no particular food or drink.  current NSAID/Aspirin use: no   Therapies tried and outcome: Omeprazole " (Prilosec)    Has tried a few times to go off the prilosec but has symptoms every time after she tries to stop      Social History     Tobacco Use     Smoking status: Never Smoker     Smokeless tobacco: Never Used   Substance Use Topics     Alcohol use: Yes     Comment: glass wine most days     Drug use: Never     MATEUS-7 SCORE 8/3/2017 1/10/2020 3/9/2021   Total Score 10 4 6     PHQ 8/3/2017 1/10/2020 3/9/2021   PHQ-9 Total Score 7 4 3   Q9: Thoughts of better off dead/self-harm past 2 weeks Not at all Not at all Not at all     Last PHQ-9 3/9/2021   1.  Little interest or pleasure in doing things 0   2.  Feeling down, depressed, or hopeless 0   3.  Trouble falling or staying asleep, or sleeping too much 1   4.  Feeling tired or having little energy 1   5.  Poor appetite or overeating 0   6.  Feeling bad about yourself 0   7.  Trouble concentrating 1   8.  Moving slowly or restless 0   Q9: Thoughts of better off dead/self-harm past 2 weeks 0   PHQ-9 Total Score 3   Difficulty at work, home, or with people Somewhat difficult     MATEUS-7  3/9/2021   1. Feeling nervous, anxious, or on edge 1   2. Not being able to stop or control worrying 1   3. Worrying too much about different things 1   4. Trouble relaxing 1   5. Being so restless that it is hard to sit still 1   6. Becoming easily annoyed or irritable 0   7. Feeling afraid, as if something awful might happen 1   MATEUS-7 Total Score 6   If you checked any problems, how difficult have they made it for you to do your work, take care of things at home, or get along with other people? Somewhat difficult         How many servings of fruits and vegetables do you eat daily?  2-3    On average, how many sweetened beverages do you drink each day (Examples: soda, juice, sweet tea, etc.  Do NOT count diet or artificially sweetened beverages)?   0    How many days per week do you exercise enough to make your heart beat faster? 3 or less    How many minutes a day do you exercise  enough to make your heart beat faster? 60 or more    How many days per week do you miss taking your medication? 0      Review of Systems   Constitutional, HEENT, cardiovascular, pulmonary, gi and gu systems are negative, except as otherwise noted.      Objective    /76 (BP Location: Right arm, Patient Position: Sitting, Cuff Size: Adult Large)   Pulse 60   Temp 99.1  F (37.3  C) (Oral)   Resp 16   Wt 63.5 kg (140 lb)   BMI 25.40 kg/m    Body mass index is 25.4 kg/m .  Physical Exam   GENERAL: healthy, alert and no distress  NEURO: Normal strength and tone, mentation intact and speech normal  PSYCH: mentation appears normal, affect normal/bright

## 2021-12-20 NOTE — PROGRESS NOTES
{PROVIDER CHARTING PREFERENCE:446590}    Kyree Kahn is a 71 year old who presents for the following health issues {ACCOMPANIED BY STATEMENT (Optional):303825}    HPI     {SUPERLIST (Optional):676724}  {additonal problems for provider to add (Optional):994465}    Review of Systems   {ROS COMP (Optional):300619}      Objective    There were no vitals taken for this visit.  There is no height or weight on file to calculate BMI.  Physical Exam   {Exam List (Optional):978332}    {Diagnostic Test Results (Optional):366015}    {AMBULATORY ATTESTATION (Optional):749848}

## 2021-12-21 ASSESSMENT — ANXIETY QUESTIONNAIRES: GAD7 TOTAL SCORE: 18

## 2022-01-27 DIAGNOSIS — F41.9 ANXIETY: ICD-10-CM

## 2022-01-27 RX ORDER — SERTRALINE HYDROCHLORIDE 25 MG/1
TABLET, FILM COATED ORAL
Qty: 90 TABLET | Refills: 0 | Status: SHIPPED | OUTPATIENT
Start: 2022-01-27 | End: 2022-04-26

## 2022-01-31 ENCOUNTER — ANCILLARY PROCEDURE (OUTPATIENT)
Dept: BONE DENSITY | Facility: CLINIC | Age: 72
End: 2022-01-31
Attending: PHYSICIAN ASSISTANT
Payer: COMMERCIAL

## 2022-01-31 DIAGNOSIS — Z78.0 POST-MENOPAUSAL: ICD-10-CM

## 2022-01-31 PROCEDURE — 77080 DXA BONE DENSITY AXIAL: CPT | Performed by: INTERNAL MEDICINE

## 2022-02-08 ENCOUNTER — VIRTUAL VISIT (OUTPATIENT)
Dept: FAMILY MEDICINE | Facility: CLINIC | Age: 72
End: 2022-02-08
Payer: COMMERCIAL

## 2022-02-08 DIAGNOSIS — F41.9 ANXIETY: ICD-10-CM

## 2022-02-08 PROCEDURE — 99213 OFFICE O/P EST LOW 20 MIN: CPT | Mod: 95 | Performed by: PHYSICIAN ASSISTANT

## 2022-02-08 NOTE — PROGRESS NOTES
Criss is a 71 year old who is being evaluated via a billable video visit.      How would you like to obtain your AVS? MyChart  If the video visit is dropped, the invitation should be resent by: Text to cell phone: [phone]  Will anyone else be joining your video visit? No    Video Start Time: 12:44 PM     Assessment & Plan     Anxiety  She did not tolerate increased dose of zoloft and would like to stay on 25 mg dose for now. Anxiety seems adequately controlled right now but she is dealing with grief over the recent death of her father. She feels well supported at this time with family and friends. She will stay on the 25 mg dose and let me know if anything changes in the future and she wants to consider alternative medication or other options. She does not need a refill today - she has a full bottle of the 50 mg tablets and will cut those in half to get the 25 mg dose.    Return in about 6 months (around 8/8/2022) for Annual Wellness Visit, Med Check.    [facility]   Criss is a 71 year old who presents for the following health issues     HPI     Anxiety Follow-Up    How are you doing with your anxiety since your last visit? No change    Are you having other symptoms that might be associated with anxiety? Yes:  Nightmares after increasing medication to 50 mg. did drop back ddown to 25 mg     Have you had a significant life event? Grief or Loss - Father passed away in January      Are you feeling depressed? No    Do you have any concerns with your use of alcohol or other drugs? No    She was seen a little over 1 month ago and anxiety was not well controlled on 25 mg zoloft daily so dose was increased to 50 mg daily. After increasing the dose, she noticed that she was having nightmares so she decreased dose back to 25 mg and nightmares stopped.    She had been having a lot of stress related to caring for her elderly father and he did pass away in  January. She has been dealing with the process of cleaning his home and making arrangements after his passing.     She is grieving but does not feel depressed or significantly anxious. She had a lot of anxiety around the time of her dad's  but that has improved. She would like to stay on the 25 mg dose for now. She has good support with her  and family.     Social History     Tobacco Use     Smoking status: Never Smoker     Smokeless tobacco: Never Used   Vaping Use     Vaping Use: Never used   Substance Use Topics     Alcohol use: Yes     Comment: glass wine most days     Drug use: Never     MATEUS-7 SCORE 1/10/2020 3/9/2021 2021   Total Score 4 6 18     PHQ 1/10/2020 3/9/2021 2021   PHQ-9 Total Score 4 3 7   Q9: Thoughts of better off dead/self-harm past 2 weeks Not at all Not at all Not at all     Last PHQ-9 2021   1.  Little interest or pleasure in doing things 0   2.  Feeling down, depressed, or hopeless 0   3.  Trouble falling or staying asleep, or sleeping too much 1   4.  Feeling tired or having little energy 1   5.  Poor appetite or overeating 0   6.  Feeling bad about yourself 1   7.  Trouble concentrating 1   8.  Moving slowly or restless 3   Q9: Thoughts of better off dead/self-harm past 2 weeks 0   PHQ-9 Total Score 7   Difficulty at work, home, or with people Somewhat difficult     MATEUS-7  2021   1. Feeling nervous, anxious, or on edge 3   2. Not being able to stop or control worrying 3   3. Worrying too much about different things 3   4. Trouble relaxing 3   5. Being so restless that it is hard to sit still 3   6. Becoming easily annoyed or irritable 1   7. Feeling afraid, as if something awful might happen 2   MATEUS-7 Total Score 18   If you checked any problems, how difficult have they made it for you to do your work, take care of things at home, or get along with other people? Somewhat difficult         How many servings of fruits and vegetables do you eat daily?   2-3    On average, how many sweetened beverages do you drink each day (Examples: soda, juice, sweet tea, etc.  Do NOT count diet or artificially sweetened beverages)?   0    How many days per week do you exercise enough to make your heart beat faster? 3 or less    How many minutes a day do you exercise enough to make your heart beat faster? 9 or less    How many days per week do you miss taking your medication? 0    Review of Systems   Constitutional, HEENT, cardiovascular, pulmonary, gi and gu systems are negative, except as otherwise noted.      Objective           Vitals:  No vitals were obtained today due to virtual visit.    Physical Exam   GENERAL: Healthy, alert and no distress  EYES: Eyes grossly normal to inspection.  No discharge or erythema, or obvious scleral/conjunctival abnormalities.  RESP: No audible wheeze, cough, or visible cyanosis.  No visible retractions or increased work of breathing.    SKIN: Visible skin clear. No significant rash, abnormal pigmentation or lesions.  NEURO: Cranial nerves grossly intact.  Mentation and speech appropriate for age.  PSYCH: Mentation appears normal, affect normal/bright, judgement and insight intact, normal speech and appearance well-groomed.          Video-Visit Details    Type of service:  Video Visit    Video End Time:12:55 PM    Originating Location (pt. Location): Home    Distant Location (provider location):  [facility]     Platform used for Video Visit: Iterable

## 2022-04-09 ENCOUNTER — HEALTH MAINTENANCE LETTER (OUTPATIENT)
Age: 72
End: 2022-04-09

## 2022-04-14 ENCOUNTER — OFFICE VISIT (OUTPATIENT)
Dept: PEDIATRICS | Facility: CLINIC | Age: 72
End: 2022-04-14
Payer: COMMERCIAL

## 2022-04-14 VITALS
TEMPERATURE: 97.9 F | OXYGEN SATURATION: 99 % | HEIGHT: 62 IN | RESPIRATION RATE: 14 BRPM | HEART RATE: 64 BPM | WEIGHT: 137.5 LBS | DIASTOLIC BLOOD PRESSURE: 64 MMHG | BODY MASS INDEX: 25.3 KG/M2 | SYSTOLIC BLOOD PRESSURE: 108 MMHG

## 2022-04-14 DIAGNOSIS — M54.50 BILATERAL LOW BACK PAIN WITHOUT SCIATICA, UNSPECIFIED CHRONICITY: Primary | ICD-10-CM

## 2022-04-14 PROCEDURE — 99213 OFFICE O/P EST LOW 20 MIN: CPT | Performed by: NURSE PRACTITIONER

## 2022-04-14 ASSESSMENT — ENCOUNTER SYMPTOMS: BACK PAIN: 1

## 2022-04-14 NOTE — PATIENT INSTRUCTIONS
Schedule with physical therapy  You can take extra strength tylenol 1,000 mg up to 3x per day for your back  Try some ice as well

## 2022-04-14 NOTE — PROGRESS NOTES
Assessment & Plan     Bilateral low back pain without sciatica, unspecified chronicity  Degenerative changes found on x-ray. Start with PT. Would recommend spine referral if not improving.  - XR Lumbar Spine 2/3 Views; Future  - Physical Therapy Referral; Future    Patient Instructions   Schedule with physical therapy  You can take extra strength tylenol 1,000 mg up to 3x per day for your back  Try some ice as well      Return in about 6 weeks (around 5/26/2022), or if symptoms worsen or fail to improve.    Radha Sigala NP  [facility] CARY Kahn is a 71 year old who presents for the following health issues     Back Pain     History of Present Illness       Back Pain:  She presents for follow up of back pain. Patient's back pain is a new problem.    Original cause of back pain: not sure  First noticed back pain: more than 1 month ago  Patient feels back pain: constantlyLocation of back pain:  Right lower back and left lower back  Description of back pain: dull ache and sharp  Back pain spreads: nowhere    Since patient first noticed back pain, pain is: always present, but gets better and worse  Does back pain interfere with her job:  Not applicable  On a scale of 1-10 (10 being the worst), patient describes pain as:  5  What makes back pain worse: bending and sitting  Acupuncture: not tried  Acetaminophen: helpful  Activity or exercise: helpful  Chiropractor:  Not tried  Cold: not tried  Heat: helpful  Massage: helpful  Muscle relaxants: not tried  NSAIDS: not tried  Opioids: not tried  Physical Therapy: not tried  Rest: helpful  Steroid Injection: not tried  Stretching: helpful  Surgery: not tried  TENS unit: not tried  Topical pain relievers: not helpful  Other healthcare providers patient is seeing for back pain: None    She eats 2-3 servings of fruits and vegetables daily.She consumes 0 sweetened beverage(s) daily.She exercises with enough effort to increase her heart rate 20  "to 29 minutes per day.  She exercises with enough effort to increase her heart rate 3 or less days per week.   She is taking medications regularly.     Pt was on road trip to [place], noticed onset of pain after that  Hx osteopenia, takes vit D/calcium  No fall, trauma, or injury  Constant dull pain to b/l low back and hips  Sharp pain with position changes  Heat , no ice  Tylenol   Salonpas patches  Exercises- Silver sneakers, able to exercise without much pain  Has had a few bad days of pain    Review of Systems   Musculoskeletal: Positive for back pain.         Objective    /64 (BP Location: Right arm, Cuff Size: Adult Regular)   Pulse 64   Temp 97.9  F (36.6  C) (Tympanic)   Resp 14   Ht 1.581 m (5' 2.25\")   Wt 62.4 kg (137 lb 8 oz)   LMP  (LMP Unknown)   SpO2 99%   BMI 24.95 kg/m    Body mass index is 24.95 kg/m .  Physical Exam   GENERAL: healthy, alert and no distress  PSYCH: mentation appears normal, affect normal/bright  MSK: No obvious deformity of spine. No bruising, redness, or masses. Spine and SI joint non-tender to palpation. B/l lumbar paraspinal muscles non-tender but locates as area of pain. Normal gait.  NEURO: +2 DTRs of lower extremities.     Lumbar spine x-ray:                                                                IMPRESSION: Five lumbar vertebral bodies. Slight convex left  curvature. 8 mm anterolisthesis of L4 on L5 which appears to be  secondary to advanced lower lumbar spine facet arthropathy. Slight  retrolisthesis of L1 on L2 and L2 on L3. Mild interspace and endplate  degeneration L2-L3 and L4-L5. Normal vertebral body heights.            "

## 2022-04-22 ENCOUNTER — THERAPY VISIT (OUTPATIENT)
Dept: PHYSICAL THERAPY | Facility: CLINIC | Age: 72
End: 2022-04-22
Attending: NURSE PRACTITIONER
Payer: COMMERCIAL

## 2022-04-22 DIAGNOSIS — M54.50 BILATERAL LOW BACK PAIN WITHOUT SCIATICA, UNSPECIFIED CHRONICITY: ICD-10-CM

## 2022-04-22 DIAGNOSIS — M54.50 ACUTE BILATERAL LOW BACK PAIN WITHOUT SCIATICA: ICD-10-CM

## 2022-04-22 PROCEDURE — 97161 PT EVAL LOW COMPLEX 20 MIN: CPT | Mod: GP | Performed by: PHYSICAL THERAPIST

## 2022-04-22 PROCEDURE — 97110 THERAPEUTIC EXERCISES: CPT | Mod: GP | Performed by: PHYSICAL THERAPIST

## 2022-04-22 PROCEDURE — 97112 NEUROMUSCULAR REEDUCATION: CPT | Mod: GP | Performed by: PHYSICAL THERAPIST

## 2022-04-22 NOTE — PROGRESS NOTES
[facility]    OUTPATIENT Physical Therapy ORTHOPEDIC EVALUATION  PLAN OF TREATMENT FOR OUTPATIENT REHABILITATION  (COMPLETE FOR INITIAL CLAIMS ONLY)  Patient's Last Name, First Name, M.I.  YOB: 1950  Anali Sanchez    Provider s Name:  [facility]   Medical Record No.  [id number]   Start of Care Date:  04/22/22   Onset Date:   03/01/22   Type:     _X__PT   ___OT Medical Diagnosis:    Encounter Diagnoses   Name Primary?    Bilateral low back pain without sciatica, unspecified chronicity     Acute bilateral low back pain without sciatica         Treatment Diagnosis:  Low back pain        Goals:     04/22/22 0500   Body Part   Goals listed below are for LBP   Goal #1   Goal #1 self cares/transfers/bed mobility   Previous Functional Level No restrictions   Current Functional Level Able ;to transfer in and out of a bed;to change positions in bed   Performance Level and perform sit to stand pain up to 5/10   STG Target Performance Be able to ; transfer in and out of a bed; transfer in and out of a chair   Performance Level pain at worst 3/10   Rationale for independent self care such as dressing, personal hygiene, bathing;for independent community transportation;for independent living   Due Date 05/13/22   LTG Target Performance Be able to ; transfer in and out of a chair; transfer in and out of a bed   Performance level pain at worst 1/10   Rationale independence in self cares;for independent community transportation;for independent living   Due Date 07/01/22       Therapy Frequency:  1 x a week  Predicted Duration of Therapy Intervention:  8 weeks    Rudy Alvarez, PT                 I CERTIFY THE NEED FOR THESE SERVICES FURNISHED UNDER        THIS PLAN OF TREATMENT AND WHILE UNDER MY CARE     (Physician attestation of this document indicates review and  certification of the therapy plan).                     Certification Date From:  04/22/22   Certification Date To:  07/01/22    Referring Provider:  Radha Sigala    Initial Assessment        See Epic Evaluation SOC Date: 04/22/22

## 2022-04-22 NOTE — PROGRESS NOTES
Physical Therapy Initial Evaluation  Subjective:  The history is provided by the patient. No  was used.   Patient Health History  Anali PADRON Laura being seen for Back pain.     Problem began: 3/1/2022.   Problem occurred: Not an injury, just began to have pain.   Pain is reported as 4/10 on pain scale.  General health as reported by patient is good.  Pertinent medical history includes: heart problems.   Red flags:  None as reported by patient.  Medical allergies: other.   Surgeries include:  Heart surgery and other. Other surgery history details: Colectomy.    Current medications:  Other and pain medication. Other medications details: Anxiety.    Current occupation is Retired.   Primary job tasks include:  Lifting/carrying.                  Therapist Generated HPI Evaluation  Problem details: States pain started after a long car trip. Pain was really bad but getting better with time. .         Type of problem:  Lumbar.    This is a new condition.  Condition occurred with:  Repetition/overuse.  Where condition occurred: in the community.  Patient reports pain:  Central lumbar spine.  Pain is described as aching and sharp and is intermittent.  Pain radiates to:  No radiation.   Since onset symptoms are gradually improving.  Associated symptoms:  Loss of motion. Symptoms are exacerbated by bending, walking and other (transfers)  and relieved by ice, heat and analgesics.  Special tests included:  X-ray.    Barriers include:  None as reported by patient.                        Objective:  System         Lumbar/SI Evaluation  ROM:    AROM Lumbar:   Flexion:        Min loss pain  Ext:                    Max loss pain    Side Bend:        Left:     Right:   Rotation:           Left:     Right:   Side Glide:        Left:     Right:         Strength: fair abdominal strength   Lumbar Myotomes:    T12-L3 (Hip Flex):  Left: 4+    Right: 4+  L2-4 (Quads):  Left:  5    Right:  5  L4 (Ankle DF):  Left:  5     Right:  5    S1 (Toe Raise):  Left: 4+    Right: 4+        Neural Tension/Mobility:  Lumbar:  Normal        Lumbar Palpation:    Tenderness present at Left:    Vertebral  Tenderness present at Right: Vertebral                                                     General     ROS    Assessment/Plan:    Patient is a 71 year old female with lumbar complaints.    Patient has the following significant findings with corresponding treatment plan.                Diagnosis 1:  Low back pain  Pain -  hot/cold therapy, manual therapy, self management, education and home program  Decreased ROM/flexibility - manual therapy, therapeutic exercise, therapeutic activity and home program  Decreased joint mobility - manual therapy, therapeutic exercise, therapeutic activity and home program  Decreased strength - therapeutic exercise, therapeutic activities and home program  Impaired muscle performance - neuro re-education and home program  Decreased function - therapeutic activities and home program    Therapy Evaluation Codes:   Cumulative Therapy Evaluation is: Low complexity.    Previous and current functional limitations:  (See Goal Flow Sheet for this information)    Short term and Long term goals: (See Goal Flow Sheet for this information)     Communication ability:  Patient appears to be able to clearly communicate and understand verbal and written communication and follow directions correctly.  Treatment Explanation - The following has been discussed with the patient:   RX ordered/plan of care  Anticipated outcomes  Possible risks and side effects  This patient would benefit from PT intervention to resume normal activities.   Rehab potential is good.    Frequency:  1 X week, once daily  Duration:  for 8 weeks  Discharge Plan:  Achieve all LTG.  Independent in home treatment program.  Reach maximal therapeutic benefit.    Please refer to the daily flowsheet for treatment today, total treatment time and time spent performing 1:1  timed codes.

## 2022-04-25 ASSESSMENT — ENCOUNTER SYMPTOMS
CONSTIPATION: 0
EYE PAIN: 0
DIARRHEA: 0
NERVOUS/ANXIOUS: 1
BREAST MASS: 0
CHILLS: 0
ABDOMINAL PAIN: 0
HEMATOCHEZIA: 0
COUGH: 0
HEMATURIA: 0
DIZZINESS: 0

## 2022-04-25 ASSESSMENT — ACTIVITIES OF DAILY LIVING (ADL): CURRENT_FUNCTION: NO ASSISTANCE NEEDED

## 2022-04-26 ENCOUNTER — OFFICE VISIT (OUTPATIENT)
Dept: FAMILY MEDICINE | Facility: CLINIC | Age: 72
End: 2022-04-26
Payer: COMMERCIAL

## 2022-04-26 VITALS
RESPIRATION RATE: 16 BRPM | HEART RATE: 60 BPM | DIASTOLIC BLOOD PRESSURE: 70 MMHG | HEIGHT: 63 IN | SYSTOLIC BLOOD PRESSURE: 130 MMHG | BODY MASS INDEX: 24.27 KG/M2 | TEMPERATURE: 97.9 F | WEIGHT: 137 LBS

## 2022-04-26 DIAGNOSIS — H91.93 DECREASED HEARING OF BOTH EARS: ICD-10-CM

## 2022-04-26 DIAGNOSIS — Z23 ENCOUNTER FOR IMMUNIZATION: ICD-10-CM

## 2022-04-26 DIAGNOSIS — Z00.00 ENCOUNTER FOR MEDICARE ANNUAL WELLNESS EXAM: Primary | ICD-10-CM

## 2022-04-26 DIAGNOSIS — R22.9 LOCALIZED SUPERFICIAL SWELLING, MASS, OR LUMP: ICD-10-CM

## 2022-04-26 DIAGNOSIS — F41.9 ANXIETY: ICD-10-CM

## 2022-04-26 DIAGNOSIS — Z12.31 VISIT FOR SCREENING MAMMOGRAM: ICD-10-CM

## 2022-04-26 DIAGNOSIS — Z12.11 SPECIAL SCREENING FOR MALIGNANT NEOPLASMS, COLON: ICD-10-CM

## 2022-04-26 DIAGNOSIS — R73.01 ELEVATED FASTING GLUCOSE: ICD-10-CM

## 2022-04-26 LAB
ALBUMIN SERPL-MCNC: 4 G/DL (ref 3.4–5)
ALP SERPL-CCNC: 51 U/L (ref 40–150)
ALT SERPL W P-5'-P-CCNC: 18 U/L (ref 0–50)
ANION GAP SERPL CALCULATED.3IONS-SCNC: 4 MMOL/L (ref 3–14)
AST SERPL W P-5'-P-CCNC: 18 U/L (ref 0–45)
BILIRUB SERPL-MCNC: 0.6 MG/DL (ref 0.2–1.3)
BUN SERPL-MCNC: 19 MG/DL (ref 7–30)
CALCIUM SERPL-MCNC: 8.7 MG/DL (ref 8.5–10.1)
CHLORIDE BLD-SCNC: 108 MMOL/L (ref 94–109)
CHOLEST SERPL-MCNC: 193 MG/DL
CO2 SERPL-SCNC: 28 MMOL/L (ref 20–32)
CREAT SERPL-MCNC: 0.7 MG/DL (ref 0.52–1.04)
FASTING STATUS PATIENT QL REPORTED: YES
GFR SERPL CREATININE-BSD FRML MDRD: >90 ML/MIN/1.73M2
GLUCOSE BLD-MCNC: 87 MG/DL (ref 70–99)
HBA1C MFR BLD: 5.6 % (ref 0–5.6)
HDLC SERPL-MCNC: 106 MG/DL
LDLC SERPL CALC-MCNC: 73 MG/DL
NONHDLC SERPL-MCNC: 87 MG/DL
POTASSIUM BLD-SCNC: 4 MMOL/L (ref 3.4–5.3)
PROT SERPL-MCNC: 7.5 G/DL (ref 6.8–8.8)
SODIUM SERPL-SCNC: 140 MMOL/L (ref 133–144)
TRIGL SERPL-MCNC: 69 MG/DL

## 2022-04-26 PROCEDURE — 91306 COVID-19,PF,MODERNA (18+ YRS BOOSTER .25ML): CPT | Performed by: PHYSICIAN ASSISTANT

## 2022-04-26 PROCEDURE — 0064A COVID-19,PF,MODERNA (18+ YRS BOOSTER .25ML): CPT | Performed by: PHYSICIAN ASSISTANT

## 2022-04-26 PROCEDURE — 80053 COMPREHEN METABOLIC PANEL: CPT | Performed by: PHYSICIAN ASSISTANT

## 2022-04-26 PROCEDURE — 99213 OFFICE O/P EST LOW 20 MIN: CPT | Mod: 25 | Performed by: PHYSICIAN ASSISTANT

## 2022-04-26 PROCEDURE — 83036 HEMOGLOBIN GLYCOSYLATED A1C: CPT | Performed by: PHYSICIAN ASSISTANT

## 2022-04-26 PROCEDURE — 80061 LIPID PANEL: CPT | Performed by: PHYSICIAN ASSISTANT

## 2022-04-26 PROCEDURE — 36415 COLL VENOUS BLD VENIPUNCTURE: CPT | Performed by: PHYSICIAN ASSISTANT

## 2022-04-26 PROCEDURE — 99397 PER PM REEVAL EST PAT 65+ YR: CPT | Mod: 25 | Performed by: PHYSICIAN ASSISTANT

## 2022-04-26 RX ORDER — SERTRALINE HYDROCHLORIDE 25 MG/1
TABLET, FILM COATED ORAL
Qty: 90 TABLET | Refills: 1 | Status: SHIPPED | OUTPATIENT
Start: 2022-04-26 | End: 2023-02-22

## 2022-04-26 ASSESSMENT — ENCOUNTER SYMPTOMS
ABDOMINAL PAIN: 0
BREAST MASS: 0
DIARRHEA: 0
CHILLS: 0
NERVOUS/ANXIOUS: 1
CONSTIPATION: 0
EYE PAIN: 0
COUGH: 0
DIZZINESS: 0
HEMATOCHEZIA: 0
HEMATURIA: 0

## 2022-04-26 ASSESSMENT — PAIN SCALES - GENERAL: PAINLEVEL: NO PAIN (0)

## 2022-04-26 ASSESSMENT — ACTIVITIES OF DAILY LIVING (ADL): CURRENT_FUNCTION: NO ASSISTANCE NEEDED

## 2022-04-26 NOTE — PROGRESS NOTES
"SUBJECTIVE:   Anali Sanchez is a 71 year old female who presents for Preventive Visit.      Patient has been advised of split billing requirements and indicates understanding: Yes  Are you in the first 12 months of your Medicare coverage?  No    Healthy Habits:     In general, how would you rate your overall health?  Good    Frequency of exercise:  2-3 days/week    Do you usually eat at least 4 servings of fruit and vegetables a day, include whole grains    & fiber and avoid regularly eating high fat or \"junk\" foods?  Yes    Taking medications regularly:  Yes    Medication side effects:  None    Ability to successfully perform activities of daily living:  No assistance needed    Home Safety:  No safety concerns identified    Hearing Impairment:  Difficulty following a conversation in a noisy restaurant or crowded room, feel that people are mumbling or not speaking clearly, need to ask people to speak up or repeat themselves, difficulty understanding soft or whispered speech and difficulty understanding speech on the telephone    In the past 6 months, have you been bothered by leaking of urine?  No    In general, how would you rate your overall mental or emotional health?  Fair      PHQ-2 Total Score: 1    Additional concerns today:  Yes    Anxiety feels well controlled.  Did increase sertraline to 50 mg but had nightmares so decreased back to 25 mg  Feels like this is a good maintenance dose    Dad passed away in January - she is working with her siblings to sort out his estate. Daughter in law recently started rehab for alcohol abuse. Overall, feels like she is managing well and the 25 mg dose of sertraline is helpful.    Weaned off of prilosec. Hasn't taken for over 1 month now. No cough that she had previously.    Noticing some decreased hearing in both ears. Saw audiology a few years ago and wasn't at the point of needing hearing aids but wondering if now she might be.     Do you feel safe in your " environment? Yes    Have you ever done Advance Care Planning? (For example, a Health Directive, POLST, or a discussion with a medical provider or your loved ones about your wishes): No, advance care planning information given to patient to review.  Patient declined advance care planning discussion at this time.       Fall risk  Fallen 2 or more times in the past year?: No  Any fall with injury in the past year?: No    Cognitive Screening   1) Repeat 3 items (Leader, Season, Table)    2) Clock draw: NORMAL  3) 3 item recall: Recalls 3 objects  Results: 3 items recalled: COGNITIVE IMPAIRMENT LESS LIKELY    Mini-CogTM Copyright S Swati. Licensed by the author for use in [facility]; reprinted with permission (somarisel@[facility]). All rights reserved.      Do you have sleep apnea, excessive snoring or daytime drowsiness?: yes, snore    Reviewed and updated as needed this visit by clinical staff   Tobacco  Allergies  Meds  Problems  Med Hx  Surg Hx  Fam Hx            Reviewed and updated as needed this visit by Provider   Tobacco  Allergies  Meds  Problems  Med Hx  Surg Hx  Fam Hx           Social History     Tobacco Use     Smoking status: Never Smoker     Smokeless tobacco: Never Used   Substance Use Topics     Alcohol use: Yes     Comment: glass wine most days     If you drink alcohol do you typically have >3 drinks per day or >7 drinks per week? No    Alcohol Use 4/26/2022   Prescreen: >3 drinks/day or >7 drinks/week? -   Prescreen: >3 drinks/day or >7 drinks/week? No       Current providers sharing in care for this patient include:   Patient Care Team:  Jovita Bauman PA-C as PCP - General (Family Medicine)  Jovita Bauman PA-C as Assigned PCP    The following health maintenance items are reviewed in Epic and correct as of today:  Health Maintenance Due   Topic Date Due     ZOSTER IMMUNIZATION (1 of 2) Never done     FALL RISK ASSESSMENT  03/09/2022     Lab work is in process  Labs  reviewed in EPIC  BP Readings from Last 3 Encounters:   04/26/22 130/70   04/14/22 108/64   12/20/21 136/76    Wt Readings from Last 3 Encounters:   04/26/22 62.1 kg (137 lb)   04/14/22 62.4 kg (137 lb 8 oz)   12/20/21 63.5 kg (140 lb)                  Patient Active Problem List   Diagnosis     Anxiety     CARDIOVASCULAR SCREENING; LDL GOAL LESS THAN 160     History of colectomy     Gastroesophageal reflux disease, unspecified whether esophagitis present     Impaired fasting glucose     Bilateral low back pain without sciatica     Past Surgical History:   Procedure Laterality Date     APPENDECTOMY  1968     CARDIAC SURGERY  age 37    WPW surg; open ablation; complicated with hemorrhage     COLECTOMY  age 52    7-8 inches rectum remain. cx with hemorrhage. no cancer. slow transit.(2 surgeries; first for low transit; second due to the bleeding)     DILATION AND CURETTAGE, HYSTEROSCOPY DIAGNOSTIC, COMBINED  2/21/2014    Procedure: COMBINED DILATION AND CURETTAGE, HYSTEROSCOPY DIAGNOSTIC;   DILATION AND CURETTAGE, HYSTEROSCOPY DIAGNOSTIC ;  Surgeon: Willie Osborn MD;  Location: [facility] OR     ENT SURGERY      t and a       Social History     Tobacco Use     Smoking status: Never Smoker     Smokeless tobacco: Never Used   Substance Use Topics     Alcohol use: Yes     Comment: glass wine most days     Family History   Problem Relation Age of Onset     Bronchitis Mother         copd     Alcoholism Mother      Diabetes Father      Coronary Artery Disease Father      Leukemia Sister         chronic     Impaired Fasting Glucose Sister      Cancer Brother         myelofibrosis; got BMT 2019     Diabetes Type 2  Brother      Impaired Fasting Glucose Brother          Current Outpatient Medications   Medication Sig Dispense Refill     Calcium-Vitamin D-Vitamin K 500-200-40 MG-UNT-MCG CHEW Takes 3 chewables daily       sertraline (ZOLOFT) 25 MG tablet TAKE 1 TABLET BY MOUTH EVERY DAY 90 tablet 1     Allergies   Allergen Reactions  "    Benadryl [Diphenhydramine]      Given in the hospital and had a difficult time waking as expected.     External Allergen Needs Reconciliation - See Comment      Please reconcile the Patient's allergy reported as LEAD ACETATEMORPHINE SULFATE and update accordingly     Protamine      sob     Tylox [Oxycodone-Acetaminophen]      unknown     Ciprofloxacin Rash     Recent Labs   Lab Test 12/02/19  1042 10/25/17  0857   LDL  --  85   HDL  --  120   TRIG  --  62   CR 0.70  --    GFRESTIMATED 88  --    GFRESTBLACK >90  --    POTASSIUM 4.0  --       Mammogram Screening: Mammogram Screening: Recommended mammography every 1-2 years with patient discussion and risk factor consideration      Review of Systems   Constitutional: Negative for chills.   HENT: Negative for congestion, ear pain and hearing loss.    Eyes: Negative for pain.   Respiratory: Negative for cough.    Cardiovascular: Negative for chest pain.   Gastrointestinal: Negative for abdominal pain, constipation, diarrhea and hematochezia.   Breasts:  Negative for tenderness, breast mass and discharge.   Genitourinary: Negative for hematuria, pelvic pain, vaginal bleeding and vaginal discharge.   Neurological: Negative for dizziness.   Psychiatric/Behavioral: The patient is nervous/anxious.        OBJECTIVE:   /70   Pulse 60   Temp 97.9  F (36.6  C) (Oral)   Resp 16   Ht 1.594 m (5' 2.75\")   Wt 62.1 kg (137 lb)   LMP  (LMP Unknown)   BMI 24.46 kg/m   Estimated body mass index is 24.46 kg/m  as calculated from the following:    Height as of this encounter: 1.594 m (5' 2.75\").    Weight as of this encounter: 62.1 kg (137 lb).  Physical Exam  GENERAL: healthy, alert and no distress  EYES: Eyes grossly normal to inspection, PERRL and conjunctivae and sclerae normal  HENT: ear canals and TM's normal, nose and mouth without ulcers or lesions  NECK: no adenopathy, no asymmetry, masses, or scars and thyroid normal to palpation  RESP: lungs clear to " auscultation - no rales, rhonchi or wheezes  BREAST: normal without masses, tenderness or nipple discharge and no palpable axillary masses or adenopathy  CV: regular rate and rhythm, normal S1 S2, no S3 or S4, no murmur, click or rub, no peripheral edema and peripheral pulses strong  ABDOMEN: soft, nontender, no hepatosplenomegaly, no masses and bowel sounds normal  MS: no gross musculoskeletal defects noted, no edema  SKIN: no suspicious lesions or rashes. There is an approximately 3/4 cm in diameter firm mass over the forehead to the left of midline. No erythema, other discoloration, or skin injury over the mass. Non-tender.   NEURO: Normal strength and tone, mentation intact and speech normal  PSYCH: mentation appears normal, affect normal/bright    Diagnostic Test Results:  Labs reviewed in Epic    ASSESSMENT / PLAN:   Encounter for Medicare annual wellness exam  Reviewed personal and family history. Reviewed age appropriate screenings. Reviewed healthy BP and BMI ranges. Counseled on lifestyle modifications for optimal mental and physical health.  Discussed age-appropriate health maintanence. Recommended any needed vaccinations. Continue to focus on well balanced diet and exercise. She will get her shingrix vaccine at a pharmacy.  - Lipid panel reflex to direct LDL Fasting; Future  - Comprehensive metabolic panel (BMP + Alb, Alk Phos, ALT, AST, Total. Bili, TP); Future  - Adult Dermatology Referral; Future  - Lipid panel reflex to direct LDL Fasting  - Comprehensive metabolic panel (BMP + Alb, Alk Phos, ALT, AST, Total. Bili, TP)    Special screening for malignant neoplasms, colon  Discussed options. She has elected to do FIT test in the past and would like to continue.  - Fecal colorectal cancer screen (FIT); Future  - Fecal colorectal cancer screen (FIT)    Anxiety  Chronic, well controlled with 25 mg dose. Continue.  - sertraline (ZOLOFT) 25 MG tablet; TAKE 1 TABLET BY MOUTH EVERY DAY    Elevated fasting  "glucose  Will check A1c  - Hemoglobin A1c; Future  - Hemoglobin A1c    Visit for screening mammogram  - *MA Screening Digital Bilateral; Future    Decreased hearing of both ears  - Adult Audiology Referral; Future    Encounter for immunization  - COVID-19,PF,MODERNA (18+ YRS BOOSTER .25ML)    Localized superficial swelling, mass, or lump  She would like to see dermatology for full body skin check so will start with them for further evaluation of the lump on her forehead. Did discuss she may need to see general surgery but she will start with dermatology and go from there.  - Adult Dermatology Referral; Future      COUNSELING:  Reviewed preventive health counseling, as reflected in patient instructions    Estimated body mass index is 24.46 kg/m  as calculated from the following:    Height as of this encounter: 1.594 m (5' 2.75\").    Weight as of this encounter: 62.1 kg (137 lb).        She reports that she has never smoked. She has never used smokeless tobacco.      Appropriate preventive services were discussed with this patient, including applicable screening as appropriate for cardiovascular disease, diabetes, osteopenia/osteoporosis, and glaucoma.  As appropriate for age/gender, discussed screening for colorectal cancer, prostate cancer, breast cancer, and cervical cancer. Checklist reviewing preventive services available has been given to the patient.    Reviewed patients plan of care and provided an AVS. The Basic Care Plan (routine screening as documented in Health Maintenance) for Anali meets the Care Plan requirement. This Care Plan has been established and reviewed with the Patient.    Counseling Resources:  ATP IV Guidelines  Pooled Cohorts Equation Calculator  Breast Cancer Risk Calculator  Breast Cancer: Medication to Reduce Risk  FRAX Risk Assessment  ICSI Preventive Guidelines  Dietary Guidelines for Americans, 2010  USDA's MyPlate  ASA Prophylaxis  Lung CA Screening    Jovita Bauman PA-C  [facility] " [facility]    Identified Health Risks:    The patient was provided with written information regarding signs of hearing loss.  The patient was provided with suggestions to help her develop a healthy emotional lifestyle.

## 2022-04-26 NOTE — PATIENT INSTRUCTIONS
Preventive Health Recommendations    See your health care provider every year to    Review health changes.     Discuss preventive care.      Review your medicines if your doctor has prescribed any.      You no longer need a yearly Pap test unless you've had an abnormal Pap test in the past 10 years. If you have vaginal symptoms, such as bleeding or discharge, be sure to talk with your provider about a Pap test.      Every 1 to 2 years, have a mammogram.  If you are over 69, talk with your health care provider about whether or not you want to continue having screening mammograms.      Every 10 years, have a colonoscopy. Or, have a yearly FIT test (stool test). These exams will check for colon cancer.       Have a cholesterol test every 5 years, or more often if your doctor advises it.       Have a diabetes test (fasting glucose) every three years. If you are at risk for diabetes, you should have this test more often.       At age 65, have a bone density scan (DEXA) to check for osteoporosis (brittle bone disease).    Shots:    Get a flu shot each year.    Get a tetanus shot every 10 years.    Talk to your doctor about your pneumonia vaccines. There are now two you should receive - Pneumovax (PPSV 23) and Prevnar (PCV 13).    Talk to your pharmacist about the shingles vaccine.    Talk to your doctor about the hepatitis B vaccine.    Nutrition:     Eat at least 5 servings of fruits and vegetables each day.      Eat whole-grain bread, whole-wheat pasta and brown rice instead of white grains and rice.      Get adequate about Calcium and Vitamin D.     Lifestyle    Exercise at least 150 minutes a week (30 minutes a day, 5 days a week). This will help you control your weight and prevent disease.      Limit alcohol to one drink per day.      No smoking.       Wear sunscreen to prevent skin cancer.       See your dentist twice a year for an exam and cleaning.      See your eye doctor every 1 to 2 years to screen for  conditions such as glaucoma, macular degeneration, cataracts, etc.    Personalized Prevention Plan  You are due for the preventive services outlined below.  Your care team is available to assist you in scheduling these services.  If you have already completed any of these items, please share that information with your care team to update in your medical record.    Health Maintenance Due   Topic Date Due     Zoster (Shingles) Vaccine (1 of 2) Never done     Annual Wellness Visit  03/09/2022     FALL RISK ASSESSMENT  03/09/2022     Patient Education   Personalized Prevention Plan  You are due for the preventive services outlined below.  Your care team is available to assist you in scheduling these services.  If you have already completed any of these items, please share that information with your care team to update in your medical record.  Health Maintenance Due   Topic Date Due     Zoster (Shingles) Vaccine (1 of 2) Never done     FALL RISK ASSESSMENT  03/09/2022       Signs of Hearing Loss      Hearing much better with one ear can be a sign of hearing loss.   Hearing loss is a problem shared by many people. In fact, it is one of the most common health problems, particularly as people age. Most people age 65 and older have some hearing loss. By age 80, almost everyone does. Hearing loss often occurs slowly over the years. So you may not realize your hearing has gotten worse.  Have your hearing checked  Call your healthcare provider if you:    Have to strain to hear normal conversation    Have to watch other people s faces very carefully to follow what they re saying    Need to ask people to repeat what they ve said    Often misunderstand what people are saying    Turn the volume of the television or radio up so high that others complain    Feel that people are mumbling when they re talking to you    Find that the effort to hear leaves you feeling tired and irritated    Notice, when using the phone, that you hear  better with one ear than the other  [employer] last reviewed this educational content on 1/1/2020 2000-2021 [employer]. All rights reserved. This information is not intended as a substitute for professional medical care. Always follow your healthcare professional's instructions.        Your Health Risk Assessment indicates you feel you are not in good emotional health.    Recreation   Recreation is not limited to sports and team events. It includes any activity that provides relaxation, interest, enjoyment, and exercise. Recreation provides an outlet for physical, mental, and social energy. It can give a sense of worth and achievement. It can help you stay healthy.    Mental Exercise and Social Involvement  Mental and emotional health is as important as physical health. Keep in touch with friends and family. Stay as active as possible. Continue to learn and challenge yourself.   Things you can do to stay mentally active are:    Learn something new, like a foreign language or musical instrument.     Play SCRABBLE or do crossword puzzles. If you cannot find people to play these games with you at home, you can play them with others on your computer through the Internet.     Join a games club--anything from card games to chess or checkers or lawn bowling.     Start a new hobby.     Go back to school.     Volunteer.     Read.   Keep up with world events.

## 2022-04-26 NOTE — PROGRESS NOTES
"   SUBJECTIVE:   CC: Anali Sanchez is an 71 year old woman who presents for preventive health visit.     {Split Bill scripting  The purpose of this visit is to discuss your medical history and prevent health problems before you are sick. You may be responsible for a co-pay, coinsurance, or deductible if your visit today includes services such as checking on a sore throat, having an x-ray or lab test, or treating and evaluating a new or existing condition :989670}  {Patient advised of split billing (Optional):477235}  Healthy Habits:     In general, how would you rate your overall health?  Good    Frequency of exercise:  2-3 days/week    Do you usually eat at least 4 servings of fruit and vegetables a day, include whole grains    & fiber and avoid regularly eating high fat or \"junk\" foods?  Yes    Taking medications regularly:  Yes    Medication side effects:  None    Ability to successfully perform activities of daily living:  No assistance needed    Home Safety:  No safety concerns identified    Hearing Impairment:  Difficulty following a conversation in a noisy restaurant or crowded room, feel that people are mumbling or not speaking clearly, need to ask people to speak up or repeat themselves, difficulty understanding soft or whispered speech and difficulty understanding speech on the telephone    In the past 6 months, have you been bothered by leaking of urine?  No    In general, how would you rate your overall mental or emotional health?  Fair      PHQ-2 Total Score: 1    Additional concerns today:  Yes    {Add if <65 person on Medicare  - Required Questions (Optional):878047}  {Outside tests to abstract? :121899}    {additional problems to add (Optional):189482}    Today's PHQ-2 Score:   PHQ-2 ( 1999 Pfizer) 4/25/2022   Q1: Little interest or pleasure in doing things 0   Q2: Feeling down, depressed or hopeless 1   PHQ-2 Score 1   PHQ-2 Total Score (12-17 Years)- Positive if 3 or more points; Administer " "PHQ-A if positive -   Q1: Little interest or pleasure in doing things Not at all   Q2: Feeling down, depressed or hopeless Several days   PHQ-2 Score 1       Abuse: Current or Past (Physical, Sexual or Emotional) - { :377699}  Do you feel safe in your environment? { :425582}        Social History     Tobacco Use     Smoking status: Never Smoker     Smokeless tobacco: Never Used   Substance Use Topics     Alcohol use: Yes     Comment: glass wine most days     {Rooming Staff- Complete this question if Prescreen response is not shown below for today's visit. If you drink alcohol do you typically have >3 drinks per day or >7 drinks per week? (Optional):131352}    Alcohol Use 4/25/2022   Prescreen: >3 drinks/day or >7 drinks/week? No   Prescreen: >3 drinks/day or >7 drinks/week? -   {add AUDIT responses (Optional) (A score of 7 for adult men is an indication of hazardous drinking; a score of 8 or more is an indication of an alcohol use disorder.  A score of 7 or more for adult women is an indication of hazardous drinking or an alchohol use disorder):217992}    Reviewed orders with patient.  Reviewed health maintenance and updated orders accordingly - { :393088::\"Yes\"}  {Chronicprobdata (optional):599285}    Breast Cancer Screening:        History of abnormal Pap smear: { :291596}     Reviewed and updated as needed this visit by clinical staff                    Reviewed and updated as needed this visit by Provider                   {HISTORY OPTIONS (Optional):927652}    Review of Systems   Constitutional: Negative for chills.   HENT: Negative for congestion, ear pain and hearing loss.    Eyes: Negative for pain.   Respiratory: Negative for cough.    Cardiovascular: Negative for chest pain.   Gastrointestinal: Negative for abdominal pain, constipation, diarrhea and hematochezia.   Breasts:  Negative for tenderness, breast mass and discharge.   Genitourinary: Negative for hematuria, pelvic pain, vaginal bleeding and " "vaginal discharge.   Neurological: Negative for dizziness.   Psychiatric/Behavioral: The patient is nervous/anxious.      {FEMALE ROS (Optional):633435}     OBJECTIVE:   LMP  (LMP Unknown)   Physical Exam  {Exam Choices (Optional):483648}    {Diagnostic Test Results (Optional):738991::\"Diagnostic Test Results:\",\"Labs reviewed in Epic\"}    ASSESSMENT/PLAN:   {Diag Picklist:588872}    {Patient advised of split billing (Optional):223942}    COUNSELING:  {FEMALE COUNSELING MESSAGES:476646::\"Reviewed preventive health counseling, as reflected in patient instructions\"}    Estimated body mass index is 24.95 kg/m  as calculated from the following:    Height as of 4/14/22: 1.581 m (5' 2.25\").    Weight as of 4/14/22: 62.4 kg (137 lb 8 oz).    {Weight Management Plan (ACO) Complete if BMI is abnormal-  Ages 18-64  BMI >24.9.  Age 65+ with BMI <23 or >30 (Optional):673059}    She reports that she has never smoked. She has never used smokeless tobacco.      Counseling Resources:  ATP IV Guidelines  Pooled Cohorts Equation Calculator  Breast Cancer Risk Calculator  BRCA-Related Cancer Risk Assessment: FHS-7 Tool  FRAX Risk Assessment  ICSI Preventive Guidelines  Dietary Guidelines for Americans, 2010  USDA's MyPlate  ASA Prophylaxis  Lung CA Screening    [facility]  "

## 2022-04-29 ENCOUNTER — THERAPY VISIT (OUTPATIENT)
Dept: PHYSICAL THERAPY | Facility: CLINIC | Age: 72
End: 2022-04-29
Payer: COMMERCIAL

## 2022-04-29 DIAGNOSIS — M54.50 BILATERAL LOW BACK PAIN WITHOUT SCIATICA: Primary | ICD-10-CM

## 2022-04-29 LAB — HEMOCCULT STL QL IA: NEGATIVE

## 2022-04-29 PROCEDURE — 97110 THERAPEUTIC EXERCISES: CPT | Mod: GP | Performed by: PHYSICAL THERAPIST

## 2022-04-29 PROCEDURE — 97112 NEUROMUSCULAR REEDUCATION: CPT | Mod: GP | Performed by: PHYSICAL THERAPIST

## 2022-04-29 PROCEDURE — 97140 MANUAL THERAPY 1/> REGIONS: CPT | Mod: GP | Performed by: PHYSICAL THERAPIST

## 2022-04-29 PROCEDURE — 82274 ASSAY TEST FOR BLOOD FECAL: CPT | Performed by: PHYSICIAN ASSISTANT

## 2022-05-27 ENCOUNTER — ANCILLARY PROCEDURE (OUTPATIENT)
Dept: MAMMOGRAPHY | Facility: CLINIC | Age: 72
End: 2022-05-27
Attending: PHYSICIAN ASSISTANT
Payer: COMMERCIAL

## 2022-05-27 DIAGNOSIS — Z12.31 VISIT FOR SCREENING MAMMOGRAM: ICD-10-CM

## 2022-05-27 PROCEDURE — 77063 BREAST TOMOSYNTHESIS BI: CPT | Mod: TC | Performed by: RADIOLOGY

## 2022-05-27 PROCEDURE — 77067 SCR MAMMO BI INCL CAD: CPT | Mod: TC | Performed by: RADIOLOGY

## 2022-07-28 NOTE — PROGRESS NOTES
Call placed to patient asking him to call back with his dose. Advised he ask for Dr. Rand's nurse if he calls tomorrow.   Patient returned for only 1 visit following evaluation.  Please refer to the flowsheet completed on 4/29/22  for discharge information.

## 2022-08-02 ENCOUNTER — MYC MEDICAL ADVICE (OUTPATIENT)
Dept: FAMILY MEDICINE | Facility: CLINIC | Age: 72
End: 2022-08-02

## 2022-08-02 DIAGNOSIS — I83.90 VARICOSE VEINS OF LOWER EXTREMITY, UNSPECIFIED LATERALITY, UNSPECIFIED WHETHER COMPLICATED: ICD-10-CM

## 2022-08-02 DIAGNOSIS — R22.9 LOCALIZED SUPERFICIAL SWELLING, MASS, OR LUMP: Primary | ICD-10-CM

## 2022-08-05 ENCOUNTER — MYC MEDICAL ADVICE (OUTPATIENT)
Dept: FAMILY MEDICINE | Facility: CLINIC | Age: 72
End: 2022-08-05

## 2022-08-10 ENCOUNTER — OFFICE VISIT (OUTPATIENT)
Dept: INTERNAL MEDICINE | Facility: CLINIC | Age: 72
End: 2022-08-10
Payer: COMMERCIAL

## 2022-08-10 VITALS
HEIGHT: 63 IN | HEART RATE: 81 BPM | BODY MASS INDEX: 24.11 KG/M2 | WEIGHT: 136.1 LBS | OXYGEN SATURATION: 99 % | TEMPERATURE: 96 F | DIASTOLIC BLOOD PRESSURE: 74 MMHG | SYSTOLIC BLOOD PRESSURE: 128 MMHG | RESPIRATION RATE: 14 BRPM

## 2022-08-10 DIAGNOSIS — I83.813 VARICOSE VEINS OF BOTH LOWER EXTREMITIES WITH PAIN: ICD-10-CM

## 2022-08-10 DIAGNOSIS — M25.551 HIP PAIN, RIGHT: Primary | ICD-10-CM

## 2022-08-10 DIAGNOSIS — I82.409 ACUTE DEEP VEIN THROMBOSIS (DVT) OF LOWER EXTREMITY, UNSPECIFIED LATERALITY, UNSPECIFIED VEIN (H): ICD-10-CM

## 2022-08-10 PROCEDURE — 99213 OFFICE O/P EST LOW 20 MIN: CPT | Performed by: INTERNAL MEDICINE

## 2022-08-10 ASSESSMENT — PAIN SCALES - GENERAL: PAINLEVEL: MILD PAIN (2)

## 2022-08-10 NOTE — PROGRESS NOTES
Assessment & Plan     Hip pain, right  Assess hip X rays   - XR Hip Right 2-3 Views; Future    Varicose veins of both lower extremities with pain  Assess US for deep vein competency, chronic DVT R/O  - US Lower Extremity Venous Duplex Bilateral; Future             See Patient Instructions    Return in about 4 weeks (around 9/7/2022) for follow up on acute problem if persists.    Marcell Pulliam MD  [facility] JOSE Kahn is a 71 year old, presenting for the following health issues:  Varicose Vein (Right leg/)      History of Present Illness       Reason for visit:  Varicose veins in legs causing pain  Symptom onset:  3-4 weeks ago  Symptoms include:  Pain  Symptom intensity:  Moderate  Symptom progression:  Staying the same  Had these symptoms before:  Yes  Has tried/received treatment for these symptoms:  No  What makes it worse:  Exercise  What makes it better:  Resting    She eats 2-3 servings of fruits and vegetables daily.She consumes 0 sweetened beverage(s) daily.She exercises with enough effort to increase her heart rate 20 to 29 minutes per day.  She exercises with enough effort to increase her heart rate 3 or less days per week.   She is taking medications regularly.       Presents with concerns for varicose veins, symptomatic with pain.   Has had problems with varicose veins for years, after delivery of her children.   Past month noted pain in the right groin and left lateral knee, where she has significantly enlarged veins.   No legs swelling.   Has pain in the groin with ambulation.   Has tried compression stockings. Has made an appointment to see vascular specialist in one month.       Review of Systems   Constitutional, HEENT, cardiovascular, pulmonary, gi and gu systems are negative, except as otherwise noted.      Objective    /74 (BP Location: Left arm, Cuff Size: Adult Regular)   Pulse 81   Temp (!) 96  F (35.6  C) (Tympanic)   Resp 14   Ht 1.594 m  "(5' 2.75\")   Wt 61.7 kg (136 lb 1.6 oz)   LMP  (LMP Unknown)   SpO2 99%   BMI 24.30 kg/m    Body mass index is 24.3 kg/m .  Physical Exam   GENERAL: healthy, alert and no distress  MS: no gross musculoskeletal defects noted, no edema  Right inguinal pain with hip flexion and FADIR test     Office Visit on 04/26/2022   Component Date Value Ref Range Status     Cholesterol 04/26/2022 193  <200 mg/dL Final     Triglycerides 04/26/2022 69  <150 mg/dL Final     Direct Measure HDL 04/26/2022 106  >=50 mg/dL Final     LDL Cholesterol Calculated 04/26/2022 73  <=100 mg/dL Final     Non HDL Cholesterol 04/26/2022 87  <130 mg/dL Final     Patient Fasting > 8hrs? 04/26/2022 Yes   Final     Sodium 04/26/2022 140  133 - 144 mmol/L Final     Potassium 04/26/2022 4.0  3.4 - 5.3 mmol/L Final     Chloride 04/26/2022 108  94 - 109 mmol/L Final     Carbon Dioxide (CO2) 04/26/2022 28  20 - 32 mmol/L Final     Anion Gap 04/26/2022 4  3 - 14 mmol/L Final     Urea Nitrogen 04/26/2022 19  7 - 30 mg/dL Final     Creatinine 04/26/2022 0.70  0.52 - 1.04 mg/dL Final     Calcium 04/26/2022 8.7  8.5 - 10.1 mg/dL Final     Glucose 04/26/2022 87  70 - 99 mg/dL Final     Alkaline Phosphatase 04/26/2022 51  40 - 150 U/L Final     AST 04/26/2022 18  0 - 45 U/L Final     ALT 04/26/2022 18  0 - 50 U/L Final     Protein Total 04/26/2022 7.5  6.8 - 8.8 g/dL Final     Albumin 04/26/2022 4.0  3.4 - 5.0 g/dL Final     Bilirubin Total 04/26/2022 0.6  0.2 - 1.3 mg/dL Final     GFR Estimate 04/26/2022 >90  >60 mL/min/1.73m2 Final    Effective December 21, 2021 eGFRcr in adults is calculated using the 2021 CKD-EPI creatinine equation which includes age and gender ( et al., NEJM, DOI: 10.1056/MAUVlc6500351)     Occult Blood Screen FIT 04/29/2022 Negative  Negative Final     Hemoglobin A1C 04/26/2022 5.6  0.0 - 5.6 % Final    Normal <5.7%   Prediabetes 5.7-6.4%    Diabetes 6.5% or higher     Note: Adopted from ADA consensus guidelines.           "         .  ..

## 2022-08-17 ENCOUNTER — TELEPHONE (OUTPATIENT)
Dept: INTERNAL MEDICINE | Facility: CLINIC | Age: 72
End: 2022-08-17

## 2022-08-17 ENCOUNTER — HOSPITAL ENCOUNTER (OUTPATIENT)
Dept: ULTRASOUND IMAGING | Facility: CLINIC | Age: 72
Discharge: HOME | End: 2022-08-17
Attending: INTERNAL MEDICINE | Admitting: INTERNAL MEDICINE
Payer: COMMERCIAL

## 2022-08-17 DIAGNOSIS — I83.813 VARICOSE VEINS OF BOTH LOWER EXTREMITIES WITH PAIN: ICD-10-CM

## 2022-08-17 DIAGNOSIS — M79.604 PAIN IN BOTH LOWER EXTREMITIES: ICD-10-CM

## 2022-08-17 DIAGNOSIS — R52 PAIN: ICD-10-CM

## 2022-08-17 DIAGNOSIS — M79.605 PAIN IN BOTH LOWER EXTREMITIES: ICD-10-CM

## 2022-08-17 PROCEDURE — 93970 EXTREMITY STUDY: CPT

## 2022-08-17 NOTE — TELEPHONE ENCOUNTER
Confirmed Bilateral Venous Duplex. Call placed to Esme. Advised of provider order to rule out DVT. Esme to add order note. DVt diagnosis code added to visit.

## 2022-08-17 NOTE — TELEPHONE ENCOUNTER
Esme from [facility] calls regarding appointment today.     8/10/22 office visit. Order placed for Bilateral Venous Duplex. Radiology states that order- Bilateral Venous Duplex- is for a DVT Rule out. Ok to proceed if we would like to rule out DVT. If we would like patient to under go a Venous Competency Study- [facility] cannot do that and appointment would have to be rescheduled with Venous Competency Study order.    Please advise if patient should continue with Bilateral Venous Duplex.    Appointments in Next Year    Aug 17, 2022  3:30 PM  (Arrive by 3:15 PM)  US LOWER EXTREMITY VENOUS DUPLEX BILATERAL with [id number]  [facility] ([facility] ) [phone]   Sep 02, 2022  2:45 PM  (Arrive by 2:30 PM)  New Visit with Jovita Garcia PA-C  [facility] ([facility] ) [phone]   Sep 08, 2022  9:00 AM  (Arrive by 8:45 AM)  CONSULT with Fercho Ariza MD  [facility] ([facility]) [phone]        OK to call Esme back and leave a message with whomever answers at [phone].

## 2022-08-17 NOTE — LETTER
August 17, 2022      Criss Sanchez  [address] [place] [id number]      Dear ,    We are writing to inform you of your test results.    Your Ultrasound showed no signs of a blood clot.     Resulted Orders   US Lower Extremity Venous Duplex Bilateral    Narrative    VENOUS ULTRASOUND BOTH LEGS  8/17/2022 3:37 PM     HISTORY: Pain; Pain in both lower extremities; Pain in both lower  extremities    COMPARISON: None.    FINDINGS:  Examination of the deep veins with graded compression and  color flow Doppler with spectral wave form analysis was performed.   There is no evidence for DVT in the lower extremities.      Impression    IMPRESSION: No evidence of deep venous thrombosis.    CHESTER BUSTAMANTE MD         SYSTEM ID:  [id number]       If you have any questions or concerns, please call the clinic at the number listed above.     Sincerely,      Marcell Pulliam MD

## 2022-09-02 ENCOUNTER — OFFICE VISIT (OUTPATIENT)
Dept: DERMATOLOGY | Facility: CLINIC | Age: 72
End: 2022-09-02
Attending: PHYSICIAN ASSISTANT
Payer: COMMERCIAL

## 2022-09-02 ENCOUNTER — ANCILLARY PROCEDURE (OUTPATIENT)
Dept: GENERAL RADIOLOGY | Facility: CLINIC | Age: 72
End: 2022-09-02
Attending: PHYSICIAN ASSISTANT
Payer: COMMERCIAL

## 2022-09-02 DIAGNOSIS — L72.9 CYST OF SKIN: ICD-10-CM

## 2022-09-02 DIAGNOSIS — L72.9 CYST OF SKIN: Primary | ICD-10-CM

## 2022-09-02 DIAGNOSIS — D18.01 ANGIOMA OF SKIN: ICD-10-CM

## 2022-09-02 DIAGNOSIS — D22.9 NEVUS: ICD-10-CM

## 2022-09-02 DIAGNOSIS — L82.1 SEBORRHEIC KERATOSIS: ICD-10-CM

## 2022-09-02 DIAGNOSIS — L81.4 LENTIGO: ICD-10-CM

## 2022-09-02 PROCEDURE — 99204 OFFICE O/P NEW MOD 45 MIN: CPT | Performed by: PHYSICIAN ASSISTANT

## 2022-09-02 PROCEDURE — 70250 X-RAY EXAM OF SKULL: CPT | Mod: TC | Performed by: RADIOLOGY

## 2022-09-02 NOTE — LETTER
9/2/2022         RE: Anali Sanchez  [address] [facility] [address]        Dear Colleague,    Thank you for referring your patient, Anali Sanchez, to the [facility]. Please see a copy of my visit note below.    HPI:   Chief complaints: Anali Sanchez is a pleasant 71 year old female who presents for Full skin cancer screening to rule out skin cancer   Last Skin Exam: n/a      1st Baseline: yes  Personal HX of Skin Cancer: no  Personal HX of Malignant Melanoma: no   Family HX of Skin Cancer / Malignant Melanoma: yes brother with likely melanoma  Personal HX of Atypical Moles:   no  Risk factors: history of sun exposure  New / Changing lesions:yes spot on the forehead which is growing  Social History:   On review of systems, there are no further skin complaints, patient is feeling otherwise well.   ROS of the following were done and are negative: Constitutional, Eyes, Ears, Nose,   Mouth, Throat, Cardiovascular, Respiratory, GI, Genitourinary, Musculoskeletal,   Psychiatric, Endocrine, Allergic/Immunologic.    PHYSICAL EXAM:   LMP  (LMP Unknown)   Breastfeeding No   Skin exam performed as follows: Type 2 skin. Mood appropriate  Alert and Oriented X 3. Well developed, well nourished in no distress.  General appearance: Normal  Head including face: Normal  Eyes: conjunctiva and lids: Normal  Mouth: Lips, teeth, gums: Normal  Neck: Normal  Chest-breast/axillae: Normal  Back: Normal  Spleen and liver: Normal  Cardiovascular: Exam of peripheral vascular system by observation for swelling, varicosities, edema: Normal  Genitalia: groin, buttocks: Normal  Extremities: digits/nails (clubbing): Normal  Eccrine and Apocrine glands: Normal  Right upper extremity: Normal  Left upper extremity: Normal  Right lower extremity: Normal  Left lower extremity: Normal  Skin: Scalp and body hair: See below    Pt deferred exam of breasts, groin, buttocks: No    Other  physical findings:  1. Multiple pigmented macules on extremities and trunk  2. Multiple pigmented macules on face, trunk and extremities  3. Multiple vascular papules on trunk, arms and legs  4. Multiple scattered keratotic plaques  5. 10 mm firm non mobile nodule on the left forehead       Except as noted above, no other signs of skin cancer or melanoma.     ASSESSMENT/PLAN:   Benign Full skin cancer screening today. . Patient with history of none  Advised on monthly self exams and 1 year  Patient Education: Appropriate brochures given.    1. Multiple benign appearing melanocytic nevi on arms, legs and trunk. Discussed ABCDEs of melanoma and sunscreen.   2. Multiple lentigos on arms, legs and trunk. Advised benign, no treatment needed.  3. Multiple scattered angiomas. Advised benign, no treatment needed.   4. Seborrheic keratosis on arms, legs and trunk. Advised benign, no treatment needed.  5. Determine soft tissue cyst vs bone cyst on the left forehead. If soft tissue then she would like the area excised; can schedule with Dr Abreu for this  --XR or skull ordered            Follow-up: yearly/PRN sooner    1.) Patient was asked about new and changing moles. YES  2.) Patient received a complete physical skin examination: YES  3.) Patient was counseled to perform a monthly self skin examination: YES  Scribed By: Jovita Garcia, MS, PADARELL          Again, thank you for allowing me to participate in the care of your patient.        Sincerely,        Jovita Garcia PA-C

## 2022-09-02 NOTE — PROGRESS NOTES
HPI:   Chief complaints: Anali Sanchez is a pleasant 71 year old female who presents for Full skin cancer screening to rule out skin cancer   Last Skin Exam: n/a      1st Baseline: yes  Personal HX of Skin Cancer: no  Personal HX of Malignant Melanoma: no   Family HX of Skin Cancer / Malignant Melanoma: yes brother with likely melanoma  Personal HX of Atypical Moles:   no  Risk factors: history of sun exposure  New / Changing lesions:yes spot on the forehead which is growing  Social History:   On review of systems, there are no further skin complaints, patient is feeling otherwise well.   ROS of the following were done and are negative: Constitutional, Eyes, Ears, Nose,   Mouth, Throat, Cardiovascular, Respiratory, GI, Genitourinary, Musculoskeletal,   Psychiatric, Endocrine, Allergic/Immunologic.    PHYSICAL EXAM:   LMP  (LMP Unknown)   Breastfeeding No   Skin exam performed as follows: Type 2 skin. Mood appropriate  Alert and Oriented X 3. Well developed, well nourished in no distress.  General appearance: Normal  Head including face: Normal  Eyes: conjunctiva and lids: Normal  Mouth: Lips, teeth, gums: Normal  Neck: Normal  Chest-breast/axillae: Normal  Back: Normal  Spleen and liver: Normal  Cardiovascular: Exam of peripheral vascular system by observation for swelling, varicosities, edema: Normal  Genitalia: groin, buttocks: Normal  Extremities: digits/nails (clubbing): Normal  Eccrine and Apocrine glands: Normal  Right upper extremity: Normal  Left upper extremity: Normal  Right lower extremity: Normal  Left lower extremity: Normal  Skin: Scalp and body hair: See below    Pt deferred exam of breasts, groin, buttocks: No    Other physical findings:  1. Multiple pigmented macules on extremities and trunk  2. Multiple pigmented macules on face, trunk and extremities  3. Multiple vascular papules on trunk, arms and legs  4. Multiple scattered keratotic plaques  5. 10 mm firm non mobile nodule on the left  forehead       Except as noted above, no other signs of skin cancer or melanoma.     ASSESSMENT/PLAN:   Benign Full skin cancer screening today. . Patient with history of none  Advised on monthly self exams and 1 year  Patient Education: Appropriate brochures given.    1. Multiple benign appearing melanocytic nevi on arms, legs and trunk. Discussed ABCDEs of melanoma and sunscreen.   2. Multiple lentigos on arms, legs and trunk. Advised benign, no treatment needed.  3. Multiple scattered angiomas. Advised benign, no treatment needed.   4. Seborrheic keratosis on arms, legs and trunk. Advised benign, no treatment needed.  5. Determine soft tissue cyst vs bone cyst on the left forehead. If soft tissue then she would like the area excised; can schedule with Dr Abreu for this  --XR or skull ordered            Follow-up: yearly/PRN sooner    1.) Patient was asked about new and changing moles. YES  2.) Patient received a complete physical skin examination: YES  3.) Patient was counseled to perform a monthly self skin examination: YES  Scribed By: Jovita Garcia MS, PA-C

## 2022-09-08 ENCOUNTER — OFFICE VISIT (OUTPATIENT)
Dept: VASCULAR SURGERY | Facility: CLINIC | Age: 72
End: 2022-09-08
Attending: PHYSICIAN ASSISTANT
Payer: COMMERCIAL

## 2022-09-08 DIAGNOSIS — I83.90 VARICOSE VEINS OF LOWER EXTREMITY, UNSPECIFIED LATERALITY, UNSPECIFIED WHETHER COMPLICATED: ICD-10-CM

## 2022-09-08 PROCEDURE — 99203 OFFICE O/P NEW LOW 30 MIN: CPT | Performed by: SURGERY

## 2022-09-08 NOTE — LETTER
9/8/2022         RE: Anali Sanchez  [address] [facility] [address]        Dear Colleague,    Thank you for referring your patient, Anali Sanchez, to the [facility] [place]. Please see a copy of my visit note below.    I had the pleasure of seeing Anali Sanchez in the vein clinic today.   She is a very pleasant 71 year old female with a history of bilateral lower extremity varicose veins.     She tells me that she has had varicose veins in both lower extremities for many decades. Recently she has noticed that they have become more prominent. She has mild pain and that does not affect her activities of daily living.     Even these symptoms have improved since she started wearing compresion socks.     On my exam she has prominent varicose veins of both the lower extremities. These are located in the calf as well as thigh areas.     Diagnosis: Mildly symptomatic bilateral lower extremity varicose veins.           Again, thank you for allowing me to participate in the care of your patient.        Sincerely,        Fercho Ariza MD

## 2022-09-08 NOTE — NURSING NOTE
Patient Reported symptoms:    Bilateral leg   Heaviness None of the time   Achiness A good bit of the time   Swelling None of the time   Throbbing None of the time   Itching None of the time   Appearance Very noticeable   Impact on work/activities Symptoms but full able to participate

## 2022-09-08 NOTE — PATIENT INSTRUCTIONS
Varicose Veins and Spider Veins    Varicose veins are swollen, enlarged veins most often found in the legs. They are usually blue or purple in color and may bulge, twist, and stand out under the skin. Spider veins are small veins just under your skin that can look red, blue or purple.        Normally, veins return blood from the body to the heart. The leg veins have one-way valves that prevent blood from flowing backward in the vein. When the valves are weak or damaged, blood backs up in the veins. This may cause some of the veins to swell and bulge and become varicose veins.     Symptoms  Varicose veins may or may not cause symptoms. If symptoms do occur, they can include:  Legs that feel tired, achy, heavy, or itchy  Leg swelling  Leg muscle cramps  Skin changes, such as discoloration, dryness, redness, or rash (in more severe cases, you may also have sores on the skin called venous leg ulcers)    Risk factors  There are a number of factors that increase the risk for varicose veins. These can include:   Being a woman  Being older  Sitting or standing for long periods  Being overweight  Being pregnant  Having a family history of varicose veins  Hormones, birth control pills    Treatment starts with simple self-help measures (see below). If these don't help, there are many procedures that can be done to shrink or remove varicose veins. Your healthcare provider can tell you more about these options, if needed.     Home care  Support or compression stockings will likely be prescribed. If so, be sure to wear them as directed. They may help improve blood flow.  Exercising helps strengthen your leg muscles and improve blood flow. To get the most benefit, choose exercises such as walking, swimming, or cycling. Also try to exercise for at least 30 minutes on most days.  Raising your legs above heart level will help relieve swelling and keep blood from pooling in veins. Try to elevate your legs for 15 to 20 minutes at  the end of the day, and whenever you're relaxing. To make sure your legs are raised above heart level, prop them up on cushions or large pillows.  To keep blood moving when you have to sit or stand for long periods, try these tips:  At work, take walking breaks instead of coffee breaks. Walk during your lunch hour. Or try flexing your feet up and down 10 times each hour.  When standing, raise yourself up and down on your toes, or rock back and forth on your heels.  If you are overweight, talk with your healthcare provider about setting up a weight-loss plan. Maintaining a healthy weight can help reduce the strain on your veins. It may also improve symptoms, such as swelling and aching.  If you have dryness and itching, ask your provider about special lotions that can be applied to the skin to help improve symptoms.     Follow-up care  Follow up with your healthcare provider, or as directed. If imaging tests were done, you'll be told the results and if there are any new findings that affect your care.     When to seek medical advice  Call your healthcare provider right away if any of these occur:  Sudden, severe leg swelling, pain, or redness  Symptoms worsen, or they don't improve with self-care  Bleeding from any affected veins  Ulcers form on the legs, ankles, or feet  Fever of 100.4 F (38 C) or higher, or as advised by your provider    Kenny last reviewed this educational content on 4/1/2018 2000-2021 [employer]. All rights reserved. This information is not intended as a substitute for professional medical care. Always follow your healthcare professional's instructions.    Self-Care for Spider and Varicose Veins    Your healthcare provider may suggest that you try self-care. Exercising and maintaining a healthy weight may keep problem veins from getting worse. Wearing elastic stockings and elevating your legs can help improve blood flow. Taking breaks when you sit or stand helps,  too.        Wearing compression stockings  Compression stockings gently squeeze veins so blood flows upward. If you need compression stockings, your healthcare provider can prescribe them for you. Follow your healthcare provider's advice about how and when to wear them. Compression stockings come in several different levels of pressure. Ask your healthcare provider which level of pressure would help you the most.     Raising your legs above heart level will help relieve swelling and keep blood from pooling in veins. Try to elevate your legs for 15 to 20 minutes at the end of the day, and whenever you're relaxing. To make sure your legs are raised above heart level, prop them up on cushions or large pillows.    To keep blood moving when you have to sit or stand for long periods, try these tips:  At work, take walking breaks instead of coffee breaks. Walk during your lunch hour. Or try flexing your feet up and down 10 times each hour.  When standing, raise yourself up and down on your toes, or rock back and forth on your heels.    [employer] last reviewed this educational content on 11/1/2019 2000-2021 [employer]. All rights reserved. This information is not intended as a substitute for professional medical care. Always follow your healthcare professional's instructions.    Treatment Options    Sclerotherapy  Your healthcare provider will inject the vein with a special chemical that will quickly close the vein from the inside. This is particularly useful for spider veins and smaller varicose veins.      If you have large varicose veins, surgery may be the best choice. But it will not prevent new varicose veins from forming. Surgery is most often done in a surgery center or in the office.    If surgery is recommended for you, your surgery will be tailored to your needs. Varicose veins may be tied off (ligation), destroyed, or removed. Blood will then flow through the healthy veins. One or more of the  following techniques may be used:    Ablation (laser or radiofrequency)  A tiny cut in the skin is made near the varicose vein. A small tube called a catheter is inserted into the vein. Energy or heat released from the catheter tip will make the vein walls collapse and stick together, stopping all blood flow through the vein.         Ablation (glue)  A tiny cut in the skin is made near the varicose vein. A small tube called a catheter is inserted into the vein. Droplets of glue are deposited into the vein to make vein walls collapse and stick together stopping blood flow through the vein.    Microphlebectomy or ambulatory phlebectomy  A special hook is used to gently take out a varicose vein through tiny incisions. Microphlebectomy may be done in your healthcare provider's office.      Vein stripping and ligation (rare)  In more severe cases, the surgeon may tie off and remove veins by making smaller cuts in the skin. Smaller branching veins may also be tied off or removed.    Know about the risks  Your healthcare provider will talk with you about the risks of surgery. These include:  Bleeding or swelling  A sense of numbness, burning, or tingling in areas near the procedure  Edema or swelling in the legs  Clots in the deep veins that may travel to the lungs  Infection  Scarring  Inflammation related to the glue    [employer] last reviewed this educational content on 11/1/2019 (Sclerotherapy image) 12/1/2019 (Radiofrequency ablation image, Microphlebectomy image)    6120-3759 [employer]. All rights reserved. This information is not intended as a substitute for professional medical care. Always follow your healthcare professional's instructions.

## 2022-09-08 NOTE — PROGRESS NOTES
I had the pleasure of seeing Anali Sanchez in the vein clinic today.   She is a very pleasant 71 year old female with a history of bilateral lower extremity varicose veins.     She tells me that she has had varicose veins in both lower extremities for many decades. Recently she has noticed that they have become more prominent. She has mild pain and that does not affect her activities of daily living.     Even these symptoms have improved since she started wearing compresion socks.     On my exam she has prominent varicose veins of both the lower extremities. These are located in the calf as well as thigh areas.     Diagnosis: Mildly symptomatic bilateral lower extremity varicose veins.

## 2022-09-13 NOTE — TELEPHONE ENCOUNTER
FUTURE VISIT INFORMATION      FUTURE VISIT INFORMATION:    Date: 10/12/22    Time: 8AM    Location: [facility]  REFERRAL INFORMATION:    Referring provider:  Jovita Garcia PA-C    Referring providers clinic:  [facility]     Reason for visit/diagnosis  appt per pt - bony growth on forehead/Hyperostosis [M85.80]. referred by Jovita Garcia PA-C. med recs in [facility]. ok'd per [facility] location verified.    RECORDS REQUESTED FROM:       Clinic name Comments Records Status Imaging Status    [facility]  9/7/22 referral   9/2/22 note from Jovita Garcia PA-C Epic

## 2022-10-09 ENCOUNTER — HEALTH MAINTENANCE LETTER (OUTPATIENT)
Age: 72
End: 2022-10-09

## 2022-10-12 ENCOUNTER — PREP FOR PROCEDURE (OUTPATIENT)
Dept: OTOLARYNGOLOGY | Facility: CLINIC | Age: 72
End: 2022-10-12

## 2022-10-12 ENCOUNTER — PRE VISIT (OUTPATIENT)
Dept: OTOLARYNGOLOGY | Facility: CLINIC | Age: 72
End: 2022-10-12

## 2022-10-12 ENCOUNTER — OFFICE VISIT (OUTPATIENT)
Dept: OTOLARYNGOLOGY | Facility: CLINIC | Age: 72
End: 2022-10-12
Attending: PHYSICIAN ASSISTANT
Payer: COMMERCIAL

## 2022-10-12 VITALS
BODY MASS INDEX: 24.86 KG/M2 | DIASTOLIC BLOOD PRESSURE: 80 MMHG | SYSTOLIC BLOOD PRESSURE: 125 MMHG | WEIGHT: 140.3 LBS | OXYGEN SATURATION: 98 % | HEART RATE: 76 BPM | HEIGHT: 63 IN

## 2022-10-12 DIAGNOSIS — M85.80 HYPEROSTOSIS: ICD-10-CM

## 2022-10-12 DIAGNOSIS — D16.4 OSTEOMA OF FACE: Primary | ICD-10-CM

## 2022-10-12 PROCEDURE — 99203 OFFICE O/P NEW LOW 30 MIN: CPT | Performed by: OTOLARYNGOLOGY

## 2022-10-12 RX ORDER — DEXAMETHASONE SODIUM PHOSPHATE 4 MG/ML
10 INJECTION, SOLUTION INTRA-ARTICULAR; INTRALESIONAL; INTRAMUSCULAR; INTRAVENOUS; SOFT TISSUE ONCE
Status: CANCELLED | OUTPATIENT
Start: 2022-10-12 | End: 2022-10-12

## 2022-10-12 RX ORDER — CEFAZOLIN SODIUM 2 G/50ML
2 SOLUTION INTRAVENOUS
Status: CANCELLED | OUTPATIENT
Start: 2022-10-12

## 2022-10-12 RX ORDER — CEFAZOLIN SODIUM 2 G/50ML
2 SOLUTION INTRAVENOUS SEE ADMIN INSTRUCTIONS
Status: CANCELLED | OUTPATIENT
Start: 2022-10-12

## 2022-10-12 ASSESSMENT — PAIN SCALES - GENERAL: PAINLEVEL: NO PAIN (0)

## 2022-10-12 NOTE — NURSING NOTE
Surgery teaching complete. Date was not set yet. Packet and soap not given at time of teaching. Will need to be mailed out when date is set.     Diana Peter RN on 10/12/2022 at 8:29 AM

## 2022-10-12 NOTE — PROGRESS NOTES
HISTORY OF PRESENT ILLNESS:  Anali Sanchez is a 71-year-old female who has a mid forehead lesion she would like to have addressed.  She notes that it is hard and firm.  She has been told that it is bone.    She has a similar bony growths inside the mouth, which I assume are antionette.  So this is probably hyperostosis with antionette formation.    PAST MEDICAL HISTORY:  Reviewed.    MEDICATIONS:  Reviewed.    ALLERGIES:  Reviewed.    REVIEW OF SYSTEMS:  Unremarkable.    PHYSICAL EXAMINATION:  Examination shows a 1.0 x 1.5 cm mid forehead bony lesion, which is firm, nontender, without overlying erythema.  It is nontender to manipulation.  There is no others palpated within the area.  At the same time, the remainder of the head and neck examination is unremarkable.    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    PLAN:  Recommend excision.  We will schedule accordingly.

## 2022-10-12 NOTE — PATIENT INSTRUCTIONS
1. You were seen in the ENT Clinic today by Dr. Davenport.  If you have any questions or concerns after your appointment, please call   - Option 1: ENT Clinic: [phone]   - Option 2: Daniella (Dr. Davenport's Nurse): [phone]                  Diana CEDILLO (Dr. Davenport's Nurse): [phone]    2.   Plan to return to clinic     How to Contact Us:  Send a [employer] message to your provider. Our team will respond to you via [employer]. Occasionally, we will need to call you to get further information.  For urgent matters (Monday-Friday), call the ENT Clinic: [phone] and speak with a call center team member - they will route your call appropriately.   If you'd like to speak directly with a nurse, please find our contact information below. We do our best to check voicemail frequently throughout the day, and will work to call you back within 1-2 days. For urgent matters, please use the general clinic phone numbers listed above.  Surgery Teaching    1. Someone from our scheduling department will call you within approximately one week to get you scheduled with your provider for surgery. If no one has called you in one week, please notify us.    2. You must have a physical exam (called  history and physical ) within 30 days of surgery. You may complete this with your primary care provider.   A. If your provider is outside of the [facility] network please have them complete the preoperative forms provided to you in the surgery packet you will be mailed and be sure to have your provider fax them to the appropriate location prior to surgery. For [place] at the [facility] the fax number [phone]. For surgery at the [place] the fax number is [phone].  B. In some cases we may have you see our Preoperative Assessment Center. If we have expressed this to you, our  will set up your appointment with them when they call to set up your surgery.    3. If you're going home on the same day as your procedure (surgery):  1 to 2 days  "before your procedure, take an at-home, rapid antigen test. You can buy these at many pharmacy stores. Or you can order free, at-home tests at covid.gov/tests. If you can't find an at-home, rapid antigen test, please schedule a PCR test with [employer] by calling [employer], or visiting [facility]/covid19. We can't accept tests that are more than 4 days old.  If your test is negative, please take a photo of the test. Show the photo to the nurse when you come in for your procedure.  If your test is positive, please see the \"If your test shows you have COVID-19\" section on this page        If you're staying overnight in the hospital:4 days before your procedure, please get a PCR test from a lab.  To schedule a PCR test with [employer], call [phone]. Or, visit   [facility]/covid19.  If your test is negative, please ask the testing location to fax your results to us at [phone].  If your test is positive, please tell your surgeon's office right away. A positive test means that you have COVID-19. We'll probably have to postpone your admission, surgery or procedure. Your care team will discuss this with you. After that, we'll let you know what to do and when you can re-schedule.    4. For same-day surgery, you must arrange for an adult to take you home from the Center. An adult must stay with you for the first 24 hours after surgery. You cannot drive for 24 hours.     5. Ask your doctor what medicines are safe before surgery. For over the counter medications and supplements it is advised that you do NOT TAKE MOTRIN, IBUPROFEN, ASPIRIN, ALEVE, GARLIC SUPPLEMENTS or FISH OIL x 7 days prior to surgery (to prevent excess bleeding and bruising at time of surgery). If your provider advises you to take any medication the morning of surgery you should take this with a sip of water.    6. A few days prior to surgery a nurse will call you to review your health " history and instructions for before and after surgery. They will give you your final arrival time based upon your scheduled arrival time for surgery.    7. Call the surgical team if there's any change in your health prior to surgery. Things you should call for include but are not limited to signs of a cold or the flu (sore throat, runny nose, cough, rash, fever). Other things to notify them for is for any open wounds (cuts, scrapes, scratches) near to the surgery site.    8. If you drink alcohol, stop drinking alcohol at least 24 hours before surgery.    9. If you smoke, stop or at least cut down on smoking 24 hours before surgery.    10.Take a bath or shower the night before and the morning of surgery (as told by your surgeon). Use an antiseptic soap. If your doctor does not give you special soap, buy Hibiclens or Chetna-Stat at the drug store or ask the pharmacist to suggest a brand. You will wash with this from the neck down, washing your hair and face as you would normally.   A. When you are done with your shower please be sure to use clean towels to dry with, have clean linens on your bed, and put on clean clothes each time.   B. DO NOT put on lotion, powder, perfume, deodorant or make-up after bathing.    11. You can eat a normal meal the night before surgery. Do not eat any solid foods or drink any milk products for 8 hours before surgery.     12. You may drink clear liquids until 2 hours before surgery. Clear liquids include water, Gatorade, apple juice and liquids you can see through.    13. No eating or drinking 2 hours prior to surgery until after surgery. Your post op team will review any diet limitations you might have and when you can start eating and drinking again after surgery.      If you have any questions before or after surgery please call:    ARUNA Serra  [facility]  Department of Otolaryngology  [phone]

## 2022-10-12 NOTE — LETTER
10/12/2022       RE: Anali Sanchez  [address] [facility] [address]     Dear Colleague,    Thank you for referring your patient, Anali Sanchez, to the [facility] [place] [facility]. Please see a copy of my visit note below.    HISTORY OF PRESENT ILLNESS:  Anali Sanchez is a 71-year-old female who has a mid forehead lesion she would like to have addressed.  She notes that it is hard and firm.  She has been told that it is bone.    She has a similar bony growths inside the mouth, which I assume are antionette.  So this is probably hyperostosis with antionette formation.    PAST MEDICAL HISTORY:  Reviewed.    MEDICATIONS:  Reviewed.    ALLERGIES:  Reviewed.    REVIEW OF SYSTEMS:  Unremarkable.    PHYSICAL EXAMINATION:  Examination shows a 1.0 x 1.5 cm mid forehead bony lesion, which is firm, nontender, without overlying erythema.  It is nontender to manipulation.  There is no others palpated within the area.  At the same time, the remainder of the head and neck examination is unremarkable.    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    PLAN:  Recommend excision.  We will schedule accordingly.      Sincerely,    Dl Davenport MD

## 2022-11-11 ENCOUNTER — TELEPHONE (OUTPATIENT)
Dept: OTOLARYNGOLOGY | Facility: CLINIC | Age: 72
End: 2022-11-11

## 2022-11-11 PROBLEM — D16.4 OSTEOMA OF FACE: Status: ACTIVE | Noted: 2022-11-11

## 2022-11-11 NOTE — TELEPHONE ENCOUNTER
Called patient to schedule surgery with Dr. Davenport    Date of Surgery: 1/11/23    Location of surgery: [facility]    Pre-Op H&P: PCP    Pre/Post Imaging:  Not Applicable    Discussed COVID-19 Testing: Yes    Post-Op Appt Date: 1-2 weeks    Surgery Packet Mailed: [employer]      Additional comments: DANA Landers on 11/11/2022 at 2:21 PM

## 2023-01-02 ENCOUNTER — OFFICE VISIT (OUTPATIENT)
Dept: FAMILY MEDICINE | Facility: CLINIC | Age: 73
End: 2023-01-02
Payer: COMMERCIAL

## 2023-01-02 VITALS
OXYGEN SATURATION: 98 % | WEIGHT: 140 LBS | BODY MASS INDEX: 24.8 KG/M2 | HEIGHT: 63 IN | HEART RATE: 63 BPM | RESPIRATION RATE: 16 BRPM | SYSTOLIC BLOOD PRESSURE: 120 MMHG | DIASTOLIC BLOOD PRESSURE: 66 MMHG | TEMPERATURE: 96 F

## 2023-01-02 DIAGNOSIS — I45.6 ANOMALOUS ATRIOVENTRICULAR EXCITATION: ICD-10-CM

## 2023-01-02 DIAGNOSIS — Z86.79 HISTORY OF WOLFF-PARKINSON-WHITE (WPW) SYNDROME: ICD-10-CM

## 2023-01-02 DIAGNOSIS — D68.9 COAGULATION DISORDER (H): ICD-10-CM

## 2023-01-02 DIAGNOSIS — D16.4 OSTEOMA OF FACE: ICD-10-CM

## 2023-01-02 DIAGNOSIS — R76.8 JKA ANTIBODY POSITIVE: ICD-10-CM

## 2023-01-02 DIAGNOSIS — Z01.818 PREOP GENERAL PHYSICAL EXAM: Primary | ICD-10-CM

## 2023-01-02 LAB — HGB BLD-MCNC: 12.1 G/DL (ref 11.7–15.7)

## 2023-01-02 PROCEDURE — 36415 COLL VENOUS BLD VENIPUNCTURE: CPT | Performed by: PHYSICIAN ASSISTANT

## 2023-01-02 PROCEDURE — 99214 OFFICE O/P EST MOD 30 MIN: CPT | Performed by: PHYSICIAN ASSISTANT

## 2023-01-02 PROCEDURE — 85018 HEMOGLOBIN: CPT | Performed by: PHYSICIAN ASSISTANT

## 2023-01-02 PROCEDURE — 93000 ELECTROCARDIOGRAM COMPLETE: CPT | Performed by: PHYSICIAN ASSISTANT

## 2023-01-02 ASSESSMENT — PAIN SCALES - GENERAL: PAINLEVEL: NO PAIN (0)

## 2023-01-02 NOTE — PATIENT INSTRUCTIONS
You can continue to take sertraline as you normally do.    Avoid NSAIDS (Motrin, Ibuprofen, Aleve, Naprosyn) for one week prior to surgery. If needed, Tylenol or Acetaminophen are okay to use.    Avoid supplements for one week prior to surgery.        For informational purposes only. Not to replace the advice of your health care provider. Copyright   2003,  [facility]. All rights reserved. Clinically reviewed by Angeles Cross MD. [employer] [id number] - REV .  Preparing for Your Surgery  Getting started  A nurse will call you to review your health history and instructions. They will give you an arrival time based on your scheduled surgery time. Please be ready to share:  Your doctor's clinic name and phone number  Your medical, surgical, and anesthesia history  A list of allergies and sensitivities  A list of medicines, including herbal treatments and over-the-counter drugs  Whether the patient has a legal guardian (ask how to send us the papers in advance)  Please tell us if you're pregnant--or if there's any chance you might be pregnant. Some surgeries may injure a fetus (unborn baby), so they require a pregnancy test. Surgeries that are safe for a fetus don't always need a test, and you can choose whether to have one.   If you have a child who's having surgery, please ask for a copy of Preparing for Your Child's Surgery.    Preparing for surgery  Within 10 to 30 days of surgery: Have a pre-op exam (sometimes called an H&P, or History and Physical). This can be done at a clinic or pre-operative center.  If you're having a , you may not need this exam. Talk to your care team.  At your pre-op exam, talk to your care team about all medicines you take. If you need to stop any medicines before surgery, ask when to start taking them again.  We do this for your safety. Many medicines can make you bleed too much during surgery. Some change how well surgery (anesthesia) drugs work.  Call your  insurance company to let them know you're having surgery. (If you don't have insurance, call [phone].)  Call your clinic if there's any change in your health. This includes signs of a cold or flu (sore throat, runny nose, cough, rash, fever). It also includes a scrape or scratch near the surgery site.  If you have questions on the day of surgery, call your hospital or surgery center.  Eating and drinking guidelines  For your safety: Unless your surgeon tells you otherwise, follow the guidelines below.  Eat and drink as usual until 8 hours before you arrive for surgery. After that, no food or milk.  Drink clear liquids until 2 hours before you arrive. These are liquids you can see through, like water, Gatorade, and Propel Water. They also include plain black coffee and tea (no cream or milk), candy, and breath mints. You can spit out gum when you arrive.  If you drink alcohol: Stop drinking it the night before surgery.  If your care team tells you to take medicine on the morning of surgery, it's okay to take it with a sip of water.  Preventing infection  Shower or bathe the night before and morning of your surgery. Follow the instructions your clinic gave you. (If no instructions, use regular soap.)  Don't shave or clip hair near your surgery site. We'll remove the hair if needed.  Don't smoke or vape the morning of surgery. You may chew nicotine gum up to 2 hours before surgery. A nicotine patch is okay.  Note: Some surgeries require you to completely quit smoking and nicotine. Check with your surgeon.  Your care team will make every effort to keep you safe from infection. We will:  Clean our hands often with soap and water (or an alcohol-based hand rub).  Clean the skin at your surgery site with a special soap that kills germs.  Give you a special gown to keep you warm. (Cold raises the risk of infection.)  Wear special hair covers, masks, gowns and gloves during surgery.  Give antibiotic medicine, if  prescribed. Not all surgeries need antibiotics.  What to bring on the day of surgery  Photo ID and insurance card  Copy of your health care directive, if you have one  Glasses and hearing aids (bring cases)  You can't wear contacts during surgery  Inhaler and eye drops, if you use them (tell us about these when you arrive)  CPAP machine or breathing device, if you use them  A few personal items, if spending the night  If you have . . .  A pacemaker, ICD (cardiac defibrillator) or other implant: Bring the ID card.  An implanted stimulator: Bring the remote control.  A legal guardian: Bring a copy of the certified (court-stamped) guardianship papers.  Please remove any jewelry, including body piercings. Leave jewelry and other valuables at home.  If you're going home the day of surgery  You must have a responsible adult drive you home. They should stay with you overnight as well.  If you don't have someone to stay with you, and you aren't safe to go home alone, we may keep you overnight. Insurance often won't pay for this.  After surgery  If it's hard to control your pain or you need more pain medicine, please call your surgeon's office.  Questions?   If you have any questions for your care team, list them here: _________________________________________________________________________________________________________________________________________________________________________ ____________________________________ ____________________________________ ____________________________________

## 2023-01-02 NOTE — PROGRESS NOTES
[facility]  [address] [address]  Phone: [phone]  Primary Provider: Jovita Bauman  Pre-op Performing Provider: JOVITA BAUMAN      PREOPERATIVE EVALUATION:  Today's date: 1/2/2023    Anali Sanchez is a 72 year old female who presents for a preoperative evaluation.    Surgical Information:  Surgery/Procedure: incision and removal of osteoma on forehead  Surgery Location: [facility]   Surgeon: Dl Millard  Surgery Date: 1/11/2023  Time of Surgery: 10 am   Where patient plans to recover: At home with family  Fax number for surgical facility: Note does not need to be faxed, will be available electronically in Epic.    Type of Anesthesia Anticipated: General    Assessment & Plan     The proposed surgical procedure is considered INTERMEDIATE risk.    Preop general physical exam  --Advised to avoid NSAIDS (Motrin, Ibuprofen, Aleve, Naprosyn) for one week prior to surgery. If needed, Tylenol or Acetaminophen are okay to use.  --Advised to avoid supplements for one week prior to surgery.  --Knows where to be and when to be there for surgery. Knows when to be NPO.  --Pain medications, time off from work and FMLA following surgery deferred to surgeon.   - Hemoglobin; Future    Osteoma of face  Anticipating surgery    Jka antibody positive    Coagulation disorder (H)  History of hemorrhage after heart and colon surgery many years ago.    Anomalous atrioventricular excitation  History of Gabino-Parkinson-White (WPW) syndrome  S/p open ablation in 1987  - EKG 12-lead complete w/read - Clinics         Risks and Recommendations:  The patient has the following additional risks and recommendations for perioperative complications:   - No identified additional risk factors other than previously addressed    Medication Instructions:  Patient is to take all scheduled medications on the day of surgery    RECOMMENDATION:  APPROVAL GIVEN to proceed with  proposed procedure, without further diagnostic evaluation.      Subjective     HPI related to upcoming procedure: hyperostosis with bony growth of the forehead. Plan for excision.    Preop Questions 1/2/2023   1. Have you ever had a heart attack or stroke? No   2. Have you ever had surgery on your heart or blood vessels, such as a stent placement, a coronary artery bypass, or surgery on an artery in your head, neck, heart, or legs? No   3. Do you have chest pain with activity? No   4. Do you have a history of  heart failure? No   5. Do you currently have a cold, bronchitis or symptoms of other infection? No   6. Do you have a cough, shortness of breath, or wheezing? No   7. Do you or anyone in your family have previous history of blood clots? YES - dad had DVT (thought to be provoked by COVID), mom had varicose veins   8. Do you or does anyone in your family have a serious bleeding problem such as prolonged bleeding following surgeries or cuts? No   9. Have you ever had problems with anemia or been told to take iron pills? YES - after blood loss with previous heart and colon surgeries   10. Have you had any abnormal blood loss such as black, tarry or bloody stools, or abnormal vaginal bleeding? YES - History of hemorrhage after heart and colon surgery many years ago. She had blood transfusion.    Also in the past heavy vaginal bleeding, had d&c many years ago and no problems since then.   11. Have you ever had a blood transfusion? YES - after heart surgery and colon surgery many years ago   11a. Have you ever had a transfusion reaction? YES - she had problems with blood transfusion. She is blood type A positive, anti-JKa red cell antibody positive.   12. Are you willing to have a blood transfusion if it is medically needed before, during, or after your surgery? Yes   13. Have you or any of your relatives ever had problems with anesthesia? No   14. Do you have sleep apnea, excessive snoring or daytime drowsiness? No    15. Do you have any artifical heart valves or other implanted medical devices like a pacemaker, defibrillator, or continuous glucose monitor? No   16. Do you have artificial joints? No   17. Are you allergic to latex? No       Health Care Directive:  Patient does not have a Health Care Directive or Living Will: Advance Directive received and scanned. Click on Code in the patient header to view.    Preoperative Review of :   reviewed - no record of controlled substances prescribed.  956}    Status of Chronic Conditions:  See problem list for active medical problems.  Problems all longstanding and stable, except as noted/documented.  See ROS for pertinent symptoms related to these conditions.      Review of Systems  Constitutional, neuro, ENT, endocrine, pulmonary, cardiac, gastrointestinal, genitourinary, musculoskeletal, integument and psychiatric systems are negative, except as otherwise noted.    Patient Active Problem List    Diagnosis Date Noted     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     Priority: Medium     Blood type A positive       Coagulation disorder (H) 01/02/2023     Priority: Medium     hx hemorrhage after heart and after colon surg       Anomalous atrioventricular excitation 01/02/2023     Priority: Medium     surg for taylor 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 37       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     Priority: Medium     Added automatically from request for surgery [id number]       Bilateral low back pain without sciatica 04/22/2022     Priority: Medium     Impaired fasting glucose 03/10/2021     Priority: Medium     Gastroesophageal reflux disease, unspecified whether esophagitis present 03/09/2021     Priority: Medium     Presented as cough that resolved p PPI.  3/9/2021: will do trial of going off or to H2 blocker.       History of colectomy 02/23/2019     Priority: Medium     Anxiety 05/03/2016     Priority: Medium     CARDIOVASCULAR SCREENING; LDL GOAL LESS THAN 160 05/03/2016     Priority: Medium      Past Medical  History:   Diagnosis Date     Anemia      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37     Coagulation disorder (H)     hx hemorrhage after heart and after colon surg     History of blood transfusion      Other and unspecified nonspecific immunological findings     anti Jka red cell antibody     Past Surgical History:   Procedure Laterality Date     APPENDECTOMY  1968     CARDIAC SURGERY  age 37    WPW surg; open ablation; complicated with hemorrhage     COLECTOMY  age 52    7-8 inches rectum remain. cx with hemorrhage. no cancer. slow transit.(2 surgeries; first for low transit; second due to the bleeding)     DILATION AND CURETTAGE, HYSTEROSCOPY DIAGNOSTIC, COMBINED  2/21/2014    Procedure: COMBINED DILATION AND CURETTAGE, HYSTEROSCOPY DIAGNOSTIC;   DILATION AND CURETTAGE, HYSTEROSCOPY DIAGNOSTIC ;  Surgeon: Willie Osborn MD;  Location: [facility] OR     ENT SURGERY      t and a     Current Outpatient Medications   Medication Sig Dispense Refill     Calcium-Vitamin D-Vitamin K 500-200-40 MG-UNT-MCG CHEW Takes 3 chewables daily       sertraline (ZOLOFT) 25 MG tablet TAKE 1 TABLET BY MOUTH EVERY DAY 90 tablet 1       Allergies   Allergen Reactions     Benadryl [Diphenhydramine]      Given in the hospital and had a difficult time waking as expected.     External Allergen Needs Reconciliation - See Comment      Please reconcile the Patient's allergy reported as LEAD ACETATEMORPHINE SULFATE and update accordingly     Protamine      sob     Tylox [Oxycodone-Acetaminophen]      unknown     Ciprofloxacin Rash        Social History     Tobacco Use     Smoking status: Never     Smokeless tobacco: Never   Substance Use Topics     Alcohol use: Yes     Comment: glass wine most days     Family History   Problem Relation Age of Onset     Bronchitis Mother         copd     Alcoholism Mother      Varicose Veins Mother      Diabetes Father      Coronary Artery Disease Father      Deep Vein Thrombosis  "Father         after having COVID     Leukemia Sister         chronic     Impaired Fasting Glucose Sister      Cancer Brother         myelofibrosis; got BMT 2019     Diabetes Type 2  Brother      Impaired Fasting Glucose Brother      History   Drug Use Unknown         Objective     /66 (BP Location: Right arm, Patient Position: Sitting, Cuff Size: Adult Regular)   Pulse 63   Temp (!) 96  F (35.6  C) (Oral)   Resp 16   Ht 1.6 m (5' 3\")   Wt 63.5 kg (140 lb)   LMP  (LMP Unknown)   SpO2 98%   BMI 24.80 kg/m      Physical Exam    GENERAL APPEARANCE: healthy, alert and no distress     EYES: EOMI, PERRL     HENT: ear canals and TM's normal and nose and mouth without ulcers or lesions     NECK: no adenopathy, no asymmetry, masses, or scars and thyroid normal to palpation     RESP: lungs clear to auscultation - no rales, rhonchi or wheezes     CV: regular rates and rhythm, normal S1 S2, no S3 or S4 and no murmur, click or rub     ABDOMEN:  soft, nontender, no HSM or masses and bowel sounds normal     MS: extremities normal- no gross deformities noted, no evidence of inflammation in joints, FROM in all extremities.     SKIN: no suspicious lesions or rashes     NEURO: Normal strength and tone, sensory exam grossly normal, mentation intact and speech normal     PSYCH: mentation appears normal. and affect normal/bright     LYMPHATICS: No cervical adenopathy    Recent Labs   Lab Test 04/26/22  0804      POTASSIUM 4.0   CR 0.70   A1C 5.6        Diagnostics:  Recent Results (from the past 24 hour(s))   Hemoglobin    Collection Time: 01/02/23  9:16 AM   Result Value Ref Range    Hemoglobin 12.1 11.7 - 15.7 g/dL      EKG: sinus bradycardia, normal axis, normal intervals, no acute ST/T changes c/w ischemia, no LVH by voltage criteria    Revised Cardiac Risk Index (RCRI):  The patient has the following serious cardiovascular risks for perioperative complications:   - No serious cardiac risks = 0 points     RCRI " Interpretation: 0 points: Class I (very low risk - 0.4% complication rate)           Signed Electronically by: Jovita Bauman PA-C  Copy of this evaluation report is provided to requesting physician.

## 2023-01-10 ENCOUNTER — ANESTHESIA EVENT (OUTPATIENT)
Dept: SURGERY | Facility: AMBULATORY SURGERY CENTER | Age: 73
End: 2023-01-10
Payer: COMMERCIAL

## 2023-01-11 ENCOUNTER — HOSPITAL ENCOUNTER (OUTPATIENT)
Facility: AMBULATORY SURGERY CENTER | Age: 73
Discharge: HOME | End: 2023-01-11
Attending: OTOLARYNGOLOGY
Payer: COMMERCIAL

## 2023-01-11 ENCOUNTER — ANESTHESIA (OUTPATIENT)
Dept: SURGERY | Facility: AMBULATORY SURGERY CENTER | Age: 73
End: 2023-01-11
Payer: COMMERCIAL

## 2023-01-11 VITALS
DIASTOLIC BLOOD PRESSURE: 58 MMHG | HEIGHT: 63 IN | OXYGEN SATURATION: 99 % | HEART RATE: 61 BPM | RESPIRATION RATE: 16 BRPM | BODY MASS INDEX: 24.8 KG/M2 | TEMPERATURE: 97.5 F | WEIGHT: 140 LBS | SYSTOLIC BLOOD PRESSURE: 109 MMHG

## 2023-01-11 DIAGNOSIS — D16.4 OSTEOMA OF FACE: ICD-10-CM

## 2023-01-11 PROCEDURE — 88311 DECALCIFY TISSUE: CPT | Mod: TC | Performed by: OTOLARYNGOLOGY

## 2023-01-11 PROCEDURE — 21181 RCNST CNTRG B9 TUM CRNL XTRC: CPT

## 2023-01-11 PROCEDURE — 21181 RCNST CNTRG B9 TUM CRNL XTRC: CPT | Mod: GC | Performed by: STUDENT IN AN ORGANIZED HEALTH CARE EDUCATION/TRAINING PROGRAM

## 2023-01-11 RX ORDER — LIDOCAINE HYDROCHLORIDE 20 MG/ML
INJECTION, SOLUTION INFILTRATION; PERINEURAL PRN
Status: DISCONTINUED | OUTPATIENT
Start: 2023-01-11 | End: 2023-01-11

## 2023-01-11 RX ORDER — FENTANYL CITRATE 50 UG/ML
25 INJECTION, SOLUTION INTRAMUSCULAR; INTRAVENOUS
Status: DISCONTINUED | OUTPATIENT
Start: 2023-01-11 | End: 2023-01-12 | Stop reason: HOSPADM

## 2023-01-11 RX ORDER — LIDOCAINE 40 MG/G
CREAM TOPICAL
Status: DISCONTINUED | OUTPATIENT
Start: 2023-01-11 | End: 2023-01-11 | Stop reason: HOSPADM

## 2023-01-11 RX ORDER — CEFAZOLIN SODIUM 2 G/50ML
2 SOLUTION INTRAVENOUS SEE ADMIN INSTRUCTIONS
Status: DISCONTINUED | OUTPATIENT
Start: 2023-01-11 | End: 2023-01-11 | Stop reason: HOSPADM

## 2023-01-11 RX ORDER — ACETAMINOPHEN 325 MG/1
650 TABLET ORAL ONCE
Status: COMPLETED | OUTPATIENT
Start: 2023-01-11 | End: 2023-01-11

## 2023-01-11 RX ORDER — CEFAZOLIN SODIUM 2 G/50ML
2 SOLUTION INTRAVENOUS
Status: COMPLETED | OUTPATIENT
Start: 2023-01-11 | End: 2023-01-11

## 2023-01-11 RX ORDER — SODIUM CHLORIDE, SODIUM LACTATE, POTASSIUM CHLORIDE, CALCIUM CHLORIDE 600; 310; 30; 20 MG/100ML; MG/100ML; MG/100ML; MG/100ML
INJECTION, SOLUTION INTRAVENOUS CONTINUOUS
Status: DISCONTINUED | OUTPATIENT
Start: 2023-01-11 | End: 2023-01-11 | Stop reason: HOSPADM

## 2023-01-11 RX ORDER — ONDANSETRON 2 MG/ML
INJECTION INTRAMUSCULAR; INTRAVENOUS PRN
Status: DISCONTINUED | OUTPATIENT
Start: 2023-01-11 | End: 2023-01-11

## 2023-01-11 RX ORDER — PROPOFOL 10 MG/ML
INJECTION, EMULSION INTRAVENOUS PRN
Status: DISCONTINUED | OUTPATIENT
Start: 2023-01-11 | End: 2023-01-11

## 2023-01-11 RX ORDER — MEPERIDINE HYDROCHLORIDE 25 MG/ML
12.5 INJECTION INTRAMUSCULAR; INTRAVENOUS; SUBCUTANEOUS
Status: DISCONTINUED | OUTPATIENT
Start: 2023-01-11 | End: 2023-01-12 | Stop reason: HOSPADM

## 2023-01-11 RX ORDER — BACITRACIN ZINC 500 [USP'U]/G
OINTMENT TOPICAL 2 TIMES DAILY
Qty: 30 G | Refills: 3 | Status: SHIPPED | OUTPATIENT
Start: 2023-01-11 | End: 2023-01-18

## 2023-01-11 RX ORDER — DEXAMETHASONE SODIUM PHOSPHATE 10 MG/ML
10 INJECTION, SOLUTION INTRAMUSCULAR; INTRAVENOUS ONCE
Status: DISCONTINUED | OUTPATIENT
Start: 2023-01-11 | End: 2023-01-11 | Stop reason: HOSPADM

## 2023-01-11 RX ORDER — FENTANYL CITRATE 50 UG/ML
25 INJECTION, SOLUTION INTRAMUSCULAR; INTRAVENOUS EVERY 5 MIN PRN
Status: DISCONTINUED | OUTPATIENT
Start: 2023-01-11 | End: 2023-01-11 | Stop reason: HOSPADM

## 2023-01-11 RX ORDER — ONDANSETRON 4 MG/1
4 TABLET, ORALLY DISINTEGRATING ORAL EVERY 30 MIN PRN
Status: DISCONTINUED | OUTPATIENT
Start: 2023-01-11 | End: 2023-01-12 | Stop reason: HOSPADM

## 2023-01-11 RX ORDER — PROPOFOL 10 MG/ML
INJECTION, EMULSION INTRAVENOUS CONTINUOUS PRN
Status: DISCONTINUED | OUTPATIENT
Start: 2023-01-11 | End: 2023-01-11

## 2023-01-11 RX ORDER — ACETAMINOPHEN 325 MG/1
650 TABLET ORAL EVERY 6 HOURS PRN
Qty: 50 TABLET | Refills: 0 | Status: SHIPPED | OUTPATIENT
Start: 2023-01-11 | End: 2023-01-25

## 2023-01-11 RX ORDER — FENTANYL CITRATE 50 UG/ML
INJECTION, SOLUTION INTRAMUSCULAR; INTRAVENOUS PRN
Status: DISCONTINUED | OUTPATIENT
Start: 2023-01-11 | End: 2023-01-11

## 2023-01-11 RX ORDER — FENTANYL CITRATE 50 UG/ML
50 INJECTION, SOLUTION INTRAMUSCULAR; INTRAVENOUS EVERY 5 MIN PRN
Status: DISCONTINUED | OUTPATIENT
Start: 2023-01-11 | End: 2023-01-11 | Stop reason: HOSPADM

## 2023-01-11 RX ORDER — LIDOCAINE HYDROCHLORIDE AND EPINEPHRINE 10; 10 MG/ML; UG/ML
INJECTION, SOLUTION INFILTRATION; PERINEURAL DAILY PRN
Status: DISCONTINUED | OUTPATIENT
Start: 2023-01-11 | End: 2023-01-11 | Stop reason: HOSPADM

## 2023-01-11 RX ORDER — SODIUM CHLORIDE, SODIUM LACTATE, POTASSIUM CHLORIDE, CALCIUM CHLORIDE 600; 310; 30; 20 MG/100ML; MG/100ML; MG/100ML; MG/100ML
INJECTION, SOLUTION INTRAVENOUS CONTINUOUS
Status: DISCONTINUED | OUTPATIENT
Start: 2023-01-11 | End: 2023-01-12 | Stop reason: HOSPADM

## 2023-01-11 RX ORDER — IBUPROFEN 600 MG/1
600 TABLET, FILM COATED ORAL EVERY 6 HOURS PRN
Qty: 30 TABLET | Refills: 3 | Status: SHIPPED | OUTPATIENT
Start: 2023-01-11 | End: 2023-01-25

## 2023-01-11 RX ORDER — DEXAMETHASONE SODIUM PHOSPHATE 4 MG/ML
INJECTION, SOLUTION INTRA-ARTICULAR; INTRALESIONAL; INTRAMUSCULAR; INTRAVENOUS; SOFT TISSUE PRN
Status: DISCONTINUED | OUTPATIENT
Start: 2023-01-11 | End: 2023-01-11

## 2023-01-11 RX ORDER — ONDANSETRON 2 MG/ML
4 INJECTION INTRAMUSCULAR; INTRAVENOUS EVERY 30 MIN PRN
Status: DISCONTINUED | OUTPATIENT
Start: 2023-01-11 | End: 2023-01-12 | Stop reason: HOSPADM

## 2023-01-11 RX ADMIN — ONDANSETRON 4 MG: 2 INJECTION INTRAMUSCULAR; INTRAVENOUS at 10:35

## 2023-01-11 RX ADMIN — LIDOCAINE HYDROCHLORIDE 80 MG: 20 INJECTION, SOLUTION INFILTRATION; PERINEURAL at 10:28

## 2023-01-11 RX ADMIN — CEFAZOLIN SODIUM 2 G: 2 SOLUTION INTRAVENOUS at 10:35

## 2023-01-11 RX ADMIN — SODIUM CHLORIDE, SODIUM LACTATE, POTASSIUM CHLORIDE, CALCIUM CHLORIDE: 600; 310; 30; 20 INJECTION, SOLUTION INTRAVENOUS at 10:22

## 2023-01-11 RX ADMIN — FENTANYL CITRATE 50 MCG: 50 INJECTION, SOLUTION INTRAMUSCULAR; INTRAVENOUS at 10:32

## 2023-01-11 RX ADMIN — PROPOFOL 200 MG: 10 INJECTION, EMULSION INTRAVENOUS at 10:28

## 2023-01-11 RX ADMIN — ACETAMINOPHEN 650 MG: 325 TABLET ORAL at 11:35

## 2023-01-11 RX ADMIN — PROPOFOL 150 MCG/KG/MIN: 10 INJECTION, EMULSION INTRAVENOUS at 10:30

## 2023-01-11 RX ADMIN — DEXAMETHASONE SODIUM PHOSPHATE 4 MG: 4 INJECTION, SOLUTION INTRA-ARTICULAR; INTRALESIONAL; INTRAMUSCULAR; INTRAVENOUS; SOFT TISSUE at 10:35

## 2023-01-11 ASSESSMENT — ENCOUNTER SYMPTOMS: DYSRHYTHMIAS: 1

## 2023-01-11 NOTE — BRIEF OP NOTE
[facility]    Brief Operative Note    Pre-operative diagnosis: Osteoma of face [D16.4]  Post-operative diagnosis Same as pre-operative diagnosis    Procedure: Procedure(s):  incision and removal of osteoma on forehead  Surgeon: Surgeon(s) and Role:     * Dl Davenport MD - Primary  Anesthesia: General   Estimated Blood Loss: Less than 10 ml    Drains: None  Specimens:   ID Type Source Tests Collected by Time Destination   1 : Forehead mass  Tissue Forehead SURGICAL PATHOLOGY EXAM Dl Davenport MD 1/11/2023 10:44 AM      Findings:   None.  Complications: None.  Implants: * No implants in log *

## 2023-01-11 NOTE — ANESTHESIA PREPROCEDURE EVALUATION
Anesthesia Pre-Procedure Evaluation    Patient: Anali Sanchez   MRN: [id number] : 1950        Procedure : Procedure(s):  incision and removal of osteoma on forehead          Past Medical History:   Diagnosis Date     Anemia      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37     Coagulation disorder (H)     hx hemorrhage after heart and after colon surg     History of blood transfusion      Other and unspecified nonspecific immunological findings     anti Jka red cell antibody      Past Surgical History:   Procedure Laterality Date     APPENDECTOMY  1968     CARDIAC SURGERY  age 37    WPW surg; open ablation; complicated with hemorrhage     COLECTOMY  age 52    7-8 inches rectum remain. cx with hemorrhage. no cancer. slow transit.(2 surgeries; first for low transit; second due to the bleeding)     DILATION AND CURETTAGE, HYSTEROSCOPY DIAGNOSTIC, COMBINED  2014    Procedure: COMBINED DILATION AND CURETTAGE, HYSTEROSCOPY DIAGNOSTIC;   DILATION AND CURETTAGE, HYSTEROSCOPY DIAGNOSTIC ;  Surgeon: Willie Osborn MD;  Location: [facility] OR     ENT SURGERY      t and a      Allergies   Allergen Reactions     Benadryl [Diphenhydramine]      Given in the hospital and had a difficult time waking as expected.     External Allergen Needs Reconciliation - See Comment      Please reconcile the Patient's allergy reported as LEAD ACETATEMORPHINE SULFATE and update accordingly     Protamine      sob     Tylox [Oxycodone-Acetaminophen]      unknown     Ciprofloxacin Rash      Social History     Tobacco Use     Smoking status: Never     Smokeless tobacco: Never   Substance Use Topics     Alcohol use: Yes     Comment: glass wine most days      Wt Readings from Last 1 Encounters:   23 63.5 kg (140 lb)        Anesthesia Evaluation            ROS/MED HX  ENT/Pulmonary:  - neg pulmonary ROS     Neurologic:  - neg neurologic ROS     Cardiovascular:     (+) -----dysrhythmias, WPW, Irregular  Heartbeat/Palpitations,     METS/Exercise Tolerance:     Hematologic:  - neg hematologic  ROS     Musculoskeletal:       GI/Hepatic:     (+) GERD, Asymptomatic on medication,     Renal/Genitourinary:  - neg Renal ROS     Endo:  - neg endo ROS     Psychiatric/Substance Use:  - neg psychiatric ROS     Infectious Disease:  - neg infectious disease ROS     Malignancy:  - neg malignancy ROS     Other:               OUTSIDE LABS:  CBC:   Lab Results   Component Value Date    WBC 8.0 12/02/2019    WBC 7.9 04/17/2008    HGB 12.1 01/02/2023    HGB 12.6 12/02/2019    HCT 38.1 12/02/2019    HCT 39.6 04/17/2008     12/02/2019     04/17/2008     BMP:   Lab Results   Component Value Date     04/26/2022     12/02/2019    POTASSIUM 4.0 04/26/2022    POTASSIUM 4.0 12/02/2019    CHLORIDE 108 04/26/2022    CHLORIDE 108 12/02/2019    CO2 28 04/26/2022    CO2 28 12/02/2019    BUN 19 04/26/2022    BUN 20 12/02/2019    CR 0.70 04/26/2022    CR 0.70 12/02/2019    GLC 87 04/26/2022     (H) 03/09/2021     COAGS: No results found for: PTT, INR, FIBR  POC: No results found for: BGM, HCG, HCGS  HEPATIC:   Lab Results   Component Value Date    ALBUMIN 4.0 04/26/2022    PROTTOTAL 7.5 04/26/2022    ALT 18 04/26/2022    AST 18 04/26/2022    ALKPHOS 51 04/26/2022    BILITOTAL 0.6 04/26/2022     OTHER:   Lab Results   Component Value Date    A1C 5.6 04/26/2022    MITCH 8.7 04/26/2022       Anesthesia Plan    ASA Status:  2      Anesthesia Type: General.     - Airway: LMA              Consents    Anesthesia Plan(s) and associated risks, benefits, and realistic alternatives discussed. Questions answered and patient/representative(s) expressed understanding.     - Discussed: Risks, Benefits and Alternatives for BOTH SEDATION and the PROCEDURE were discussed     - Discussed with:  Patient      - Extended Intubation/Ventilatory Support Discussed: No.      - Patient is DNR/DNI Status: No    Use of blood products discussed: No  .     Postoperative Care    Pain management: IV analgesics, Oral pain medications.   PONV prophylaxis: Ondansetron (or other 5HT-3), Dexamethasone or Solumedrol     Comments:           H&P reviewed: Unable to attach H&P to encounter due to EHR limitations. H&P Update: appropriate H&P reviewed, patient examined. No interval changes since H&P (within 30 days).         Chidi Shukla MD, MD

## 2023-01-11 NOTE — OP NOTE
Date of surgery: 1/11/2023    Surgeon: Dl Davenport MD    Resident Surgeon: Alexy Garcia MD    Operation: Excision of left forehead osteoma    Preoperative diagnosis: Forehead osteoma excision    Postoperative diagnosis: Same    Indications: This is a 72-year-old female who presented to clinic with a left forehead mass.  After discussion of the risk and benefits, the patient elected to proceed with the above surgery.    Anesthesia: General next    EBL: 2 cc    Implants: None    Specimens: Forehead mass    Findings: Likely osteoma of the mid forehead removed with osteotome.  Next  Description of procedure: The patient is rapidly operating by the anesthesia team and induced into a plane of anesthesia and intubated.  1% lidocaine with 1: 100,000 epinephrine was injected around the osteoma.  A timeout was performed identify the patient, procedure, and all operating room staff are in agreement.  The patient was prepped and draped in the usual sterile fashion.  A 15 blade was used to make a horizontal incision through a rhytid in the forehead.  Dissection continued down to the bone which was just underneath the dermis.  An elevator was used to clear off periosteum from the mass and an osteotome was used to cleave off the mass from the frontal bone.  Hemostasis obtained with monopolar cautery.  4-0 Vicryl was used to close the deep layer and 5-0 Monocryl was used to close the skin layer in a subcuticular fashion.  Bacitracin was then applied.  This concluded the case the patient was handed off to the anesthesia team for extubation and bring the patient to the PACU.    Dr. Davenport was present during the critical portions of the case    Alexy Garcia MD  ENT resident

## 2023-01-11 NOTE — DISCHARGE INSTRUCTIONS
[facility]  Home Care Following Anesthesia  For 24 hours after surgery:  Get plenty of rest.  A responsible adult must stay with you for at least 24 hours after you leave the surgery center.  Do not drive or use heavy equipment.  If you have weakness or tingling, don't drive or use heavy equipment until this feeling goes away.   Do not drink alcohol.   Avoid strenuous or risky activities.  Ask for help when climbing stairs.  You may feel lightheaded.  IF so, sit for a few minutes before standing.  Have someone help you get up.   If you have nausea (feel sick to your stomach): Drink only clear liquids such as apple juice, ginger ale, broth or 7-Up.  Rest may also help.  Be sure to drink enough fluids.  Move to a regular diet as you feel able.   You may have a slight fever.  Call the doctor if your fever is over 100 F (37.7 C) (taken under the tongue) or lasts longer than 24 hours.  You may have a dry mouth, a sore throat, muscle aches or trouble sleeping. These should go away after 24 hours.  Do not make important or legal decisions.   It is recommended to avoid smoking.               Tips for taking pain medications  To get the best pain relief possible, remember these points:  Take pain medications as directed, before pain becomes severe.  Pain medication can upset your stomach: taking it with food may help.  Constipation is a common side effect of pain medication. Drink plenty of  fluids.  Eat foods high in fiber. Take a stool softener if recommended by your doctor or pharmacist.  Do not drink alcohol, drive or operate machinery while taking pain medications.  Ask about other ways to control pain, such as with heat, ice or relaxation.    Tylenol/Acetaminophen Consumption  To help encourage the safe use of acetaminophen, the makers of TYLENOL  have lowered the maximum daily dose for single-ingredient Extra Strength TYLENOL  (acetaminophen) products sold in the [place] from 8 pills  per day (4,000 mg) to 6 pills per day (3,000 mg). The dosing interval has also changed from 2 pills every 4-6 hours to 2 pills every 6 hours.  If you feel your pain relief is insufficient, you may take Tylenol/Acetaminophen in addition to your narcotic pain medication.   Be careful not to exceed 3,000 mg of Tylenol/Acetaminophen in a 24 hour period from all sources.  If you are taking extra strength Tylenol/acetaminophen (500 mg), the maximum dose is 6 tablets in 24 hours.  If you are taking regular strength acetaminophen (325 mg), the maximum dose is 9 tablets in 24 hours.    Call a doctor for any of the following:  Signs of infection (fever, growing tenderness at the surgery site, a large amount of drainage or bleeding, severe pain, foul-smelling drainage, redness, swelling).  It has been over 8 to 10 hours since surgery and you are still not able to urinate (pass water).  Headache for over 24 hours.  Signs of Covid-19 infection (temperature over 100 degrees, shortness of breath, cough, loss of taste/smell, generalized body aches, persistent headache, chills, sore throat, nausea/vomiting/diarrhea)  Your doctor is:  Dr. Dl Davenport, ENT Otolaryngology: [phone]                Or dial [phone] and ask for the resident on call for:  ENT Otolaryngology  For emergency care, call the:  [facility]:  [phone] (TTY for hearing impaired: [phone])

## 2023-01-11 NOTE — ANESTHESIA CARE TRANSFER NOTE
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    Anesthesia Type:   General     Note:    Oropharynx: oropharynx clear of all foreign objects  Level of Consciousness: awake  Oxygen Supplementation: face mask    Independent Airway: airway patency satisfactory and stable  Dentition: dentition unchanged  Vital Signs Stable: post-procedure vital signs reviewed and stable  Report to RN Given: handoff report given  Patient transferred to: PACU    Handoff Report: Identifed the Patient, Identified the Reponsible Provider, Reviewed the pertinent medical history, Discussed the surgical course, Reviewed Intra-OP anesthesia mangement and issues during anesthesia, Set expectations for post-procedure period and Allowed opportunity for questions and acknowledgement of understanding      Vitals:  Vitals Value Taken Time   /53 01/11/23 1108   Temp 36.2  C (97.1  F) 01/11/23 1108   Pulse 58 01/11/23 1109   Resp 14 01/11/23 1109   SpO2 98 % 01/11/23 1109   Vitals shown include unvalidated device data.    Electronically Signed By: LEONOR Gómez CRNA  January 11, 2023  11:10 AM

## 2023-01-11 NOTE — ANESTHESIA POSTPROCEDURE EVALUATION
Patient: Anali Sanchez    Procedure: Procedure(s):  incision and removal of osteoma on forehead       Anesthesia Type:  General    Note:  Disposition: Outpatient   Postop Pain Control: Uneventful            Sign Out: Well controlled pain   PONV: No   Neuro/Psych: Uneventful            Sign Out: Acceptable/Baseline neuro status   Airway/Respiratory: Uneventful            Sign Out: Acceptable/Baseline resp. status   CV/Hemodynamics: Uneventful            Sign Out: Acceptable CV status; No obvious hypovolemia; No obvious fluid overload   Other NRE: NONE   DID A NON-ROUTINE EVENT OCCUR? No           Last vitals:  Vitals Value Taken Time   /63 01/11/23 1115   Temp 36.2  C (97.1  F) 01/11/23 1115   Pulse 61 01/11/23 1115   Resp 16 01/11/23 1115   SpO2 97 % 01/11/23 1115       Electronically Signed By: Chidi Shukla MD, MD  January 11, 2023  1:00 PM

## 2023-01-20 LAB
PATH REPORT.COMMENTS IMP SPEC: NORMAL
PATH REPORT.COMMENTS IMP SPEC: NORMAL
PATH REPORT.FINAL DX SPEC: NORMAL
PATH REPORT.GROSS SPEC: NORMAL
PATH REPORT.MICROSCOPIC SPEC OTHER STN: NORMAL
PATH REPORT.RELEVANT HX SPEC: NORMAL
PHOTO IMAGE: NORMAL

## 2023-01-20 PROCEDURE — 88311 DECALCIFY TISSUE: CPT | Mod: 26 | Performed by: PATHOLOGY

## 2023-01-20 PROCEDURE — 88305 TISSUE EXAM BY PATHOLOGIST: CPT | Mod: 26 | Performed by: PATHOLOGY

## 2023-01-25 ENCOUNTER — ANCILLARY PROCEDURE (OUTPATIENT)
Dept: CT IMAGING | Facility: CLINIC | Age: 73
End: 2023-01-25
Attending: OTOLARYNGOLOGY
Payer: COMMERCIAL

## 2023-01-25 ENCOUNTER — OFFICE VISIT (OUTPATIENT)
Dept: OTOLARYNGOLOGY | Facility: CLINIC | Age: 73
End: 2023-01-25
Payer: COMMERCIAL

## 2023-01-25 VITALS
HEIGHT: 63 IN | BODY MASS INDEX: 24.63 KG/M2 | HEART RATE: 63 BPM | WEIGHT: 139 LBS | DIASTOLIC BLOOD PRESSURE: 76 MMHG | OXYGEN SATURATION: 100 % | SYSTOLIC BLOOD PRESSURE: 134 MMHG | TEMPERATURE: 97.8 F

## 2023-01-25 DIAGNOSIS — D16.4 OSTEOMA OF FACE: ICD-10-CM

## 2023-01-25 DIAGNOSIS — D18.09 HEMANGIOMA OF FACE: ICD-10-CM

## 2023-01-25 DIAGNOSIS — D18.09 HEMANGIOMA OF FACE: Primary | ICD-10-CM

## 2023-01-25 LAB
CREAT BLD-MCNC: 0.9 MG/DL (ref 0.5–1)
GFR SERPL CREATININE-BSD FRML MDRD: >60 ML/MIN/1.73M2

## 2023-01-25 PROCEDURE — 82565 ASSAY OF CREATININE: CPT | Performed by: PATHOLOGY

## 2023-01-25 PROCEDURE — 70498 CT ANGIOGRAPHY NECK: CPT | Mod: GC | Performed by: RADIOLOGY

## 2023-01-25 PROCEDURE — 99024 POSTOP FOLLOW-UP VISIT: CPT | Performed by: OTOLARYNGOLOGY

## 2023-01-25 PROCEDURE — 70496 CT ANGIOGRAPHY HEAD: CPT | Mod: GC | Performed by: RADIOLOGY

## 2023-01-25 RX ORDER — IOPAMIDOL 755 MG/ML
75 INJECTION, SOLUTION INTRAVASCULAR ONCE
Status: COMPLETED | OUTPATIENT
Start: 2023-01-25 | End: 2023-01-25

## 2023-01-25 RX ADMIN — IOPAMIDOL 75 ML: 755 INJECTION, SOLUTION INTRAVASCULAR at 15:04

## 2023-01-25 ASSESSMENT — PAIN SCALES - GENERAL: PAINLEVEL: NO PAIN (0)

## 2023-01-25 NOTE — PATIENT INSTRUCTIONS
1. You were seen in the ENT Clinic today by Dr. Davenport.  If you have any questions or concerns after your appointment, please call   - Option 1: ENT Clinic: [phone]   - Option 2: Daniella (Dr. Davenport's Nurse): [phone]                  Diana CEDILLO (Dr. Davenport's Nurse): [phone]    2.   Plan to return to clinic after consult with IR    3. Consult with IR- they will call you to schedule    4. CTA neck    How to Contact Us:  Send a [employer] message to your provider. Our team will respond to you via [employer]. Occasionally, we will need to call you to get further information.  For urgent matters (Monday-Friday), call the ENT Clinic: [phone] and speak with a call center team member - they will route your call appropriately.   If you'd like to speak directly with a nurse, please find our contact information below. We do our best to check voicemail frequently throughout the day, and will work to call you back within 1-2 days. For urgent matters, please use the general clinic phone numbers listed above.       Daniella Mock LPN  [facility] - Otolaryngology

## 2023-01-25 NOTE — PROGRESS NOTES
HISTORY OF PRESENT ILLNESS:  Anali returns following removal of an osteoma from the forehead.  She has no complaints of some soreness and in fact it is healing nicely according to her.    PHYSICAL EXAMINATION:  Examination shows subcuticular sutures in place.  There is no obvious infection or cellulitis.  There is actually no erythema associated as well.    One other issues raised.  She has a collection of blood vessels, as she describes it, on the right temporal area.  On palpation, it is soft.  There is no detection of a pulse.  At the same time, it is consistent with a probable hemangioma.    ASSESSMENT:  Stable, status post removal of forehead osteoma.    PLAN:  I have recommended a CTA to evaluate this and consultation for possible embolization.  If indeed this leads to embolization and surgical removal, we will discuss that.  Meanwhile, I ordered a CTA.      
No

## 2023-01-25 NOTE — NURSING NOTE
"Chief Complaint   Patient presents with     RECHECK     2 week post op      Blood pressure 134/76, pulse 63, temperature 97.8  F (36.6  C), height 1.6 m (5' 3\"), weight 63 kg (139 lb), SpO2 100 %, not currently breastfeeding.    Rigo Zuñiga LPN    "

## 2023-01-25 NOTE — LETTER
1/25/2023       RE: Anali Sanchez  [address] [facility] [address]     Dear Colleague,    Thank you for referring your patient, Anali Sanchez, to the [facility] [place] [facility]. Please see a copy of my visit note below.    HISTORY OF PRESENT ILLNESS:  Anali returns following removal of an osteoma from the forehead.  She has no complaints of some soreness and in fact it is healing nicely according to her.    PHYSICAL EXAMINATION:  Examination shows subcuticular sutures in place.  There is no obvious infection or cellulitis.  There is actually no erythema associated as well.    One other issues raised.  She has a collection of blood vessels, as she describes it, on the right temporal area.  On palpation, it is soft.  There is no detection of a pulse.  At the same time, it is consistent with a probable hemangioma.    ASSESSMENT:  Stable, status post removal of forehead osteoma.    PLAN:  I have recommended a CTA to evaluate this and consultation for possible embolization.  If indeed this leads to embolization and surgical removal, we will discuss that.  Meanwhile, I ordered a CTA.      Again, thank you for allowing me to participate in the care of your patient.      Sincerely,    Dl Davenport MD

## 2023-01-30 DIAGNOSIS — Q27.9 VASCULAR MALFORMATION: Primary | ICD-10-CM

## 2023-02-16 DIAGNOSIS — Q27.9 VASCULAR MALFORMATION: Primary | ICD-10-CM

## 2023-02-20 ENCOUNTER — MYC MEDICAL ADVICE (OUTPATIENT)
Dept: FAMILY MEDICINE | Facility: CLINIC | Age: 73
End: 2023-02-20

## 2023-02-20 ENCOUNTER — HOSPITAL ENCOUNTER (OUTPATIENT)
Dept: MRI IMAGING | Facility: CLINIC | Age: 73
Discharge: HOME | End: 2023-02-20
Attending: RADIOLOGY | Admitting: RADIOLOGY
Payer: COMMERCIAL

## 2023-02-20 DIAGNOSIS — Q27.9 VASCULAR MALFORMATION: ICD-10-CM

## 2023-02-20 PROCEDURE — 255N000002 HC RX 255 OP 636: Performed by: RADIOLOGY

## 2023-02-20 PROCEDURE — 70553 MRI BRAIN STEM W/O & W/DYE: CPT

## 2023-02-20 PROCEDURE — 70553 MRI BRAIN STEM W/O & W/DYE: CPT | Mod: 26 | Performed by: STUDENT IN AN ORGANIZED HEALTH CARE EDUCATION/TRAINING PROGRAM

## 2023-02-20 PROCEDURE — A9585 GADOBUTROL INJECTION: HCPCS | Performed by: RADIOLOGY

## 2023-02-20 RX ORDER — GADOBUTROL 604.72 MG/ML
6 INJECTION INTRAVENOUS ONCE
Status: COMPLETED | OUTPATIENT
Start: 2023-02-20 | End: 2023-02-20

## 2023-02-20 RX ADMIN — GADOBUTROL 6 ML: 604.72 INJECTION INTRAVENOUS at 07:47

## 2023-02-21 ASSESSMENT — ANXIETY QUESTIONNAIRES
8. IF YOU CHECKED OFF ANY PROBLEMS, HOW DIFFICULT HAVE THESE MADE IT FOR YOU TO DO YOUR WORK, TAKE CARE OF THINGS AT HOME, OR GET ALONG WITH OTHER PEOPLE?: VERY DIFFICULT
2. NOT BEING ABLE TO STOP OR CONTROL WORRYING: NEARLY EVERY DAY
3. WORRYING TOO MUCH ABOUT DIFFERENT THINGS: NEARLY EVERY DAY
7. FEELING AFRAID AS IF SOMETHING AWFUL MIGHT HAPPEN: NEARLY EVERY DAY
1. FEELING NERVOUS, ANXIOUS, OR ON EDGE: NEARLY EVERY DAY
6. BECOMING EASILY ANNOYED OR IRRITABLE: NOT AT ALL
GAD7 TOTAL SCORE: 18
7. FEELING AFRAID AS IF SOMETHING AWFUL MIGHT HAPPEN: NEARLY EVERY DAY
IF YOU CHECKED OFF ANY PROBLEMS ON THIS QUESTIONNAIRE, HOW DIFFICULT HAVE THESE PROBLEMS MADE IT FOR YOU TO DO YOUR WORK, TAKE CARE OF THINGS AT HOME, OR GET ALONG WITH OTHER PEOPLE: VERY DIFFICULT
GAD7 TOTAL SCORE: 18
GAD7 TOTAL SCORE: 18
5. BEING SO RESTLESS THAT IT IS HARD TO SIT STILL: NEARLY EVERY DAY
4. TROUBLE RELAXING: NEARLY EVERY DAY

## 2023-02-22 ENCOUNTER — VIRTUAL VISIT (OUTPATIENT)
Dept: RADIOLOGY | Facility: CLINIC | Age: 73
End: 2023-02-22
Attending: RADIOLOGY
Payer: COMMERCIAL

## 2023-02-22 ENCOUNTER — VIRTUAL VISIT (OUTPATIENT)
Dept: FAMILY MEDICINE | Facility: CLINIC | Age: 73
End: 2023-02-22
Payer: COMMERCIAL

## 2023-02-22 DIAGNOSIS — F41.9 ANXIETY: Primary | ICD-10-CM

## 2023-02-22 DIAGNOSIS — D16.4 OSTEOMA OF FACE: ICD-10-CM

## 2023-02-22 DIAGNOSIS — D18.09 HEMANGIOMA OF FACE: ICD-10-CM

## 2023-02-22 PROCEDURE — 99203 OFFICE O/P NEW LOW 30 MIN: CPT | Mod: 95 | Performed by: RADIOLOGY

## 2023-02-22 PROCEDURE — 96127 BRIEF EMOTIONAL/BEHAV ASSMT: CPT | Mod: VID | Performed by: PHYSICIAN ASSISTANT

## 2023-02-22 PROCEDURE — 99214 OFFICE O/P EST MOD 30 MIN: CPT | Mod: VID | Performed by: PHYSICIAN ASSISTANT

## 2023-02-22 RX ORDER — ESCITALOPRAM OXALATE 10 MG/1
10 TABLET ORAL DAILY
Qty: 30 TABLET | Refills: 1 | Status: SHIPPED | OUTPATIENT
Start: 2023-02-22 | End: 2023-03-14

## 2023-02-22 RX ORDER — ESCITALOPRAM OXALATE 5 MG/1
5 TABLET ORAL DAILY
Qty: 7 TABLET | Refills: 0 | Status: SHIPPED | OUTPATIENT
Start: 2023-02-22 | End: 2023-03-14

## 2023-02-22 ASSESSMENT — ENCOUNTER SYMPTOMS: NERVOUS/ANXIOUS: 1

## 2023-02-22 ASSESSMENT — ANXIETY QUESTIONNAIRES: GAD7 TOTAL SCORE: 18

## 2023-02-22 NOTE — LETTER
2/22/2023         RE: Anali Sanchez  [address] [facility] [address]        Dear Colleague,    Thank you for referring your patient, Anali Sanchez, to the [facility]. Please see a copy of my visit note below.    Virtual-Visit Details    Type of service:  Telephone Visit    Call duration: 20 minutes    Originating Location (pt. Location): Home        Distant Location (provider location):  Off-site        INTERVENTIONAL RADIOLOGY CONSULTATION    Name: Anali Sanchez  Age: 72 year old   Referring Physician: Dr. Davenport   REASON FOR REFERRAL: Right scalp lesion     HPI:   Mrs. Sanchez is referred by Dr. Davenport to discuss a right scalp lesion.    She first noted a lump on her right scalp many years ago, she thinks at age 19. She first noted it and it increased in size rapidly. She did not pursue additional diagnostic workup or treatment. She had an osteoma removed from her forehead. It does not cause pain. She does not appreciate extra vessels, although a clinician told her he    The lump to her feels soft. No significant change in size over the last few years, although as her hair thinks, it is more difficult to conceal it.    No other lump/bumps or cutaneous vascular stains. No family history of vascular malformation.     No fever, cough, dyspnea, chest, palpitations, abdominal pain, n/v, blood in stool urine, lower extremity edema. She does have varicose veins, symptoms are well managed with compression. She did see a specialist for varicose veins.       PAST MEDICAL HISTORY:   Past Medical History:   Diagnosis Date     Anemia      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37     Coagulation disorder (H)     hx hemorrhage after heart and after colon surg     History of blood transfusion      Other and unspecified nonspecific immunological findings     anti Jka red cell antibody       PAST SURGICAL HISTORY:   Past Surgical History:    Procedure Laterality Date     APPENDECTOMY  1968     CARDIAC SURGERY  age 37    WPW surg; open ablation; complicated with hemorrhage     COLECTOMY  age 52    7-8 inches rectum remain. cx with hemorrhage. no cancer. slow transit.(2 surgeries; first for low transit; second due to the bleeding)     DILATION AND CURETTAGE, HYSTEROSCOPY DIAGNOSTIC, COMBINED  2/21/2014    Procedure: COMBINED DILATION AND CURETTAGE, HYSTEROSCOPY DIAGNOSTIC;   DILATION AND CURETTAGE, HYSTEROSCOPY DIAGNOSTIC ;  Surgeon: Willie Osborn MD;  Location: [facility] OR     ENT SURGERY      t and a     EXCISE LESION FACE N/A 1/11/2023    Procedure: incision and removal of osteoma on forehead;  Surgeon: Dl Davenport MD;  Location: [facility] OR       FAMILY HISTORY:   Family History   Problem Relation Age of Onset     Bronchitis Mother         copd     Alcoholism Mother      Varicose Veins Mother      Diabetes Father      Coronary Artery Disease Father      Deep Vein Thrombosis Father         after having COVID     Leukemia Sister         chronic     Impaired Fasting Glucose Sister      Cancer Brother         myelofibrosis; got BMT 2019     Diabetes Type 2  Brother      Impaired Fasting Glucose Brother        SOCIAL HISTORY:   Social History     Tobacco Use     Smoking status: Never     Smokeless tobacco: Never   Substance Use Topics     Alcohol use: Yes     Comment: glass wine most days       PROBLEM LIST:   Patient Active Problem List    Diagnosis Date Noted     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     Priority: Medium     Blood type A positive       Coagulation disorder (H) 01/02/2023     Priority: Medium     hx hemorrhage after heart and after colon surg       Anomalous atrioventricular excitation 01/02/2023     Priority: Medium     surg for taylor 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 37       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     Priority: Medium     Added automatically from request for surgery [id number]       Bilateral low back pain without sciatica 04/22/2022     Priority:  Medium     Impaired fasting glucose 03/10/2021     Priority: Medium     Gastroesophageal reflux disease, unspecified whether esophagitis present 03/09/2021     Priority: Medium     Presented as cough that resolved p PPI.  3/9/2021: will do trial of going off or to H2 blocker.       History of colectomy 02/23/2019     Priority: Medium     Anxiety 05/03/2016     Priority: Medium     CARDIOVASCULAR SCREENING; LDL GOAL LESS THAN 160 05/03/2016     Priority: Medium       MEDICATIONS:   Prescription Medications as of 2/22/2023       Rx Number Disp Refills Start End Last Dispensed Date Next Fill Date Owning Pharmacy    Calcium-Vitamin D-Vitamin K 500-200-40 MG-UNT-MCG CHEW            Sig: Takes 3 chewables daily    Class: Historical    Route: Oral    escitalopram (LEXAPRO) 10 MG tablet  30 tablet 1 2/22/2023    [facility] [place] [address] [place] [address] [id number]    Sig: Take 1 tablet (10 mg) by mouth daily Start taking AFTER taking 5 mg once daily for 1 week    Class: E-Prescribe    Route: Oral    escitalopram (LEXAPRO) 5 MG tablet  7 tablet 0 2/22/2023 3/1/2023   [facility] [place] [address] [place] [address] - [id number]    Sig: Take 1 tablet (5 mg) by mouth daily for 7 days THEN increase to 10 mg once daily    Class: E-Prescribe    Route: Oral          ALLERGIES:   Benadryl [diphenhydramine], External allergen needs reconciliation - see comment, Protamine, Tylox [oxycodone-acetaminophen], and Ciprofloxacin    ROS:  As above    Physical Examination:   VITALS:   LMP  (LMP Unknown)   General: Alert and oriented, answers questions appropriately.     Labs:    BMP RESULTS:  Lab Results   Component Value Date     04/26/2022     12/02/2019    POTASSIUM 4.0 04/26/2022    POTASSIUM 4.0 12/02/2019    CHLORIDE 108 04/26/2022    CHLORIDE 108 12/02/2019    CO2 28 04/26/2022    CO2 28 12/02/2019    ANIONGAP 4 04/26/2022    ANIONGAP 4 12/02/2019    GLC 87 04/26/2022     (H) 03/09/2021    BUN 19 04/26/2022    BUN 20  12/02/2019    CR 0.9 01/25/2023    CR 0.70 04/26/2022    CR 0.70 12/02/2019    GFRESTIMATED >60 01/25/2023    GFRESTIMATED 88 12/02/2019    GFRESTBLACK >90 12/02/2019    MITCH 8.7 04/26/2022    MITCH 8.7 12/02/2019        CBC RESULTS:  Lab Results   Component Value Date    WBC 8.0 12/02/2019    RBC 4.06 12/02/2019    HGB 12.1 01/02/2023    HGB 12.6 12/02/2019    HCT 38.1 12/02/2019    MCV 94 12/02/2019    MCH 31.0 12/02/2019    MCHC 33.1 12/02/2019    RDW 12.4 12/02/2019     12/02/2019       INR/PTT:  No results found for: INR, PTT    Diagnostic studies:   Imaging personally reviewed and interpreted by me.  CT 1/25/2023 shows no hypervascularity to the right scalp lesion.  Hounsfield units consistent with fat, suggesting lipoma.  Chronic, likely ischemic, white matter changes are noted.    MRI 2/20/2023 confirms T1 hyperintensity in the area of right scalp lump, with signal loss on fat-saturated images, consistent with lipoma.  There is no extra vascularity in that location.    Radiologist interpretation:  IMPRESSION:  1. No meningioma or vascular malformation identified.  2. Right frontal scalp meningioma.  3. Mild chronic white matter ischemic change.    Assessment: 72-year-old female with right scalp lesion, imaging features are consistent with a lipoma.  There is no vascular malformation in this area.    Plan:  No indication for interventional radiology procedure.  Surgical resection of the lipoma could be considered if clinically indicated and is deferred to the referring clinician, Dr. Davenport.    It was a pleasure to conduct this telephone visit with Mrs. Sanchez today.  Thank you for involving the interventional radiology service in her care.    I spent a total of 20 minutes on today's telephone visit, over 50% time was for counseling and care coordination.  In addition I spent 10 minutes reviewing imaging and 10 minutes completing documentation.    Miranda Varner MD  Interventional  Radiology   Pager [phone]     Review of the result(s) of each unique test - CT, MRI  Independent interpretation of a test performed by another physician/other qualified health care professional (not separately reported) - MRI, CT           CC  Patient Care Team:  Jovita Bauman PA-C as PCP - General (Family Medicine)  Jovita Bauman PA-C as Assigned PCP  Jovita Garcia PA-C as Physician Assistant (Dermatology)  Dl Davenport MD as MD (Otolaryngology)  Jovita Garcia PA-C as Referring Physician (Dermatology)  Fercho Ariza MD as Assigned Heart and Vascular Provider  Dl Davenport MD as Assigned Surgical Provider  DL DAVENPORT

## 2023-02-22 NOTE — PROGRESS NOTES
Criss is a 72 year old who is being evaluated via a billable video visit.      How would you like to obtain your AVS? MyChart  If the video visit is dropped, the invitation should be resent by: Text to cell phone: [phone]  Will anyone else be joining your video visit? No          Assessment & Plan     Anxiety  Worsening anxiety lately. Was on escitalopram in the past and it worked well, but switched to sertraline due to cost of escitalopram. She overall doesn't feel like the sertraline has been as helpful as the escitalopram. Plan to stop sertraline and try escitalopram again. She will let me know if escitalopram is too expensive and we can discuss other options. Otherwise, follow-up in 1 month for med/mood check. Trying to schedule therapy but earliest available is about 3 months from now. Has been watching therapy videos online which have been helpful.  - escitalopram (LEXAPRO) 5 MG tablet; Take 1 tablet (5 mg) by mouth daily for 7 days THEN increase to 10 mg once daily  - escitalopram (LEXAPRO) 10 MG tablet; Take 1 tablet (10 mg) by mouth daily Start taking AFTER taking 5 mg once daily for 1 week    Return in 4 weeks (on 3/22/2023) for Mood Recheck, Med Check - virtual visit.    Jovita Bauman PA-C  [facility]   Criss is a 72 year old, presenting for the following health issues:  Anxiety and Recheck Medication      Anxiety    History of Present Illness       Mental Health Follow-up:  Patient presents to follow-up on Anxiety.    Patient's anxiety since last visit has been:  Worse  The patient is having other symptoms associated with anxiety.  Any significant life events: No  Patient is feeling anxious or having panic attacks.  Patient has no concerns about alcohol or drug use.    She eats 2-3 servings of fruits and vegetables daily.She consumes 0 sweetened beverage(s) daily.She exercises with enough effort to increase her heart rate 10 to 19 minutes per day.  She  "exercises with enough effort to increase her heart rate 3 or less days per week.   She is taking medications regularly.  Today's MATEUS-7 Score: 18       Anxiety Follow-Up    How are you doing with your anxiety since your last visit? Worsened. Would like to discuss increasing dosage. Is doing therapy with video visit, apps on phone.    Are you having other symptoms that might be associated with anxiety? Yes    Have you had a significant life event? No     Are you feeling depressed? No    Do you have any concerns with your use of alcohol or other drugs? No     Medication Followup of Sertraline    Taking Medication as prescribed: yes    Side Effects:  None    Medication Helping Symptoms:  Yes, but feels she needs an increase of medication or change in medication    Taking sertraline 25 mg daily. She tried to increase sertraline to 50 mg in the past but had nightmares so decreased back to 25 mg. She was on lexapro in the past and felt like it worked well for her but had to switch due to cost.     Feels like anxiety is worsening recently. Felt like sertraline was helping but lately feels \"totally consumed by anxiety\". Whole day is trying to cope with anxious feelings. Makes it difficult to function properly - feels scattered, racing thoughts. No specific stressors but she does note that her brother and sister have needed her help due to medical reasons. Doesn't feel like she is coping well anymore. Trying to schedule therapy but earliest available is about 3 months from now. Has been watching therapy videos online which have been helpful.    Social History     Tobacco Use     Smoking status: Never     Smokeless tobacco: Never   Vaping Use     Vaping Use: Never used   Substance Use Topics     Alcohol use: Yes     Comment: glass wine most days     Drug use: Never     MATEUS-7 SCORE 3/9/2021 12/20/2021 2/21/2023   Total Score - - 18 (severe anxiety)   Total Score 6 18 18     PHQ 1/10/2020 3/9/2021 12/20/2021   PHQ-9 Total Score " 4 3 7   Q9: Thoughts of better off dead/self-harm past 2 weeks Not at all Not at all Not at all     Last PHQ-9 12/20/2021   1.  Little interest or pleasure in doing things 0   2.  Feeling down, depressed, or hopeless 0   3.  Trouble falling or staying asleep, or sleeping too much 1   4.  Feeling tired or having little energy 1   5.  Poor appetite or overeating 0   6.  Feeling bad about yourself 1   7.  Trouble concentrating 1   8.  Moving slowly or restless 3   Q9: Thoughts of better off dead/self-harm past 2 weeks 0   PHQ-9 Total Score 7   Difficulty at work, home, or with people Somewhat difficult     MATEUS-7  2/21/2023   1. Feeling nervous, anxious, or on edge 3   2. Not being able to stop or control worrying 3   3. Worrying too much about different things 3   4. Trouble relaxing 3   5. Being so restless that it is hard to sit still 3   6. Becoming easily annoyed or irritable 0   7. Feeling afraid, as if something awful might happen 3   MATEUS-7 Total Score 18   If you checked any problems, how difficult have they made it for you to do your work, take care of things at home, or get along with other people? Very difficult           Review of Systems   Psychiatric/Behavioral: The patient is nervous/anxious.       Constitutional, HEENT, cardiovascular, pulmonary, gi and gu systems are negative, except as otherwise noted.      Objective    Vitals - Patient Reported  Weight (Patient Reported): 63.5 kg (140 lb)  Pain Score: No Pain (0)      Vitals:  No vitals were obtained today due to virtual visit.    Physical Exam   GENERAL: Healthy, alert and no distress  EYES: Eyes grossly normal to inspection.  No discharge or erythema, or obvious scleral/conjunctival abnormalities.  RESP: No audible wheeze, cough, or visible cyanosis.  No visible retractions or increased work of breathing.    SKIN: Visible skin clear. No significant rash, abnormal pigmentation or lesions.  NEURO: Cranial nerves grossly intact.  Mentation and speech  appropriate for age.  PSYCH: Mentation appears normal, affect normal/bright, judgement and insight intact, normal speech and appearance well-groomed.    Video-Visit Details    Type of service:  Video Visit   Video Start Time: 8:52 AM  Video End Time:9:07 AM    Originating Location (pt. Location): Home    Distant Location (provider location):  On-site  Platform used for Video Visit: Smartbill - Recurrence Backoffice

## 2023-02-22 NOTE — PROGRESS NOTES
Virtual-Visit Details    Type of service:  Telephone Visit    Call duration: 20 minutes    Originating Location (pt. Location): Home        Distant Location (provider location):  Off-site        INTERVENTIONAL RADIOLOGY CONSULTATION    Name: Anali Sanchez  Age: 72 year old   Referring Physician: Dr. Davenport   REASON FOR REFERRAL: Right scalp lesion     HPI:   Mrs. Sanchez is referred by Dr. Davenport to discuss a right scalp lesion.    She first noted a lump on her right scalp many years ago, she thinks at age 19. She first noted it and it increased in size rapidly. She did not pursue additional diagnostic workup or treatment. She had an osteoma removed from her forehead. It does not cause pain. She does not appreciate extra vessels, although a clinician told her he    The lump to her feels soft. No significant change in size over the last few years, although as her hair thinks, it is more difficult to conceal it.    No other lump/bumps or cutaneous vascular stains. No family history of vascular malformation.     No fever, cough, dyspnea, chest, palpitations, abdominal pain, n/v, blood in stool urine, lower extremity edema. She does have varicose veins, symptoms are well managed with compression. She did see a specialist for varicose veins.       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 cx with hemorrhage. no cancer. slow transit.(2 surgeries; first for low transit;  second due to the bleeding)     DILATION AND CURETTAGE, HYSTEROSCOPY DIAGNOSTIC, COMBINED  2/21/2014    Procedure: COMBINED DILATION AND CURETTAGE, HYSTEROSCOPY DIAGNOSTIC;   DILATION AND CURETTAGE, HYSTEROSCOPY DIAGNOSTIC ;  Surgeon: Willie Osborn MD;  Location: [facility] OR     ENT SURGERY      t and a     EXCISE LESION FACE N/A 1/11/2023    Procedure: incision and removal of osteoma on forehead;  Surgeon: Dl Davenport MD;  Location: [facility] OR       FAMILY HISTORY:   Family History   Problem Relation Age of Onset     Bronchitis Mother         copd     Alcoholism Mother      Varicose Veins Mother      Diabetes Father      Coronary Artery Disease Father      Deep Vein Thrombosis Father         after having COVID     Leukemia Sister         chronic     Impaired Fasting Glucose Sister      Cancer Brother         myelofibrosis; got BMT 2019     Diabetes Type 2  Brother      Impaired Fasting Glucose Brother        SOCIAL HISTORY:   Social History     Tobacco Use     Smoking status: Never     Smokeless tobacco: Never   Substance Use Topics     Alcohol use: Yes     Comment: glass wine most days       PROBLEM LIST:   Patient Active Problem List    Diagnosis Date Noted     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     Priority: Medium     Blood type A positive       Coagulation disorder (H) 01/02/2023     Priority: Medium     hx hemorrhage after heart and after colon surg       Anomalous atrioventricular excitation 01/02/2023     Priority: Medium     surg for taylor 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 37       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  3/9/2021: will do trial of going off or to H2  blocker.       History of colectomy 02/23/2019     Priority: Medium     Anxiety 05/03/2016     Priority: Medium     CARDIOVASCULAR SCREENING; LDL GOAL LESS THAN 160 05/03/2016     Priority: Medium       MEDICATIONS:   Prescription Medications as of 2/22/2023       Rx Number Disp Refills Start End Last Dispensed Date Next Fill Date Owning Pharmacy    Calcium-Vitamin D-Vitamin K 500-200-40 MG-UNT-MCG CHEW            Sig: Takes 3 chewables daily    Class: Historical    Route: Oral    escitalopram (LEXAPRO) 10 MG tablet  30 tablet 1 2/22/2023    [facility]/pharmacy [address] [place], [place] [id number]    Sig: Take 1 tablet (10 mg) by mouth daily Start taking AFTER taking 5 mg once daily for 1 week    Class: E-Prescribe    Route: Oral    escitalopram (LEXAPRO) 5 MG tablet  7 tablet 0 2/22/2023 3/1/2023   [facility]/pharmacy [address] [place] [address], [place] - [address] [address]    Sig: Take 1 tablet (5 mg) by mouth daily for 7 days THEN increase to 10 mg once daily    Class: E-Prescribe    Route: Oral          ALLERGIES:   Benadryl [diphenhydramine], External allergen needs reconciliation - see comment, Protamine, Tylox [oxycodone-acetaminophen], and Ciprofloxacin    ROS:  As above    Physical Examination:   VITALS:   LMP  (LMP Unknown)   General: Alert and oriented, answers questions appropriately.     Labs:    BMP RESULTS:  Lab Results   Component Value Date     04/26/2022     12/02/2019    POTASSIUM 4.0 04/26/2022    POTASSIUM 4.0 12/02/2019    CHLORIDE 108 04/26/2022    CHLORIDE 108 12/02/2019    CO2 28 04/26/2022    CO2 28 12/02/2019    ANIONGAP 4 04/26/2022    ANIONGAP 4 12/02/2019    GLC 87 04/26/2022     (H) 03/09/2021    BUN 19 04/26/2022    BUN 20 12/02/2019    CR 0.9 01/25/2023    CR 0.70 04/26/2022    CR 0.70 12/02/2019    GFRESTIMATED >60 01/25/2023    GFRESTIMATED 88 12/02/2019    GFRESTBLACK >90 12/02/2019    MITCH 8.7 04/26/2022    MITCH 8.7 12/02/2019        CBC RESULTS:  Lab Results   Component Value Date     WBC 8.0 12/02/2019    RBC 4.06 12/02/2019    HGB 12.1 01/02/2023    HGB 12.6 12/02/2019    HCT 38.1 12/02/2019    MCV 94 12/02/2019    MCH 31.0 12/02/2019    MCHC 33.1 12/02/2019    RDW 12.4 12/02/2019     12/02/2019       INR/PTT:  No results found for: INR, PTT    Diagnostic studies:   Imaging personally reviewed and interpreted by me.  CT 1/25/2023 shows no hypervascularity to the right scalp lesion.  Hounsfield units consistent with fat, suggesting lipoma.  Chronic, likely ischemic, white matter changes are noted.    MRI 2/20/2023 confirms T1 hyperintensity in the area of right scalp lump, with signal loss on fat-saturated images, consistent with lipoma.  There is no extra vascularity in that location.    Radiologist interpretation:  IMPRESSION:  1. No meningioma or vascular malformation identified.  2. Right frontal scalp meningioma.  3. Mild chronic white matter ischemic change.    Assessment: 72-year-old female with right scalp lesion, imaging features are consistent with a lipoma.  There is no vascular malformation in this area.    Plan:  No indication for interventional radiology procedure.  Surgical resection of the lipoma could be considered if clinically indicated and is deferred to the referring clinician, Dr. Davenport.    It was a pleasure to conduct this telephone visit with Mrs. Sanchez today.  Thank you for involving the interventional radiology service in her care.    I spent a total of 20 minutes on today's telephone visit, over 50% time was for counseling and care coordination.  In addition I spent 10 minutes reviewing imaging and 10 minutes completing documentation.    Miranda Varner MD  Interventional Radiology   Pager [phone]     Review of the result(s) of each unique test - CT, MRI  Independent interpretation of a test performed by another physician/other qualified health care professional (not separately reported) - MRI, CT           CC  Patient Care Team:  Jovita Bauman  SARITA as PCP - General (Family Medicine)  Jovita Bauman PA-C as Assigned PCP  Jovita Garcia PA-C as Physician Assistant (Dermatology)  Mounika Davenport MD as MD (Otolaryngology)  Jovita Garcia PA-C as Referring Physician (Dermatology)  Fercho Ariza MD as Assigned Heart and Vascular Provider  Mounika Davenport MD as Assigned Surgical Provider  MOUNIKA DAVENPORT

## 2023-02-22 NOTE — NURSING NOTE
Is the patient currently in the state of [place]? YES    Visit mode:TELEPHONE    If the visit is dropped, the patient can be reconnected by: TELEPHONE VISIT: Phone number: [phone]    Will anyone else be joining the visit? NO      How would you like to obtain your AVS? MyChart    Are changes needed to the allergy or medication list? NO   Patient verified medications and allergies are correct via eCheck-in. Patient confirms no changes at this time and/or since last reviewed by clinic staff.    Reason for visit: Anali Zunigaman 72 year old female presents today for consultation evaluation for Osteoma of face.  Evie Jones, Virtual Facilitator

## 2023-02-22 NOTE — PATIENT INSTRUCTIONS
Stop sertraline and start lexapro. With the lexapro - start with 5 mg once daily for 1 week, then increase to 10 mg once daily.

## 2023-03-09 ENCOUNTER — MYC MEDICAL ADVICE (OUTPATIENT)
Dept: FAMILY MEDICINE | Facility: CLINIC | Age: 73
End: 2023-03-09
Payer: COMMERCIAL
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
## 2023-03-10 NOTE — TELEPHONE ENCOUNTER
Routed to Jovita Bauman, please review  message and below and advise.  Sooner appt?  Virtual or same day slot on 3/14?    Increased Escitalopram 5 mg to 10 mg about 2 weeks ago.    Normally after eating has BM x 2 (has no colon), since on medication not able to go after eating.  Ends up going a couple hours later, with diarrhea.    Anxiety has worsened, finding self irritable.    Lashay Connor RN, BSN  [facility]
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
## 2023-03-10 NOTE — TELEPHONE ENCOUNTER
Would recommend she stop the escitalopram for now and monitor forgetfulness. Would recommend follow-up in clinic next week - okay to use same day spot. Urgent follow-up if she has recurrent issues with memory gaps, confusion, etc, or if numbness/tingling, focal weakness, speech changes, vision changes, headache, chest pain, shortness of breath, palpitations, or other concerns.    She actually just had brain imaging in January and February 2023 (CTA head/neck, MRI Brain W/O & W) for other reasons and there did not appear to be worrisome findings.    Jovita Bauman PA-C

## 2023-03-10 NOTE — TELEPHONE ENCOUNTER
Called pt and advised of below per Jovita Bauman.  Pt verbalized understanding and agrees to plan.    Appt scheduled 3/14/23    Lashay Connor RN, BSN  [facility]

## 2023-03-13 ASSESSMENT — ANXIETY QUESTIONNAIRES
4. TROUBLE RELAXING: NEARLY EVERY DAY
3. WORRYING TOO MUCH ABOUT DIFFERENT THINGS: NEARLY EVERY DAY
8. IF YOU CHECKED OFF ANY PROBLEMS, HOW DIFFICULT HAVE THESE MADE IT FOR YOU TO DO YOUR WORK, TAKE CARE OF THINGS AT HOME, OR GET ALONG WITH OTHER PEOPLE?: VERY DIFFICULT
1. FEELING NERVOUS, ANXIOUS, OR ON EDGE: NEARLY EVERY DAY
IF YOU CHECKED OFF ANY PROBLEMS ON THIS QUESTIONNAIRE, HOW DIFFICULT HAVE THESE PROBLEMS MADE IT FOR YOU TO DO YOUR WORK, TAKE CARE OF THINGS AT HOME, OR GET ALONG WITH OTHER PEOPLE: VERY DIFFICULT
7. FEELING AFRAID AS IF SOMETHING AWFUL MIGHT HAPPEN: NEARLY EVERY DAY
GAD7 TOTAL SCORE: 21
2. NOT BEING ABLE TO STOP OR CONTROL WORRYING: NEARLY EVERY DAY
GAD7 TOTAL SCORE: 21
7. FEELING AFRAID AS IF SOMETHING AWFUL MIGHT HAPPEN: NEARLY EVERY DAY
5. BEING SO RESTLESS THAT IT IS HARD TO SIT STILL: NEARLY EVERY DAY
GAD7 TOTAL SCORE: 21
6. BECOMING EASILY ANNOYED OR IRRITABLE: NEARLY EVERY DAY

## 2023-03-14 ENCOUNTER — OFFICE VISIT (OUTPATIENT)
Dept: FAMILY MEDICINE | Facility: CLINIC | Age: 73
End: 2023-03-14
Payer: COMMERCIAL

## 2023-03-14 VITALS
WEIGHT: 138 LBS | OXYGEN SATURATION: 100 % | SYSTOLIC BLOOD PRESSURE: 126 MMHG | TEMPERATURE: 97.8 F | RESPIRATION RATE: 16 BRPM | DIASTOLIC BLOOD PRESSURE: 61 MMHG | BODY MASS INDEX: 24.45 KG/M2 | HEART RATE: 63 BPM | HEIGHT: 63 IN

## 2023-03-14 DIAGNOSIS — R41.89 BRAIN FOG: ICD-10-CM

## 2023-03-14 DIAGNOSIS — F41.9 ANXIETY: Primary | ICD-10-CM

## 2023-03-14 PROCEDURE — 99214 OFFICE O/P EST MOD 30 MIN: CPT | Mod: 24 | Performed by: PHYSICIAN ASSISTANT

## 2023-03-14 ASSESSMENT — PAIN SCALES - GENERAL: PAINLEVEL: NO PAIN (0)

## 2023-03-14 ASSESSMENT — ANXIETY QUESTIONNAIRES: GAD7 TOTAL SCORE: 21
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
## 2023-04-20 ENCOUNTER — PATIENT OUTREACH (OUTPATIENT)
Dept: CARE COORDINATION | Facility: CLINIC | Age: 73
End: 2023-04-20
Payer: COMMERCIAL

## 2023-06-10 ENCOUNTER — HEALTH MAINTENANCE LETTER (OUTPATIENT)
Age: 73
End: 2023-06-10

## 2023-07-28 ENCOUNTER — OFFICE VISIT (OUTPATIENT)
Dept: FAMILY MEDICINE | Facility: CLINIC | Age: 73
End: 2023-07-28
Payer: COMMERCIAL

## 2023-07-28 VITALS
DIASTOLIC BLOOD PRESSURE: 72 MMHG | HEART RATE: 61 BPM | HEIGHT: 63 IN | RESPIRATION RATE: 13 BRPM | SYSTOLIC BLOOD PRESSURE: 114 MMHG | BODY MASS INDEX: 24.43 KG/M2 | TEMPERATURE: 97.2 F | OXYGEN SATURATION: 95 % | WEIGHT: 137.9 LBS

## 2023-07-28 DIAGNOSIS — H91.93 BILATERAL CHANGE IN HEARING: ICD-10-CM

## 2023-07-28 DIAGNOSIS — Z12.31 VISIT FOR SCREENING MAMMOGRAM: ICD-10-CM

## 2023-07-28 DIAGNOSIS — Z00.00 ENCOUNTER FOR MEDICARE ANNUAL WELLNESS EXAM: Primary | ICD-10-CM

## 2023-07-28 DIAGNOSIS — R73.01 IMPAIRED FASTING GLUCOSE: ICD-10-CM

## 2023-07-28 DIAGNOSIS — Z13.220 LIPID SCREENING: ICD-10-CM

## 2023-07-28 DIAGNOSIS — Z12.11 SPECIAL SCREENING FOR MALIGNANT NEOPLASMS, COLON: ICD-10-CM

## 2023-07-28 DIAGNOSIS — F41.1 GENERALIZED ANXIETY DISORDER: ICD-10-CM

## 2023-07-28 PROCEDURE — 99214 OFFICE O/P EST MOD 30 MIN: CPT | Mod: 25 | Performed by: PHYSICIAN ASSISTANT

## 2023-07-28 PROCEDURE — 0124A COVID-19 BIVALENT 12+ (PFIZER): CPT | Performed by: PHYSICIAN ASSISTANT

## 2023-07-28 PROCEDURE — G0439 PPPS, SUBSEQ VISIT: HCPCS | Performed by: PHYSICIAN ASSISTANT

## 2023-07-28 PROCEDURE — 91312 COVID-19 BIVALENT 12+ (PFIZER): CPT | Performed by: PHYSICIAN ASSISTANT

## 2023-07-28 RX ORDER — VENLAFAXINE HYDROCHLORIDE 37.5 MG/1
37.5 CAPSULE, EXTENDED RELEASE ORAL DAILY
Qty: 30 CAPSULE | Refills: 1 | Status: SHIPPED | OUTPATIENT
Start: 2023-07-28 | End: 2023-08-08

## 2023-07-28 ASSESSMENT — ACTIVITIES OF DAILY LIVING (ADL): CURRENT_FUNCTION: NO ASSISTANCE NEEDED

## 2023-07-28 ASSESSMENT — ANXIETY QUESTIONNAIRES
GAD7 TOTAL SCORE: 19
7. FEELING AFRAID AS IF SOMETHING AWFUL MIGHT HAPPEN: NEARLY EVERY DAY
1. FEELING NERVOUS, ANXIOUS, OR ON EDGE: NEARLY EVERY DAY
GAD7 TOTAL SCORE: 19
3. WORRYING TOO MUCH ABOUT DIFFERENT THINGS: NEARLY EVERY DAY
IF YOU CHECKED OFF ANY PROBLEMS ON THIS QUESTIONNAIRE, HOW DIFFICULT HAVE THESE PROBLEMS MADE IT FOR YOU TO DO YOUR WORK, TAKE CARE OF THINGS AT HOME, OR GET ALONG WITH OTHER PEOPLE: SOMEWHAT DIFFICULT
2. NOT BEING ABLE TO STOP OR CONTROL WORRYING: NEARLY EVERY DAY
5. BEING SO RESTLESS THAT IT IS HARD TO SIT STILL: NEARLY EVERY DAY
6. BECOMING EASILY ANNOYED OR IRRITABLE: SEVERAL DAYS

## 2023-07-28 ASSESSMENT — PATIENT HEALTH QUESTIONNAIRE - PHQ9
5. POOR APPETITE OR OVEREATING: NEARLY EVERY DAY
SUM OF ALL RESPONSES TO PHQ QUESTIONS 1-9: 7

## 2023-07-28 ASSESSMENT — PAIN SCALES - GENERAL: PAINLEVEL: NO PAIN (0)
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
## 2023-07-28 NOTE — PATIENT INSTRUCTIONS
Patient Education   Personalized Prevention Plan  You are due for the preventive services outlined below.  Your care team is available to assist you in scheduling these services.  If you have already completed any of these items, please share that information with your care team to update in your medical record.  Health Maintenance Due   Topic Date Due     COVID-19 Vaccine (6 - Moderna series) 02/11/2023     Annual Wellness Visit  04/26/2023

## 2023-07-29 ENCOUNTER — MYC MEDICAL ADVICE (OUTPATIENT)
Dept: FAMILY MEDICINE | Facility: CLINIC | Age: 73
End: 2023-07-29
Payer: COMMERCIAL

## 2023-08-03 ENCOUNTER — MYC MEDICAL ADVICE (OUTPATIENT)
Dept: FAMILY MEDICINE | Facility: CLINIC | Age: 73
End: 2023-08-03
Payer: COMMERCIAL

## 2023-08-03 PROCEDURE — 82274 ASSAY TEST FOR BLOOD FECAL: CPT | Performed by: PHYSICIAN ASSISTANT

## 2023-08-03 NOTE — TELEPHONE ENCOUNTER
Routing to provider. Patient had previous DNAe LTDt message as well with concerns about medication (see mychart encounter dated 7/29/23). Recommend virtual visit to discuss patient concerns in more depth?    Thank you,    Patricia BROWNING RN, BSN, PHN

## 2023-08-04 ENCOUNTER — LAB (OUTPATIENT)
Dept: LAB | Facility: CLINIC | Age: 73
End: 2023-08-04
Payer: COMMERCIAL

## 2023-08-04 DIAGNOSIS — Z13.220 LIPID SCREENING: ICD-10-CM

## 2023-08-04 DIAGNOSIS — R73.01 IMPAIRED FASTING GLUCOSE: ICD-10-CM

## 2023-08-04 LAB
ANION GAP SERPL CALCULATED.3IONS-SCNC: 9 MMOL/L (ref 7–15)
BUN SERPL-MCNC: 17.5 MG/DL (ref 8–23)
CALCIUM SERPL-MCNC: 9 MG/DL (ref 8.8–10.2)
CHLORIDE SERPL-SCNC: 103 MMOL/L (ref 98–107)
CHOLEST SERPL-MCNC: 209 MG/DL
CREAT SERPL-MCNC: 0.85 MG/DL (ref 0.51–0.95)
DEPRECATED HCO3 PLAS-SCNC: 25 MMOL/L (ref 22–29)
GFR SERPL CREATININE-BSD FRML MDRD: 72 ML/MIN/1.73M2
GLUCOSE SERPL-MCNC: 94 MG/DL (ref 70–99)
HBA1C MFR BLD: 5.6 % (ref 0–5.6)
HDLC SERPL-MCNC: 100 MG/DL
HEMOCCULT STL QL IA: NEGATIVE
LDLC SERPL CALC-MCNC: 94 MG/DL
NONHDLC SERPL-MCNC: 109 MG/DL
POTASSIUM SERPL-SCNC: 4.1 MMOL/L (ref 3.4–5.3)
SODIUM SERPL-SCNC: 137 MMOL/L (ref 136–145)
TRIGL SERPL-MCNC: 77 MG/DL

## 2023-08-04 PROCEDURE — 80061 LIPID PANEL: CPT

## 2023-08-04 PROCEDURE — 36415 COLL VENOUS BLD VENIPUNCTURE: CPT

## 2023-08-04 PROCEDURE — 83036 HEMOGLOBIN GLYCOSYLATED A1C: CPT

## 2023-08-04 PROCEDURE — 80048 BASIC METABOLIC PNL TOTAL CA: CPT

## 2023-08-07 ENCOUNTER — MYC MEDICAL ADVICE (OUTPATIENT)
Dept: FAMILY MEDICINE | Facility: CLINIC | Age: 73
End: 2023-08-07
Payer: COMMERCIAL

## 2023-08-07 NOTE — TELEPHONE ENCOUNTER
Routing to Jovita Bauman as an FYI, please see ZOZI message and advise.     Yudelka German RN on 8/7/2023 at 1:09 PM

## 2023-08-08 ENCOUNTER — VIRTUAL VISIT (OUTPATIENT)
Dept: FAMILY MEDICINE | Facility: CLINIC | Age: 73
End: 2023-08-08
Payer: COMMERCIAL

## 2023-08-08 DIAGNOSIS — Z86.79 HISTORY OF ATRIAL FLUTTER: ICD-10-CM

## 2023-08-08 DIAGNOSIS — Z86.79 HISTORY OF WOLFF-PARKINSON-WHITE (WPW) SYNDROME: ICD-10-CM

## 2023-08-08 DIAGNOSIS — F41.1 GENERALIZED ANXIETY DISORDER: Primary | ICD-10-CM

## 2023-08-08 PROCEDURE — 99214 OFFICE O/P EST MOD 30 MIN: CPT | Mod: VID | Performed by: PHYSICIAN ASSISTANT

## 2023-08-08 RX ORDER — SERTRALINE HYDROCHLORIDE 25 MG/1
25 TABLET, FILM COATED ORAL DAILY
Qty: 30 TABLET | Refills: 1 | Status: SHIPPED | OUTPATIENT
Start: 2023-08-08 | End: 2023-09-06

## 2023-08-08 ASSESSMENT — ANXIETY QUESTIONNAIRES
1. FEELING NERVOUS, ANXIOUS, OR ON EDGE: NEARLY EVERY DAY
4. TROUBLE RELAXING: NEARLY EVERY DAY
5. BEING SO RESTLESS THAT IT IS HARD TO SIT STILL: NEARLY EVERY DAY
2. NOT BEING ABLE TO STOP OR CONTROL WORRYING: NEARLY EVERY DAY
IF YOU CHECKED OFF ANY PROBLEMS ON THIS QUESTIONNAIRE, HOW DIFFICULT HAVE THESE PROBLEMS MADE IT FOR YOU TO DO YOUR WORK, TAKE CARE OF THINGS AT HOME, OR GET ALONG WITH OTHER PEOPLE: SOMEWHAT DIFFICULT
7. FEELING AFRAID AS IF SOMETHING AWFUL MIGHT HAPPEN: NEARLY EVERY DAY
GAD7 TOTAL SCORE: 21
GAD7 TOTAL SCORE: 21
6. BECOMING EASILY ANNOYED OR IRRITABLE: NEARLY EVERY DAY
3. WORRYING TOO MUCH ABOUT DIFFERENT THINGS: NEARLY EVERY DAY

## 2023-08-08 NOTE — PROGRESS NOTES
Criss is a 72 year old who is being evaluated via a billable video visit.      How would you like to obtain your AVS? MyChart  If the video visit is dropped, the invitation should be resent by: Text to cell phone: [phone]  Will anyone else be joining your video visit? No          Assessment & Plan     Generalized anxiety disorder  She tried the venlafaxine for 3 days but read about possible risk of cardiac arrhythmia with the medication so stopped taking it. We discussed contraindications, risks, benefits, side effects of venlafaxine. Potential side effects from the venlafaxine include small risk of slight increase in heart rate but does not seem to adversely affect cardiac conduction or heart rhythm. She is still hesitant to try venlafaxine. She would like to try sertraline again since it helped for a while and see if it helps again. I did review r/b/se of sertraline including small risk of palpitations, tachycardia. She feels comfortable with the sertraline since she did not have issues with it previously. Follow-up in 1 month for med/mood check.  - sertraline (ZOLOFT) 25 MG tablet; Take 1 tablet (25 mg) by mouth daily    Jovita Bauman PA-C  [facility]   Criss is a 72 year old, presenting for the following health issues:  Anxiety        8/8/2023     3:22 PM   Additional Questions   Roomed by leah garcia cma   Accompanied by self         8/8/2023     3:22 PM   Patient Reported Additional Medications   Patient reports taking the following new medications na       History of Present Illness       Mental Health Follow-up:  Patient presents to follow-up on Anxiety.    Patient's anxiety since last visit has been:  No change  The patient is not having other symptoms associated with anxiety.  Any significant life events: relationship concerns  Patient is feeling anxious or having panic attacks.  Patient has no concerns about alcohol or drug use.    She eats 2-3 servings of  fruits and vegetables daily.She consumes 0 sweetened beverage(s) daily.She exercises with enough effort to increase her heart rate 30 to 60 minutes per day.  She exercises with enough effort to increase her heart rate 3 or less days per week.   She is taking medications regularly.     Anxiety worse over past year, a lot of anxiety is related to 's personality/mood changes and episodes of uncontrollable anger (see clinic note 7/28/23 for further details regarding this). Started venlafaxine at clinic visit 7/28/23.     Previously tried:  Sertraline - 25 mg dose was helpful for a while but then didn't seem as helpful. Mood was better with 50 mg dose, but she had nightmares.   Escitalopram - worked well for her in the past but recently tried again and had brain fog/confusion.    She tried the venlafaxine for 3 days but read about possible risk of cardiac arrhythmia with the medication so stopped taking it. 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 She did not have any recurrent palpitations until 2014. At that time, she was seen at [facility] in [place] and had ECG consistent with atrial flutter. This episode of atrial flutter lasted several hours and terminated spontaneously. She saw cardiology after that and plan was for observation, follow-up with cardiology if recurrent symptoms. No indication for Coumadin as she did not have any risk factors for stroke.  No issues since that time.     She would like to try sertraline again since it helped for a while and see if it helps again. She feels comfortable with that medication since she did not have issues with it previously.    Social History     Tobacco Use    Smoking status: Never    Smokeless tobacco: Never   Vaping Use    Vaping Use: Never used   Substance Use Topics    Alcohol use: Yes     Comment: glass wine most days    Drug use: Never         3/9/2021    10:12 AM 12/20/2021    11:38 AM  7/28/2023    12:20 PM   PHQ   PHQ-9 Total Score 3 7 7   Q9: Thoughts of better off dead/self-harm past 2 weeks Not at all Not at all Not at all         3/13/2023     7:53 PM 7/28/2023    12:20 PM 8/8/2023     3:43 PM   MATEUS-7 SCORE   Total Score 21 (severe anxiety)  21 (severe anxiety)   Total Score 21 19 21     Review of Systems   Constitutional, HEENT, cardiovascular, pulmonary, gi and gu systems are negative, except as otherwise noted.      Objective           Vitals:  No vitals were obtained today due to virtual visit.    Physical Exam   GENERAL: Healthy, alert and no distress  EYES: Eyes grossly normal to inspection.  No discharge or erythema, or obvious scleral/conjunctival abnormalities.  RESP: No audible wheeze, cough, or visible cyanosis.  No visible retractions or increased work of breathing.    SKIN: Visible skin clear. No significant rash, abnormal pigmentation or lesions.  NEURO: Cranial nerves grossly intact.  Mentation and speech appropriate for age.  PSYCH: Mentation appears normal, affect normal/bright, judgement and insight intact, normal speech and appearance well-groomed.      Video-Visit Details    Type of service:  Video Visit   Video Start Time: 3:57 PM  Video End Time:4:13 PM    Originating Location (pt. Location): Home    Distant Location (provider location):  On-site  Platform used for Video Visit: [place]

## 2023-08-31 ENCOUNTER — TRANSFERRED RECORDS (OUTPATIENT)
Dept: HEALTH INFORMATION MANAGEMENT | Facility: CLINIC | Age: 73
End: 2023-08-31
Payer: COMMERCIAL

## 2023-08-31 DIAGNOSIS — F41.1 GENERALIZED ANXIETY DISORDER: ICD-10-CM

## 2023-09-05 RX ORDER — SERTRALINE HYDROCHLORIDE 25 MG/1
25 TABLET, FILM COATED ORAL DAILY
Qty: 30 TABLET | Refills: 1 | OUTPATIENT
Start: 2023-09-05

## 2023-09-06 ENCOUNTER — OFFICE VISIT (OUTPATIENT)
Dept: FAMILY MEDICINE | Facility: CLINIC | Age: 73
End: 2023-09-06
Attending: PHYSICIAN ASSISTANT
Payer: COMMERCIAL

## 2023-09-06 VITALS
HEART RATE: 68 BPM | BODY MASS INDEX: 24.68 KG/M2 | DIASTOLIC BLOOD PRESSURE: 72 MMHG | TEMPERATURE: 97.9 F | OXYGEN SATURATION: 98 % | SYSTOLIC BLOOD PRESSURE: 112 MMHG | HEIGHT: 63 IN | WEIGHT: 139.3 LBS | RESPIRATION RATE: 23 BRPM

## 2023-09-06 DIAGNOSIS — F41.1 GENERALIZED ANXIETY DISORDER: ICD-10-CM

## 2023-09-06 PROCEDURE — 90662 IIV NO PRSV INCREASED AG IM: CPT | Performed by: PHYSICIAN ASSISTANT

## 2023-09-06 PROCEDURE — G0008 ADMIN INFLUENZA VIRUS VAC: HCPCS | Performed by: PHYSICIAN ASSISTANT

## 2023-09-06 PROCEDURE — 99213 OFFICE O/P EST LOW 20 MIN: CPT | Mod: 25 | Performed by: PHYSICIAN ASSISTANT

## 2023-09-06 RX ORDER — SERTRALINE HYDROCHLORIDE 25 MG/1
25 TABLET, FILM COATED ORAL DAILY
Qty: 90 TABLET | Refills: 1 | Status: SHIPPED | OUTPATIENT
Start: 2023-09-06 | End: 2023-11-17

## 2023-09-06 ASSESSMENT — ANXIETY QUESTIONNAIRES
3. WORRYING TOO MUCH ABOUT DIFFERENT THINGS: NEARLY EVERY DAY
1. FEELING NERVOUS, ANXIOUS, OR ON EDGE: NEARLY EVERY DAY
4. TROUBLE RELAXING: NEARLY EVERY DAY
5. BEING SO RESTLESS THAT IT IS HARD TO SIT STILL: NEARLY EVERY DAY
GAD7 TOTAL SCORE: 17
IF YOU CHECKED OFF ANY PROBLEMS ON THIS QUESTIONNAIRE, HOW DIFFICULT HAVE THESE PROBLEMS MADE IT FOR YOU TO DO YOUR WORK, TAKE CARE OF THINGS AT HOME, OR GET ALONG WITH OTHER PEOPLE: SOMEWHAT DIFFICULT
7. FEELING AFRAID AS IF SOMETHING AWFUL MIGHT HAPPEN: MORE THAN HALF THE DAYS
2. NOT BEING ABLE TO STOP OR CONTROL WORRYING: NEARLY EVERY DAY
6. BECOMING EASILY ANNOYED OR IRRITABLE: NOT AT ALL
GAD7 TOTAL SCORE: 17

## 2023-09-06 ASSESSMENT — PAIN SCALES - GENERAL: PAINLEVEL: NO PAIN (0)

## 2023-09-06 ASSESSMENT — PATIENT HEALTH QUESTIONNAIRE - PHQ9: SUM OF ALL RESPONSES TO PHQ QUESTIONS 1-9: 4

## 2023-09-06 NOTE — PROGRESS NOTES
Assessment & Plan     Generalized anxiety disorder  She started about 1 month ago - sertraline 25 mg daily. It is hard to tell if the sertraline has been helping much yet because she had COVID about 1 month ago and had brain fog and generalized weakness after COVID. Starting to feel stronger over last 1-2 weeks. She would like to continue with same dose of sertraline for another month and see how she feels at that time.  - sertraline (ZOLOFT) 25 MG tablet; Take 1 tablet (25 mg) by mouth daily    Jovita Bauman PA-C  [facility] MIKE Kahn is a 72 year old, presenting for the following health issues:  Recheck Medication      9/6/2023     1:43 PM   Additional Questions   Roomed by Marisol FLOREZ   Accompanied by Self         9/6/2023     1:43 PM   Patient Reported Additional Medications   Patient reports taking the following new medications None       History of Present Illness       Mental Health Follow-up:  Patient presents to follow-up on Anxiety.    Patient's anxiety since last visit has been:  No change  The patient is having other symptoms associated with anxiety.  Any significant life events: relationship concerns  Patient is feeling anxious or having panic attacks.  Patient has no concerns about alcohol or drug use.    She eats 2-3 servings of fruits and vegetables daily.She consumes 0 sweetened beverage(s) daily.She exercises with enough effort to increase her heart rate 20 to 29 minutes per day.  She exercises with enough effort to increase her heart rate 3 or less days per week.   She is taking medications regularly.       Medication Followup of Sertraline  Taking Medication as prescribed: yes  Side Effects:  Nightmares X2 initially but no nightmares for past few weeks  Medication Helping Symptoms:  Hard to tell since recovering from Covid last month 8/6/2023    Anxiety worse over past year, a lot of anxiety is related to 's personality/mood changes and episodes of  uncontrollable anger (see clinic note 7/28/23 for further details regarding this). Started venlafaxine at clinic visit 7/28/23. Her  has been seeing psychologist and she reports he had normal cognitive testing - no concerns for dementia. She has firmly established boundaries with him and let him know that she will leave if she doesn't feel safe. She says things have gotten better overall.    Previously tried:  Sertraline - 25 mg dose was helpful for a while but then didn't seem as helpful. Mood was better with 50 mg dose, but she had nightmares.   Escitalopram - worked well for her in the past but recently tried again and had brain fog/confusion.     She tried the venlafaxine for 3 days but read about possible risk of cardiac arrhythmia with the medication so stopped taking it and did not want to take any longer.     She wanted to try sertraline again since it helped for a while and see if it helps again. She feels comfortable with that medication since she did not have issues with it previously. She started about 1 month ago - sertraline 25 mg daily. It is hard to tell if the sertraline has been helping much yet because she had COVID about 1 month ago and had brain fog and generalized weakness after COVID. Starting to feel stronger over last 1-2 weeks. She would like to continue with same dose of sertraline for another month and see how she feels at that time.        12/20/2021    11:38 AM 7/28/2023    12:20 PM 9/6/2023     1:44 PM   PHQ   PHQ-9 Total Score 7 7 4   Q9: Thoughts of better off dead/self-harm past 2 weeks Not at all Not at all Not at all         7/28/2023    12:20 PM 8/8/2023     3:43 PM 9/6/2023     8:25 AM   MATEUS-7 SCORE   Total Score  21 (severe anxiety) 17 (severe anxiety)   Total Score 19 21 17       Review of Systems   Constitutional, HEENT, cardiovascular, pulmonary, gi and gu systems are negative, except as otherwise noted.      Objective    LMP  (LMP Unknown)   There is no height or  weight on file to calculate BMI.  Physical Exam   GENERAL: healthy, alert and no distress  PSYCH: mentation appears normal, affect normal/bright

## 2023-09-15 ENCOUNTER — ANCILLARY PROCEDURE (OUTPATIENT)
Dept: MAMMOGRAPHY | Facility: CLINIC | Age: 73
End: 2023-09-15
Attending: PHYSICIAN ASSISTANT
Payer: COMMERCIAL

## 2023-09-15 DIAGNOSIS — Z12.31 VISIT FOR SCREENING MAMMOGRAM: ICD-10-CM

## 2023-09-15 PROCEDURE — 77063 BREAST TOMOSYNTHESIS BI: CPT | Mod: TC | Performed by: RADIOLOGY

## 2023-09-15 PROCEDURE — 77067 SCR MAMMO BI INCL CAD: CPT | Mod: TC | Performed by: RADIOLOGY

## 2023-09-26 ENCOUNTER — MYC MEDICAL ADVICE (OUTPATIENT)
Dept: FAMILY MEDICINE | Facility: CLINIC | Age: 73
End: 2023-09-26
Payer: COMMERCIAL

## 2023-09-26 ENCOUNTER — E-VISIT (OUTPATIENT)
Dept: URGENT CARE | Facility: CLINIC | Age: 73
End: 2023-09-26
Payer: COMMERCIAL

## 2023-09-26 ENCOUNTER — LAB (OUTPATIENT)
Dept: LAB | Facility: CLINIC | Age: 73
End: 2023-09-26
Payer: COMMERCIAL

## 2023-09-26 DIAGNOSIS — J02.9 PHARYNGITIS, UNSPECIFIED ETIOLOGY: Primary | ICD-10-CM

## 2023-09-26 DIAGNOSIS — J02.9 PHARYNGITIS, UNSPECIFIED ETIOLOGY: ICD-10-CM

## 2023-09-26 LAB
DEPRECATED S PYO AG THROAT QL EIA: NEGATIVE
GROUP A STREP BY PCR: NOT DETECTED

## 2023-09-26 PROCEDURE — 99421 OL DIG E/M SVC 5-10 MIN: CPT | Performed by: EMERGENCY MEDICINE

## 2023-09-26 PROCEDURE — 87651 STREP A DNA AMP PROBE: CPT

## 2023-09-26 NOTE — PATIENT INSTRUCTIONS
Sore Throat: Care Instructions  Overview     Infection by bacteria or a virus causes most sore throats. Cigarette smoke, dry air, air pollution, allergies, and yelling can also cause a sore throat. Sore throats can be painful and annoying. Fortunately, most sore throats go away on their own. If you have a bacterial infection, your doctor may prescribe antibiotics.  Follow-up care is a key part of your treatment and safety. Be sure to make and go to all appointments, and call your doctor if you are having problems. It's also a good idea to know your test results and keep a list of the medicines you take.  How can you care for yourself at home?    If your doctor prescribed antibiotics, take them as directed. Do not stop taking them just because you feel better. You need to take the full course of antibiotics.    Gargle with warm salt water several times a day to help reduce swelling and relieve pain. Mix 1/2 teaspoon of salt in 1 cup of warm water.    Take an over-the-counter pain medicine, such as acetaminophen (Tylenol), ibuprofen (Advil, Motrin), or naproxen (Aleve). Read and follow all instructions on the label.    Be careful when taking over-the-counter cold or flu medicines and Tylenol at the same time. Many of these medicines have acetaminophen, which is Tylenol. Read the labels to make sure that you are not taking more than the recommended dose. Too much acetaminophen (Tylenol) can be harmful.    Drink plenty of fluids. Fluids may help soothe an irritated throat. Hot fluids, such as tea or soup, may help decrease throat pain.    Use over-the-counter throat lozenges to soothe pain. Regular cough drops or hard candy may also help. These should not be given to young children because of the risk of choking.    Do not smoke or allow others to smoke around you. If you need help quitting, talk to your doctor about stop-smoking programs and medicines. These can increase your chances of quitting for good.    Use a  "vaporizer or humidifier to add moisture to your bedroom. Follow the directions for cleaning the machine.  When should you call for help?   Call your doctor now or seek immediate medical care if:      You have trouble breathing.       Your sore throat gets much worse on one side.       You have new or worse trouble swallowing.       You have a new or higher fever.   Watch closely for changes in your health, and be sure to contact your doctor if you do not get better as expected.  Where can you learn more?  Go to https://www.[facility].net/patiented  Enter U420 in the search box to learn more about \"Sore Throat: Care Instructions.\"  Current as of: March 1, 2023               Content Version: 13.7    9799-4510 [place].   Care instructions adapted under license by your healthcare professional. If you have questions about a medical condition or this instruction, always ask your healthcare professional. [employer], [employer] disclaims any warranty or liability for your use of this information.      "

## 2023-10-04 ENCOUNTER — OFFICE VISIT (OUTPATIENT)
Dept: FAMILY MEDICINE | Facility: CLINIC | Age: 73
End: 2023-10-04
Attending: PHYSICIAN ASSISTANT
Payer: COMMERCIAL

## 2023-10-04 VITALS
BODY MASS INDEX: 24.45 KG/M2 | RESPIRATION RATE: 19 BRPM | DIASTOLIC BLOOD PRESSURE: 78 MMHG | HEIGHT: 63 IN | TEMPERATURE: 98 F | SYSTOLIC BLOOD PRESSURE: 138 MMHG | OXYGEN SATURATION: 98 % | WEIGHT: 138 LBS | HEART RATE: 57 BPM

## 2023-10-04 DIAGNOSIS — J01.90 ACUTE NON-RECURRENT SINUSITIS, UNSPECIFIED LOCATION: Primary | ICD-10-CM

## 2023-10-04 DIAGNOSIS — F41.1 GENERALIZED ANXIETY DISORDER: ICD-10-CM

## 2023-10-04 PROCEDURE — 99214 OFFICE O/P EST MOD 30 MIN: CPT | Performed by: PHYSICIAN ASSISTANT

## 2023-10-04 RX ORDER — SERTRALINE HYDROCHLORIDE 25 MG/1
37.5 TABLET, FILM COATED ORAL DAILY
Qty: 135 TABLET | Refills: 1 | Status: SHIPPED | OUTPATIENT
Start: 2023-10-04 | End: 2024-01-08 | Stop reason: DRUGHIGH

## 2023-10-04 ASSESSMENT — ANXIETY QUESTIONNAIRES
7. FEELING AFRAID AS IF SOMETHING AWFUL MIGHT HAPPEN: SEVERAL DAYS
3. WORRYING TOO MUCH ABOUT DIFFERENT THINGS: MORE THAN HALF THE DAYS
GAD7 TOTAL SCORE: 13
GAD7 TOTAL SCORE: 13
5. BEING SO RESTLESS THAT IT IS HARD TO SIT STILL: NEARLY EVERY DAY
6. BECOMING EASILY ANNOYED OR IRRITABLE: NOT AT ALL
2. NOT BEING ABLE TO STOP OR CONTROL WORRYING: SEVERAL DAYS
IF YOU CHECKED OFF ANY PROBLEMS ON THIS QUESTIONNAIRE, HOW DIFFICULT HAVE THESE PROBLEMS MADE IT FOR YOU TO DO YOUR WORK, TAKE CARE OF THINGS AT HOME, OR GET ALONG WITH OTHER PEOPLE: SOMEWHAT DIFFICULT
1. FEELING NERVOUS, ANXIOUS, OR ON EDGE: NEARLY EVERY DAY

## 2023-10-04 ASSESSMENT — PAIN SCALES - GENERAL: PAINLEVEL: NO PAIN (0)

## 2023-10-04 ASSESSMENT — PATIENT HEALTH QUESTIONNAIRE - PHQ9: 5. POOR APPETITE OR OVEREATING: NEARLY EVERY DAY

## 2023-10-04 NOTE — PROGRESS NOTES
Assessment & Plan     Acute non-recurrent sinusitis, unspecified location  Symptoms for past 10 days. Will treat for sinus infection with augmentin. Recommend rest, fluids and over the counter medications. Advised follow up if symptoms worsen or do not alleviate or improve.     - amoxicillin-clavulanate (AUGMENTIN) 875-125 MG tablet; Take 1 tablet by mouth 2 times daily for 7 days    Generalized anxiety disorder  Feeling like sertraline 25 mg is helping some but still having anxiety. Therapy is going really well. She previously had nightmares when she was on 50 mg daily of sertraline so is nervous to increase dose but would be open to trying to increase to 37.5 mg daily. No side effects noted with 25 mg dose. If doing well with dose increase, can follow-up in 6 months. She will follow-up earlier if concerns.  - sertraline (ZOLOFT) 25 MG tablet; Take 1.5 tablets (37.5 mg) by mouth daily    [facility]    Kyree Kahn is a 72 year old, presenting for the following health issues:  Cough and MH Follow Up        10/4/2023     2:20 PM   Additional Questions   Roomed by Mercedes Meza CMA         10/4/2023     2:20 PM   Patient Reported Additional Medications   Patient reports taking the following new medications none       HPI     Anxiety Follow-Up  How are you doing with your anxiety since your last visit? Managable - has been going to therapist - [facility] here in [place]  Are you having other symptoms that might be associated with anxiety? No  Have you had a significant life event? No   Are you feeling depressed? No  Do you have any concerns with your use of alcohol or other drugs? No    Feeling like sertraline 25 mg is helping some but still having anxiety. Therapy is going really well. She previously had nightmares when she was on 50 mg daily of sertraline so is nervous to increase dose but would be open to trying to increase to 37.5 mg  "daily. No side effects noted with 25 mg dose.    Social History     Tobacco Use    Smoking status: Never    Smokeless tobacco: Never   Vaping Use    Vaping Use: Never used   Substance Use Topics    Alcohol use: Yes     Comment: glass wine most days    Drug use: Never         8/8/2023     3:43 PM 9/6/2023     8:25 AM 10/4/2023     2:57 PM   MATEUS-7 SCORE   Total Score 21 (severe anxiety) 17 (severe anxiety)    Total Score 21 17 13         12/20/2021    11:38 AM 7/28/2023    12:20 PM 9/6/2023     1:44 PM   PHQ   PHQ-9 Total Score 7 7 4   Q9: Thoughts of better off dead/self-harm past 2 weeks Not at all Not at all Not at all         Acute Illness  Acute illness concerns: cough, congestion, runny nose. Had COVID in August. Has not tested for COVID yet.  Onset/Duration: 1.5 weeks  Symptoms:  Fever: No  Chills/Sweats: No  Headache (location?): No  Sinus Pressure: YES  Conjunctivitis:  No  Ear Pain: YES  Rhinorrhea: YES  Congestion: YES  Sore Throat: YES  Cough: YES-productive  Wheeze: No  Decreased Appetite: No  Nausea: No  Vomiting: No  Diarrhea: No  Dysuria/Freq.: No  Dysuria or Hematuria: No  Fatigue/Achiness: YES  Sick/Strep Exposure: No  Therapies tried and outcome: None    No chest pain, shortness of breath  Feels really fatigued    Review of Systems   Constitutional, HEENT, cardiovascular, pulmonary, gi and gu systems are negative, except as otherwise noted.      Objective    /78 (BP Location: Right arm, Patient Position: Sitting, Cuff Size: Adult Large)   Pulse 57   Temp 98  F (36.7  C) (Oral)   Resp 19   Ht 1.6 m (5' 3\")   Wt 62.6 kg (138 lb)   LMP  (LMP Unknown)   SpO2 98%   BMI 24.45 kg/m    Body mass index is 24.45 kg/m .  Physical Exam   GENERAL: healthy, alert and no distress  EYES: Eyes grossly normal to inspection, PERRL and conjunctivae and sclerae normal  HENT: both ears: clear effusion, nose and mouth without ulcers or lesions, nasal mucosa edematous , oropharynx clear, oral mucous membranes " moist, and sinuses: mild maxillary, frontal TTP bilaterally  NECK: no adenopathy  RESP: lungs clear to auscultation - no rales, rhonchi or wheezes  CV: regular rate and rhythm, normal S1 S2, no S3 or S4, no murmur, click or rub, no peripheral edema  NEURO: Normal strength and tone, mentation intact and speech normal  PSYCH: mentation appears normal, affect normal/bright

## 2023-10-06 NOTE — PROGRESS NOTES
"[facility] - PRIMARY CARE SKIN    CC : skin cancer screening (full-body)  SUBJECTIVE:                                                    Anali PADRON Laura is a 68 year old female who presents to clinic today for a full-body skin exam because of lesion on the face and neck.    Bothersome lesions noticed by the patient or other skin concerns :  Issue One : Lesions on face and neck. A lesion on the left neck has been irritated by friction from clothing.  Onset : present for years.  Enlarging : NO.  Bleeding : NO  Itchy or irritating : NO.  Pain or tenderness : NO.  Changing color : NO.    Issue Two : A bump on the right scalp has been present since age 19. It was diagnosed at that time has \"a mass of blood vessels.\" It has not changed since the initial onset.    Personal history of skin cancer : NO.  Family history of skin cancer : NO.    Personal Medical History  Eczema Psoriasis Autoimmune   NO NO NO     Family Medical History  Eczema Psoriasis Autoimmune   NO NO NO     Occupation : retired, previously teacher (indoor).    Refer to electronic medical record (EMR) for past medical history and medications.    INTEGUMENTARY/SKIN: NEGATIVE for worrisome rashes, moles or lesions  ROS : 14 point review of systems was negative except the symptoms listed above in the HPI.    This document serves as a record of the services and decisions personally performed and made by Mouna Madrigal MD. It was created on her behalf by Marquis Mcpherson, a trained medical scribe.  The creation of this document is based on the scribe's personal observations and the provider's statements to the medical scribe.  Marquis Mcpherson, November 12, 2018 10:30 AM      OBJECTIVE:                                                    GENERAL: healthy, alert and no distress  SKIN: Bush Skin Type - I-II.  This patient was examined from the top of the head to the bottom of the feet  including scalp, face, neck, back, chest, breasts, buttocks, both arms, both " legs, both hands, both feet, all 10 fingers and all 10 toes. The dermatoscope was used to help evaluate pigmented lesions.  Skin Pertinent Findings:  Right lateral frontal scalp : 4 x 3 cm soft smooth subepidermal mass present for years. Previously diagnosed as hemangioma.    Mid-forehead, left of midline : 6 mm in size subepidermal mass most consistent with cyst.    Left lateral forehead : 7 mm in size stuck-on appearing papules, raised, brown, coarse-textured, round lesion(s) most consistent with seborrheic keratoses.    Right arm(s) and left arm(s) : brown, macule(s) most consistent with benign solar lentigo. brown macules of various sizes and shapes most consistent with (benign) melanocytic nevi.    Abdomen : Scattered stuck-on appearing papules, raised, brown, coarse-textured, round lesion(s) most consistent with seborrheic keratoses.    Lower legs and dorsal foot : Multiple varicosities    Back : Few scattered brown macules of various sizes and shapes most consistent with (benign) melanocytic nevi.    Significant Findings:  Left mid-lateral neck : 5 mm in size, smooth, raised, benign-appearing lesion. ? Benign compound nevus ? Neuroma    Diagnostic Test Results:  none           ASSESSMENT:                                                      Encounter Diagnoses   Name Primary?     Neoplasm of uncertain behavior of skin Yes     Hemangioma of skin and subcutaneous tissue      Epidermoid cyst of face      Seborrheic keratoses      Multiple melanocytic nevi      Solar lentigo      Varicose veins of both lower extremities, unspecified whether complicated          PLAN:                                                    Patient Instructions   FUTURE APPOINTMENTS  Follow up per pathology report.    WOUND CARE INSTRUCTIONS  1. Wash hands before every dressing change.  2. After 24 hours, change dressing daily.  3. Wash the wound area with a mild soap, then rinse.  4. Gently pat dry with a sterile gauze or Q-tip.  5.  "Using a Q-tip, apply Vaseline or Aquaphor only over entire wound. Do NOT use Neosporin - as many people react to neomycin.  6. Finally, cover with a bandage or sterile non-stick gauze with micropore paper tape.  7. Repeat once daily until wound has healed.      Soap, water and shampoo will not hurt this area.    Do not go swimming or take baths, but showering is encouraged.    Limit use of the area where the procedure was done for a few days to allow for optimal healing.    If you experience bleeding:  Wash hands and hold firm pressure on the area for 10 minutes without checking to see if the bleeding has stopped. \"Checking\" pulls off the protective wound clot and restarts the bleeding all over again. Re-apply pressure for 10 minutes if necessary to stop bleeding.  Use additional sterile gauze and tape to maintain pressure once bleeding has stopped.  If bleeding continues, then call back to clinic at [phone].    Signs of Infection:  Infection can occur in any area where skin has been disrupted.  If you notice persistent redness, swelling, colored drainage, increasing pain, fever or other signs of infection, please call us at: [phone] and ask to have me or my colleague paged. We will call you back to discuss.    Pathology Results:  You will be notified, generally via letter or MyChart, in approximately 10 days. If there is anything we need to discuss or further treatment needed, I will call you to discuss it.    PATIENT INFORMATION : WOUNDS  During the healing process you will notice a number of changes. All wounds develop a small halo of redness surrounding the wound.  This means healing is occurring. Severe itching with extensive redness usually indicates sensitivity to the ointment or bandage tape used to dress the wound.  You should call our office if this develops.      Swelling  and/or discoloration around your surgical site is common, particularly when performed around the eye.    All wounds " normally drain.  The larger the wound the more drainage there will be.  After 7-10 days, you will notice the wound beginning to shrink and new skin will begin to grow.  The wound is healed when you can see skin has formed over the entire area.  A healed wound has a healthy, shiny look to the surface and is red to dark pink in color to normalize.  Wounds may take approximately 4-6 weeks to heal.  Larger wounds may take 6-8 weeks. After the wound is healed you may discontinue dressing changes.    You may experience a sensation of tightness as your wound heals. This is normal and will gradually subside.    Your healed wound may be sensitive to temperature changes. This sensitivity improves with time, but if you re having a lot of discomfort, try to avoid temperature extremes.    Patients frequently experience itching after their wound appears to have healed because of the continue healing under the skin.  Plain Vaseline will help relieve the itching.    SUN PROTECTION INSTRUCTIONS  Sun damage can lead to skin cancer and premature aging of the skin.      The best way to protect from sun damage to your skin is to avoid the sun during peak hours (10 am - 2 pm) even on overcast days.      Use UPF sun-protective clothing, which while more expensive initially provides longer lasting coverage without having to worry about remembering to re-apply.  1. Wear a wide-brimmed hat and sunglasses.   2. Wear sun-protective clothing.  [employer] and other [employer] make sun protective clothing that are stylish, comfortable and cool. [employer] and other [employer] make UV arm sleeves suitable for golfing, gardening and other activities.      Sunscreen instructions:  1. Use sunscreens with Zinc Oxide, Titanium Dioxide or Avobenzone to protect from UVA rays.  2. Use SPF 30-50+ to protect from UVB rays.  3. Re-apply every 2 hours even if water resistant.  4. Apply on your face every day even when cloudy and even in the winter.  "UVA \"aging rays\" penetrate window glass and are just as strong in the winter as in the summer.    Product Recommendations:    Consider use of sunscreen sticks with Zinc Oxide and Titanium Dioxide active ingredients such as Neutrogena Pure&Free Baby Sunscreen Stick.    Good examples include: Blue Lizard, EltaMD, Solbar    Good daily moisturizers with SPF: Vanicream, CeraVe.    For sensitive skin, consider : SkinMedica Essential Defense Mineral Shield Broad Spectrum SPF 35    Men: consider use of Neutrogena Triple Protect Facial Lotion      Never use tanning beds. Tanning beds are associated with much higher risks of skin cancer.    All tanning damages the skin. Aim for ivory skin year round and you will have less trouble with your skin in years to come. There is no merit in getting \"a base tan\" before a warm weather vacation, as any tanning indicates your body's response to sun damage.    Stop smoking. Smokers have higher rates of skin cancer and also have premature skin wrinkling.    FYI  You should use about 3 tablespoons of sunscreen to protect your whole body. Thus a typical eight ounce bottle of sunscreen should last 4 applications. Remember, that the SPF rating is compromised if you don t apply enough. Most people only apply 1/2 - 1/3 of the amount they need. Also don t forget areas such as your ears, feet, upper back and harder to reach places. Keep in mind that these amounts should be increased for larger body sizes.    Sunscreens with titanium dioxide and/or zinc oxide in the active ingredients are physical blockers as opposed to chemical blockers. Chemical-free sunscreens should not irritate the skin.    Spray-on sunscreens may be used for touch-up application only, not as a base layer. Also, use with caution around small children due to inhalation risk.    Avoid retinyl palmitate products.    Avoid combination products that include both sunscreen and insect repellant, as sunscreen should be applied every 2 " hours, but insect repellant should not be applied as frequently.    SPF means sun protection factor, which is just the degree to which the sunscreen can protect against UVB rays. There is no rating system for UVA rays. SPF is calculated as the time skin will burn when sunscreen is applied vs. skin without sunscreen.    Water resistant sunscreens should be re-applied every 1-2 hours.    For more information:  http://www.skincancer.org/prevention/sun-protection/sunscreen/sunscreens-safe-and-effective    The patient was counseled about sunscreens and sun avoidance. The patient was counseled to check the skin regularly and report any lesion that is new, changing, itching, scabbing, bleeding or otherwise bothersome. The patient was discharged ambulatory and in stable condition.      PROCEDURES:                                                    Name : Shave Excision  Indication : Excision of tissue for pathology evaluation.  Location(s) : Left mid-lateral neck : 5 mm in size, smooth, raised, benign-appearing lesion. ? Benign compound nevus ? Neuroma.  Completed by : Mouna Madrigal MD  Photo Taken : no.  Anesthesia : Patient was anesthetized by infiltrating the area surrounding the lesion with 1% lidocaine.   epinephrine [id number] : Yes.  Note : Discussed the risk of pain, infection, scarring, hypo- or hyperpigmentation and recurrence or need for re-treatment. The benefits of treatment and alternative treatments were also discussed.    During this procedure, the universal protocol was utilized. The patient's identity was confirmed by no less than two patient identifiers, correct procedure was verified, correct site was verified and marked as applicable and a final pause was completed.    Sterile technique was used throughout the procedure. The skin was cleaned and prepped with surgical cleanser. Once adequate anesthesia was obtained, the lesion was removed with a deep scallop shave procedure. The specimen was sent to  pathology.    Direct pressure and aluminum chloride and monopolar cautery was applied for hemostasis. No bleeding was present upon the completion of the procedure. The wound was coated with antibacterial ointment. A dry sterile dressing was applied. Patient tolerated the procedure well and left in satisfactory condition.    Primary provider and referring provider will be informed regarding the tissue report when it returns.      The information in this document, created by the medical scribe for me, accurately reflects the services I personally performed and the decisions made by me. I have reviewed and approved this document for accuracy prior to leaving the patient care area.  November 12, 2018 10:30 AM  Leticia Madrigal MD  [facility] [place]   n/a

## 2023-11-14 ENCOUNTER — MYC MEDICAL ADVICE (OUTPATIENT)
Dept: FAMILY MEDICINE | Facility: CLINIC | Age: 73
End: 2023-11-14
Payer: COMMERCIAL

## 2023-11-15 NOTE — TELEPHONE ENCOUNTER
Please call to help schedule in person visit with me for wrist pain. Could use same day spot on Friday or 3 PM virtual spot on Friday for in person visit.    Jovita Bauman PA-C

## 2023-11-17 ENCOUNTER — ANCILLARY PROCEDURE (OUTPATIENT)
Dept: GENERAL RADIOLOGY | Facility: CLINIC | Age: 73
End: 2023-11-17
Attending: PHYSICIAN ASSISTANT
Payer: COMMERCIAL

## 2023-11-17 ENCOUNTER — OFFICE VISIT (OUTPATIENT)
Dept: FAMILY MEDICINE | Facility: CLINIC | Age: 73
End: 2023-11-17
Payer: COMMERCIAL

## 2023-11-17 VITALS
SYSTOLIC BLOOD PRESSURE: 138 MMHG | RESPIRATION RATE: 16 BRPM | TEMPERATURE: 97.8 F | WEIGHT: 137 LBS | OXYGEN SATURATION: 100 % | HEIGHT: 63 IN | BODY MASS INDEX: 24.27 KG/M2 | HEART RATE: 56 BPM | DIASTOLIC BLOOD PRESSURE: 78 MMHG

## 2023-11-17 DIAGNOSIS — M25.551 HIP PAIN, RIGHT: ICD-10-CM

## 2023-11-17 DIAGNOSIS — M67.431 GANGLION CYST OF WRIST, RIGHT: ICD-10-CM

## 2023-11-17 DIAGNOSIS — M25.531 RIGHT WRIST PAIN: Primary | ICD-10-CM

## 2023-11-17 DIAGNOSIS — F41.1 GENERALIZED ANXIETY DISORDER: ICD-10-CM

## 2023-11-17 PROCEDURE — 91320 SARSCV2 VAC 30MCG TRS-SUC IM: CPT | Performed by: PHYSICIAN ASSISTANT

## 2023-11-17 PROCEDURE — 73502 X-RAY EXAM HIP UNI 2-3 VIEWS: CPT | Mod: TC | Performed by: RADIOLOGY

## 2023-11-17 PROCEDURE — 90480 ADMN SARSCOV2 VAC 1/ONLY CMP: CPT | Performed by: PHYSICIAN ASSISTANT

## 2023-11-17 PROCEDURE — 99214 OFFICE O/P EST MOD 30 MIN: CPT | Performed by: PHYSICIAN ASSISTANT

## 2023-11-17 RX ORDER — RESPIRATORY SYNCYTIAL VIRUS VACCINE 120MCG/0.5
0.5 KIT INTRAMUSCULAR ONCE
Qty: 1 EACH | Refills: 0 | Status: CANCELLED | OUTPATIENT
Start: 2023-11-17 | End: 2023-11-17

## 2023-11-17 ASSESSMENT — PAIN SCALES - GENERAL: PAINLEVEL: MODERATE PAIN (4)

## 2023-11-17 NOTE — PATIENT INSTRUCTIONS
Try taking ibuprofen 600 mg 3 times daily for 1-2 weeks  Use thumb/wrist splint  If no improvement, consider follow-up with ortho for possible injection

## 2023-11-17 NOTE — PROGRESS NOTES
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 She does have a ganglion cyst on the right wrist but I don't think this is the cause of her wrist pain. Discussed diagnosis and conservative management. Follow-up with ortho if no improvement or worsening.  - Wrist/Arm Supplies Order Wrist Brace; Right; with thumb spica    Hip pain, right  Right hip pain for past 1-2 years intermittently, worsening over past month. Pain is in anterior hip/groin. Notices pain mostly when walking up or down stairs - will have sudden sharp pain that causes her to feel like the right leg gives out for a few seconds. Pain does not shoot into leg, just kind of has to catch herself due to the brief intense hip pain. She has not fallen. Apart from the couple of seconds of sharp pain, doesn't feel leg is weak. Hip usually doesn't hurt, just with certain movements. Has had low back pain in the past but no recent back pain. No red flag symptoms. X-ray today stable from last year - mild degenerative changes. Recommend starting with physical therapy and monitor. Follow-up with ortho if no improvement or worsening. Urgent follow-up if any red flag symptoms we discussed.  - XR Hip Right 2-3 Views; Future  - Physical Therapy Referral; Future    Ganglion cyst of wrist, right  Monitor for now.        Jovita Bauman PA-C  [facility] MIKE Kahn is a 73 year old, presenting for the following health issues:  Pain (Wrist pain)        11/17/2023    12:40 PM   Additional Questions   Roomed by Sita SAXENA       History of Present Illness       Reason for visit:  Cysts on right wrist  Symptom onset:  More than a month  Symptoms include:  Painful cysts on wrist  Symptom intensity:  Moderate  Symptom progression:  Staying the same  Had these symptoms before:  Yes  Has tried/received treatment for these symptoms:  No  What makes it worse:  Use of the  wrist  What makes it better:  No    She eats 2-3 servings of fruits and vegetables daily.She consumes 0 sweetened beverage(s) daily.She exercises with enough effort to increase her heart rate 10 to 19 minutes per day.  She exercises with enough effort to increase her heart rate 3 or less days per week.   She is taking medications regularly.     Pain in right thumb/radial wrist over the past 4-6 weeks. 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 She had her performance 2 weeks ago and has been trying to rest her wrist but is still having pain. She has a small cyst on the volar right wrist and wonders if that is causing the pain. She has had this cyst for a long time - doesn't seem to be growing or changing, however, sometimes the base of the thumb/wrist looks a little swollen. No redness, warmth. No numbness/tingling in wrist or hand; no weakness.    Decreased sertraline back down to 25 mg daily - didn't like being on 37.5 mg daily. Mood feels better. 's mental health is better.    Right hip pain for past 1-2 years intermittently, worsening over past month. Pain is in anterior hip/groin. Notices pain mostly when walking up or down stairs - will have sudden sharp pain that causes her to feel like the right leg gives out for a few seconds. Pain does not shoot into leg, just kind of has to catch herself due to the brief intense hip pain. She has not fallen. Apart from the couple of seconds of sharp pain, doesn't feel leg is weak. Hip usually doesn't hurt. No leg or groin numbness/tingling. No loss of bowel or bladder control. Has had low back pain in the past but no recent back pain.     Right hip x-ray 8/10/22:  IMPRESSION: No acute fracture or malalignment. There is normal right hip joint spacing. Mild chondrocalcinosis of the right hip.    Lumbar spine x-ray 4/14/22:  IMPRESSION: Five lumbar vertebral bodies. Slight  "convex left curvature. 8 mm anterolisthesis of L4 on L5 which appears to be secondary to advanced lower lumbar spine facet arthropathy. Slight retrolisthesis of L1 on L2 and L2 on L3. Mild interspace and endplate  degeneration L2-L3 and L4-L5. Normal vertebral body heights.      Review of Systems   Constitutional, HEENT, cardiovascular, pulmonary, gi and gu systems are negative, except as otherwise noted.      Objective    /78   Pulse 56   Temp 97.8  F (36.6  C) (Oral)   Resp 16   Ht 1.6 m (5' 3\")   Wt 62.1 kg (137 lb)   LMP  (LMP Unknown)   SpO2 100%   BMI 24.27 kg/m    Body mass index is 24.27 kg/m .  Physical Exam   GENERAL: healthy, alert and no distress  RESP: lungs clear to auscultation - no rales, rhonchi or wheezes  CV: regular rate and rhythm, normal S1 S2, no S3 or S4, no murmur, click or rub, no peripheral edema and peripheral pulses strong  ABDOMEN: soft, nontender, no hepatosplenomegaly, no masses and bowel sounds normal  MS: Right inguinal pain with hip flexion and FADIR test. Right wrist: TTP and enlargement over radial styloid, positive Finkelstein test. There is an approximately 7-8 mm in diameter soft tissue mass over the radial aspect of the right volar wrist. It is soft, compressible, and slightly mobile. No erythema, other discoloration, or skin injury over the mass.   NEURO: Normal strength and tone, mentation intact and speech normal  Comprehensive back pain exam:  No tenderness, full ROM, Lower extremity strength functional and equal on both sides, Lower extremity reflexes within normal limits bilaterally, Lower extremity sensation normal and equal on both sides, and Straight leg raise negative bilaterally  PSYCH: mentation appears normal, affect normal/bright    Xray Right Hip - Reviewed and interpreted by me: mild degenerative changes, no acute findings. Per radiology: Mild degenerative changes in the right hip. No fractures are evident. Findings are stable.\"          "

## 2023-11-20 ENCOUNTER — MYC MEDICAL ADVICE (OUTPATIENT)
Dept: FAMILY MEDICINE | Facility: CLINIC | Age: 73
End: 2023-11-20
Payer: COMMERCIAL

## 2023-11-20 ENCOUNTER — ALLIED HEALTH/NURSE VISIT (OUTPATIENT)
Dept: FAMILY MEDICINE | Facility: CLINIC | Age: 73
End: 2023-11-20
Payer: COMMERCIAL

## 2023-11-20 VITALS — HEART RATE: 60 BPM | SYSTOLIC BLOOD PRESSURE: 152 MMHG | OXYGEN SATURATION: 100 % | DIASTOLIC BLOOD PRESSURE: 76 MMHG

## 2023-11-20 DIAGNOSIS — Z01.30 BP CHECK: Primary | ICD-10-CM

## 2023-11-20 PROCEDURE — 99207 PR NO CHARGE NURSE ONLY: CPT

## 2023-11-20 NOTE — PROGRESS NOTES
Anali Sanchez is a 73 year old patient who comes in today for a Blood Pressure check.  Initial BP:  BP (!) 148/72   Pulse 56   LMP  (LMP Unknown)   SpO2 100%      Second BP: 150/84 right arm, reg cuff  Disposition: BP elevated.  Triage RN notified, patient asked to wait.    Mercedes Meza CMA

## 2023-11-20 NOTE — TELEPHONE ENCOUNTER
Sent [employer] message  Please call and help schedule nurse BP check in the next few days    Thanks!  Jovita Bauman PA-C

## 2023-11-20 NOTE — TELEPHONE ENCOUNTER
Looks like bp in that range on 11/17/23.      Will forward to Jovita Bauman for advisal.  Do you want the pt to schedule some kind of visit?

## 2023-11-20 NOTE — PROGRESS NOTES
Anali Sanchez is a 73 year old year old patient who comes in today for a Blood Pressure check because of ongoing blood pressure monitoring.    BP Readings from Last 6 Encounters:   11/20/23 (!) 152/76   11/17/23 138/78   10/04/23 138/78   09/06/23 112/72   07/28/23 114/72   03/14/23 126/61      Vital Signs as repeated by /76  Patient is not on any medications for BP  Patient is monitoring Blood Pressure at home.  Average readings if yes are 160s/70s  Current complaints: none  Disposition:  huddled with provider, patient instructed to talk with provider at virtual appointment tomorrow per MK to discuss medication options. Scheduled appt for phone visit. Patient reminded to call the clinic if there is any change in her condition or if she develops any symptoms.    Nov 21, 2023  8:00 AM  Provider Visit with Jovita Bauman PA-C  [facility] ([facility] [place] ) [phone]       Yash Orellana RN on 11/20/2023 at 4:19 PM

## 2023-11-21 ENCOUNTER — VIRTUAL VISIT (OUTPATIENT)
Dept: FAMILY MEDICINE | Facility: CLINIC | Age: 73
End: 2023-11-21
Payer: COMMERCIAL

## 2023-11-21 DIAGNOSIS — I10 ESSENTIAL HYPERTENSION: Primary | ICD-10-CM

## 2023-11-21 PROCEDURE — 99214 OFFICE O/P EST MOD 30 MIN: CPT | Mod: 95 | Performed by: PHYSICIAN ASSISTANT

## 2023-11-21 RX ORDER — OLMESARTAN MEDOXOMIL 20 MG/1
10 TABLET ORAL DAILY
Qty: 30 TABLET | Refills: 0 | Status: SHIPPED | OUTPATIENT
Start: 2023-11-21 | End: 2024-01-08

## 2023-11-21 NOTE — PROGRESS NOTES
Criss is a 73 year old who is being evaluated via a billable telephone visit.      What phone number would you like to be contacted at? [phone] (home)     How would you like to obtain your AVS? Abelino    Distant Location (provider location):  On-site    Assessment & Plan     Essential hypertension  Reviewed recent blood pressures as well as home blood pressures suggesting new diagnosis of HTN. Discussed lifestyle changes to improve BP. Plan to start olmesartan, discussed r/b/se. Will start with 10 mg as I don't want to drop her blood pressure too low. Follow-up in 3-4 weeks for lab and nurse BP check.  - olmesartan (BENICAR) 20 MG tablet; Take 0.5 tablets (10 mg) by mouth daily  - Basic metabolic panel  (Ca, Cl, CO2, Creat, Gluc, K, Na, BUN); Future  - TSH with free T4 reflex; Future  - CBC with platelets; Future  - UA Macroscopic with reflex to Microscopic and Culture - Lab Collect; Future       [facility]    Subjective   Criss is a 73 year old, presenting for the following health issues:  Hypertension (See MA/RN visit from 11/20/2023.)        11/21/2023     7:35 AM   Additional Questions   Roomed by jovana       HPI     Hypertension     Do you check your blood pressure regularly outside of the clinic? Yes   Are you following a low salt diet? Yes  Are your blood pressures ever more than 140 on the top number (systolic) OR more   than 90 on the bottom number (diastolic), for example 140/90? Yes -     Blood pressures recently elevated at clinic visits and when she checked BP at home.  Home BP over past few days running around 160/70.  138/74, 160/70, 161/70, 159/68.  This morning, BP was 159/72    She came in for nurse BP check yesterday and BP was 152/76    BP Readings from Last 6 Encounters:   11/20/23 (!) 152/76   11/17/23 138/78   10/04/23 138/78   09/06/23 112/72   07/28/23 114/72   03/14/23 126/61     No previous diagnosis of HTN. No chest pain, shortness of  breath, swelling in the extremities, palpitations, dizziness, headaches, blurred vision.    BMP wnl 8/4/23    Tries to eat healthy but planning on making some diet changes and increasing exercise    Review of Systems   Constitutional, HEENT, cardiovascular, pulmonary, gi and gu systems are negative, except as otherwise noted.      Objective           Vitals:  No vitals were obtained today due to virtual visit.    Physical Exam   healthy, alert, and no distress  PSYCH: Alert and oriented times 3; coherent speech, normal   rate and volume, able to articulate logical thoughts, able   to abstract reason, no tangential thoughts, no hallucinations   or delusions  Her affect is normal  RESP: No cough, no audible wheezing, able to talk in full sentences  Remainder of exam unable to be completed due to telephone visits      Phone call duration: 8 minutes

## 2023-11-22 ENCOUNTER — THERAPY VISIT (OUTPATIENT)
Dept: PHYSICAL THERAPY | Facility: CLINIC | Age: 73
End: 2023-11-22
Payer: COMMERCIAL

## 2023-11-22 DIAGNOSIS — M25.551 HIP PAIN, RIGHT: Primary | ICD-10-CM

## 2023-11-22 DIAGNOSIS — M25.551 HIP PAIN, RIGHT: ICD-10-CM

## 2023-11-22 PROCEDURE — 97161 PT EVAL LOW COMPLEX 20 MIN: CPT | Mod: GP | Performed by: PHYSICAL THERAPIST

## 2023-11-22 PROCEDURE — 97110 THERAPEUTIC EXERCISES: CPT | Mod: GP | Performed by: PHYSICAL THERAPIST

## 2023-11-22 ASSESSMENT — ACTIVITIES OF DAILY LIVING (ADL)
WALKING_UP_STEEP_HILLS: NO DIFFICULTY AT ALL
STANDING_FOR_15_MINUTES: NO DIFFICULTY AT ALL
WALKING_DOWN_STEEP_HILLS: NO DIFFICULTY AT ALL
STEPPING_UP_AND_DOWN_CURBS: NO DIFFICULTY AT ALL
HOS_ADL_COUNT: 17
TWISTING/PIVOTING_ON_INVOLVED_LEG: NO DIFFICULTY AT ALL
SITTING_FOR_15_MINUTES: NO DIFFICULTY AT ALL
WALKING_APPROXIMATELY_10_MINUTES: NO DIFFICULTY AT ALL
DEEP_SQUATTING: NO DIFFICULTY AT ALL
ROLLING_OVER_IN_BED: NO DIFFICULTY AT ALL
WALKING_15_MINUTES_OR_GREATER: SLIGHT DIFFICULTY
LIGHT_TO_MODERATE_WORK: NO DIFFICULTY AT ALL
GOING_DOWN_1_FLIGHT_OF_STAIRS: SLIGHT DIFFICULTY
HOW_WOULD_YOU_RATE_YOUR_CURRENT_LEVEL_OF_FUNCTION_DURING_YOUR_USUAL_ACTIVITIES_OF_DAILY_LIVING_FROM_0_TO_100_WITH_100_BEING_YOUR_LEVEL_OF_FUNCTION_PRIOR_TO_YOUR_HIP_PROBLEM_AND_0_BEING_THE_INABILITY_TO_PERFORM_ANY_OF_YOUR_USUAL_DAILY_ACTIVITIES?: 90
HOS_ADL_HIGHEST_POTENTIAL_SCORE: 68
WALKING_INITIALLY: NO DIFFICULTY AT ALL
HOS_ADL_ITEM_SCORE_TOTAL: 64
GETTING_INTO_AND_OUT_OF_AN_AVERAGE_CAR: NO DIFFICULTY AT ALL
HEAVY_WORK: SLIGHT DIFFICULTY
GOING_UP_1_FLIGHT_OF_STAIRS: SLIGHT DIFFICULTY
GETTING_INTO_AND_OUT_OF_A_BATHTUB: NO DIFFICULTY AT ALL
PUTTING_ON_SOCKS_AND_SHOES: NO DIFFICULTY AT ALL
RECREATIONAL_ACTIVITIES: NO DIFFICULTY AT ALL
HOS_ADL_SCORE(%): 94.12

## 2023-11-22 NOTE — PROGRESS NOTES
"PHYSICAL THERAPY EVALUATION  Type of Visit: Evaluation    See electronic medical record for Abuse and Falls Screening details.    Subjective       Presenting condition or subjective complaint: Main concern is an insidious onset of right hip, groin pain that started 2 months ago.  @ 4-5x/month she gets a sharp pain in her rt groin and then her right thigh, knee feels like it will \"give out.\"  She has not fallen but is afraid she could.  She has had right hip pain years ago but her current symptoms seem worse.  Date of onset: 09/22/23    Relevant medical history: High blood pressure   Dates & types of surgery:      Prior diagnostic imaging/testing results: X-ray     Prior therapy history for the same diagnosis, illness or injury: Yes 2019      Living Environment  Social support: With a significant other or spouse   Type of home: Multi-level   Stairs to enter the home: Yes       Ramp: No   Stairs inside the home: Yes       Help at home: None  Equipment owned: Raised toilet seat     Employment: No    Hobbies/Interests: Piano, reading    Patient goals for therapy:         Objective   HIP EVALUATION  PAIN: Pain Level at Rest: 0/10  Pain Level with Use: 4/10  GAIT:   Weightbearing Status: WBAT  Assistive Device(s): None  Gait Deviations: Stride length decreased  BALANCE/PROPRIOCEPTION:  Pain with SLS right, - left  WEIGHTBEARING ALIGNMENT: WFL  NON-WEIGHTBEARING ALIGNMENT: WFL   ROM:  hip prom - flexion slight limitation right.  IR rt 20, left 10, ER rt 35, left 45.  Passive hip ext rt 0, left 10.      STRENGTH:  gross l/e 4+ to 5-/5.  MMT, RIT - for hip pain  LE FLEXIBILITY:  B HS @ 70.    SPECIAL TESTS:  + FERMIN, + FADIR right.    FUNCTIONAL TESTS:  mod LOC B squat.   PALPATION:  not assessed      Assessment & Plan   CLINICAL IMPRESSIONS  Medical Diagnosis: Hip pain, right    Treatment Diagnosis: right hip, ?intra-articular, labral   Impression/Assessment: Patient is a 73 year old female with right hip complaints.  The " following significant findings have been identified: Pain, Decreased ROM/flexibility, Decreased joint mobility, Decreased strength, Impaired balance, Impaired gait, and Decreased activity tolerance. These impairments interfere with their ability to perform self care tasks, recreational activities, household chores, household mobility, and community mobility as compared to previous level of function.     Clinical Decision Making (Complexity):  Clinical Presentation: Stable/Uncomplicated  Clinical Presentation Rationale: based on medical and personal factors listed in PT evaluation  Clinical Decision Making (Complexity): Low complexity    PLAN OF CARE  Treatment Interventions:  Interventions: Manual Therapy, Neuromuscular Re-education, Therapeutic Activity, Therapeutic Exercise, Self-Care/Home Management    Long Term Goals     PT Goal 1  Goal Identifier: Ambulation  Goal Description: Patient will be able to walk 2+ miles with a normal gait pattern.  Rationale: to maximize safety and independence with performance of ADLs and functional tasks;to maximize safety and independence within the home;to maximize safety and independence within the community  Target Date: 01/03/24      Frequency of Treatment: 1x/week  Duration of Treatment: 6      Education Assessment:   Learner/Method: Patient;Listening;Demonstration;Pictures/Video  Education Comments: Patient participated in their education    Risks and benefits of evaluation/treatment have been explained.   Patient/Family/caregiver agrees with Plan of Care.     Evaluation Time:           Signing Clinician: Dl Leblanc, PT      [facility]                                                                                   OUTPATIENT PHYSICAL THERAPY      PLAN OF TREATMENT FOR OUTPATIENT REHABILITATION   Patient's Last Name, First Name, Anali Garcia YOB: 1950   Provider's Name   [facility]   Medical Record No.  [id number]     Onset Date: 09/22/23  Start of Care Date: 11/22/23     Medical Diagnosis:  Hip pain, right      PT Treatment Diagnosis:  right hip, ?intra-articular, labral Plan of Treatment  Frequency/Duration: 1x/week/ 6    Certification date from 11/22/23 to 01/03/24         See note for plan of treatment details and functional goals     Dl Leblanc, PT                         I CERTIFY THE NEED FOR THESE SERVICES FURNISHED UNDER        THIS PLAN OF TREATMENT AND WHILE UNDER MY CARE     (Physician attestation of this document indicates review and certification of the therapy plan).              Referring Provider:  Jovita Bauman    Initial Assessment  See Epic Evaluation- Start of Care Date: 11/22/23

## 2023-11-22 NOTE — PROGRESS NOTES
Patient had PT today and will continue with this. Physical therapist also thought ortho referral would be beneficial, referral placed.    Jovita Bauman PA-C

## 2023-11-25 ENCOUNTER — MYC MEDICAL ADVICE (OUTPATIENT)
Dept: FAMILY MEDICINE | Facility: CLINIC | Age: 73
End: 2023-11-25
Payer: COMMERCIAL

## 2023-11-25 DIAGNOSIS — M25.531 RIGHT WRIST PAIN: Primary | ICD-10-CM

## 2023-11-27 NOTE — TELEPHONE ENCOUNTER
Is a referral needed for hand therapy? Should she be seen virtually/evisit to get referral?    Yash Orellana RN on 11/27/2023 at 11:12 AM

## 2023-11-29 ENCOUNTER — THERAPY VISIT (OUTPATIENT)
Dept: PHYSICAL THERAPY | Facility: CLINIC | Age: 73
End: 2023-11-29
Payer: COMMERCIAL

## 2023-11-29 DIAGNOSIS — M25.551 HIP PAIN, RIGHT: Primary | ICD-10-CM

## 2023-11-29 PROCEDURE — 97110 THERAPEUTIC EXERCISES: CPT | Mod: GP | Performed by: PHYSICAL THERAPIST

## 2023-11-29 PROCEDURE — 97112 NEUROMUSCULAR REEDUCATION: CPT | Mod: GP | Performed by: PHYSICAL THERAPIST

## 2023-11-29 NOTE — PROGRESS NOTES
ASSESSMENT & PLAN       Today we discussed the underlying etiology/pathology of patient's   1. Primary osteoarthritis of right hip with degenerative labral tear      -Discussed the patient has had onset of right anterior groin pain for duration of approximately 6 months without injury or trauma.  - X-rays show advancing osteoarthritic findings with more central acetabular narrowing.  No bone-on-bone contact  - Physical exam demonstrates likely degenerative labral tear of the right hip with associated mild to moderate right hip osteoarthritis  - We discussed that patient has improved 75% over the last few weeks with initiation of physical therapy  - We discussed treatment options for degenerative labral tear and osteoarthritis including activity modification, oral anti-inflammatory medication as well as consideration for intra-articular cortisone injection for the right hip  - Due to the patient having significant improvement with conservative treatment and PT we will continue with his current treatment plan/course  - Patient may follow-up as needed for her right hip.    -Call direct clinic number [[phone]] at any time with questions or concerns in regards to your recent office visit with me.     Dl Dallas PA-C  [facility]      SUBJECTIVE  Anali Sanchez is a/an 73 year old female who is seen in consultation at the request of  Jovita Bauman PA-C for evaluation of right hip pain. The patient is seen by themselves.    Expanded HPI: Patient states approximately 6 months ago she was having significant right groin pain with frequent mechanical catching/giving way symptoms of her right leg.  She has begun physical therapy over the last few weeks and those symptoms have improved significantly only occurring a couple times a week.  She is active with a [employer] exercise program as well as walking program.  She denies any pain in the anterior thigh, lateral hip or low back.   No symptoms below the knee.  Treatment has slowly consisted of physical therapy sessions with current 75% improvement.    Onset: less than 6 month(s) ago. Reports insidious onset without acute precipitating event.  Location of Pain: right hip - anterior groin area; no radiation  Rating of Pain at worst: 6/10  Rating of Pain Currently: 6/10  Worsened by: constant, stairs, standing from chair at times  Better with: PT and rest  Treatments tried: rest/activity avoidance and physical therapy  Quality: constant sharp, stabbing-it feels deep in her hip  Associated symptoms: weakness of the leg with the sharp pain and feeling of instability  Orthopedic history: YES - Date: 2018 - Fell out of a truck with R hip/calf injury  Relevant surgical history: NO  Social history: social history: retired - ; [employer] program, walking    Past Medical History:   Diagnosis Date    Anemia     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37    Coagulation disorder (H24)     hx hemorrhage after heart and after colon surg    History of blood transfusion     Other and unspecified nonspecific immunological findings     anti Jka red cell antibody     Social History     Socioeconomic History    Marital status:    Tobacco Use    Smoking status: Never    Smokeless tobacco: Never   Vaping Use    Vaping Use: Never used   Substance and Sexual Activity    Alcohol use: Yes     Comment: glass wine most days    Drug use: Never    Sexual activity: Yes     Partners: Male     Social Determinants of Health     Financial Resource Strain: Low Risk  (11/16/2023)    Financial Resource Strain     Within the past 12 months, have you or your family members you live with been unable to get utilities (heat, electricity) when it was really needed?: No   Food Insecurity: Low Risk  (11/16/2023)    Food Insecurity     Within the past 12 months, did you worry that your food would run out before you got money to buy  more?: No     Within the past 12 months, did the food you bought just not last and you didn t have money to get more?: No   Transportation Needs: Low Risk  (11/16/2023)    Transportation Needs     Within the past 12 months, has lack of transportation kept you from medical appointments, getting your medicines, non-medical meetings or appointments, work, or from getting things that you need?: No   Interpersonal Safety: Low Risk  (11/17/2023)    Interpersonal Safety     Do you feel physically and emotionally safe where you currently live?: Yes     Within the past 12 months, have you been hit, slapped, kicked or otherwise physically hurt by someone?: No     Within the past 12 months, have you been humiliated or emotionally abused in other ways by your partner or ex-partner?: No   Housing Stability: Low Risk  (11/16/2023)    Housing Stability     Do you have housing? : Yes     Are you worried about losing your housing?: No         Patient's past medical, surgical, social, and family histories were personally reviewed today and no changes are noted.    REVIEW OF SYSTEMS:  10 point ROS is negative other than symptoms noted above in HPI, Past Medical History or as stated below  Constitutional: NEGATIVE for fever, chills, change in weight  Skin: NEGATIVE for worrisome rashes, moles or lesions  GI/: NEGATIVE for bowel or bladder changes  Neuro: NEGATIVE for weakness, dizziness or paresthesias    OBJECTIVE:  LMP  (LMP Unknown)    General: healthy, alert and in no distress  HEENT: no scleral icterus or conjunctival erythema  Skin: no suspicious lesions or rash. No jaundice.  CV: no pedal edema  Resp: normal respiratory effort without conversational dyspnea   Psych: normal mood and affect  Gait: normal steady gait with appropriate coordination and balance  Neuro: Normal light sensory exam of lower extremity      MSK:  Exam shows a very pleasant 73-year-old female who ambulates full weightbearing without assistive device.   Stride is normal.  Negative Trendelenburg gait.  No evidence of antalgia.  Examination of both lower extremities shows varicosities.  Range of motion of the hips bilaterally are maintained but terminal internal rotation on the right hip reproduces right groin pain but this is only reproduced sporadically with impingement testing of the right hip.  No symptoms on the left.  Hip strength is 5 out of 5 for flexion against resistance with mild discomfort on the right groin when tested.  Quad strength is 5 out of 5 without pain, hamstring strength is 5 out of 5 without pain.  Patient is neurovascularly intact L2-S1 bilaterally.  FERMIN testing does not generate any pain in the low back but generates a little bit of inguinal discomfort right greater than left.  No pain over the bony prominences of the hip and pelvis including the ASIS and AIIS and greater trochanter.        Independent visualization of the below image:  Previous x-rays of the patient's pelvis and right hip from 11/17/2023 are personally reviewed.  Patient shows degenerative changes of the right hip with central narrowing.  No evidence of bone-on-bone contact.  No evidence of fracture or dislocation.    XR HIP RIGHT 2-3 VIEWS 11/17/2023 1:45 PM      HISTORY: Hip pain, right     COMPARISON: 8/10/2022                                                                    IMPRESSION: Mild degenerative changes in the right hip. No fractures  are evident. Findings are stable.     ROSIE RIOS MD    Patient's conditions were thoroughly discussed during today's visit with total time spent face-to-face with the patient and documentation being 30 minutes.    Dl Dallas PA-C  [facility]    This note was completed in part using a voice recognition software, any grammatical or context distortion are unintentional and inherent to the software.

## 2023-11-30 ENCOUNTER — THERAPY VISIT (OUTPATIENT)
Dept: OCCUPATIONAL THERAPY | Facility: CLINIC | Age: 73
End: 2023-11-30
Attending: PHYSICIAN ASSISTANT
Payer: COMMERCIAL

## 2023-11-30 ENCOUNTER — TELEPHONE (OUTPATIENT)
Dept: ORTHOPEDICS | Facility: CLINIC | Age: 73
End: 2023-11-30

## 2023-11-30 DIAGNOSIS — M25.531 RIGHT WRIST PAIN: ICD-10-CM

## 2023-11-30 PROCEDURE — 97165 OT EVAL LOW COMPLEX 30 MIN: CPT | Mod: GO | Performed by: OCCUPATIONAL THERAPIST

## 2023-11-30 PROCEDURE — 97110 THERAPEUTIC EXERCISES: CPT | Mod: GO | Performed by: OCCUPATIONAL THERAPIST

## 2023-11-30 PROCEDURE — 97760 ORTHOTIC MGMT&TRAING 1ST ENC: CPT | Mod: GO | Performed by: OCCUPATIONAL THERAPIST

## 2023-11-30 NOTE — PROGRESS NOTES
OCCUPATIONAL THERAPY EVALUATION  Type of Visit: Evaluation    See electronic medical record for Abuse and Falls Screening details.    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, Colectomy    Prior diagnostic imaging/testing results: X-ray     Prior therapy history for the same diagnosis, illness or injury: No 2019    Prior Level of Function  Transfers: Independent  Ambulation: Independent  ADL: Independent    Living Environment  Social support: With a significant other or spouse   Type of home: House; Multi-level   Stairs to enter the home: Yes 1     Ramp: No   Stairs inside the home: Yes 14 (7 up, 7 down) Is there a railing: Yes   Help at home: None  Equipment owned: Raised toilet seat     Employment: No retired  Hobbies/Interests: Piano, reading    Patient goals for therapy: Piano       Objective   ADDITIONAL HISTORY:  Right hand dominant  Patient reports symptoms of pain, weakness/loss of strength, and edema  Transportation: drives  Currently retired    Functional Outcome Measure:   Upper Extremity Functional Index Score:  SCORE:   Column Totals: /80: 76   (A lower score indicates greater disability.)    PAIN:  Pain Level at Rest: 1/10  Pain Level with Use: 5/10  Pain Location: R radial wrist/1st discontinue and volar radial wrist  Pain Quality: Aching, Dull, and Tender  Pain Frequency: constant  Pain is Worst: daytime  Pain is Exacerbated By: playing piano, cleaning, increase use of R hand/wrist  Pain is Relieved By: rest (has tried OTC braces and cold, which haven't helped significantly)  Pain Progression: Unchanged    EDEMA:  mild volar edema with possible cyst along FCR tendon, moderate bony prominence at radial wrist      ROM:   Wrist ROM  Left AROM Right AROM    Extension WNL WNL   Flexion WNL WNL   Radial Deviation (RD) WNL  WNL   Ulnar Deviation (UD) WNL WNL   UD with Th Flex 25 18. 4-5/10 pain present     SPECIAL TESTS:   De Quervain's Special Tests  Pain Report Right   Finkelstein's Test +, 4-5/10 pain   Radial Nerve Tinel's (DRSN) +   Finkelstein's in with forearm pronated +, 4-5/10 pain     STRENGTH:     Measured in pounds 11/30/2023 11/30/2023    Left Right   Trial 1 44 38   Average 44 38     Lateral Pinch  Measured in pounds 11/30/2023 11/30/2023    Left Right   Trial 1 13 11   Average 13 11     3 Point Pinch  Measured in pounds 11/30/2023 11/30/2023    Left Right   Trial 1 10 9   Average 10 9     Assessment & Plan   CLINICAL IMPRESSIONS  Medical Diagnosis: R De Quervains    Treatment Diagnosis: R wrist pain    Impression/Assessment: Pt is a 73 year old female presenting to Occupational Therapy due to gradual onset of right wrist pain.  The following significant findings have been identified: Impaired activity tolerance, Impaired ROM, Impaired strength, and Pain.  These identified deficits interfere with their ability to perform driving  and meal planning and preparation as compared to previous level of function.   Patient's limitations or Problem List includes: Pain, Decreased ROM/motion, Weakness, Decreased , Decreased pinch, and Decreased coordination of the right wrist which interferes with the patient's ability to perform Household Chores and Driving  as compared to previous level of function.    Clinical Decision Making (Complexity):  Assessment of Occupational Performance: 3-5 Performance Deficits  Occupational Performance Limitations: driving and community mobility, home establishment and management, meal preparation and cleanup, and leisure activities  Clinical Decision Making (Complexity): Low complexity    PLAN OF CARE  Treatment Interventions:  Modalities:  US  Therapeutic Exercise:  AROM, AAROM, PROM, Tendon Gliding, Blocking, Reverse Blocking, Place and Hold, Contract Relax, Isotonics, and  Isometrics  Neuromuscular re-education:  Nerve Gliding, Coordination/Dexterity, Proprioceptive Training, and Isometrics  Manual Techniques:  Joint mobilization, Myofascial release, and Manual edema mobilization  Orthotic Fabrication:  Static and Forearm based  Self Care:  Self Care Tasks and Ergonomic Considerations    Long Term Goals   OT Goal 1  Goal Identifier: Household Chores  Goal Description: Pt will be able to open a new jar without pain in order to maximize independence with ADLs  Target Date: 01/14/24      Frequency of Treatment: 1x/week  Duration of Treatment: 6 weeks     Recommended Referrals to Other Professionals:  none  Education Assessment: Learner/Method: Patient;No Barriers to Learning     Risks and benefits of evaluation/treatment have been explained.   Patient/Family/caregiver agrees with Plan of Care.     Evaluation Time:    OT Eval, Low Complexity Minutes (29900): 28   Present: Not applicable     Signing Clinician: SALAZAR [facility]                                                                                   OUTPATIENT OCCUPATIONAL THERAPY      PLAN OF TREATMENT FOR OUTPATIENT REHABILITATION   Patient's Last Name, First Name, Anali Garcia YOB: 1950   Provider's Name   [facility]   Medical Record No.  [id number]     Onset Date: 09/01/23 Start of Care Date: 11/30/23     Medical Diagnosis:  R De Quervains      OT Treatment Diagnosis:  R wrist pain Plan of Treatment  Frequency/Duration:1x/week/6 weeks    Certification date from 11/30/23   To 01/14/24        See note for plan of treatment details and functional goals     Prema Emmanuel OT                         I CERTIFY THE NEED FOR THESE SERVICES FURNISHED UNDER        THIS PLAN OF TREATMENT AND WHILE UNDER MY CARE     (Physician attestation of this document indicates review and certification of the therapy plan).               Referring Provider:  Jovita Bauman    Initial Assessment  See Epic Evaluation- 11/30/23

## 2023-11-30 NOTE — TELEPHONE ENCOUNTER
Spoke with patient's PT today who would like her to be evaluated for her R wrist. Patient has an appointment with Dl Dallas for 12/1 for the R hip and wanted to add on the wrist to that appointment. Dl would like patient to schedule a separate appointment for the R wrist.    Attempted call to patient to inform her she needs a separate appointment and we have availability in the next week.     Did not leave detailed message. No consent to communicate on file. Central scheduling number provided for call back or scheduling.    Ester Lyn, ATC

## 2023-12-01 ENCOUNTER — OFFICE VISIT (OUTPATIENT)
Dept: ORTHOPEDICS | Facility: CLINIC | Age: 73
End: 2023-12-01
Attending: PHYSICIAN ASSISTANT
Payer: COMMERCIAL

## 2023-12-01 VITALS
DIASTOLIC BLOOD PRESSURE: 65 MMHG | BODY MASS INDEX: 23.92 KG/M2 | WEIGHT: 135 LBS | HEIGHT: 63 IN | SYSTOLIC BLOOD PRESSURE: 110 MMHG

## 2023-12-01 DIAGNOSIS — M16.11 PRIMARY OSTEOARTHRITIS OF RIGHT HIP: Primary | ICD-10-CM

## 2023-12-01 PROBLEM — M25.551 HIP PAIN, RIGHT: Status: RESOLVED | Noted: 2023-11-22 | Resolved: 2023-12-01

## 2023-12-01 PROBLEM — Z86.79 HISTORY OF ATRIAL FLUTTER: Status: RESOLVED | Noted: 2023-08-08 | Resolved: 2023-12-01

## 2023-12-01 PROBLEM — Z86.79 HISTORY OF WOLFF-PARKINSON-WHITE (WPW) SYNDROME: Status: RESOLVED | Noted: 2023-08-08 | Resolved: 2023-12-01

## 2023-12-01 PROCEDURE — 99203 OFFICE O/P NEW LOW 30 MIN: CPT | Performed by: PHYSICIAN ASSISTANT

## 2023-12-01 NOTE — TELEPHONE ENCOUNTER
Patient arrived in clinic for separate appointment on 12/1. Information below was provided.     Patient verbalized understanding and agreement and will schedule separate appointment for the R wrist.     Ester Lyn ATC

## 2023-12-01 NOTE — PATIENT INSTRUCTIONS
Today we discussed the underlying etiology/pathology of patient's   1. Primary osteoarthritis of right hip with degenerative labral tear      -Discussed the patient has had onset of right anterior groin pain for duration of approximately 6 months without injury or trauma.  - X-rays show advancing osteoarthritic findings with more central acetabular narrowing.  No bone-on-bone contact  - Physical exam demonstrates likely degenerative labral tear of the right hip with associated mild to moderate right hip osteoarthritis  - We discussed that patient has improved 75% over the last few weeks with initiation of physical therapy  - We discussed treatment options for degenerative labral tear and osteoarthritis including activity modification, oral anti-inflammatory medication as well as consideration for intra-articular cortisone injection for the right hip  - Due to the patient having significant improvement with conservative treatment and PT we will continue with his current treatment plan/course  - Patient may follow-up as needed for her right hip.    -Call direct clinic number [[phone]] at any time with questions or concerns in regards to your recent office visit with me.     Dl Dallas PA-C  [facility]

## 2023-12-01 NOTE — LETTER
12/1/2023         RE: Anali Sanchez  [address] [facility] [address]        Dear Colleague,    Thank you for referring your patient, Anali Sanchez, to the [facility] [place]. Please see a copy of my visit note below.    ASSESSMENT & PLAN       Today we discussed the underlying etiology/pathology of patient's   1. Primary osteoarthritis of right hip with degenerative labral tear      -Discussed the patient has had onset of right anterior groin pain for duration of approximately 6 months without injury or trauma.  - X-rays show advancing osteoarthritic findings with more central acetabular narrowing.  No bone-on-bone contact  - Physical exam demonstrates likely degenerative labral tear of the right hip with associated mild to moderate right hip osteoarthritis  - We discussed that patient has improved 75% over the last few weeks with initiation of physical therapy  - We discussed treatment options for degenerative labral tear and osteoarthritis including activity modification, oral anti-inflammatory medication as well as consideration for intra-articular cortisone injection for the right hip  - Due to the patient having significant improvement with conservative treatment and PT we will continue with his current treatment plan/course  - Patient may follow-up as needed for her right hip.    -Call direct clinic number [[phone]] at any time with questions or concerns in regards to your recent office visit with me.     Dl Dallas PA-C  [facility]      SUBJECTIVE  Anali Sanchez is a/an 73 year old female who is seen in consultation at the request of  Jovita Bauman PA-C for evaluation of right hip pain. The patient is seen by themselves.    Expanded HPI: Patient states approximately 6 months ago she was having significant right groin pain with frequent mechanical catching/giving way symptoms of her right leg.  She has begun  physical therapy over the last few weeks and those symptoms have improved significantly only occurring a couple times a week.  She is active with a [employer] exercise program as well as walking program.  She denies any pain in the anterior thigh, lateral hip or low back.  No symptoms below the knee.  Treatment has slowly consisted of physical therapy sessions with current 75% improvement.    Onset: less than 6 month(s) ago. Reports insidious onset without acute precipitating event.  Location of Pain: right hip - anterior groin area; no radiation  Rating of Pain at worst: 6/10  Rating of Pain Currently: 6/10  Worsened by: constant, stairs, standing from chair at times  Better with: PT and rest  Treatments tried: rest/activity avoidance and physical therapy  Quality: constant sharp, stabbing-it feels deep in her hip  Associated symptoms: weakness of the leg with the sharp pain and feeling of instability  Orthopedic history: YES - Date: 2018 - Fell out of a truck with R hip/calf injury  Relevant surgical history: NO  Social history: social history: retired - ; [employer] program, walking    Past Medical History:   Diagnosis Date     Anemia      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37     Coagulation disorder (H24)     hx hemorrhage after heart and after colon surg     History of blood transfusion      Other and unspecified nonspecific immunological findings     anti Jka red cell antibody     Social History     Socioeconomic History     Marital status:    Tobacco Use     Smoking status: Never     Smokeless tobacco: Never   Vaping Use     Vaping Use: Never used   Substance and Sexual Activity     Alcohol use: Yes     Comment: glass wine most days     Drug use: Never     Sexual activity: Yes     Partners: Male     Social Determinants of Health     Financial Resource Strain: Low Risk  (11/16/2023)    Financial Resource Strain      Within the past 12 months,  have you or your family members you live with been unable to get utilities (heat, electricity) when it was really needed?: No   Food Insecurity: Low Risk  (11/16/2023)    Food Insecurity      Within the past 12 months, did you worry that your food would run out before you got money to buy more?: No      Within the past 12 months, did the food you bought just not last and you didn t have money to get more?: No   Transportation Needs: Low Risk  (11/16/2023)    Transportation Needs      Within the past 12 months, has lack of transportation kept you from medical appointments, getting your medicines, non-medical meetings or appointments, work, or from getting things that you need?: No   Interpersonal Safety: Low Risk  (11/17/2023)    Interpersonal Safety      Do you feel physically and emotionally safe where you currently live?: Yes      Within the past 12 months, have you been hit, slapped, kicked or otherwise physically hurt by someone?: No      Within the past 12 months, have you been humiliated or emotionally abused in other ways by your partner or ex-partner?: No   Housing Stability: Low Risk  (11/16/2023)    Housing Stability      Do you have housing? : Yes      Are you worried about losing your housing?: No         Patient's past medical, surgical, social, and family histories were personally reviewed today and no changes are noted.    REVIEW OF SYSTEMS:  10 point ROS is negative other than symptoms noted above in HPI, Past Medical History or as stated below  Constitutional: NEGATIVE for fever, chills, change in weight  Skin: NEGATIVE for worrisome rashes, moles or lesions  GI/: NEGATIVE for bowel or bladder changes  Neuro: NEGATIVE for weakness, dizziness or paresthesias    OBJECTIVE:  LMP  (LMP Unknown)    General: healthy, alert and in no distress  HEENT: no scleral icterus or conjunctival erythema  Skin: no suspicious lesions or rash. No jaundice.  CV: no pedal edema  Resp: normal respiratory effort without  conversational dyspnea   Psych: normal mood and affect  Gait: normal steady gait with appropriate coordination and balance  Neuro: Normal light sensory exam of lower extremity      MSK:  Exam shows a very pleasant 73-year-old female who ambulates full weightbearing without assistive device.  Stride is normal.  Negative Trendelenburg gait.  No evidence of antalgia.  Examination of both lower extremities shows varicosities.  Range of motion of the hips bilaterally are maintained but terminal internal rotation on the right hip reproduces right groin pain but this is only reproduced sporadically with impingement testing of the right hip.  No symptoms on the left.  Hip strength is 5 out of 5 for flexion against resistance with mild discomfort on the right groin when tested.  Quad strength is 5 out of 5 without pain, hamstring strength is 5 out of 5 without pain.  Patient is neurovascularly intact L2-S1 bilaterally.  FERMIN testing does not generate any pain in the low back but generates a little bit of inguinal discomfort right greater than left.  No pain over the bony prominences of the hip and pelvis including the ASIS and AIIS and greater trochanter.        Independent visualization of the below image:  Previous x-rays of the patient's pelvis and right hip from 11/17/2023 are personally reviewed.  Patient shows degenerative changes of the right hip with central narrowing.  No evidence of bone-on-bone contact.  No evidence of fracture or dislocation.    XR HIP RIGHT 2-3 VIEWS 11/17/2023 1:45 PM      HISTORY: Hip pain, right     COMPARISON: 8/10/2022                                                                    IMPRESSION: Mild degenerative changes in the right hip. No fractures  are evident. Findings are stable.     ROSIE RIOS MD    Patient's conditions were thoroughly discussed during today's visit with total time spent face-to-face with the patient and documentation being 30 minutes.    Dl Dallas  SARITA  [facility]    This note was completed in part using a voice recognition software, any grammatical or context distortion are unintentional and inherent to the software.       Again, thank you for allowing me to participate in the care of your patient.        Sincerely,        Dl Dallas PA-C

## 2023-12-01 NOTE — PROGRESS NOTES
ASSESSMENT & PLAN       Today we discussed the underlying etiology/pathology of patient's   1. Tendinitis, de Quervain's- right wrist      -We discussed patient's x-rays today of the wrist showing no bony enlargement over the radial styloid.  Thickening of the soft tissue is noted in this region.  - Patient has very mild early age-appropriate DJD changes of the basal joint but is asymptomatic in this regards  - We discussed treatment options for de Quervain's tendinitis of the first dorsal compartment of the thumb including rest, activity modification, oral anti-inflammatory medication, continued occupational therapy with additional iontophoresis and possible cortisone injection with continued immobilization with her thumb splint  - Patient like to proceed with iontophoresis to be added to her occupational therapy regiment.  This was ordered and dexamethasone bottle was sent to the [facility] pharmacy to be utilized with her OT sessions  - If patient does not improve as expected she should follow-up for repeat discussion about oral agent to decrease inflammation or possible first dorsal compartment cortisone injection  - Patient's complaint is pain and dysfunction with the thumb and is not very worried about the enlargement of the tissue.  She also has a very small cystic structure at the radiocarpal junction on the volar aspect of the wrist just radial to the palmaris longus tendon which is likely degenerative in nature which the patient is not symptomatic and is not concerned about.    -Call direct clinic number [[phone]] at any time with questions or concerns in regards to your recent office visit with me.     Dl Dallas PA-C  [facility]      SUBJECTIVE  Analimoises Sanchez is a/an 73 year old female who is seen in consultation at the request of Prema Emmanuel OT for evaluation of right wrist pain and possible cyst. The patient is seen by themselves.    Onset: 3  month(s) ago. 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   Location of Pain: right wrist - distal Radius, dorsal and volar aspect; mild radiation up the wrist and into the thumb  Rating of Pain at worst: 6/10  Rating of Pain Currently: 3/10  Worsened by: general usage, bumping the area, difficulty with ADLs  Better with: nothing yet  Treatments tried: rest/activity avoidance, ice, Tylenol, physical therapy (several visits), and casting/splinting/bracing, home exericses  Quality: intermittent sharp, stabbing; constant achiness  Associated symptoms: swelling, weakness of the hand with use, and two lumps forming, intermittent clicking/popping  Orthopedic history: NO  Relevant surgical history: NO  Social history: social history: retired as ; exercise classes with weights    Past Medical History:   Diagnosis Date    Anemia     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37    Coagulation disorder (H24)     hx hemorrhage after heart and after colon surg    History of atrial flutter     She has a history of WPW status post surgical ablation of a left lateral electrical pathway at age 37. She did not have any recurrent palpitations until 2014. At that time, she was seen at [facility] in [place] and had ECG consistent with atrial flutter. This episode of atrial flutter lasted several hours and terminated spontaneously. She saw cardiology after that and plan was for obser    History of blood transfusion     History of Gabino-Parkinson-White (WPW) syndrome     She has a history of WPW status post surgical ablation of a left lateral electrical pathway at age 37.     Other and unspecified nonspecific immunological findings     anti Jka red cell antibody     Social History     Socioeconomic History    Marital status:     Tobacco Use    Smoking status: Never    Smokeless tobacco: Never   Vaping Use    Vaping Use: Never used   Substance and Sexual Activity    Alcohol use: Yes     Comment: glass wine most days    Drug use: Never    Sexual activity: Yes     Partners: Male     Social Determinants of Health     Financial Resource Strain: Low Risk  (11/16/2023)    Financial Resource Strain     Within the past 12 months, have you or your family members you live with been unable to get utilities (heat, electricity) when it was really needed?: No   Food Insecurity: Low Risk  (11/16/2023)    Food Insecurity     Within the past 12 months, did you worry that your food would run out before you got money to buy more?: No     Within the past 12 months, did the food you bought just not last and you didn t have money to get more?: No   Transportation Needs: Low Risk  (11/16/2023)    Transportation Needs     Within the past 12 months, has lack of transportation kept you from medical appointments, getting your medicines, non-medical meetings or appointments, work, or from getting things that you need?: No   Interpersonal Safety: Low Risk  (11/17/2023)    Interpersonal Safety     Do you feel physically and emotionally safe where you currently live?: Yes     Within the past 12 months, have you been hit, slapped, kicked or otherwise physically hurt by someone?: No     Within the past 12 months, have you been humiliated or emotionally abused in other ways by your partner or ex-partner?: No   Housing Stability: Low Risk  (11/16/2023)    Housing Stability     Do you have housing? : Yes     Are you worried about losing your housing?: No         Patient's past medical, surgical, social, and family histories were personally reviewed today and no changes are noted.    REVIEW OF SYSTEMS:  10 point ROS is negative other than symptoms noted above in HPI, Past Medical History or as stated below  Constitutional: NEGATIVE for fever, chills, change in weight  Skin:  NEGATIVE for worrisome rashes, moles or lesions  GI/: NEGATIVE for bowel or bladder changes  Neuro: NEGATIVE for weakness, dizziness or paresthesias    OBJECTIVE:  LMP  (LMP Unknown)    General: healthy, alert and in no distress  HEENT: no scleral icterus or conjunctival erythema  Skin: no suspicious lesions or rash. No jaundice.  CV: no pedal edema  Resp: normal respiratory effort without conversational dyspnea   Psych: normal mood and affect  Gait: normal steady gait with appropriate coordination and balance  Neuro: Normal light sensory exam of lower extremity      MSK:  Exam shows a very pleasant 73-year-old female who ambulates full weightbearing without assistive device.  She does have a wrap around thumb brace on the right side.  Examination of the right wrist shows enlargement of the radial styloid process on the radial side which is very firm and minimally tender.  She has positive de Quervain's Finkelstein's test causing discomfort along the length of the first dorsal compartment.  Grind test of the basal joint is largely unremarkable without crepitation.  Slight degenerative changes are noted at the IP joint which is nontender.  Pain is elicited with resisted thumb extension but no pain with flexion of the IP joint.  Patient is neurovascularly intact.  Radial and ulnar collateral ligaments of the MCP joint are stable and pain-free.  Patient is a very small 2 mm cyst noted on the volar aspect at the radial carpal junction radial to the palmaris longus tendon which is nontender.  Overlying vein is noted.  Remaining wrist and forearm are nontender.  No pain throughout the remaining carpus or hand.        Independent visualization of the below image:  Three-view x-ray of the patient's right wrist are obtained and reviewed today showing thickening of the soft tissues over the radial styloid process but no bony enlargement or change.  Very mild degenerative changes are noted of the first CMC joint.  No  evidence of fracture or dislocation    Patient's conditions were thoroughly discussed during today's visit with total time spent face-to-face with the patient and documentation being 20 minutes.    Dl Dallas PA-C  [facility]    This note was completed in part using a voice recognition software, any grammatical or context distortion are unintentional and inherent to the software.

## 2023-12-03 PROBLEM — M25.531 RIGHT WRIST PAIN: Status: ACTIVE | Noted: 2023-12-03

## 2023-12-05 ENCOUNTER — THERAPY VISIT (OUTPATIENT)
Dept: OCCUPATIONAL THERAPY | Facility: CLINIC | Age: 73
End: 2023-12-05
Payer: COMMERCIAL

## 2023-12-05 ENCOUNTER — OFFICE VISIT (OUTPATIENT)
Dept: ORTHOPEDICS | Facility: CLINIC | Age: 73
End: 2023-12-05
Payer: COMMERCIAL

## 2023-12-05 ENCOUNTER — ANCILLARY PROCEDURE (OUTPATIENT)
Dept: GENERAL RADIOLOGY | Facility: CLINIC | Age: 73
End: 2023-12-05
Attending: PHYSICIAN ASSISTANT
Payer: COMMERCIAL

## 2023-12-05 DIAGNOSIS — M25.531 RIGHT WRIST PAIN: ICD-10-CM

## 2023-12-05 DIAGNOSIS — M65.4 TENDINITIS, DE QUERVAIN'S: ICD-10-CM

## 2023-12-05 DIAGNOSIS — M65.4 TENDINITIS, DE QUERVAIN'S: Primary | ICD-10-CM

## 2023-12-05 DIAGNOSIS — M25.531 RIGHT WRIST PAIN: Primary | ICD-10-CM

## 2023-12-05 PROCEDURE — 73110 X-RAY EXAM OF WRIST: CPT | Mod: TC | Performed by: RADIOLOGY

## 2023-12-05 PROCEDURE — 97033 APP MDLTY 1+IONTPHRSIS EA 15: CPT | Mod: GO | Performed by: OCCUPATIONAL THERAPIST

## 2023-12-05 PROCEDURE — 99213 OFFICE O/P EST LOW 20 MIN: CPT | Performed by: PHYSICIAN ASSISTANT

## 2023-12-05 PROCEDURE — 97140 MANUAL THERAPY 1/> REGIONS: CPT | Mod: GO | Performed by: OCCUPATIONAL THERAPIST

## 2023-12-05 RX ORDER — DEXAMETHASONE SODIUM PHOSPHATE 4 MG/ML
INJECTION, SOLUTION INTRA-ARTICULAR; INTRALESIONAL; INTRAMUSCULAR; INTRAVENOUS; SOFT TISSUE
Qty: 30 ML | Refills: 0 | Status: SHIPPED | OUTPATIENT
Start: 2023-12-05

## 2023-12-05 NOTE — LETTER
12/5/2023         RE: Anali Sanchez  [address] [facility] [address]        Dear Colleague,    Thank you for referring your patient, Anali Sanchez, to the [facility] [place]. Please see a copy of my visit note below.    ASSESSMENT & PLAN       Today we discussed the underlying etiology/pathology of patient's   1. Tendinitis, de Quervain's- right wrist      -We discussed patient's x-rays today of the wrist showing no bony enlargement over the radial styloid.  Thickening of the soft tissue is noted in this region.  - Patient has very mild early age-appropriate DJD changes of the basal joint but is asymptomatic in this regards  - We discussed treatment options for de Quervain's tendinitis of the first dorsal compartment of the thumb including rest, activity modification, oral anti-inflammatory medication, continued occupational therapy with additional iontophoresis and possible cortisone injection with continued immobilization with her thumb splint  - Patient like to proceed with iontophoresis to be added to her occupational therapy regiment.  This was ordered and dexamethasone bottle was sent to the [facility] pharmacy to be utilized with her OT sessions  - If patient does not improve as expected she should follow-up for repeat discussion about oral agent to decrease inflammation or possible first dorsal compartment cortisone injection  - Patient's complaint is pain and dysfunction with the thumb and is not very worried about the enlargement of the tissue.  She also has a very small cystic structure at the radiocarpal junction on the volar aspect of the wrist just radial to the palmaris longus tendon which is likely degenerative in nature which the patient is not symptomatic and is not concerned about.    -Call direct clinic number [[phone]] at any time with questions or concerns in regards to your recent office visit with me.     Dl Dallas  SARITA  [facility]      SUBJECTIVE  Anali Sanchez is a/an 73 year old female who is seen in consultation at the request of Prema Emmanuel OT for evaluation of right wrist pain and possible cyst. The patient is seen by themselves.    Onset: 3 month(s) ago. 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   Location of Pain: right wrist - distal Radius, dorsal and volar aspect; mild radiation up the wrist and into the thumb  Rating of Pain at worst: 6/10  Rating of Pain Currently: 3/10  Worsened by: general usage, bumping the area, difficulty with ADLs  Better with: nothing yet  Treatments tried: rest/activity avoidance, ice, Tylenol, physical therapy (several visits), and casting/splinting/bracing, home exericses  Quality: intermittent sharp, stabbing; constant achiness  Associated symptoms: swelling, weakness of the hand with use, and two lumps forming, intermittent clicking/popping  Orthopedic history: NO  Relevant surgical history: NO  Social history: social history: retired as ; exercise classes with weights    Past Medical History:   Diagnosis Date     Anemia      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37     Coagulation disorder (H24)     hx hemorrhage after heart and after colon surg     History of atrial flutter     She has a history of WPW status post surgical ablation of a left lateral electrical pathway at age 37. She did not have any recurrent palpitations until 2014. At that time, she was seen at [facility] in [place] and had ECG consistent with atrial flutter. This episode of atrial flutter lasted several hours and terminated spontaneously. She saw cardiology after that and plan was for obser     History of blood transfusion      History of Gabino-Parkinson-White (WPW)  syndrome     She has a history of WPW status post surgical ablation of a left lateral electrical pathway at age 37.      Other and unspecified nonspecific immunological findings     anti Jka red cell antibody     Social History     Socioeconomic History     Marital status:    Tobacco Use     Smoking status: Never     Smokeless tobacco: Never   Vaping Use     Vaping Use: Never used   Substance and Sexual Activity     Alcohol use: Yes     Comment: glass wine most days     Drug use: Never     Sexual activity: Yes     Partners: Male     Social Determinants of Health     Financial Resource Strain: Low Risk  (11/16/2023)    Financial Resource Strain      Within the past 12 months, have you or your family members you live with been unable to get utilities (heat, electricity) when it was really needed?: No   Food Insecurity: Low Risk  (11/16/2023)    Food Insecurity      Within the past 12 months, did you worry that your food would run out before you got money to buy more?: No      Within the past 12 months, did the food you bought just not last and you didn t have money to get more?: No   Transportation Needs: Low Risk  (11/16/2023)    Transportation Needs      Within the past 12 months, has lack of transportation kept you from medical appointments, getting your medicines, non-medical meetings or appointments, work, or from getting things that you need?: No   Interpersonal Safety: Low Risk  (11/17/2023)    Interpersonal Safety      Do you feel physically and emotionally safe where you currently live?: Yes      Within the past 12 months, have you been hit, slapped, kicked or otherwise physically hurt by someone?: No      Within the past 12 months, have you been humiliated or emotionally abused in other ways by your partner or ex-partner?: No   Housing Stability: Low Risk  (11/16/2023)    Housing Stability      Do you have housing? : Yes      Are you worried about losing your housing?: No         Patient's past  medical, surgical, social, and family histories were personally reviewed today and no changes are noted.    REVIEW OF SYSTEMS:  10 point ROS is negative other than symptoms noted above in HPI, Past Medical History or as stated below  Constitutional: NEGATIVE for fever, chills, change in weight  Skin: NEGATIVE for worrisome rashes, moles or lesions  GI/: NEGATIVE for bowel or bladder changes  Neuro: NEGATIVE for weakness, dizziness or paresthesias    OBJECTIVE:  LMP  (LMP Unknown)    General: healthy, alert and in no distress  HEENT: no scleral icterus or conjunctival erythema  Skin: no suspicious lesions or rash. No jaundice.  CV: no pedal edema  Resp: normal respiratory effort without conversational dyspnea   Psych: normal mood and affect  Gait: normal steady gait with appropriate coordination and balance  Neuro: Normal light sensory exam of lower extremity      MSK:  Exam shows a very pleasant 73-year-old female who ambulates full weightbearing without assistive device.  She does have a wrap around thumb brace on the right side.  Examination of the right wrist shows enlargement of the radial styloid process on the radial side which is very firm and minimally tender.  She has positive de Quervain's Finkelstein's test causing discomfort along the length of the first dorsal compartment.  Grind test of the basal joint is largely unremarkable without crepitation.  Slight degenerative changes are noted at the IP joint which is nontender.  Pain is elicited with resisted thumb extension but no pain with flexion of the IP joint.  Patient is neurovascularly intact.  Radial and ulnar collateral ligaments of the MCP joint are stable and pain-free.  Patient is a very small 2 mm cyst noted on the volar aspect at the radial carpal junction radial to the palmaris longus tendon which is nontender.  Overlying vein is noted.  Remaining wrist and forearm are nontender.  No pain throughout the remaining carpus or  hand.        Independent visualization of the below image:  Three-view x-ray of the patient's right wrist are obtained and reviewed today showing thickening of the soft tissues over the radial styloid process but no bony enlargement or change.  Very mild degenerative changes are noted of the first CMC joint.  No evidence of fracture or dislocation    Patient's conditions were thoroughly discussed during today's visit with total time spent face-to-face with the patient and documentation being 20 minutes.    Dl Dallas PA-C  [facility]    This note was completed in part using a voice recognition software, any grammatical or context distortion are unintentional and inherent to the software.       Again, thank you for allowing me to participate in the care of your patient.        Sincerely,        Dl Dallas PA-C

## 2023-12-05 NOTE — PATIENT INSTRUCTIONS
Today we discussed the underlying etiology/pathology of patient's   1. Tendinitis, de Quervain's- right wrist      -We discussed patient's x-rays today of the wrist showing no bony enlargement over the radial styloid.  Thickening of the soft tissue is noted in this region.  - Patient has very mild early age-appropriate DJD changes of the basal joint but is asymptomatic in this regards  - We discussed treatment options for de Quervain's tendinitis of the first dorsal compartment of the thumb including rest, activity modification, oral anti-inflammatory medication, continued occupational therapy with additional iontophoresis and possible cortisone injection with continued immobilization with her thumb splint  - Patient like to proceed with iontophoresis to be added to her occupational therapy regiment.  This was ordered and dexamethasone bottle was sent to the [facility] pharmacy to be utilized with her OT sessions  - If patient does not improve as expected she should follow-up for repeat discussion about oral agent to decrease inflammation or possible first dorsal compartment cortisone injection  - Patient's complaint is pain and dysfunction with the thumb and is not very worried about the enlargement of the tissue.  She also has a very small cystic structure at the radiocarpal junction on the volar aspect of the wrist just radial to the palmaris longus tendon which is likely degenerative in nature which the patient is not symptomatic and is not concerned about.    -Call direct clinic number [[phone]] at any time with questions or concerns in regards to your recent office visit with me.     Dl Dallas PA-C  [facility]

## 2023-12-12 ENCOUNTER — LAB (OUTPATIENT)
Dept: LAB | Facility: CLINIC | Age: 73
End: 2023-12-12
Payer: COMMERCIAL

## 2023-12-12 DIAGNOSIS — I10 ESSENTIAL HYPERTENSION: ICD-10-CM

## 2023-12-12 LAB
ALBUMIN UR-MCNC: NEGATIVE MG/DL
APPEARANCE UR: CLEAR
BILIRUB UR QL STRIP: NEGATIVE
COLOR UR AUTO: YELLOW
ERYTHROCYTE [DISTWIDTH] IN BLOOD BY AUTOMATED COUNT: 12.2 % (ref 10–15)
GLUCOSE UR STRIP-MCNC: NEGATIVE MG/DL
HCT VFR BLD AUTO: 37.4 % (ref 35–47)
HGB BLD-MCNC: 12.4 G/DL (ref 11.7–15.7)
HGB UR QL STRIP: NEGATIVE
KETONES UR STRIP-MCNC: ABNORMAL MG/DL
LEUKOCYTE ESTERASE UR QL STRIP: NEGATIVE
MCH RBC QN AUTO: 30.7 PG (ref 26.5–33)
MCHC RBC AUTO-ENTMCNC: 33.2 G/DL (ref 31.5–36.5)
MCV RBC AUTO: 93 FL (ref 78–100)
NITRATE UR QL: NEGATIVE
PH UR STRIP: 5 [PH] (ref 5–7)
PLATELET # BLD AUTO: 254 10E3/UL (ref 150–450)
RBC # BLD AUTO: 4.04 10E6/UL (ref 3.8–5.2)
SP GR UR STRIP: >=1.03 (ref 1–1.03)
UROBILINOGEN UR STRIP-ACNC: 0.2 E.U./DL
WBC # BLD AUTO: 6 10E3/UL (ref 4–11)

## 2023-12-12 PROCEDURE — 85027 COMPLETE CBC AUTOMATED: CPT

## 2023-12-12 PROCEDURE — 84439 ASSAY OF FREE THYROXINE: CPT

## 2023-12-12 PROCEDURE — 84443 ASSAY THYROID STIM HORMONE: CPT

## 2023-12-12 PROCEDURE — 81003 URINALYSIS AUTO W/O SCOPE: CPT

## 2023-12-12 PROCEDURE — 80048 BASIC METABOLIC PNL TOTAL CA: CPT

## 2023-12-12 PROCEDURE — 36415 COLL VENOUS BLD VENIPUNCTURE: CPT

## 2023-12-13 LAB
ANION GAP SERPL CALCULATED.3IONS-SCNC: 6 MMOL/L (ref 7–15)
BUN SERPL-MCNC: 17.7 MG/DL (ref 8–23)
CALCIUM SERPL-MCNC: 9.4 MG/DL (ref 8.8–10.2)
CHLORIDE SERPL-SCNC: 101 MMOL/L (ref 98–107)
CREAT SERPL-MCNC: 0.91 MG/DL (ref 0.51–0.95)
DEPRECATED HCO3 PLAS-SCNC: 30 MMOL/L (ref 22–29)
EGFRCR SERPLBLD CKD-EPI 2021: 66 ML/MIN/1.73M2
GLUCOSE SERPL-MCNC: 102 MG/DL (ref 70–99)
POTASSIUM SERPL-SCNC: 5 MMOL/L (ref 3.4–5.3)
SODIUM SERPL-SCNC: 137 MMOL/L (ref 135–145)
T4 FREE SERPL-MCNC: 0.69 NG/DL (ref 0.9–1.7)
TSH SERPL DL<=0.005 MIU/L-ACNC: 4.26 UIU/ML (ref 0.3–4.2)

## 2023-12-14 DIAGNOSIS — R79.89 ABNORMAL THYROID BLOOD TEST: Primary | ICD-10-CM

## 2023-12-21 ENCOUNTER — THERAPY VISIT (OUTPATIENT)
Dept: OCCUPATIONAL THERAPY | Facility: CLINIC | Age: 73
End: 2023-12-21
Payer: COMMERCIAL

## 2023-12-21 ENCOUNTER — ALLIED HEALTH/NURSE VISIT (OUTPATIENT)
Dept: FAMILY MEDICINE | Facility: CLINIC | Age: 73
End: 2023-12-21
Payer: COMMERCIAL

## 2023-12-21 ENCOUNTER — THERAPY VISIT (OUTPATIENT)
Dept: PHYSICAL THERAPY | Facility: CLINIC | Age: 73
End: 2023-12-21
Payer: COMMERCIAL

## 2023-12-21 VITALS — SYSTOLIC BLOOD PRESSURE: 108 MMHG | DIASTOLIC BLOOD PRESSURE: 68 MMHG

## 2023-12-21 DIAGNOSIS — M25.531 RIGHT WRIST PAIN: Primary | ICD-10-CM

## 2023-12-21 DIAGNOSIS — M25.551 HIP PAIN, RIGHT: Primary | ICD-10-CM

## 2023-12-21 DIAGNOSIS — Z01.30 BP CHECK: Primary | ICD-10-CM

## 2023-12-21 PROCEDURE — 97110 THERAPEUTIC EXERCISES: CPT | Mod: GP | Performed by: PHYSICAL THERAPIST

## 2023-12-21 PROCEDURE — 97112 NEUROMUSCULAR REEDUCATION: CPT | Mod: GP | Performed by: PHYSICAL THERAPIST

## 2023-12-21 PROCEDURE — 97140 MANUAL THERAPY 1/> REGIONS: CPT | Mod: GO | Performed by: OCCUPATIONAL THERAPIST

## 2023-12-21 PROCEDURE — 97033 APP MDLTY 1+IONTPHRSIS EA 15: CPT | Mod: GO | Performed by: OCCUPATIONAL THERAPIST

## 2023-12-21 PROCEDURE — 99207 PR NO CHARGE NURSE ONLY: CPT | Performed by: PHYSICIAN ASSISTANT

## 2023-12-21 PROCEDURE — 97112 NEUROMUSCULAR REEDUCATION: CPT | Mod: GO | Performed by: OCCUPATIONAL THERAPIST

## 2023-12-21 NOTE — PROGRESS NOTES
Anali Sanchez was evaluated at [facility] on December 21, 2023 at which time her blood pressure was:    BP Readings from Last 1 Encounters:   12/21/23 108/68     No data recorded      Reviewed lifestyle modifications for blood pressure control and reduction: including making healthy food choices, managing weight, getting regular exercise, smoking cessation, reducing alcohol consumption, monitoring blood pressure regularly.     Symptoms: None    BP Goal:< 140/90 mmHg    BP Assessment:  BP at goal    Potential Reasons for BP too high: NA - Not applicable    BP Follow-Up Plan: Recheck BP in 6 months at pharmacy    Recommendation to Provider: none    Note completed by: Aric Bowden RPH     Thank You   Sandee Lindsey  [facility]

## 2024-01-02 ENCOUNTER — MYC MEDICAL ADVICE (OUTPATIENT)
Dept: FAMILY MEDICINE | Facility: CLINIC | Age: 74
End: 2024-01-02
Payer: COMMERCIAL

## 2024-01-02 DIAGNOSIS — F41.1 GENERALIZED ANXIETY DISORDER: ICD-10-CM

## 2024-01-03 ENCOUNTER — MYC REFILL (OUTPATIENT)
Dept: FAMILY MEDICINE | Facility: CLINIC | Age: 74
End: 2024-01-03
Payer: COMMERCIAL

## 2024-01-03 DIAGNOSIS — F41.1 GENERALIZED ANXIETY DISORDER: ICD-10-CM

## 2024-01-03 RX ORDER — SERTRALINE HYDROCHLORIDE 25 MG/1
37.5 TABLET, FILM COATED ORAL DAILY
Qty: 135 TABLET | Refills: 1 | OUTPATIENT
Start: 2024-01-03

## 2024-01-04 ENCOUNTER — THERAPY VISIT (OUTPATIENT)
Dept: OCCUPATIONAL THERAPY | Facility: CLINIC | Age: 74
End: 2024-01-04
Payer: COMMERCIAL

## 2024-01-04 DIAGNOSIS — M25.531 RIGHT WRIST PAIN: Primary | ICD-10-CM

## 2024-01-04 PROCEDURE — 97112 NEUROMUSCULAR REEDUCATION: CPT | Mod: GO | Performed by: OCCUPATIONAL THERAPIST

## 2024-01-04 PROCEDURE — 97140 MANUAL THERAPY 1/> REGIONS: CPT | Mod: GO | Performed by: OCCUPATIONAL THERAPIST

## 2024-01-04 PROCEDURE — 97033 APP MDLTY 1+IONTPHRSIS EA 15: CPT | Mod: GO | Performed by: OCCUPATIONAL THERAPIST

## 2024-01-04 RX ORDER — SERTRALINE HYDROCHLORIDE 25 MG/1
25 TABLET, FILM COATED ORAL DAILY
Qty: 90 TABLET | Refills: 1 | Status: SHIPPED | OUTPATIENT
Start: 2024-01-04 | End: 2024-06-27

## 2024-01-04 NOTE — TELEPHONE ENCOUNTER
"Jovita,     Per OV 11/17/23:  \"Decreased sertraline back down to 25 mg daily - didn't like being on 37.5 mg daily. Mood feels better. 's mental health is better. \"     Current script for 37.5mg      Disp Refills Start End LEV   sertraline (ZOLOFT) 25 MG tablet 135 tablet 1 10/4/2023 -- No   Sig - Route: Take 1.5 tablets (37.5 mg) by mouth daily - Oral   Patient taking differently: Take 25 mg by mouth daily   Sent to pharmacy as: Sertraline HCl 25 MG Oral Tablet (ZOLOFT)   Class: E-Prescribe   Order: [id number]       Please advise     25mg and pharmacy pended    Rachael VASQUEZ RN on 1/4/2024 at 2:05 PM     "

## 2024-01-08 ENCOUNTER — OFFICE VISIT (OUTPATIENT)
Dept: PEDIATRICS | Facility: CLINIC | Age: 74
End: 2024-01-08
Payer: COMMERCIAL

## 2024-01-08 ENCOUNTER — MYC MEDICAL ADVICE (OUTPATIENT)
Dept: FAMILY MEDICINE | Facility: CLINIC | Age: 74
End: 2024-01-08
Payer: COMMERCIAL

## 2024-01-08 ENCOUNTER — HOSPITAL ENCOUNTER (OUTPATIENT)
Dept: CT IMAGING | Facility: CLINIC | Age: 74
Discharge: HOME | End: 2024-01-08
Attending: PHYSICIAN ASSISTANT | Admitting: PHYSICIAN ASSISTANT
Payer: COMMERCIAL

## 2024-01-08 VITALS
HEART RATE: 68 BPM | SYSTOLIC BLOOD PRESSURE: 127 MMHG | WEIGHT: 140 LBS | RESPIRATION RATE: 18 BRPM | OXYGEN SATURATION: 99 % | TEMPERATURE: 97.9 F | BODY MASS INDEX: 24.8 KG/M2 | DIASTOLIC BLOOD PRESSURE: 70 MMHG

## 2024-01-08 DIAGNOSIS — R30.0 DYSURIA: ICD-10-CM

## 2024-01-08 DIAGNOSIS — Z78.9 DAILY CONSUMPTION OF ALCOHOL: ICD-10-CM

## 2024-01-08 DIAGNOSIS — R41.0 CONFUSION: ICD-10-CM

## 2024-01-08 DIAGNOSIS — R06.89 OTHER ABNORMALITIES OF BREATHING: ICD-10-CM

## 2024-01-08 DIAGNOSIS — D53.9 MACROCYTIC ANEMIA: ICD-10-CM

## 2024-01-08 DIAGNOSIS — E03.9 HYPOTHYROIDISM, UNSPECIFIED TYPE: Primary | ICD-10-CM

## 2024-01-08 DIAGNOSIS — R07.89 ATYPICAL CHEST PAIN: ICD-10-CM

## 2024-01-08 DIAGNOSIS — I10 ESSENTIAL HYPERTENSION: ICD-10-CM

## 2024-01-08 PROBLEM — D68.9 COAGULATION DISORDER (H): Status: RESOLVED | Noted: 2023-01-02 | Resolved: 2024-01-08

## 2024-01-08 LAB
ALBUMIN SERPL BCG-MCNC: 4.3 G/DL (ref 3.5–5.2)
ALBUMIN UR-MCNC: NEGATIVE MG/DL
ALP SERPL-CCNC: 50 U/L (ref 40–150)
ALT SERPL W P-5'-P-CCNC: 14 U/L (ref 0–50)
ANION GAP SERPL CALCULATED.3IONS-SCNC: 11 MMOL/L (ref 7–15)
APPEARANCE UR: CLEAR
AST SERPL W P-5'-P-CCNC: 22 U/L (ref 0–45)
BACTERIA #/AREA URNS HPF: ABNORMAL /HPF
BASOPHILS # BLD AUTO: 0 10E3/UL (ref 0–0.2)
BASOPHILS NFR BLD AUTO: 1 %
BILIRUB SERPL-MCNC: 0.2 MG/DL
BILIRUB UR QL STRIP: NEGATIVE
BUN SERPL-MCNC: 17 MG/DL (ref 8–23)
CALCIUM SERPL-MCNC: 9.3 MG/DL (ref 8.8–10.2)
CHLORIDE SERPL-SCNC: 102 MMOL/L (ref 98–107)
COLOR UR AUTO: ABNORMAL
CREAT SERPL-MCNC: 0.79 MG/DL (ref 0.51–0.95)
CRP SERPL-MCNC: 4.09 MG/L
DEPRECATED HCO3 PLAS-SCNC: 26 MMOL/L (ref 22–29)
EGFRCR SERPLBLD CKD-EPI 2021: 79 ML/MIN/1.73M2
EOSINOPHIL # BLD AUTO: 0.3 10E3/UL (ref 0–0.7)
EOSINOPHIL NFR BLD AUTO: 5 %
ERYTHROCYTE [DISTWIDTH] IN BLOOD BY AUTOMATED COUNT: 12.7 % (ref 10–15)
ERYTHROCYTE [SEDIMENTATION RATE] IN BLOOD BY WESTERGREN METHOD: 16 MM/HR (ref 0–30)
GLUCOSE SERPL-MCNC: 96 MG/DL (ref 70–99)
GLUCOSE UR STRIP-MCNC: NEGATIVE MG/DL
HCT VFR BLD AUTO: 34.9 % (ref 35–47)
HGB BLD-MCNC: 11.4 G/DL (ref 11.7–15.7)
HGB UR QL STRIP: ABNORMAL
IMM GRANULOCYTES # BLD: 0 10E3/UL
IMM GRANULOCYTES NFR BLD: 0 %
KETONES UR STRIP-MCNC: NEGATIVE MG/DL
LEUKOCYTE ESTERASE UR QL STRIP: NEGATIVE
LYMPHOCYTES # BLD AUTO: 1.8 10E3/UL (ref 0.8–5.3)
LYMPHOCYTES NFR BLD AUTO: 30 %
MCH RBC QN AUTO: 30.6 PG (ref 26.5–33)
MCHC RBC AUTO-ENTMCNC: 32.7 G/DL (ref 31.5–36.5)
MCV RBC AUTO: 94 FL (ref 78–100)
MONOCYTES # BLD AUTO: 0.5 10E3/UL (ref 0–1.3)
MONOCYTES NFR BLD AUTO: 8 %
NEUTROPHILS # BLD AUTO: 3.2 10E3/UL (ref 1.6–8.3)
NEUTROPHILS NFR BLD AUTO: 56 %
NITRATE UR QL: NEGATIVE
NRBC # BLD AUTO: 0 10E3/UL
NRBC BLD AUTO-RTO: 0 /100
NT-PROBNP SERPL-MCNC: 625 PG/ML (ref 0–900)
PH UR STRIP: 5 [PH] (ref 5–7)
PLATELET # BLD AUTO: 241 10E3/UL (ref 150–450)
POTASSIUM SERPL-SCNC: 4.7 MMOL/L (ref 3.4–5.3)
PROT SERPL-MCNC: 7.6 G/DL (ref 6.4–8.3)
RBC # BLD AUTO: 3.73 10E6/UL (ref 3.8–5.2)
RBC URINE: 4 /HPF
SODIUM SERPL-SCNC: 139 MMOL/L (ref 135–145)
SP GR UR STRIP: 1.01 (ref 1–1.03)
SQUAMOUS EPITHELIAL: <1 /HPF
T4 FREE SERPL-MCNC: 0.67 NG/DL (ref 0.9–1.7)
TROPONIN T SERPL HS-MCNC: 8 NG/L
TSH SERPL DL<=0.005 MIU/L-ACNC: 4.78 UIU/ML (ref 0.3–4.2)
UROBILINOGEN UR STRIP-MCNC: NORMAL MG/DL
VIT B12 SERPL-MCNC: 814 PG/ML (ref 232–1245)
WBC # BLD AUTO: 5.8 10E3/UL (ref 4–11)
WBC URINE: 1 /HPF

## 2024-01-08 PROCEDURE — 80053 COMPREHEN METABOLIC PANEL: CPT | Performed by: PHYSICIAN ASSISTANT

## 2024-01-08 PROCEDURE — 99215 OFFICE O/P EST HI 40 MIN: CPT | Mod: 25 | Performed by: PHYSICIAN ASSISTANT

## 2024-01-08 PROCEDURE — 70450 CT HEAD/BRAIN W/O DYE: CPT

## 2024-01-08 PROCEDURE — 84439 ASSAY OF FREE THYROXINE: CPT | Performed by: PHYSICIAN ASSISTANT

## 2024-01-08 PROCEDURE — 36415 COLL VENOUS BLD VENIPUNCTURE: CPT | Mod: 90 | Performed by: PHYSICIAN ASSISTANT

## 2024-01-08 PROCEDURE — 82607 VITAMIN B-12: CPT | Performed by: PHYSICIAN ASSISTANT

## 2024-01-08 PROCEDURE — 87086 URINE CULTURE/COLONY COUNT: CPT | Performed by: PHYSICIAN ASSISTANT

## 2024-01-08 PROCEDURE — 81001 URINALYSIS AUTO W/SCOPE: CPT | Performed by: PHYSICIAN ASSISTANT

## 2024-01-08 PROCEDURE — 99000 SPECIMEN HANDLING OFFICE-LAB: CPT | Performed by: PHYSICIAN ASSISTANT

## 2024-01-08 PROCEDURE — 83880 ASSAY OF NATRIURETIC PEPTIDE: CPT | Performed by: PHYSICIAN ASSISTANT

## 2024-01-08 PROCEDURE — 85652 RBC SED RATE AUTOMATED: CPT | Performed by: PHYSICIAN ASSISTANT

## 2024-01-08 PROCEDURE — 85025 COMPLETE CBC W/AUTO DIFF WBC: CPT | Performed by: PHYSICIAN ASSISTANT

## 2024-01-08 PROCEDURE — 84484 ASSAY OF TROPONIN QUANT: CPT | Performed by: PHYSICIAN ASSISTANT

## 2024-01-08 PROCEDURE — 93005 ELECTROCARDIOGRAM TRACING: CPT | Performed by: PHYSICIAN ASSISTANT

## 2024-01-08 PROCEDURE — 86140 C-REACTIVE PROTEIN: CPT | Performed by: PHYSICIAN ASSISTANT

## 2024-01-08 PROCEDURE — 84443 ASSAY THYROID STIM HORMONE: CPT | Performed by: PHYSICIAN ASSISTANT

## 2024-01-08 PROCEDURE — 84207 ASSAY OF VITAMIN B-6: CPT | Mod: 90 | Performed by: PHYSICIAN ASSISTANT

## 2024-01-08 RX ORDER — LEVOTHYROXINE SODIUM 25 UG/1
25 TABLET ORAL DAILY
Qty: 90 TABLET | Refills: 0 | Status: SHIPPED | OUTPATIENT
Start: 2024-01-08 | End: 2024-04-08

## 2024-01-08 RX ORDER — OLMESARTAN MEDOXOMIL 20 MG/1
10 TABLET ORAL DAILY
Qty: 45 TABLET | Refills: 1 | Status: SHIPPED | OUTPATIENT
Start: 2024-01-08 | End: 2024-06-28

## 2024-01-08 NOTE — TELEPHONE ENCOUNTER
Called the pt to discuss.  She said typically she is not confused.  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  She has been doing that for a few years and then got confused.  She even gets days mixed up.  When she is driving, she thinks where am I going?  This happened 3 times in the last week.  It is times and dates seems to be the issues.      No one sided weakness.  No facial droop.  She does have a little bit of balance issues - she is a little tippy when she gets up at night.  She has a little light headedness.  No headaches.  She says she is a little dizzy when she gets up.  Advised to make position changes slowly.      She said she had an issue like this when she changed her anxiety med.  She said 6 weeks ago, she went on new bp med.      Advised I will discuss with Jovita Bauman.

## 2024-01-08 NOTE — RESULT ENCOUNTER NOTE
Results discussed directly with patient while patient was present. Any further details documented in the note.   Ewa Ritter PA-C

## 2024-01-08 NOTE — TELEPHONE ENCOUNTER
Talked to Jovita Bauman.    She advised seeing if the [facility] could see her.  Called the pt to see if she would be willing to go if they could take her.      Called the [facility] and spoke to Manuel.  He transferred me to Ewa.      Advised of below.  They can see her at 12:30.      Called the pt to advise.  Advised of address and phone number.

## 2024-01-09 LAB — BACTERIA UR CULT: NORMAL

## 2024-01-09 NOTE — RESULT ENCOUNTER NOTE
Criss  I have reviewed your recent test results:    Vitamin B12 level is normal - you do NOT need to start a supplement at this time.    For additional lab test information, www.testing.com is an excellent reference.     If you have any questions please do not hesitate to contact our office via phone ([phone]) or [employer].    Healthy regards,     Ewa Ritter MBA, MS, PA-C  [facility]

## 2024-01-11 LAB — PYRIDOXAL PHOS SERPL-SCNC: 204.9 NMOL/L

## 2024-01-15 ENCOUNTER — TELEPHONE (OUTPATIENT)
Dept: ORTHOPEDICS | Facility: CLINIC | Age: 74
End: 2024-01-15
Payer: COMMERCIAL

## 2024-01-15 NOTE — TELEPHONE ENCOUNTER
Duplicate encounter. Please see other encounter dated today 1/15/24.     Kristie Parada MSA, ATC  Certified Athletic Trainer    Topical Clindamycin Pregnancy And Lactation Text: This medication is Pregnancy Category B and is considered safe during pregnancy. It is unknown if it is excreted in breast milk.

## 2024-01-15 NOTE — TELEPHONE ENCOUNTER
[facility] Call Center    Phone Message    May a detailed message be left on voicemail: yes     Reason for Call: Pt has not had improvement with hand therapy, is wanting to discuss injection, please call regarding this    Action Taken: Other: bu ortho    Travel Screening: Not Applicable

## 2024-01-15 NOTE — CONFIDENTIAL NOTE
Please schedule patient to see Dr. Yeo, Mueller, Bartley or Madhu for ultrasound guided 1st dorsal compartment CSI.  Let me know if you have questions.   Thanks

## 2024-01-17 ENCOUNTER — OFFICE VISIT (OUTPATIENT)
Dept: ORTHOPEDICS | Facility: CLINIC | Age: 74
End: 2024-01-17
Payer: COMMERCIAL

## 2024-01-17 DIAGNOSIS — M65.4 TENDINITIS, DE QUERVAIN'S: Primary | ICD-10-CM

## 2024-01-17 PROCEDURE — 76942 ECHO GUIDE FOR BIOPSY: CPT | Performed by: FAMILY MEDICINE

## 2024-01-17 PROCEDURE — 20550 NJX 1 TENDON SHEATH/LIGAMENT: CPT | Mod: RT | Performed by: FAMILY MEDICINE

## 2024-01-17 RX ORDER — METHYLPREDNISOLONE ACETATE 40 MG/ML
40 INJECTION, SUSPENSION INTRA-ARTICULAR; INTRALESIONAL; INTRAMUSCULAR; SOFT TISSUE
Status: SHIPPED | OUTPATIENT
Start: 2024-01-17

## 2024-01-17 RX ORDER — LIDOCAINE HYDROCHLORIDE 10 MG/ML
1 INJECTION, SOLUTION INFILTRATION; PERINEURAL
Status: SHIPPED | OUTPATIENT
Start: 2024-01-17

## 2024-01-17 RX ADMIN — METHYLPREDNISOLONE ACETATE 40 MG: 40 INJECTION, SUSPENSION INTRA-ARTICULAR; INTRALESIONAL; INTRAMUSCULAR; SOFT TISSUE at 14:39

## 2024-01-17 RX ADMIN — LIDOCAINE HYDROCHLORIDE 1 ML: 10 INJECTION, SOLUTION INFILTRATION; PERINEURAL at 14:39

## 2024-01-17 NOTE — PROGRESS NOTES
ASSESSMENT & PLAN  Patient Instructions     1. Tendinitis, de Quervain's- right wrist      -Patient is referred by Dl Davis for a right first dorsal compartment tendon sheath cortisone injection  -Patient tolerated first dorsal compartment tendon sheath cortisone injection today without complications.  Patient was given postprocedure instructions  -There is a localized firm swelling over abductor pollicis longus tendon proximal to the radial styloid.  I visualized the soft tissue swelling with the ultrasound and could not see a discrete mass or cyst.  There appeared to be swelling around the tendon sheath in this region.  I am uncertain of the clinical significance of this finding.  -To consider an MRI of the right wrist if patient does not get significant relief from today's procedure and if the soft tissue swelling does not resolve  -Patient will follow-up with Dl davis as directed  -Call direct clinic number [[phone]] at any time with questions or concerns.    Albert Yeo MD [facility]  [facility]        -----    SUBJECTIVE:  Anali Sanchez is a 73 year old female who is seen for US guided right wrist - DeQuervain's cortisone injection at the request of Dl Davis PA-C.    Hand / Upper Extremity Injection/Arthrocentesis: R extensor compartment 1    Date/Time: 1/17/2024 2:39 PM    Performed by: Yeo, Albert, MD  Authorized by: Yeo, Albert, MD    Indications:  Pain  Needle Size:  25 G  Guidance: ultrasound    Approach:  Radial  Condition: de Quervain's      Site:  R extensor compartment 1  Medications:  40 mg methylPREDNISolone 40 MG/ML; 1 mL lidocaine 1 %  Outcome:  Tolerated well, no immediate complications  Procedure discussed: discussed risks, benefits, and alternatives    Consent Given by:  Patient  Timeout: timeout called immediately prior to procedure    Prep: patient was prepped and draped in usual sterile fashion     Ultrasound was used to  ensure safe and accurate needle placement and injection. Ultrasound images of the procedure were permanently stored.          Albert Yeo MD, [facility]

## 2024-01-17 NOTE — PATIENT INSTRUCTIONS
1. Tendinitis, de Quervain's- right wrist      -Patient is referred by Dl Davis for a right first dorsal compartment tendon sheath cortisone injection  -Patient tolerated first dorsal compartment tendon sheath cortisone injection today without complications.  Patient was given postprocedure instructions  -There is a localized firm swelling over abductor pollicis longus tendon proximal to the radial styloid.  I visualized the soft tissue swelling with the ultrasound and could not see a discrete mass or cyst.  There appeared to be swelling around the tendon sheath in this region.  I am uncertain of the clinical significance of this finding.  -To consider an MRI of the right wrist if patient does not get significant relief from today's procedure and if the soft tissue swelling does not resolve  -Patient will follow-up with Dl davis as directed  -Call direct clinic number [[phone]] at any time with questions or concerns.    Albert Yeo MD [facility]  [facility] [place]

## 2024-01-18 ENCOUNTER — THERAPY VISIT (OUTPATIENT)
Dept: PHYSICAL THERAPY | Facility: CLINIC | Age: 74
End: 2024-01-18
Payer: COMMERCIAL

## 2024-01-18 DIAGNOSIS — M25.551 HIP PAIN, RIGHT: Primary | ICD-10-CM

## 2024-01-18 PROCEDURE — 97110 THERAPEUTIC EXERCISES: CPT | Mod: GP | Performed by: PHYSICAL THERAPIST

## 2024-01-18 NOTE — PROGRESS NOTES
[facility]                                                                                   OUTPATIENT PHYSICAL THERAPY    PLAN OF TREATMENT FOR OUTPATIENT REHABILITATION   Patient's Last Name, First Name, Anali Garcia YOB: 1950   Provider's Name   RAUL [facility]   Medical Record No.  [id number]     Onset Date: 09/22/23  Start of Care Date: 11/22/23     Medical Diagnosis:  Hip pain, right      PT Treatment Diagnosis:  right hip, ?intra-articular, labral Plan of Treatment  Frequency/Duration: 1x/week over 3 week period/ 3    Certification date from 01/04/24 to 01/15/24         See note for plan of treatment details and functional goals.  New certification period covers today's visit.  Patient has had 4 PT visits.      Dl Leblanc, PT                         I CERTIFY THE NEED FOR THESE SERVICES FURNISHED UNDER        THIS PLAN OF TREATMENT AND WHILE UNDER MY CARE     (Physician attestation of this document indicates review and certification of the therapy plan).              Referring Provider:  Jovita Bauman    Initial Assessment  See Epic Evaluation- Start of Care Date: 11/22/23

## 2024-01-23 ENCOUNTER — TELEPHONE (OUTPATIENT)
Dept: FAMILY MEDICINE | Facility: CLINIC | Age: 74
End: 2024-01-23
Payer: COMMERCIAL

## 2024-01-23 NOTE — TELEPHONE ENCOUNTER
"Patient recently started BP med.    Two episodes of extreme dizziness today when getting up \"too fast\". Short in duration.    Denies nausea, SOB, chest pain.    BP at home 96/63. Two readings the same.    BP Readings from Last 3 Encounters:   01/08/24 127/70   12/21/23 108/68   12/01/23 110/65        Please advise.    Bessie Torres RN    "

## 2024-01-23 NOTE — TELEPHONE ENCOUNTER
"She started olmesartan 10 mg daily on 11/21/23.  Would recommend continuing to monitor BP at home and follow-up if low blood pressure or ongoing lightheadedness. Make position changes slowly.    BP Readings from Last 6 Encounters:   01/08/24 127/70   12/21/23 108/68   12/01/23 110/65   11/20/23 (!) 152/76   11/17/23 138/78   10/04/23 138/78     She was seen at [facility] on 1/[facility]/24 for confusion, atypical chest pain: \"Stat labs & EKG  overall quite reassuring without evidence of cardiac issues.  Stat head CT also reassuring.\" She was started on levothyroxine at that time for new hypothyroidism. She is due for follow-up with me in the next few weeks after starting levothyroxine. Please help schedule.    Jovita Bauman PA-C   "

## 2024-01-23 NOTE — TELEPHONE ENCOUNTER
Called pt and relayed provider message below.   Pt is leaving out of town for 6 weeks. Advised to call or send MCM with any questions.   Patient was given an opportunity to ask questions, verbalized understanding of plan, and is agreeable.     Sandra Mendez RN

## 2024-01-26 ENCOUNTER — TRANSFERRED RECORDS (OUTPATIENT)
Dept: HEALTH INFORMATION MANAGEMENT | Facility: CLINIC | Age: 74
End: 2024-01-26

## 2024-01-26 ENCOUNTER — TELEPHONE (OUTPATIENT)
Dept: ORTHOPEDICS | Facility: CLINIC | Age: 74
End: 2024-01-26
Payer: COMMERCIAL

## 2024-01-26 NOTE — TELEPHONE ENCOUNTER
[facility] Call Center    Phone Message    May a detailed message be left on voicemail: yes     Reason for Call: Symptoms or Concerns     If patient has red-flag symptoms, warm transfer to triage line    Current symptom or concern: Patient had injection on 1/17 in R wrist and calling to speak to care team regarding some concerns. Having numbness and burning in the R arm from hand to shoulder for the last 2 days    Symptoms have been present for:  2 day(s)    Has patient previously been seen for this? Yes    By : Dr. Yeo     Date: 1/17    Are there any new or worsening symptoms? Yes: numbness and burning     Action Taken: Other: Yeo     Travel Screening: Not Applicable

## 2024-01-26 NOTE — CONFIDENTIAL NOTE
I spoke with patient.  Onset of symptoms 2 days ago.  No particular known injury or trauma.  She states that all of her digits on her right hand feel numb and tingly.  Symptoms radiate involving the right upper extremity more so along the anterior bicep level up to the shoulder.  No left-sided symptoms.  We discussed that her symptoms are not related to her first dorsal compartment cortisone injection recently done by Dr. Yeo.  I recommend that she be seen by an orthopedic urgent care today where she is asked to evaluate her more closely.  We discussed that it may be cervical radiculopathy involving possibly the C5 or C6 nerve root.  She denies any red flag symptoms at this time.  She does have pain.  Patient is again out of the state of [place] for 6 weeks and will need to seek treatment where she currently is.

## 2024-01-26 NOTE — TELEPHONE ENCOUNTER
Patient was last seen on 1/17/24 and had US guided right first dorsal compartment tendon sheath cortisone injection.    Return call to patient to discuss.     Patient states she had no issues after the injection and her wrist was starting to feel better early this week. However, 2 days ago she started experiencing burning pain in right arm and numbness in right hand. She denies swelling, redness, or other signs of infection. Pain radiates from right sided chest down entire right arm and reports entire right hand is numb. Pain and numbness are constant. Pain wakes her at night. She notes weakness of her arm / hand, but believes it is likely due to pain. Pain is worse with movement, so she has been avoiding use of her arm. She has tried Tylenol 325mg Q4H which provides some relief. Patient had a colectomy 20 years ago, so she was told to only use regular strength Tylenol. She has been wearing a wrist brace but it does not seem to help. She denies a new acute injury or fall. She denies neck pain.    She is currently in [place] visiting family, but wonders if there is anything she can do to help with pain. Will discuss with providers and call her back. She verbalized understanding and was appreciative of call back.    Please advise.    Janelle Pradhan MBA, ATC

## 2024-02-16 PROBLEM — M25.531 RIGHT WRIST PAIN: Status: RESOLVED | Noted: 2023-12-03 | Resolved: 2024-02-16

## 2024-02-16 NOTE — PROGRESS NOTES
DISCHARGE  Reason for Discharge: Patient chooses to discontinue therapy.    Equipment Issued: none    Discharge Plan: Patient to continue home program.    Referring Provider:  Jovita Bauman

## 2024-02-23 ENCOUNTER — MYC MEDICAL ADVICE (OUTPATIENT)
Dept: FAMILY MEDICINE | Facility: CLINIC | Age: 74
End: 2024-02-23
Payer: COMMERCIAL

## 2024-02-23 DIAGNOSIS — M50.20 CERVICAL HERNIATED DISC: ICD-10-CM

## 2024-02-23 DIAGNOSIS — M25.521 PAIN IN JOINT INVOLVING UPPER ARM, RIGHT: Primary | ICD-10-CM

## 2024-02-23 NOTE — TELEPHONE ENCOUNTER
Jovita Bauman PA-C-    See [facility]: recommend patient schedule with local ortho provider to take over management? Referral pended.     -Patient requests referral to PT for right arm/wrist pain.  -Patient experiencing worsening right wrist pain, saw ortho provider in [place]. Patient advised to see PT.    Per visit note on 11/17/23  Right wrist pain  Suspect De Quervain's tenosynovitis related to overuse from recent piano playing. She does have a ganglion cyst on the right wrist but I don't think this is the cause of her wrist pain. Discussed diagnosis and conservative management. Follow-up with ortho if no improvement or worsening.  - Wrist/Arm Supplies Order Wrist Brace; Right; with thumb hanny SOLORIO RN, BSN  [facility]

## 2024-02-23 NOTE — TELEPHONE ENCOUNTER
VV scheduled.    Future Appointments 2/23/2024 - 8/21/2024        Date Visit Type Length Department Provider     3/4/2024 11:30 AM OFFICE VISIT 30 min RM Jovita Rouse PA-C    Location Instructions:     [facility] is located at [address] [address], near [address]. This is a half mile west of [address]. If traveling west on [address], turn south onto [address], then west onto [address] to reach the parking lot. If traveling east on [address], turn south directly onto [address].

## 2024-02-23 NOTE — TELEPHONE ENCOUNTER
I can place order for PT but I would also like to schedule a visit so we can discuss a little further and make sure there's not anything else she needs. We could start with virtual visit. Please help schedule.    Jovita Bauman PA-C

## 2024-03-04 ENCOUNTER — OFFICE VISIT (OUTPATIENT)
Dept: FAMILY MEDICINE | Facility: CLINIC | Age: 74
End: 2024-03-04
Payer: COMMERCIAL

## 2024-03-04 VITALS
WEIGHT: 135 LBS | BODY MASS INDEX: 23.92 KG/M2 | OXYGEN SATURATION: 99 % | RESPIRATION RATE: 15 BRPM | TEMPERATURE: 97.9 F | HEIGHT: 63 IN | SYSTOLIC BLOOD PRESSURE: 116 MMHG | HEART RATE: 63 BPM | DIASTOLIC BLOOD PRESSURE: 69 MMHG

## 2024-03-04 DIAGNOSIS — M54.12 CERVICAL RADICULOPATHY: Primary | ICD-10-CM

## 2024-03-04 DIAGNOSIS — M50.30 DDD (DEGENERATIVE DISC DISEASE), CERVICAL: ICD-10-CM

## 2024-03-04 PROCEDURE — 99213 OFFICE O/P EST LOW 20 MIN: CPT | Performed by: PHYSICIAN ASSISTANT

## 2024-03-04 ASSESSMENT — PAIN SCALES - GENERAL: PAINLEVEL: SEVERE PAIN (7)

## 2024-03-04 NOTE — PATIENT INSTRUCTIONS
Physical therapy order placed today  Follow-up with ortho for further evaluation/management of neck/arm pain: [phone]

## 2024-03-04 NOTE — PROGRESS NOTES
Assessment & Plan     Cervical radiculopathy  No red flag symptoms. Recommend physical therapy. Offered to order MRI but she prefers to schedule follow-up with her orthopedist for further evaluation and consideration of MRI.  - Physical Therapy  Referral; Future    DDD (degenerative disc disease), cervical  As above      Kyree Kahn is a 73 year old, presenting for the following health issues:  Musculoskeletal Problem (Wrist pain)        3/4/2024    11:13 AM   Additional Questions   Roomed by Sita SAXENA       History of Present Illness       Reason for visit:  Arm and hand pain  Symptom onset:  More than a month  Symptoms include:  Burning pain in arm and hand, not able to use my right hand.  Symptom intensity:  Severe  Symptom progression:  Staying the same  Had these symptoms before:  No  What makes it worse:  Trying to use my hand  What makes it better:  Only tylenol    She eats 2-3 servings of fruits and vegetables daily.She consumes 0 sweetened beverage(s) daily.She exercises with enough effort to increase her heart rate 10 to 19 minutes per day.  She exercises with enough effort to increase her heart rate 3 or less days per week.   She is taking medications regularly.     Was being treated for de quervains in right wrist, had cortisone injection in January 2024 and was doing well. Her de Quervain's tendinitis symptoms in the right wrist essentially have resolved.    She was on a recent vacation when she developed right arm pain along with numbness/tingling. Symptoms started 1/28/23. No trauma, injury, or obvious trigger.     She was seen by ortho in [place] and x-rays of her neck showed DDD and cervical radiculopathy was suspected. She was treated with 5-day course of prednisone and started on gabapentin 300 mg once daily. Symptoms improved but then worsened again and she [place] a separate ortho provider out of state who placed her on Celebrex 200 mg daily which she has taken as well as  "Tylenol nightly and gabapentin 300 mg nightly. She did try to increase her gabapentin to twice a day but chose to return to once a day. She denies any left-sided symptoms. She denies any cervical pain.  Symptoms seem to be radiating from the right shoulder level down the medial aspect of the right arm into the right hand. Overall her symptoms have improved since onset. The ortho provider she saw had recommended considering PT and cervical spine MRI. She would like referral for PT today and will schedule follow-up with ortho here to discuss MRI.    Review of Systems  Constitutional, HEENT, cardiovascular, pulmonary, gi and gu systems are negative, except as otherwise noted.      Objective    /69 (BP Location: Right arm, Patient Position: Sitting, Cuff Size: Adult Regular)   Pulse 63   Temp 97.9  F (36.6  C) (Oral)   Resp 15   Ht 1.6 m (5' 3\")   Wt 61.2 kg (135 lb)   LMP  (LMP Unknown)   SpO2 99%   BMI 23.91 kg/m    Body mass index is 23.91 kg/m .  Physical Exam   GENERAL: alert and no distress  RESP: lungs clear to auscultation - no rales, rhonchi or wheezes  CV: regular rate and rhythm, normal S1 S2, no S3 or S4, no murmur, click or rub  MSK: moving all extremities, no obvious deformity  NEURO: Normal strength and tone, mentation intact and speech normal  PSYCH: mentation appears normal, affect normal/bright          Signed Electronically by: Jovita Bauman PA-C    "

## 2024-03-05 ENCOUNTER — OFFICE VISIT (OUTPATIENT)
Dept: ORTHOPEDICS | Facility: CLINIC | Age: 74
End: 2024-03-05
Payer: COMMERCIAL

## 2024-03-05 VITALS — HEIGHT: 63 IN | WEIGHT: 135 LBS | BODY MASS INDEX: 23.92 KG/M2

## 2024-03-05 DIAGNOSIS — M50.30 DEGENERATIVE DISC DISEASE, CERVICAL: ICD-10-CM

## 2024-03-05 DIAGNOSIS — M54.12 CERVICAL RADICULOPATHY: Primary | ICD-10-CM

## 2024-03-05 PROCEDURE — 99214 OFFICE O/P EST MOD 30 MIN: CPT | Performed by: PHYSICIAN ASSISTANT

## 2024-03-05 RX ORDER — PREDNISONE 20 MG/1
TABLET ORAL
COMMUNITY
Start: 2024-01-26 | End: 2024-05-07

## 2024-03-05 RX ORDER — CELECOXIB 200 MG/1
200 CAPSULE ORAL DAILY
COMMUNITY
Start: 2024-02-19 | End: 2024-04-19

## 2024-03-05 RX ORDER — GABAPENTIN 300 MG/1
300 CAPSULE ORAL 3 TIMES DAILY
COMMUNITY
Start: 2024-01-26 | End: 2024-04-29

## 2024-03-05 NOTE — PATIENT INSTRUCTIONS
Today we discussed the underlying etiology/pathology of patient's   1. Cervical radiculopathy-right    2. Degenerative disc disease, cervical      -We discussed the patient appears to have acute onset of right-sided cervical radiculopathy.  I would suspect the C5-C7 interval based on physical exam and previous outside x-rays of the cervical column.  - Patient's symptoms have been present now for greater than 6 weeks and she has largely failed conservative treatment including medication management including oral prednisone, gabapentin, over-the-counter OTCs.  - We will proceed with cervical MRI to confirm cervical stenosis and possible nerve impingement.  Patient likely would benefit from transforaminal LIBBY.  I would refer her to Dr. Gannon for consult as well as injection therapy with continued management per the spine department.  - We discussed titrating up her gabapentin to twice daily dosing but patient is comfortable keeping with her current medication regimen which is Celebrex 200 mg daily, Tylenol up to 3000 mg daily and gabapentin 300 mg nightly.  - I will contact the patient via telephone with MRI results and we will update treatment plan at that time.  - We discussed red flag symptoms and when to present to the emergency department for red flag findings immediately.  -Patient to begin physical therapy later this week for her cervical spine.  Cervical traction may be of benefit.    -Call direct clinic number [[phone]] at any time with questions or concerns in regards to your recent office visit with me.     Dl Dallas PA-C  [facility]
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
## 2024-03-05 NOTE — LETTER
3/5/2024         RE: Anali Sanchez  [address] [facility] [address]        Dear Colleague,    Thank you for referring your patient, Anali Sanchez, to the [facility] [place]. Please see a copy of my visit note below.    ASSESSMENT & PLAN     Today we discussed the underlying etiology/pathology of patient's   1. Cervical radiculopathy-right    2. Degenerative disc disease, cervical      -We discussed the patient appears to have acute onset of right-sided cervical radiculopathy.  I would suspect the C5-C7 interval based on physical exam and previous outside x-rays of the cervical column.  - Patient's symptoms have been present now for greater than 6 weeks and she has largely failed conservative treatment including medication management including oral prednisone, gabapentin, over-the-counter OTCs.  - We will proceed with cervical MRI to confirm cervical stenosis and possible nerve impingement.  Patient likely would benefit from transforaminal LIBBY.  I would refer her to Dr. Gannon for consult as well as injection therapy with continued management per the spine department.  - We discussed titrating up her gabapentin to twice daily dosing but patient is comfortable keeping with her current medication regimen which is Celebrex 200 mg daily, Tylenol up to 3000 mg daily and gabapentin 300 mg nightly.  - I will contact the patient via telephone with MRI results and we will update treatment plan at that time.  - We discussed red flag symptoms and when to present to the emergency department for red flag findings immediately.  -Patient to begin physical therapy later this week for her cervical spine.  Cervical traction may be of benefit.    -Call direct clinic number [[phone]] at any time with questions or concerns in regards to your recent office visit with me.     Dl Dallas PA-C  [facility]          SUBJECTIVE  Anali Sanchez is  a/an 73 year old female who is seen in consultation at the request of  Jovita Bauman PA-C for evaluation of right cervical radicular pain. The patient is seen by themselves.    Expanded HPI:  Patient underwent a right wrist first dorsal compartment cortisone injection for de Quervain's tendinitis on 01/17/2024.  She was doing very well.  She went to [place] for a trip and contacted us stating that she was having right upper extremity symptoms with numbness and tingling.  I spoke to her and is sounded very radicular from her cervical spine.  Symptoms began on 01/28/2023 spontaneously without injury or trauma.  She was seen by an orthopedist in [place] and x-rays of her neck were obtained showing significant disc degeneration at C5-6 as well as degenerative change at C6-7 with small anterolisthesis.  Cervical radiculopathy was suspected.  No advanced imaging was obtained.  Patient was placed on a 5-day course of prednisone and started on gabapentin.  Patient continued her travels and was seen by another orthopedist later on who placed her on Celebrex 200 mg daily which she has taken as well as Tylenol nightly and gabapentin 300 mg nightly.  She did try to increase her gabapentin to twice a day but chose to return to once a day.  She denies any left-sided symptoms.  She denies any cervical pain.  He de Quervain's tendinitis symptoms in the right wrist essentially have resolved.  Symptoms seem to be radiating from the right shoulder level down the medial aspect of the right arm into the right hand.  Overall her symptoms have improved since onset.  She was referred to physical therapy for her cervical spine by her primary care provider who she saw yesterday.  Patient begins PT on 03/07/2024.  Patient denies any red flag symptoms.      Onset: 1.5 month(s) ago. Reports insidious onset without acute precipitating event.  Location of Pain: right cervical spine - radiating pain with numbness/tingling into the hand on the  underside of the right arm  Rating of Pain at worst: 9/10  Rating of Pain Currently: 7/10  Worsened by: worst at night (needs Tylenol to sleep), using the hand/arm, ADLs using the arm  Better with: mild with treatments tried - best to sleep with Tylenol  Treatments tried: rest/activity avoidance, ice, heat, Tylenol, other medications: Gabapentin q hs 300 mg and Celexobic 200 mg daily, Prednisone x 5 days initially  Past medical treatment consisted of first dorsal compartment wrist ultrasound-guided corticosteroid injection (most recent date: 1/17/24) that provided continued relief  Quality: sharp, stabbing, burning  Associated symptoms: swelling, numbness, tingling, and weakness of the hand with use  Orthopedic history: YES - Date: 2018 - Fell out of a truck with R hip/calf injury   Relevant surgical history: NO  Social history: social history: retired - ; [id number] program, walking    Past Medical History:   Diagnosis Date     Anemia      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37     Coagulation disorder (H24)     hx hemorrhage after heart and after colon surg     History of atrial flutter     She has a history of WPW status post surgical ablation of a left lateral electrical pathway at age 37. She did not have any recurrent palpitations until 2014. At that time, she was seen at [facility] in [place] and had ECG consistent with atrial flutter. This episode of atrial flutter lasted several hours and terminated spontaneously. She saw cardiology after that and plan was for obser     History of blood transfusion      History of Gabino-Parkinson-White (WPW) syndrome     She has a history of WPW status post surgical ablation of a left lateral electrical pathway at age 37.      Other and unspecified nonspecific immunological findings     anti Jka red cell antibody     Social History     Socioeconomic History     Marital status:    Tobacco Use      Smoking status: Never     Smokeless tobacco: Never   Vaping Use     Vaping Use: Never used   Substance and Sexual Activity     Alcohol use: Yes     Alcohol/week: 7.0 standard drinks of alcohol     Types: 7 Glasses of wine per week     Comment: glass wine most days     Drug use: Never     Sexual activity: Yes     Partners: Male     Social Determinants of Health     Financial Resource Strain: Low Risk  (11/16/2023)    Financial Resource Strain      Within the past 12 months, have you or your family members you live with been unable to get utilities (heat, electricity) when it was really needed?: No   Food Insecurity: Low Risk  (11/16/2023)    Food Insecurity      Within the past 12 months, did you worry that your food would run out before you got money to buy more?: No      Within the past 12 months, did the food you bought just not last and you didn t have money to get more?: No   Transportation Needs: Low Risk  (11/16/2023)    Transportation Needs      Within the past 12 months, has lack of transportation kept you from medical appointments, getting your medicines, non-medical meetings or appointments, work, or from getting things that you need?: No   Interpersonal Safety: Low Risk  (11/17/2023)    Interpersonal Safety      Do you feel physically and emotionally safe where you currently live?: Yes      Within the past 12 months, have you been hit, slapped, kicked or otherwise physically hurt by someone?: No      Within the past 12 months, have you been humiliated or emotionally abused in other ways by your partner or ex-partner?: No   Housing Stability: Low Risk  (11/16/2023)    Housing Stability      Do you have housing? : Yes      Are you worried about losing your housing?: No         Patient's past medical, surgical, social, and family histories were personally reviewed today and no changes are noted.    REVIEW OF SYSTEMS:  10 point ROS is negative other than symptoms noted above in HPI, Past Medical History or as  stated below  Constitutional: NEGATIVE for fever, chills, change in weight  Skin: NEGATIVE for worrisome rashes, moles or lesions  GI/: NEGATIVE for bowel or bladder changes  Neuro: NEGATIVE for weakness, dizziness or paresthesias    OBJECTIVE:  Vital signs as noted in EPIC for 3/5/2024  General: healthy, alert and in no distress  HEENT: no scleral icterus or conjunctival erythema  Skin: no suspicious lesions or rash. No jaundice.  CV: no pedal edema  Resp: normal respiratory effort without conversational dyspnea   Psych: normal mood and affect  Gait: normal steady gait with appropriate coordination and balance  Neuro: Normal light sensory exam of lower extremity      MSK:  Exam shows a pleasant 73-year-old female ablates weightbearing without assistive device.  No antalgia.  No Trendelenburg gait.  Normal heel-to-toe strike.  She is alert and orientated x 3.  Examination of the cervical column shows she lacks 2 fingerbreadths chin to chest without pain or discomfort.  Normal neck extension as well as 45 degrees of rotation and 45 degrees of head tilt all without reproduction of symptoms.  No pain to palpation throughout the cervical column with no evidence of swollen lymph nodes or muscle rigidity.  No pain throughout the upper trapezius or scapular borders.  No particular pain to palpation of the deltoid, bicep or tricep.  No pain throughout the forearm.  First dorsal compartment of the right wrist is nontender.  Chronic bony enlargement is noted over the radial border of the distal radial styloid unchanged from previous exam.  Gross sensation of the digits shows patient feels abnormal sensation involving digits 1 through 5 on the right hand compared to the left.  Patient is unsure if there is a sensory disturbance along the deltoid, biceps or triceps to touch.  Patient shows motor weakness of the bicep and tricep on the right side compared to the left.  Finklestein's test of the right first dorsal compartment  is negative.  No upper extremity swelling.        Independent visualization of the below image:  X-ray report of patient cervical spine and images on patient's phone show significant C5/C6 degenerative disc disease as well as degenerative changes at the C6-C7 level.  Small anterolisthesis noted at these levels.  No evidence of fracture or dislocation.    XRAY SPINE, CERVICAL MINIMUM 4 VIEWS 01/26/24  1:39 PM     IMPRESSION:     Advanced cervical spondylosis. No acute fracture is noted.     There is a carotid artery calcification. Does the patient have a bruit?     Electronically Verified and Signed by Attending Radiologist: Levy Wang MD 1/26/2024 1:45 PM   This exam was dictated at [facility].     Patient's conditions were thoroughly discussed during today's visit with total time reviewing patient's previous medical records/history/radiology, face-to-face examination and discussion and plan of care with the patient and documentation being 30 minutes.    Dl Dallas PA-C  [facility]    This note was completed in part using a voice recognition software, any grammatical or context distortion are unintentional and inherent to the software.       Again, thank you for allowing me to participate in the care of your patient.        Sincerely,        Dl Dallas PA-C

## 2024-03-07 ENCOUNTER — THERAPY VISIT (OUTPATIENT)
Dept: PHYSICAL THERAPY | Facility: CLINIC | Age: 74
End: 2024-03-07
Payer: COMMERCIAL

## 2024-03-07 DIAGNOSIS — M54.12 CERVICAL RADICULOPATHY: ICD-10-CM

## 2024-03-07 DIAGNOSIS — M54.2 NECK PAIN: Primary | ICD-10-CM

## 2024-03-07 PROBLEM — M50.30 DDD (DEGENERATIVE DISC DISEASE), CERVICAL: Status: ACTIVE | Noted: 2024-03-07

## 2024-03-07 PROCEDURE — 97161 PT EVAL LOW COMPLEX 20 MIN: CPT | Mod: GP | Performed by: PHYSICAL THERAPIST

## 2024-03-07 PROCEDURE — 97110 THERAPEUTIC EXERCISES: CPT | Mod: GP | Performed by: PHYSICAL THERAPIST

## 2024-03-07 NOTE — PROGRESS NOTES
PHYSICAL THERAPY EVALUATION  Type of Visit: Evaluation    See electronic medical record for Abuse and Falls Screening details.    Subjective       Presenting condition or subjective complaint: Main concern is right shoulder, arm, hand pain that started on 1/26/24.  she woke up with severe right shoulder and numbnes, pain in her right hand.  She went to  in [place].  She had x-rays and started 5 day prednisone burst.  Initially felt better but sxs returned after stopping the steroid.  Was prescribed gabapention and Celebrex.  Min to no relief.  She is still taking Gabapention and Celebrex and Tyelonol.  Main concern today is the pain in her right hand, more than her shoulder.  Hard to lift, use right hand and has constant pain.  Unable to play the piano.  Date of onset: 01/26/24    Relevant medical history: Heart problems; Pain at night or rest   Dates & types of surgery: Heart, colon    Prior diagnostic imaging/testing results: X-ray (MRI scheduled for 3/19/24)     Prior therapy history for the same diagnosis, illness or injury: Yes Hnd therapy      Living Environment  Social support: With a significant other or spouse   Type of home: House; Multi-level   Stairs to enter the home: Yes 2 Is there a railing: No   Ramp: No   Stairs inside the home: Yes 14 Is there a railing: Yes   Help at home: None  Equipment owned:       Employment: No    Hobbies/Interests: Reading, golfing, piano    Patient goals for therapy: Lift, play piano.  No pain.       Objective   CERVICAL SPINE EVALUATION  PAIN: Pain Level at Rest: 3/10  Pain Level with Use: 5/10  POSTURE: Sitting Posture: Rounded shoulders    WEIGHTBEARING ALIGNMENT: WFL  ROM:  Cx arom flex 100, ext 75, rotation rt 90-, left 75+.  B SB @ 50.  Shoulder arom.  Flexion Left 160, Right 152.  IR/EXT Right PSIS. Left T5.  Prom er B 90.  Flexion rt 150+, left 170-.    Strength - gross shoulder MMT er, flexion 4/5  MYOTOMES: WNL  FLEXIBILITY:  tx seated rotation 25, rt and lt -.   "      PALPATION:  + suboccipital, rt and lt.  Cautious, did not assess SCM, scalene.  B  UT +.    SPINAL SEGMENTAL CONCLUSIONS:  C7, T1-4 hypo.        Assessment & Plan   CLINICAL IMPRESSIONS  Medical Diagnosis: Cervical radiculopathy    Treatment Diagnosis: Neck, arm pain.  ? right shoulder pain, impingement   Impression/Assessment: Patient is a 73 year old female with right arm, hand complaints.  The following significant findings have been identified: Pain, Decreased ROM/flexibility, Decreased joint mobility, Decreased strength, Decreased activity tolerance, and Impaired posture. These impairments interfere with their ability to perform self care tasks, recreational activities, household chores, and driving  as compared to previous level of function.     Clinical Decision Making (Complexity):  Clinical Presentation: Stable/Uncomplicated  Clinical Presentation Rationale: based on medical and personal factors listed in PT evaluation  Clinical Decision Making (Complexity): Low complexity    PLAN OF CARE  Treatment Interventions:  Interventions: Manual Therapy, Neuromuscular Re-education, Therapeutic Activity, Therapeutic Exercise, Self-Care/Home Management    Long Term Goals     PT Goal 1  Goal Identifier: Sitting  Goal Description: Patient will be able to sit 30 minutes with good posture and minimal to no symptoms.  This will allow the patient to perform work or personal computer tasks.  Rationale: to maximize safety and independence within the home;to maximize safety and independence with transportation  Target Date: 04/04/24  PT Goal 2  Goal Identifier: Sitting  Goal Description: The patient will be able to sit for 45-60 minutes.  Rationale: to maximize safety and independence within the home;to maximize safety and independence with transportation  Target Date: 05/02/24  PT Goal 3  Goal Identifier: play piano  Goal Description: play piano for 30\"  Rationale: to maximize safety and independence with performance of " ADLs and functional tasks  Target Date: 05/02/24      Frequency of Treatment: 1x/week for 4, eo for 4 weeks  Duration of Treatment: 8    Education Assessment:   Learner/Method: Patient;Listening;Demonstration;Pictures/Video  Education Comments: Patient participated in their education    Risks and benefits of evaluation/treatment have been explained.   Patient/Family/caregiver agrees with Plan of Care.     Evaluation Time:     PT Eval, Low Complexity Minutes (29121): 25       Signing Clinician: Dl Leblanc PT      [facility]                                                                                   OUTPATIENT PHYSICAL THERAPY      PLAN OF TREATMENT FOR OUTPATIENT REHABILITATION   Patient's Last Name, First Name, Anali Garcia YOB: 1950   Provider's Name   [facility]   Medical Record No.  [id number]     Onset Date: 01/26/24  Start of Care Date: 03/07/24     Medical Diagnosis:  Cervical radiculopathy      PT Treatment Diagnosis:  Neck, arm pain.  ? right shoulder pain, impingement Plan of Treatment  Frequency/Duration: 1x/week for 4, eo for 4 weeks/ 8    Certification date from 03/07/24 to 05/02/24         See note for plan of treatment details and functional goals     Dl Leblanc, PT                         I CERTIFY THE NEED FOR THESE SERVICES FURNISHED UNDER        THIS PLAN OF TREATMENT AND WHILE UNDER MY CARE     (Physician attestation of this document indicates review and certification of the therapy plan).              Referring Provider:  Jovita Bauman    Initial Assessment  See Epic Evaluation- Start of Care Date: 03/07/24

## 2024-03-12 ENCOUNTER — HOSPITAL ENCOUNTER (OUTPATIENT)
Dept: MRI IMAGING | Facility: CLINIC | Age: 74
Discharge: HOME | End: 2024-03-12
Attending: PHYSICIAN ASSISTANT | Admitting: PHYSICIAN ASSISTANT
Payer: COMMERCIAL

## 2024-03-12 DIAGNOSIS — M54.12 CERVICAL RADICULOPATHY: ICD-10-CM

## 2024-03-12 DIAGNOSIS — M50.30 DEGENERATIVE DISC DISEASE, CERVICAL: ICD-10-CM

## 2024-03-12 PROCEDURE — 72141 MRI NECK SPINE W/O DYE: CPT

## 2024-03-13 ENCOUNTER — TELEPHONE (OUTPATIENT)
Dept: ORTHOPEDICS | Facility: CLINIC | Age: 74
End: 2024-03-13
Payer: COMMERCIAL

## 2024-03-13 DIAGNOSIS — M54.12 CERVICAL RADICULOPATHY: Primary | ICD-10-CM

## 2024-03-13 DIAGNOSIS — M50.30 DDD (DEGENERATIVE DISC DISEASE), CERVICAL: ICD-10-CM

## 2024-03-13 NOTE — CONFIDENTIAL NOTE
I spoke with patient today in regards to her cervical MRI findings.  Patient continues to have significant worsening symptoms.  Symptoms affect the right upper extremity.  Patient will be referred to neurosurgery for consult for possible CSI versus surgical consideration as patient is continuing to worsen and has failed oral medication and current physical therapy program.  Patient MRI shows multilevel stenosis which also correlates with her x-rays.    EXAM: MR CERVICAL SPINE W/O CONTRAST  LOCATION: [facility]  DATE: 3/12/2024     INDICATION: subacute right sided cervical and right upper extremity radicular pain  suspect C5 7 levels.  evaluate for cervical stenosis with nerve impingement  COMPARISON: None.  TECHNIQUE: MRI Cervical Spine without IV contrast.     FINDINGS:   Anterolisthesis of C4 on C5 measuring 2 mm and C7 on T1 measuring 1 mm. Straightening of the normal cervical lordosis. The vertebral bodies of the cervical spine otherwise have normal stature, alignment, and marrow signal. No evidence of signal   abnormality or expansion within the cervical spinal cord. No extraspinal abnormality.     Craniovertebral junction and C1-C2: Normal.     C2-C3: Loss of disc signal with normal disc height. No posterior disc bulge or spinal canal narrowing. No neural foraminal narrowing.      C3-C4: Mild loss of disc signal with normal disc height. No posterior disc bulge or spinal canal narrowing. Uncovertebral joint disease and facet arthropathy with severe left and moderate right neural foraminal narrowing.      C4-C5: Broad bar disc osteophyte complex with mild spinal canal narrowing. Uncovertebral joint disease and facet arthropathy with severe left neural foraminal narrowing. No right neural foraminal narrowing.      C5-C6: Mild loss of disc signal and disc height. Broad bar disc osteophyte complex with moderate spinal canal narrowing and near complete effacement of the CSF surrounding the  cervical spinal cord. Uncovertebral joint disease and facet arthropathy with   severe bilateral neural foraminal narrowing.      C6-C7: Mild loss of disc signal and disc height. No posterior disc bulge or spinal canal narrowing. Uncovertebral joint disease and facet arthropathy with mild bilateral neural foraminal narrowing.      C7-T1: Mild loss of disc signal and disc height. No posterior disc bulge or spinal canal narrowing. No neural foraminal narrowing.                                                                      IMPRESSION:  1.  At the C5/C6 level, there is a broad bar disc osteophyte complex with moderate spinal canal narrowing and near complete effacement of the CSF surrounding the cervical spinal cord.  2.  Multilevel uncovertebral joint disease and facet arthropathy with severe multilevel neural foraminal narrowing as described above.

## 2024-03-14 ENCOUNTER — THERAPY VISIT (OUTPATIENT)
Dept: PHYSICAL THERAPY | Facility: CLINIC | Age: 74
End: 2024-03-14
Payer: COMMERCIAL

## 2024-03-14 ENCOUNTER — MYC MEDICAL ADVICE (OUTPATIENT)
Dept: FAMILY MEDICINE | Facility: CLINIC | Age: 74
End: 2024-03-14

## 2024-03-14 DIAGNOSIS — M54.2 NECK PAIN: Primary | ICD-10-CM

## 2024-03-14 DIAGNOSIS — I65.29 CAROTID ARTERY CALCIFICATION, UNSPECIFIED LATERALITY: Primary | ICD-10-CM

## 2024-03-14 PROCEDURE — 97112 NEUROMUSCULAR REEDUCATION: CPT | Mod: GP | Performed by: PHYSICAL THERAPIST

## 2024-03-14 PROCEDURE — 97110 THERAPEUTIC EXERCISES: CPT | Mod: GP | Performed by: PHYSICAL THERAPIST

## 2024-03-14 NOTE — CONFIDENTIAL NOTE
Neurosurgery  SPINE PATIENTS - NEW PROTOCOL PREVISIT    RECORDS RECEIVED FROM: [facility]   REASON FOR VISIT:   M54.12 (ICD-10-CM) - Cervical radiculopathy   M50.30 (ICD-10-CM) - DDD (degenerative disc disease), cervical      Date of Appt: 3/26/24    NOTES (FOR ALL VISITS) STATUS DETAILS   OFFICE NOTE from referring provider Internal Additional Information:  multilevel cervial stenosis with right sided cervial radiculopathy- cervial MRI completed.  discuss LIBBY vs. surgical recommendations   OFFICE NOTE from other specialist Internal PT, FP   DISCHARGE SUMMARY from hospital N/A    DISCHARGE REPORT from ER N/A    OPERATIVE REPORT N/A    EMG REPORT N/A    MEDICATION LIST Internal    IMAGING  (FOR ALL VISITS)     MRI (HEAD, NECK, SPINE) Pacs Cervical 3/12/24    XRAY (SPINE) *NEUROSURGERY* In process Disk being sent   CT (HEAD, NECK, SPINE) Pacs Head 1/8/24

## 2024-03-14 NOTE — CONFIDENTIAL NOTE
Action F [phone] [facility]    Action Taken Requested disk with Cervcial XR from 1/26/24   Status: OK  rightfax conf at 120 p 3/14/24  Action P [phone] [facility]    Action Taken As above   Status: done

## 2024-03-26 ENCOUNTER — PRE VISIT (OUTPATIENT)
Dept: NEUROSURGERY | Facility: CLINIC | Age: 74
End: 2024-03-26

## 2024-03-26 ENCOUNTER — OFFICE VISIT (OUTPATIENT)
Dept: NEUROSURGERY | Facility: CLINIC | Age: 74
End: 2024-03-26
Attending: PHYSICIAN ASSISTANT
Payer: COMMERCIAL

## 2024-03-26 VITALS — HEART RATE: 61 BPM | DIASTOLIC BLOOD PRESSURE: 78 MMHG | SYSTOLIC BLOOD PRESSURE: 144 MMHG | OXYGEN SATURATION: 98 %

## 2024-03-26 DIAGNOSIS — M54.12 CERVICAL RADICULOPATHY: ICD-10-CM

## 2024-03-26 DIAGNOSIS — Z01.818 PREOP TESTING: Primary | ICD-10-CM

## 2024-03-26 DIAGNOSIS — M50.30 DDD (DEGENERATIVE DISC DISEASE), CERVICAL: ICD-10-CM

## 2024-03-26 PROCEDURE — 99205 OFFICE O/P NEW HI 60 MIN: CPT | Performed by: NEUROLOGICAL SURGERY

## 2024-03-26 RX ORDER — GABAPENTIN 100 MG/1
100 CAPSULE ORAL 3 TIMES DAILY
Qty: 90 CAPSULE | Refills: 2 | Status: SHIPPED | OUTPATIENT
Start: 2024-03-26 | End: 2024-05-07

## 2024-03-26 ASSESSMENT — PAIN SCALES - GENERAL: PAINLEVEL: SEVERE PAIN (6)

## 2024-03-26 NOTE — NURSING NOTE
"Anali Sanchez is a 73 year old female who presents for:  Chief Complaint   Patient presents with    Consult     Spine -multilevel cervial stenosis with right sided cervial radiculopathy- cervial MRI completed. discuss LIBBY vs. surgical recommendations. Numbness and tingly in right hand, severe arm pain has been improving.         Initial Vitals:  BP (!) 144/78   Pulse 61   LMP  (LMP Unknown)   SpO2 98%  Estimated body mass index is 23.91 kg/m  as calculated from the following:    Height as of 3/5/24: 5' 3\" (1.6 m).    Weight as of 3/5/24: 135 lb (61.2 kg).. There is no height or weight on file to calculate BSA. BP completed using cuff size: regular  Severe Pain (6)    Nursing Comments:       Kim Miller    "

## 2024-03-26 NOTE — LETTER
3/26/2024         RE: Anali Sanchez  [address] [facility] [address]        Dear Colleague,    Thank you for referring your patient, Anali Sanchez, to the [facility]. Please see a copy of my visit note below.    NEUROSURGERY CONSULTATION NOTE  Neurosurgery was asked to see this patient by Dl Dallas PA-C for evaluation of midline axial neck pain and right upper extremity pain likely along right C6 dermatomal distribution.       CONSULTATION ASSESSMENT AND PLAN:    Ms. Sanchez is a pleasant 73-year-old right-handed female with significant past medical history of WPW syndrome s/p open surgical ablation at the age of 36 years, complete colectomy 16 years ago at [place] for chronic constipation, bleeding disorder history of postoperative hemorrhage after heart and colon surgery requiring reexploration presented to the clinic today for evaluation of midline axial neck pain and right upper extremity radiculopathy likely along right C6 dermatome.  She has right biceps weakness to 4 x 5 along with sensory symptoms with no other focal neurological deficit.    I explained the MRI cervical spine findings to the patient and showed her the images.  I explained to her that she has multilevel degenerative disc disease with HNP primarily at C5-6 causing central and lateral recess stenosis primary on the right.  I discussed the natural history of degenerative disc disease with HNP and discussed the management options including conservative treatment with physical therapy, steroid injections, decompression and fusion with anterior cervical discectomy and fusion.  I also discussed alternatives including arthroplasty.  I briefly discussed the risk and benefits of the surgery including but not limited to bleeding, infection, injury to the nerve root, injury to the spinal cord, worsening of preoperative symptoms, injury to the major vascular structures of the neck, injury  to the trachea, injury to the esophagus, dysphagia, dysarthria, DVT/PE, MI and others.    However prior to finalizing the management plan I will refer her to the hematologist for clearance for anterior cervical discectomy and fusion at C5-6.  I will also refer her for right C5-6 transforaminal injection and EMG/NCV of the right upper extremity.  We will also prescribe her gabapentin 100 mg 3 times daily.  I also explained to the patient that we need clearance from her primary care physician for general anesthesia.  She can continue physical therapy and conservative management in the interim.  I will follow her with above-mentioned tests to finalize the management plan.    Patient agreed with the plan.  All the questions were answered and patient sounded understanding. She can contact us if there are any further questions or concerns or worsening neurological deficits.I spent more than 60 minutes in this apt, examining the pt, reviewing the scans, reviewing notes from chart, discussing treatment options with risks and benefits and coordinating care. This note was created in part by the use of Dragon voice recognition system. Inadvertent grammatical errors and typographical errors may  have occurred due to inherent limitation of voice recognition software.  Reasonable attempts made to avoid errors, but this document may contain an error not identified before finalizing.  Please contact me for any clarification needed.     Manuel Stout MD      HPI:    Ms. Sanchez is a pleasant 73-year-old right-handed female with significant past medical history of WPW syndrome s/p open surgical ablation at the age of 36 years, complete colectomy 16 years ago at [place] for chronic constipation, bleeding disorder history of postoperative hemorrhage after heart and colon surgery requiring reexploration presented to the clinic today for evaluation of midline axial neck pain and right upper extremity radiculopathy.    Patient started  noticing right hand pain in October 2023 while playing piano at [facility] and was initially diagnosed to have right de Quervain's tenosynovitis and received cortisone injection with some relief.  Subsequently she started noticing pain radiating from her neck to her right hand along the posterior aspect of the right arm forearm and primary involving right index and middle finger.  She describes her pain as sharp in nature 9 x 10 on VAS aggravated with neck movement and relieved with over-the-counter pain medication, steroids and ice.  She has tried oral steroids along with gabapentin with some relief of her symptoms.  She started noticing worsening of her pain during her road trip to [place] where she had recurrent pain and again received symptomatic treatment.    She came home and underwent MRI cervical spine which showed multilevel degenerative disc disease primarily at C5-6.  She was started on physical therapy since early March and underwent few sessions with no relief of her symptoms.  Patient reports that her pain is severe enough that is affecting her day-to-day activities and she is not able to use her right hand.  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  She denies any obvious weakness in her right hand however not able to use her right arm forearm secondary to pain.  She is on gabapentin 300 mg once a day with no significant relief.    Patient denies any new onset bladder or bowel incontinence, imbalance on walking or falls.  She is a non-smoker, occasionally consumes alcohol and denies use of recreational drugs.  She is not on antiplatelets or anticoagulants.    She has a history of open heart surgery with ablation for her WPW syndrome at the age of 36 years and complete colectomy 16 years ago at [facility] for chronic constipation.  During both of her surgeries she had postoperative hemorrhage requiring reexploration.    Past Medical History:   Diagnosis  Date     Anemia      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37     Coagulation disorder (H24)     hx hemorrhage after heart and after colon surg     History of atrial flutter     She has a history of WPW status post surgical ablation of a left lateral electrical pathway at age 37. She did not have any recurrent palpitations until 2014. At that time, she was seen at [facility] in [place] and had ECG consistent with atrial flutter. This episode of atrial flutter lasted several hours and terminated spontaneously. She saw cardiology after that and plan was for obser     History of blood transfusion      History of Gabino-Parkinson-White (WPW) syndrome     She has a history of WPW status post surgical ablation of a left lateral electrical pathway at age 37.      Other and unspecified nonspecific immunological findings     anti Jka red cell antibody       Past Surgical History:   Procedure Laterality Date     APPENDECTOMY  1968     CARDIAC SURGERY  age 37    WPW surg; open ablation; complicated with hemorrhage     COLECTOMY  age 52    7-8 inches rectum remain. cx with hemorrhage. no cancer. slow transit.(2 surgeries; first for low transit; second due to the bleeding)     DILATION AND CURETTAGE, HYSTEROSCOPY DIAGNOSTIC, COMBINED  2/21/2014    Procedure: COMBINED DILATION AND CURETTAGE, HYSTEROSCOPY DIAGNOSTIC;   DILATION AND CURETTAGE, HYSTEROSCOPY DIAGNOSTIC ;  Surgeon: Willie Osborn MD;  Location: [facility] OR     ENT SURGERY      t and a     EXCISE LESION FACE N/A 1/11/2023    Procedure: incision and removal of osteoma on forehead;  Surgeon: Dl Davenport MD;  [place] [facility] [place]       REVIEW OF SYSTEMS:  Review Of Systems  Skin: negative  Eyes: negative  Ears/Nose/Throat: negative  Respiratory: No shortness of breath, dyspnea on exertion, cough, or hemoptysis  Cardiovascular: negative  Gastrointestinal: negative  Genitourinary: negative  Musculoskeletal:  negative  Neurologic: Midline axial neck pain and right upper extremity pain   psychiatric: negative  Hematologic/Lymphatic/Immunologic: negative  Endocrine: negative    MEDICATIONS:    Current Outpatient Medications   Medication Sig Dispense Refill     Calcium-Vitamin D-Vitamin K 500-200-40 MG-UNT-MCG CHEW Takes 3 chewables daily       celecoxib (CELEBREX) 200 MG capsule Take 200 mg by mouth daily       dexAMETHasone (DECADRON) 4 MG/ML injection Use 4 mg or dose determined by provider for iontophoresis. 30 mL 0     gabapentin (NEURONTIN) 300 MG capsule Take 300 mg by mouth 3 times daily       levothyroxine (SYNTHROID/LEVOTHROID) 25 MCG tablet Take 1 tablet (25 mcg) by mouth daily 90 tablet 0     olmesartan (BENICAR) 20 MG tablet Take 0.5 tablets (10 mg) by mouth daily 45 tablet 1     predniSONE (DELTASONE) 20 MG tablet        sertraline (ZOLOFT) 25 MG tablet Take 1 tablet (25 mg) by mouth daily 90 tablet 1         ALLERGIES/SENSITIVITIES:     Allergies   Allergen Reactions     Benadryl [Diphenhydramine]      Given in the hospital and had a difficult time waking as expected.     External Allergen Needs Reconciliation - See Comment      Please reconcile the Patient's allergy reported as LEAD ACETATEMORPHINE SULFATE and update accordingly     Protamine      sob     Tylox [Oxycodone-Acetaminophen]      unknown     Ciprofloxacin Rash     Escitalopram Other (See Comments)     Brain fog, forgetfulness       PERTINENT SOCIAL HISTORY: Non-smoker  Social History     Socioeconomic History     Marital status:      Spouse name: None     Number of children: None     Years of education: None     Highest education level: None   Tobacco Use     Smoking status: Never     Smokeless tobacco: Never   Vaping Use     Vaping Use: Never used   Substance and Sexual Activity     Alcohol use: Yes     Alcohol/week: 7.0 standard drinks of alcohol     Types: 7 Glasses of wine per week     Comment: glass wine most days     Drug use: Never      Sexual activity: Yes     Partners: Male     Social Determinants of Health     Financial Resource Strain: Low Risk  (11/16/2023)    Financial Resource Strain      Within the past 12 months, have you or your family members you live with been unable to get utilities (heat, electricity) when it was really needed?: No   Food Insecurity: Low Risk  (11/16/2023)    Food Insecurity      Within the past 12 months, did you worry that your food would run out before you got money to buy more?: No      Within the past 12 months, did the food you bought just not last and you didn t have money to get more?: No   Transportation Needs: Low Risk  (11/16/2023)    Transportation Needs      Within the past 12 months, has lack of transportation kept you from medical appointments, getting your medicines, non-medical meetings or appointments, work, or from getting things that you need?: No   Interpersonal Safety: Low Risk  (11/17/2023)    Interpersonal Safety      Do you feel physically and emotionally safe where you currently live?: Yes      Within the past 12 months, have you been hit, slapped, kicked or otherwise physically hurt by someone?: No      Within the past 12 months, have you been humiliated or emotionally abused in other ways by your partner or ex-partner?: No   Housing Stability: Low Risk  (11/16/2023)    Housing Stability      Do you have housing? : Yes      Are you worried about losing your housing?: No         FAMILY HISTORY:  Family History   Problem Relation Age of Onset     Bronchitis Mother         copd     Alcoholism Mother      Varicose Veins Mother      Diabetes Father      Coronary Artery Disease Father      Deep Vein Thrombosis Father         after having COVID     Leukemia Sister         chronic     Impaired Fasting Glucose Sister      Cancer Brother         myelofibrosis; got BMT 2019     Diabetes Type 2  Brother      Impaired Fasting Glucose Brother         PHYSICAL EXAM:   Constitutional: BP (!) 144/78    Pulse 61   LMP  (LMP Unknown)   SpO2 98%      Mental Status: A & O in no acute distress.  Affect is appropriate.  Speech is fluent.  Recent and remote memory are intact.  Attention span and concentration are normal.        Cranial Nerves:   CN1: grossly intact per patient recall.   CN2: No funduscopic exam performed.   CN3,4 & 6: Pupillary light response, lateral and vertical gaze normal.  No nystagmus.  Visual fields are full to confrontation.   CN5: Intact to touch   CN7: No facial weakness, smile, facial symmetry intact.   CN8: Intact to spoken voice.   CN9&10: Gag reflex, uvula midline, palate rises with phonation.   CN11: Shoulder shrug 5/5 intact bilaterally.   CN12: Tongue midline and moves freely from side to side.     Motor: No pronator drift of upper extremity.   Normal bulk and tone all muscle groups of upper and lower extremities.       Delt Bi Tri Hand Flex/  Ext Iliopsoas Quadriceps Tibialis Anterior EHL Gastroc     C5 C6 C7 C8/T1 L2 L3 L4 L5 S1   R 5 4 5 5 5 5 5 5 5   L 5 5 5 5 5 5 5 5 5       Sensory: Sensation decreased to touch along right C6 dermatome    Coordination; finger to nose, rapid alternating movements smooth and rhythmic.   Romberg intact.   Heel/toe/tandem gait intact.    Normal gait and station.     Reflexes;                       Right              Left  Brachioradialis (C5,6)      2+                 2+  Biceps   (C5,6)                 2+                 2+  Triceps  (C7,8)                 2+                2+  Knee (L3,4)                      2+                2+  Ankle jerk (S1,2)              1+                1+      No hoffmans/babinski/ clonus.  Spurling's test negative        IMAGING:  I personally reviewed all radiographic images and agree with the neuroradiology report of lateral recess stenosis on the right at C5-6.     03/12/2024: MRI cervical spine:   MPRESSION:  1.  At the C5/C6 level, there is a broad bar disc osteophyte complex with moderate spinal canal narrowing and  near complete effacement of the CSF surrounding the cervical spinal cord.  2.  Multilevel uncovertebral joint disease and facet arthropathy with severe multilevel neural foraminal narrowing as described above.      Cc:   Jovita Bauman                Again, thank you for allowing me to participate in the care of your patient.        Sincerely,        Manuel Stout MD

## 2024-03-26 NOTE — PATIENT INSTRUCTIONS
Patient Next Steps:    Order placed for epidural steroid injection  The steroid can take 10-14 days to reach max effect  Please call our clinic if symptoms persist after this timeframe  Pain management department: [phone]  If you would like to look outside [facility], You can call [facility] (previously known as [facility]) to schedule your injection at [phone]    Dr Stout has ordered Gabapentin. This will be sent to your pharmacy with instruction to start with a lower dose and increase as tolerated. Please don't discontinue this medication abruptly    Order placed for EMG. You can call the phone number highlighted in the order to schedule your appointment. Please follow up in clinic when completed.   [facility] will call you to coordinate your care as prescribed by your provider. If you don't hear from a representative within 2 business days, please call [phone].    If you would like to inquire on a sooner Neurology appointment, I will list a few external options and their phone numbers. You can call and ask them how far out they are booking.  Please let us know if you wish to go to one of these external options and we can fax them the order.  [place]: [phone]  [facility]: [phone]  [place]: [phone]      Dr Stout would like to see you back in the clinic for follow up in 4-6 weeks after completing the above orders for a follow up to discuss surgery. Please call the number below to schedule.     A referral has been placed for you to see Dr. Jayden Caba with Hematology. They will call you to schedule. If you don't hear from them within 2 business days please call [phone]    Please call us if you have any further questions or concerns.    [facility]   Phone: [phone]  Fax: [phone]

## 2024-03-26 NOTE — PROGRESS NOTES
NEUROSURGERY CONSULTATION NOTE  Neurosurgery was asked to see this patient by Dl Dallas PA-C for evaluation of midline axial neck pain and right upper extremity pain likely along right C6 dermatomal distribution.       CONSULTATION ASSESSMENT AND PLAN:    Ms. Sanchez is a pleasant 73-year-old right-handed female with significant past medical history of WPW syndrome s/p open surgical ablation at the age of 36 years, complete colectomy 16 years ago at [place] for chronic constipation, bleeding disorder history of postoperative hemorrhage after heart and colon surgery requiring reexploration presented to the clinic today for evaluation of midline axial neck pain and right upper extremity radiculopathy likely along right C6 dermatome.  She has right biceps weakness to 4 x 5 along with sensory symptoms with no other focal neurological deficit.    I explained the MRI cervical spine findings to the patient and showed her the images.  I explained to her that she has multilevel degenerative disc disease with HNP primarily at C5-6 causing central and lateral recess stenosis primary on the right.  I discussed the natural history of degenerative disc disease with HNP and discussed the management options including conservative treatment with physical therapy, steroid injections, decompression and fusion with anterior cervical discectomy and fusion.  I also discussed alternatives including arthroplasty.  I briefly discussed the risk and benefits of the surgery including but not limited to bleeding, infection, injury to the nerve root, injury to the spinal cord, worsening of preoperative symptoms, injury to the major vascular structures of the neck, injury to the trachea, injury to the esophagus, dysphagia, dysarthria, DVT/PE, MI and others.    However prior to finalizing the management plan I will refer her to the hematologist for clearance for anterior cervical discectomy and fusion at C5-6.  I will also refer her for  right C5-6 transforaminal injection and EMG/NCV of the right upper extremity.  We will also prescribe her gabapentin 100 mg 3 times daily.  I also explained to the patient that we need clearance from her primary care physician for general anesthesia.  She can continue physical therapy and conservative management in the interim.  I will follow her with above-mentioned tests to finalize the management plan.    Patient agreed with the plan.  All the questions were answered and patient sounded understanding. She can contact us if there are any further questions or concerns or worsening neurological deficits.I spent more than 60 minutes in this apt, examining the pt, reviewing the scans, reviewing notes from chart, discussing treatment options with risks and benefits and coordinating care. This note was created in part by the use of Dragon voice recognition system. Inadvertent grammatical errors and typographical errors may  have occurred due to inherent limitation of voice recognition software.  Reasonable attempts made to avoid errors, but this document may contain an error not identified before finalizing.  Please contact me for any clarification needed.     Manuel Stout MD      HPI:    Ms. Sanchez is a pleasant 73-year-old right-handed female with significant past medical history of WPW syndrome s/p open surgical ablation at the age of 36 years, complete colectomy 16 years ago at [place] for chronic constipation, bleeding disorder history of postoperative hemorrhage after heart and colon surgery requiring reexploration presented to the clinic today for evaluation of midline axial neck pain and right upper extremity radiculopathy.    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  Subsequently she started noticing pain radiating from her neck to her right hand  along the posterior aspect of the right arm forearm and primary involving right index and middle finger.  She describes her pain as sharp in nature 9 x 10 on VAS aggravated with neck movement and relieved with over-the-counter pain medication, steroids and ice.  She has tried oral steroids along with gabapentin with some relief of her symptoms.  She started noticing worsening of her pain during her road trip to [place] where she had recurrent pain and again received symptomatic treatment.    She came home and underwent MRI cervical spine which showed multilevel degenerative disc disease primarily at C5-6.  She was started on physical therapy since early March and underwent few sessions with no relief of her symptoms.  Patient reports that her pain is severe enough that is affecting her day-to-day activities and she is not able to use her right hand.  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  She denies any obvious weakness in her right hand however not able to use her right arm forearm secondary to pain.  She is on gabapentin 300 mg once a day with no significant relief.    Patient denies any new onset bladder or bowel incontinence, imbalance on walking or falls.  She is a non-smoker, occasionally consumes alcohol and denies use of recreational drugs.  She is not on antiplatelets or anticoagulants.    She has a history of open heart surgery with ablation for her WPW syndrome at the age of 36 years and complete colectomy 16 years ago at [facility] for chronic constipation.  During both of her surgeries she had postoperative hemorrhage requiring reexploration.    Past Medical History:   Diagnosis Date    Anemia     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37    Coagulation disorder (H24)     hx hemorrhage after heart and after colon surg    History of atrial flutter     She has a history of WPW status post surgical ablation of a left  lateral electrical pathway at age 37. She did not have any recurrent palpitations until 2014. At that time, she was seen at [facility] in [place] and had ECG consistent with atrial flutter. This episode of atrial flutter lasted several hours and terminated spontaneously. She saw cardiology after that and plan was for obser    History of blood transfusion     History of Gabino-Parkinson-White (WPW) syndrome     She has a history of WPW status post surgical ablation of a left lateral electrical pathway at age 37.     Other and unspecified nonspecific immunological findings     anti Jka red cell antibody       Past Surgical History:   Procedure Laterality Date    APPENDECTOMY  1968    CARDIAC SURGERY  age 37    WPW surg; open ablation; complicated with hemorrhage    COLECTOMY  age 52    7-8 inches rectum remain. cx with hemorrhage. no cancer. slow transit.(2 surgeries; first for low transit; second due to the bleeding)    DILATION AND CURETTAGE, HYSTEROSCOPY DIAGNOSTIC, COMBINED  2/21/2014    Procedure: COMBINED DILATION AND CURETTAGE, HYSTEROSCOPY DIAGNOSTIC;   DILATION AND CURETTAGE, HYSTEROSCOPY DIAGNOSTIC ;  Surgeon: Willie Osborn MD;  Location: [facility] OR    ENT SURGERY      t and a    EXCISE LESION FACE N/A 1/11/2023    Procedure: incision and removal of osteoma on forehead;  Surgeon: Dl Davenport MD;  [place] [facility] [place]       REVIEW OF SYSTEMS:  Review Of Systems  Skin: negative  Eyes: negative  Ears/Nose/Throat: negative  Respiratory: No shortness of breath, dyspnea on exertion, cough, or hemoptysis  Cardiovascular: negative  Gastrointestinal: negative  Genitourinary: negative  Musculoskeletal: negative  Neurologic: Midline axial neck pain and right upper extremity pain   psychiatric: negative  Hematologic/Lymphatic/Immunologic: negative  Endocrine: negative    MEDICATIONS:    Current Outpatient Medications   Medication Sig Dispense Refill    Calcium-Vitamin D-Vitamin K 500-200-40 MG-UNT-MCG  CHEW Takes 3 chewables daily      celecoxib (CELEBREX) 200 MG capsule Take 200 mg by mouth daily      dexAMETHasone (DECADRON) 4 MG/ML injection Use 4 mg or dose determined by provider for iontophoresis. 30 mL 0    gabapentin (NEURONTIN) 300 MG capsule Take 300 mg by mouth 3 times daily      levothyroxine (SYNTHROID/LEVOTHROID) 25 MCG tablet Take 1 tablet (25 mcg) by mouth daily 90 tablet 0    olmesartan (BENICAR) 20 MG tablet Take 0.5 tablets (10 mg) by mouth daily 45 tablet 1    predniSONE (DELTASONE) 20 MG tablet       sertraline (ZOLOFT) 25 MG tablet Take 1 tablet (25 mg) by mouth daily 90 tablet 1         ALLERGIES/SENSITIVITIES:     Allergies   Allergen Reactions    Benadryl [Diphenhydramine]      Given in the hospital and had a difficult time waking as expected.    External Allergen Needs Reconciliation - See Comment      Please reconcile the Patient's allergy reported as LEAD ACETATEMORPHINE SULFATE and update accordingly    Protamine      sob    Tylox [Oxycodone-Acetaminophen]      unknown    Ciprofloxacin Rash    Escitalopram Other (See Comments)     Brain fog, forgetfulness       PERTINENT SOCIAL HISTORY: Non-smoker  Social History     Socioeconomic History    Marital status:      Spouse name: None    Number of children: None    Years of education: None    Highest education level: None   Tobacco Use    Smoking status: Never    Smokeless tobacco: Never   Vaping Use    Vaping Use: Never used   Substance and Sexual Activity    Alcohol use: Yes     Alcohol/week: 7.0 standard drinks of alcohol     Types: 7 Glasses of wine per week     Comment: glass wine most days    Drug use: Never    Sexual activity: Yes     Partners: Male     Social Determinants of Health     Financial Resource Strain: Low Risk  (11/16/2023)    Financial Resource Strain     Within the past 12 months, have you or your family members you live with been unable to get utilities (heat, electricity) when it was really needed?: No   Food  Insecurity: Low Risk  (11/16/2023)    Food Insecurity     Within the past 12 months, did you worry that your food would run out before you got money to buy more?: No     Within the past 12 months, did the food you bought just not last and you didn t have money to get more?: No   Transportation Needs: Low Risk  (11/16/2023)    Transportation Needs     Within the past 12 months, has lack of transportation kept you from medical appointments, getting your medicines, non-medical meetings or appointments, work, or from getting things that you need?: No   Interpersonal Safety: Low Risk  (11/17/2023)    Interpersonal Safety     Do you feel physically and emotionally safe where you currently live?: Yes     Within the past 12 months, have you been hit, slapped, kicked or otherwise physically hurt by someone?: No     Within the past 12 months, have you been humiliated or emotionally abused in other ways by your partner or ex-partner?: No   Housing Stability: Low Risk  (11/16/2023)    Housing Stability     Do you have housing? : Yes     Are you worried about losing your housing?: No         FAMILY HISTORY:  Family History   Problem Relation Age of Onset    Bronchitis Mother         copd    Alcoholism Mother     Varicose Veins Mother     Diabetes Father     Coronary Artery Disease Father     Deep Vein Thrombosis Father         after having COVID    Leukemia Sister         chronic    Impaired Fasting Glucose Sister     Cancer Brother         myelofibrosis; got BMT 2019    Diabetes Type 2  Brother     Impaired Fasting Glucose Brother         PHYSICAL EXAM:   Constitutional: BP (!) 144/78   Pulse 61   LMP  (LMP Unknown)   SpO2 98%      Mental Status: A & O in no acute distress.  Affect is appropriate.  Speech is fluent.  Recent and remote memory are intact.  Attention span and concentration are normal.        Cranial Nerves:   CN1: grossly intact per patient recall.   CN2: No funduscopic exam performed.   CN3,4 & 6: Pupillary  light response, lateral and vertical gaze normal.  No nystagmus.  Visual fields are full to confrontation.   CN5: Intact to touch   CN7: No facial weakness, smile, facial symmetry intact.   CN8: Intact to spoken voice.   CN9&10: Gag reflex, uvula midline, palate rises with phonation.   CN11: Shoulder shrug 5/5 intact bilaterally.   CN12: Tongue midline and moves freely from side to side.     Motor: No pronator drift of upper extremity.   Normal bulk and tone all muscle groups of upper and lower extremities.       Delt Bi Tri Hand Flex/  Ext Iliopsoas Quadriceps Tibialis Anterior EHL Gastroc     C5 C6 C7 C8/T1 L2 L3 L4 L5 S1   R 5 4 5 5 5 5 5 5 5   L 5 5 5 5 5 5 5 5 5       Sensory: Sensation decreased to touch along right C6 dermatome    Coordination; finger to nose, rapid alternating movements smooth and rhythmic.   Romberg intact.   Heel/toe/tandem gait intact.    Normal gait and station.     Reflexes;                       Right              Left  Brachioradialis (C5,6)      2+                 2+  Biceps   (C5,6)                 2+                 2+  Triceps  (C7,8)                 2+                2+  Knee (L3,4)                      2+                2+  Ankle jerk (S1,2)              1+                1+      No hoffmans/babinski/ clonus.  Spurling's test negative        IMAGING:  I personally reviewed all radiographic images and agree with the neuroradiology report of lateral recess stenosis on the right at C5-6.     03/12/2024: MRI cervical spine:   MPRESSION:  1.  At the C5/C6 level, there is a broad bar disc osteophyte complex with moderate spinal canal narrowing and near complete effacement of the CSF surrounding the cervical spinal cord.  2.  Multilevel uncovertebral joint disease and facet arthropathy with severe multilevel neural foraminal narrowing as described above.      Cc:   Jovita Bauman

## 2024-03-27 ENCOUNTER — TELEPHONE (OUTPATIENT)
Dept: NEUROSURGERY | Facility: CLINIC | Age: 74
End: 2024-03-27

## 2024-03-27 ENCOUNTER — THERAPY VISIT (OUTPATIENT)
Dept: PHYSICAL THERAPY | Facility: CLINIC | Age: 74
End: 2024-03-27
Payer: COMMERCIAL

## 2024-03-27 DIAGNOSIS — M54.2 NECK PAIN: Primary | ICD-10-CM

## 2024-03-27 PROCEDURE — 97112 NEUROMUSCULAR REEDUCATION: CPT | Mod: GP | Performed by: PHYSICAL THERAPIST

## 2024-03-27 PROCEDURE — 97110 THERAPEUTIC EXERCISES: CPT | Mod: GP | Performed by: PHYSICAL THERAPIST

## 2024-03-27 NOTE — TELEPHONE ENCOUNTER
[facility] Call Center    Phone Message    May a detailed message be left on voicemail: yes     Reason for Call: Other: Patient called requesting order for EMG be sent to [facility] and for the steroid injection to go to [facility]. Patient states these ordered had been discussed with Dr. Stout, and that she found openings sooner outside of [facility]. Please call patient to discuss.     Action Taken: Message routed to:  Other: [facility] Neurosurgery    Travel Screening: Not Applicable

## 2024-03-28 NOTE — TELEPHONE ENCOUNTER
Spoke with Criss and confirmed that she had scheduled appointments for both her EMG and injection. Wished her the best with the appointments and that we look forward to seeing her again in the future.    Joe Pathak  Visit Facilitator  [facility]

## 2024-03-28 NOTE — TELEPHONE ENCOUNTER
Faxed referral for EMG to [facility] and injection to [facility] March 28, 2024 to fax number [facility] - [phone] and [facility] - [phone]    Right Fax confirmed at 1:40 PM    Joe Pathak

## 2024-03-29 NOTE — TELEPHONE ENCOUNTER
[facility] Call Center    Phone Message    May a detailed message be left on voicemail: yes     Reason for Call: Other: Rodrigo is needing clarification for the injection order that was sent over. Please call back when available.      Action Taken: Other: Neurosurgery    Travel Screening: Not Applicable

## 2024-03-29 NOTE — TELEPHONE ENCOUNTER
Maynor Toledo  Clarification on LIBBY    Talked to  who asked for laterality   Confirmed Right C5-6 TFESI

## 2024-04-01 ENCOUNTER — HOSPITAL ENCOUNTER (OUTPATIENT)
Dept: ULTRASOUND IMAGING | Facility: CLINIC | Age: 74
Discharge: HOME | End: 2024-04-01
Attending: PHYSICIAN ASSISTANT | Admitting: PHYSICIAN ASSISTANT
Payer: COMMERCIAL

## 2024-04-01 DIAGNOSIS — I65.29 CAROTID ARTERY CALCIFICATION, UNSPECIFIED LATERALITY: ICD-10-CM

## 2024-04-01 PROCEDURE — 93880 EXTRACRANIAL BILAT STUDY: CPT

## 2024-04-04 ENCOUNTER — NURSE TRIAGE (OUTPATIENT)
Dept: FAMILY MEDICINE | Facility: CLINIC | Age: 74
End: 2024-04-04
Payer: COMMERCIAL

## 2024-04-04 PROBLEM — I65.23 MILD ATHEROSCLEROSIS OF CAROTID ARTERY, BILATERAL: Status: ACTIVE | Noted: 2024-04-04

## 2024-04-04 NOTE — TELEPHONE ENCOUNTER
Nurse Triage SBAR    Is this a 2nd Level Triage? NO    Situation: Pt calls to report dry cough.    Background: Pt reports that she has had a dry cough ongoing for 2-3 weeks now. Pt reports similar symptoms in the past from acid reflux. Pt states she took OTC Zantac in the past, but was unsure how helpful it was as she used it for a short time. Pt reports recently starting gabapentin and Celebrex. Pt states she uses Tylenol PM at night since January for her arm pain. Pt is scheduled for an injection tomorrow for a herniated disk in her neck. Pt denies hx of blood clots, heart or lung disease. Pt denies recent travel.    Assessment: Pt endorses frequent dry cough, and tickle in the back of the throat. Pt states that her sx worsen at night/when lying down. Pt denies SOB, hemoptysis, chest pain, headache, rhinorrhea, muscle aches and fever.    Protocol Recommended Disposition:   See in Office Within 3 Days    Recommendation: Advised sitting up at night, not eating right before bed, stay hydrated and can try TUMS. Recommended being seen for visit within 3 days per protocol. Pt states she cannot be seen in clinic tomorrow as she is scheduled for her injection. Pt agreeable to scheduling appt on Monday. Scheduled pt to be seen per request. Advised calling back or seeking care sooner for new/worsening symptoms. Pt verbalized understanding and agrees with plan.     Reason for Disposition   Cough has been present for > 3 weeks    Additional Information   Negative: Bluish (or gray) lips or face   Negative: SEVERE difficulty breathing (e.g., struggling for each breath, speaks in single words)   Negative: Rapid onset of cough and has hives   Negative: Coughing started suddenly after medicine, an allergic food or bee sting   Negative: Difficulty breathing after exposure to flames, smoke, or fumes   Negative: Sounds like a life-threatening emergency to the triager   Negative: Previous asthma attacks and this feels like asthma  attack   Negative: Dry cough (non-productive; no sputum or minimal clear sputum) and within 14 days of COVID-19 Exposure   Negative: MODERATE difficulty breathing (e.g., speaks in phrases, SOB even at rest, pulse 100-120) and still present when not coughing   Negative: Chest pain present when not coughing   Negative: Passed out (i.e., fainted, collapsed and was not responding)   Negative: Patient sounds very sick or weak to the triager   Negative: MILD difficulty breathing (e.g., minimal/no SOB at rest, SOB with walking, pulse <100) and still present when not coughing   Negative: Coughed up > 1 tablespoon (15 ml) blood (Exception: Blood-tinged sputum.)   Negative: Fever > 103 F (39.4 C)   Negative: Fever > 101 F (38.3 C) and over 60 years of age   Negative: Fever > 100.0 F (37.8 C) and has diabetes mellitus or a weak immune system (e.g., HIV positive, cancer chemotherapy, organ transplant, splenectomy, chronic steroids)   Negative: Fever > 100.0 F (37.8 C) and bedridden (e.g., CVA, chronic illness, recovering from surgery)   Negative: Increasing ankle swelling   Negative: Wheezing is present   Negative: SEVERE coughing spells (e.g., whooping sound after coughing, vomiting after coughing)   Negative: Coughing up rosmery-colored (reddish-brown) or blood-tinged sputum   Negative: Fever present > 3 days (72 hours)   Negative: Fever returns after gone for over 24 hours and symptoms worse or not improved   Negative: Using nasal washes and pain medicine > 24 hours and sinus pain persists   Negative: Known COPD or other severe lung disease (i.e., bronchiectasis, cystic fibrosis, lung surgery) and worsening symptoms (i.e., increased sputum purulence or amount, increased breathing difficulty)   Negative: Continuous (nonstop) coughing interferes with work or school and no improvement using cough treatment per Care Advice   Negative: Patient wants to be seen    Protocols used: Cough-A-LUIS EDUARDO Ricketts RN on 4/4/2024 at 9:13  AM

## 2024-04-05 ENCOUNTER — TRANSFERRED RECORDS (OUTPATIENT)
Dept: HEALTH INFORMATION MANAGEMENT | Facility: CLINIC | Age: 74
End: 2024-04-05
Payer: COMMERCIAL

## 2024-04-08 ENCOUNTER — MYC REFILL (OUTPATIENT)
Dept: FAMILY MEDICINE | Facility: CLINIC | Age: 74
End: 2024-04-08

## 2024-04-08 DIAGNOSIS — E03.9 HYPOTHYROIDISM, UNSPECIFIED TYPE: ICD-10-CM

## 2024-04-08 DIAGNOSIS — E67.2 HYPERVITAMINOSIS B6: Primary | ICD-10-CM

## 2024-04-09 RX ORDER — LEVOTHYROXINE SODIUM 25 UG/1
25 TABLET ORAL DAILY
Qty: 30 TABLET | Refills: 0 | Status: SHIPPED | OUTPATIENT
Start: 2024-04-09 | End: 2024-05-02

## 2024-04-09 NOTE — TELEPHONE ENCOUNTER
Started levothyroxine 25 mcg daily 1/8/24. Due for thyroid lab recheck. Has upcoming visit.    Jovita Bauman PA-C

## 2024-04-11 ENCOUNTER — TRANSFERRED RECORDS (OUTPATIENT)
Dept: HEALTH INFORMATION MANAGEMENT | Facility: CLINIC | Age: 74
End: 2024-04-11
Payer: COMMERCIAL

## 2024-04-15 ENCOUNTER — THERAPY VISIT (OUTPATIENT)
Dept: PHYSICAL THERAPY | Facility: CLINIC | Age: 74
End: 2024-04-15
Payer: COMMERCIAL

## 2024-04-15 DIAGNOSIS — M54.2 NECK PAIN: Primary | ICD-10-CM

## 2024-04-15 PROCEDURE — 97110 THERAPEUTIC EXERCISES: CPT | Mod: GP | Performed by: PHYSICAL THERAPIST

## 2024-04-15 PROCEDURE — 97112 NEUROMUSCULAR REEDUCATION: CPT | Mod: GP | Performed by: PHYSICAL THERAPIST

## 2024-04-16 ENCOUNTER — OFFICE VISIT (OUTPATIENT)
Dept: FAMILY MEDICINE | Facility: CLINIC | Age: 74
End: 2024-04-16
Attending: PHYSICIAN ASSISTANT
Payer: COMMERCIAL

## 2024-04-16 VITALS
WEIGHT: 135 LBS | TEMPERATURE: 98 F | BODY MASS INDEX: 23.92 KG/M2 | HEART RATE: 59 BPM | RESPIRATION RATE: 16 BRPM | HEIGHT: 63 IN | OXYGEN SATURATION: 100 % | DIASTOLIC BLOOD PRESSURE: 58 MMHG | SYSTOLIC BLOOD PRESSURE: 115 MMHG

## 2024-04-16 DIAGNOSIS — M54.12 CERVICAL RADICULOPATHY: ICD-10-CM

## 2024-04-16 DIAGNOSIS — G56.01 CARPAL TUNNEL SYNDROME OF RIGHT WRIST: Primary | ICD-10-CM

## 2024-04-16 DIAGNOSIS — I65.23 MILD ATHEROSCLEROSIS OF CAROTID ARTERY, BILATERAL: ICD-10-CM

## 2024-04-16 DIAGNOSIS — R22.0 SWELLING OF UPPER LIP: ICD-10-CM

## 2024-04-16 PROCEDURE — 99214 OFFICE O/P EST MOD 30 MIN: CPT | Performed by: PHYSICIAN ASSISTANT

## 2024-04-16 ASSESSMENT — PAIN SCALES - GENERAL: PAINLEVEL: NO PAIN (0)

## 2024-04-16 NOTE — PROGRESS NOTES
Assessment & Plan     Carpal tunnel syndrome of right wrist  Her biggest concern today is right hand pain/weakness. She can't play piano, garden, or use her hand normally due to pain. She also has numbness/tingling in index, middle, and ring finger. EMG showed right carpal tunnel as above; no ulnar neuropathy or cervical radiculopathy. She wonders if this hand pain is related to the carpal tunnel and not her neck. Recommend wearing wrist brace, carpal tunnel exercises. She will see ortho to discuss further.   - Orthopedic  Referral; Future    Cervical radiculopathy  Has been following with neurosurgery for cervical radiculopathy, right upper extremity pain; has follow-up visit scheduled in 1 month. Plan was for PT, injection, consider anterior cervical discectomy and fusion at C5-6. Arm pain has significantly improved. She had injection 4/5/24 which did seem to help, though she reports that right arm pain was improving even before injection. She has also been doing physical therapy and taking gabapentin and celebrex. She would like to be able to stop medications if possible, especially the gabapentin. EMG 4/2024 showed right median neuropathy - carpal tunnel; no ulnar neuropathy or cervical radiculopathy. She wonders if she should be pursuing carpal tunnel treatment over cervical radiculopathy treatment. Will follow-up with ortho as above and with neurosurgery as scheduled.    Lip swelling  Mild upper lip swelling with erythema, dryness, and mild scaling along vermilion border. Possibly cheilitis vs gabapentin side effect though not clearly related to medication - seems unusual that swelling would be improving even while continuing with the 300 mg dose. Since she would prefer to stop the gabapentin anyway, plan will be to taper off of medication and monitor for worsening arm pain/symptoms. Take 200 mg nightly for 1 week, then 100 mg nightly for 1 week, then stop. Urgent evaluation if worsening lip  swelling; call 911 or go to the ER if throat swelling, difficulty breathing, etc.    Mild atherosclerosis of carotid artery, bilateral  Asymptomatic. Discussed that this could put her at higher risk for stroke and cardiovascular disease in the future. Encourage healthy lifestyle, would recommend starting statin medication. At this time, she wants to hold off on starting a new medication so she would like to discuss this at a later time. Would plan to repeat carotid artery US annually to evaluate for plaque progression.      Kyree Kahn is a 73 year old, presenting for the following health issues:  Hand Pain        4/16/2024     9:02 AM   Additional Questions   Roomed by JOSE KLEIN   Accompanied by SELF         4/16/2024     9:02 AM   Patient Reported Additional Medications   Patient reports taking the following new medications NA     History of Present Illness       Hypertension: She presents for follow up of hypertension.  She does not check blood pressure  regularly outside of the clinic. Outside blood pressures have been over 140/90. She follows a low salt diet.     Hypothyroidism:     Since last visit, patient describes the following symptoms::  Anxiety    Reason for visit:  Hand pain    She eats 2-3 servings of fruits and vegetables daily.She consumes 5 sweetened beverage(s) daily.She exercises with enough effort to increase her heart rate 10 to 19 minutes per day.  She exercises with enough effort to increase her heart rate 3 or less days per week.   She is taking medications regularly.       Has been following with neurosurgery for cervical radiculopathy, right upper extremity pain. Plan was for PT, injection, consider anterior cervical discectomy and fusion at C5-6.  She had injection 4/5/24 which did seem to help, though she reports that right arm pain was improving even before injection. She has also been doing physical therapy. EMG 4/2024 showed right median neuropathy - carpal tunnel; no ulnar  neuropathy or cervical radiculopathy.    She has been taking gabapentin 300 mg nightly for past few months.  About 2 weeks ago, added 100 mg dose in morning  About 1 week after she increased dose, she developed upper swelling/stinging/burning. She did have a small sore in corner of mouth which felt dry/cracking when this started which has since resolved. She wasn't sure if this was related to the gabapentin so she stopped taking the 100 mg dose in mornings but has continued with 300 mg nightly. She says that the upper lip swelling/stinging is improving; feels better with vaseline. No tongue, throat swelling, shortness of breath, dizziness, lightheadedness. No other rash.    Her biggest concern today is right hand pain/weakness. She can't play piano, garden, or use her hand normally due to pain. She also has numbness/tingling in index, middle, and ring finger. EMG showed right carpal tunnel as above; no ulnar neuropathy or cervical radiculopathy. She wonders if this hand pain is related to the carpal tunnel and not her neck.    Carotid artery US 4/1/24 showed   1.  RIGHT CAROTID:  Mild atherosclerotic plaque. Peak systolic velocities in the ICA are 88 cm/s which correspond to the <50% stenosis range based on NASCET criteria.  2.  LEFT CAROTID:  Mild atherosclerotic plaque. Peak systolic velocities in the ICA are 103 cm/s which correspond to the <50% stenosis range based on NASCET criteria.  3.  Antegrade flow within the vertebral arteries bilaterally.    The 10-year ASCVD risk score (Jr CORRALES, et al., 2019) is: 13.7%    Values used to calculate the score:      Age: 73 years      Sex: Female      Is Non- : No      Diabetic: No      Tobacco smoker: No      Systolic Blood Pressure: 115 mmHg      Is BP treated: Yes      HDL Cholesterol: 100 mg/dL      Total Cholesterol: 209 mg/dL    Asymptomatic. Discussed that this could put her at higher risk for stroke and cardiovascular disease in the future.  "Encourage healthy lifestyle, would recommend starting statin medication. At this time, she wants to hold off on starting a new medication so she would like to discuss this at a later time.    Review of Systems  Constitutional, HEENT, cardiovascular, pulmonary, gi and gu systems are negative, except as otherwise noted.      Objective    /58   Pulse 59   Temp 98  F (36.7  C) (Oral)   Resp 16   Ht 1.6 m (5' 3\")   Wt 61.2 kg (135 lb)   LMP  (LMP Unknown)   SpO2 100%   BMI 23.91 kg/m    Body mass index is 23.91 kg/m .  Physical Exam   GENERAL: alert and no distress  HENT: ear canals and TM's normal, nose and mouth without ulcers or lesions. Mild upper lip swelling with erythema, dryness, and mild scaling along vermilion border.   NECK: no adenopathy, no asymmetry, masses, or scars  RESP: lungs clear to auscultation - no rales, rhonchi or wheezes  CV: regular rate and rhythm  SKIN: no suspicious lesions or rashes  NEURO: Normal strength and tone, mentation intact and speech normal  PSYCH: mentation appears normal, affect normal/bright          Signed Electronically by: Jovita Bauman PA-C    "

## 2024-04-19 ENCOUNTER — MYC REFILL (OUTPATIENT)
Dept: FAMILY MEDICINE | Facility: CLINIC | Age: 74
End: 2024-04-19
Payer: COMMERCIAL

## 2024-04-19 DIAGNOSIS — M54.12 CERVICAL RADICULOPATHY: Primary | ICD-10-CM

## 2024-04-19 DIAGNOSIS — M50.30 DDD (DEGENERATIVE DISC DISEASE), CERVICAL: ICD-10-CM

## 2024-04-19 RX ORDER — CELECOXIB 200 MG/1
200 CAPSULE ORAL DAILY
Qty: 30 CAPSULE | Refills: 1 | Status: SHIPPED | OUTPATIENT
Start: 2024-04-19 | End: 2024-05-29

## 2024-04-22 NOTE — PROGRESS NOTES
[facility] H&P Note:  Date: Apr 23, 2024    Patient Name: Anali Sanchez  MRN: [id number]    Consult requested by: Jovita Bauman    CHIEF COMPLAINT: Right carpal tunnel syndrome    Dominant Hand:right   Occupation:        HPI:  Ms. Anali Sanchez is a 73 year old female right  hand dominant who presents with right carpal tunnel syndrome.  Onset in October, thinks it was due to increased piano playing.  First noticed it while warming up for a show.  She describes constant numbness, tingling, pain localized primarily to the index, middle, ring fingers.  Hand is weak.  Symptoms wake her up at night.  She has had extensive conservative treatment.  Dl Dallas PA-C's note from 3/5/2024 reviewed in detail.  Patient has had nighttime bracing, anti-inflammatories, gabapentin.  Patient previously had de Quervain's tenosynovitis which responded completely to a steroid injection 1/17/2024.  The patient also has a history of right cervical radiculopathy which has completely improved with physical therapy and an epidural steroid injection in March.  However pain and numbness at the wrist persist.    Patient had EMG completed at [facility] on 4/11/24.     PMH  Diabetes no   Thyroid Problems yes  Smoking Y/N non smoker       PAST MEDICAL HISTORY:  Past Medical History:   Diagnosis Date    Anemia     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37    Coagulation disorder (H24)     hx hemorrhage after heart and after colon surg    History of atrial flutter     She has a history of WPW status post surgical ablation of a left lateral electrical pathway at age 37. She did not have any recurrent palpitations until 2014. At that time, she was seen at [facility] in [place] and had ECG consistent with atrial flutter. This episode of atrial flutter lasted several hours and terminated spontaneously. She saw cardiology after that and  plan was for obser    History of blood transfusion     History of Gabino-Parkinson-White (WPW) syndrome     She has a history of WPW status post surgical ablation of a left lateral electrical pathway at age 37.     Other and unspecified nonspecific immunological findings     anti Jka red cell antibody       PAST SURGICAL HISTORY:  Past Surgical History:   Procedure Laterality Date    APPENDECTOMY  1968    CARDIAC SURGERY  age 37    WPW surg; open ablation; complicated with hemorrhage    COLECTOMY  age 52    7-8 inches rectum remain. cx with hemorrhage. no cancer. slow transit.(2 surgeries; first for low transit; second due to the bleeding)    DILATION AND CURETTAGE, HYSTEROSCOPY DIAGNOSTIC, COMBINED  2/21/2014    Procedure: COMBINED DILATION AND CURETTAGE, HYSTEROSCOPY DIAGNOSTIC;   DILATION AND CURETTAGE, HYSTEROSCOPY DIAGNOSTIC ;  Surgeon: Willie Osborn MD;  Location: [facility] OR    ENT SURGERY      t and a    EXCISE LESION FACE N/A 1/11/2023    Procedure: incision and removal of osteoma on forehead;  Surgeon: Dl Davenport MD;  Location: [facility] OR       MEDICATIONS:  Current Outpatient Medications   Medication Sig Dispense Refill    Calcium-Vitamin D-Vitamin K 500-200-40 MG-UNT-MCG CHEW Takes 3 chewables daily      celecoxib (CELEBREX) 200 MG capsule Take 1 capsule (200 mg) by mouth daily 30 capsule 1    dexAMETHasone (DECADRON) 4 MG/ML injection Use 4 mg or dose determined by provider for iontophoresis. 30 mL 0    gabapentin (NEURONTIN) 100 MG capsule Take 1 capsule (100 mg) by mouth 3 times daily 90 capsule 2    gabapentin (NEURONTIN) 300 MG capsule Take 300 mg by mouth 3 times daily      levothyroxine (SYNTHROID/LEVOTHROID) 25 MCG tablet Take 1 tablet (25 mcg) by mouth daily 30 tablet 0    olmesartan (BENICAR) 20 MG tablet Take 0.5 tablets (10 mg) by mouth daily 45 tablet 1    predniSONE (DELTASONE) 20 MG tablet       sertraline (ZOLOFT) 25 MG tablet Take 1 tablet (25 mg) by mouth daily 90 tablet 1      Current Facility-Administered Medications   Medication Dose Route Frequency Provider Last Rate Last Admin    lidocaine 1 % injection 1 mL  1 mL   Yeo, Albert, MD   1 mL at 01/17/24 1439    methylPREDNISolone (DEPO-Medrol) injection 40 mg  40 mg   Yeo, Albert, MD   40 mg at 01/17/24 1439       ALLERGIES:     Allergies   Allergen Reactions    Benadryl [Diphenhydramine]      Given in the hospital and had a difficult time waking as expected.    External Allergen Needs Reconciliation - See Comment      Please reconcile the Patient's allergy reported as LEAD ACETATEMORPHINE SULFATE and update accordingly    Protamine      sob    Tylox [Oxycodone-Acetaminophen]      unknown    Ciprofloxacin Rash    Escitalopram Other (See Comments)     Brain fog, forgetfulness       FAMILY HISTORY:  No pertinent family history    SOCIAL HISTORY:  Social History     Tobacco Use    Smoking status: Never    Smokeless tobacco: Never   Vaping Use    Vaping status: Never Used   Substance Use Topics    Alcohol use: Yes     Alcohol/week: 7.0 standard drinks of alcohol     Types: 7 Glasses of wine per week     Comment: glass wine most days    Drug use: Never       The patient's past medical, family, and social history was reviewed and confirmed.    REVIEW OF SYMPTOMS:      General: Negative   Eyes: Negative   Ear, Nose and Throat: Negative   Respiratory: Negative   Cardiovascular: Negative   Gastrointestinal: Negative   Genito-urinary: Negative   Musculoskeletal: Negative  Neurological: Negative   Psychological: Negative  HEME: Negative   ENDO: Negative   SKIN: Negative    VITALS:  There were no vitals filed for this visit.    EXAM:  General: NAD, A&Ox3  HEENT: NC/AT  CV: RRR by peripheral pulse  Pulmonary: Non-labored breathing on RA  RUE:  Skin intact, no deformity, no atrophy  Sensation is intact to light touch in the median, radial, ulnar nerve distributions  Diminished sensation to light touch along the radial border of the fourth finger,  intact on the ulnar border  2-point discrimination 6 mm median and ulnar  Positive Tinel's and Durkan's compression test at the carpal tunnel  Positive Tinel's at the cubital tunnel on the right  5/5 EPL, FPL, APB, hand intrinsics  Warm well-perfused, capillary refill less than 2 seconds       IMAGING:    FINDINGS:   Anterolisthesis of C4 on C5 measuring 2 mm and C7 on T1 measuring 1 mm. Straightening of the normal cervical lordosis. The vertebral bodies of the cervical spine otherwise have normal stature, alignment, and marrow signal. No evidence of signal   abnormality or expansion within the cervical spinal cord. No extraspinal abnormality.     Craniovertebral junction and C1-C2: Normal.     C2-C3: Loss of disc signal with normal disc height. No posterior disc bulge or spinal canal narrowing. No neural foraminal narrowing.      C3-C4: Mild loss of disc signal with normal disc height. No posterior disc bulge or spinal canal narrowing. Uncovertebral joint disease and facet arthropathy with severe left and moderate right neural foraminal narrowing.      C4-C5: Broad bar disc osteophyte complex with mild spinal canal narrowing. Uncovertebral joint disease and facet arthropathy with severe left neural foraminal narrowing. No right neural foraminal narrowing.      C5-C6: Mild loss of disc signal and disc height. Broad bar disc osteophyte complex with moderate spinal canal narrowing and near complete effacement of the CSF surrounding the cervical spinal cord. Uncovertebral joint disease and facet arthropathy with   severe bilateral neural foraminal narrowing.      C6-C7: Mild loss of disc signal and disc height. No posterior disc bulge or spinal canal narrowing. Uncovertebral joint disease and facet arthropathy with mild bilateral neural foraminal narrowing.      C7-T1: Mild loss of disc signal and disc height. No posterior disc bulge or spinal canal narrowing. No neural foraminal narrowing.                                                                       IMPRESSION:  1.  At the C5/C6 level, there is a broad bar disc osteophyte complex with moderate spinal canal narrowing and near complete effacement of the CSF surrounding the cervical spinal cord.  2.  Multilevel uncovertebral joint disease and facet arthropathy with severe multilevel neural foraminal narrowing as described above.    X-rays right wrist 12/5/2023 demonstrate STT arthrosis.  Otherwise no acute bony abnormality    EMG/NCS 4/11/2024 scanned into media  Electrodiagnostic evidence for a moderate median neuropathy across the right carpal tunnel.  Supported by increased onset latency of the right median motor nerve, small median SNAP and increased peak latency of the right median antidromic sensory nerve conduction study.  No evidence of active denervation on EMG of the right APB.  No evidence of ulnar neuropathy or cervical radiculopathy.    I have personally reviewed the above images and labs.         IMPRESSION AND RECOMMENDATIONS:  Ms. Anali Sanchez is a 73 year old female right hand dominant with right carpal tunnel syndrome.    I discussed the diagnosis, prognosis, and treatment options with the patient.  The patient has had extensive conservative treatment.  We discussed further treatment including nighttime bracing, occupational therapy, steroid injections, and surgery.    Symptoms are quite bothersome and patient would like to proceed with surgery    The indications for surgery were discussed with the patient. The benefits, risks, and alternatives of operative management were discussed in detail with the patient. The patient understands that the risks of surgery include, but are not limited to: infection, bleeding, injury to nearby structures (such as nerves, blood vessels, and tendons), pillar pain, temporary weakness in , need for additional surgery, pain, stiffness, scarring, need for rehabilitation, and anesthetic complications.  Patient  expressed understanding and elected to proceed with surgery. All questions were answered to the patient's satisfaction.    Case request placed.  [facility].    Tristian Milligan MD    Hand, Upper Extremity & Microvascular Surgery  Department of Orthopaedic Surgery  [facility]

## 2024-04-23 ENCOUNTER — OFFICE VISIT (OUTPATIENT)
Dept: ORTHOPEDICS | Facility: CLINIC | Age: 74
End: 2024-04-23
Attending: PHYSICIAN ASSISTANT
Payer: COMMERCIAL

## 2024-04-23 VITALS
SYSTOLIC BLOOD PRESSURE: 118 MMHG | DIASTOLIC BLOOD PRESSURE: 38 MMHG | WEIGHT: 135 LBS | HEIGHT: 63 IN | BODY MASS INDEX: 23.92 KG/M2

## 2024-04-23 DIAGNOSIS — G56.01 CARPAL TUNNEL SYNDROME OF RIGHT WRIST: ICD-10-CM

## 2024-04-23 PROCEDURE — 99214 OFFICE O/P EST MOD 30 MIN: CPT | Performed by: STUDENT IN AN ORGANIZED HEALTH CARE EDUCATION/TRAINING PROGRAM

## 2024-04-23 NOTE — PATIENT INSTRUCTIONS
Thank you for choosing [facility]    Dr. Milligan Locations:    [facility] [address], [address]  [address] [address]   Appointments: [phone] Appointments: [phone]   Fax: [phone] Fax: [phone]         Please call Surgical Scheduling at [phone] to schedule your surgery appointment.    Please call [phone] to schedule your follow up appointment.

## 2024-04-23 NOTE — LETTER
4/23/2024         RE: Anali Sanchez  [address] [facility] [address]        Dear Colleague,    Thank you for referring your patient, Anali Sanchez, to the [facility] [place]. Please see a copy of my visit note below.    Orthopaedic Surgery Hand and Upper Extremity Clinic H&P Note:  Date: Apr 23, 2024    Patient Name: Anali Sanchez  MRN: [id number]    Consult requested by: Jovita Bauman    CHIEF COMPLAINT: Right carpal tunnel syndrome    Dominant Hand:right   Occupation:        HPI:  Ms. Anali Sanchez is a 73 year old female right  hand dominant who presents with right carpal tunnel syndrome.  Onset in October, thinks it was due to increased piano playing.  First noticed it while warming up for a show.  She describes constant numbness, tingling, pain localized primarily to the index, middle, ring fingers.  Hand is weak.  Symptoms wake her up at night.  She has had extensive conservative treatment.  Dl Dallas PA-C's note from 3/5/2024 reviewed in detail.  Patient has had nighttime bracing, anti-inflammatories, gabapentin.  Patient previously had de Quervain's tenosynovitis which responded completely to a steroid injection 1/17/2024.  The patient also has a history of right cervical radiculopathy which has completely improved with physical therapy and an epidural steroid injection in March.  However pain and numbness at the wrist persist.    Patient had EMG completed at [facility] on 4/11/24.     PMH  Diabetes no   Thyroid Problems yes  Smoking Y/N non smoker       PAST MEDICAL HISTORY:  Past Medical History:   Diagnosis Date     Anemia      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37     Coagulation disorder (H24)     hx hemorrhage after heart and after colon surg     History of atrial flutter     She has a history of WPW status post surgical ablation of a left lateral electrical pathway at age 37.  She did not have any recurrent palpitations until 2014. At that time, she was seen at [facility] in [place] and had ECG consistent with atrial flutter. This episode of atrial flutter lasted several hours and terminated spontaneously. She saw cardiology after that and plan was for obser     History of blood transfusion      History of Gabino-Parkinson-White (WPW) syndrome     She has a history of WPW status post surgical ablation of a left lateral electrical pathway at age 37.      Other and unspecified nonspecific immunological findings     anti Jka red cell antibody       PAST SURGICAL HISTORY:  Past Surgical History:   Procedure Laterality Date     APPENDECTOMY  1968     CARDIAC SURGERY  age 37    WPW surg; open ablation; complicated with hemorrhage     COLECTOMY  age 52    7-8 inches rectum remain. cx with hemorrhage. no cancer. slow transit.(2 surgeries; first for low transit; second due to the bleeding)     DILATION AND CURETTAGE, HYSTEROSCOPY DIAGNOSTIC, COMBINED  2/21/2014    Procedure: COMBINED DILATION AND CURETTAGE, HYSTEROSCOPY DIAGNOSTIC;   DILATION AND CURETTAGE, HYSTEROSCOPY DIAGNOSTIC ;  Surgeon: Willie Osborn MD;  Location: [facility] OR     ENT SURGERY      t and a     EXCISE LESION FACE N/A 1/11/2023    Procedure: incision and removal of osteoma on forehead;  Surgeon: Dl Davenport MD;  Location: [facility] OR       MEDICATIONS:  Current Outpatient Medications   Medication Sig Dispense Refill     Calcium-Vitamin D-Vitamin K 500-200-40 MG-UNT-MCG CHEW Takes 3 chewables daily       celecoxib (CELEBREX) 200 MG capsule Take 1 capsule (200 mg) by mouth daily 30 capsule 1     dexAMETHasone (DECADRON) 4 MG/ML injection Use 4 mg or dose determined by provider for iontophoresis. 30 mL 0     gabapentin (NEURONTIN) 100 MG capsule Take 1 capsule (100 mg) by mouth 3 times daily 90 capsule 2     gabapentin (NEURONTIN) 300 MG capsule Take 300 mg by mouth 3 times daily       levothyroxine  (SYNTHROID/LEVOTHROID) 25 MCG tablet Take 1 tablet (25 mcg) by mouth daily 30 tablet 0     olmesartan (BENICAR) 20 MG tablet Take 0.5 tablets (10 mg) by mouth daily 45 tablet 1     predniSONE (DELTASONE) 20 MG tablet        sertraline (ZOLOFT) 25 MG tablet Take 1 tablet (25 mg) by mouth daily 90 tablet 1     Current Facility-Administered Medications   Medication Dose Route Frequency Provider Last Rate Last Admin     lidocaine 1 % injection 1 mL  1 mL   Yeo, Albert, MD   1 mL at 01/17/24 1439     methylPREDNISolone (DEPO-Medrol) injection 40 mg  40 mg   Yeo, Albert, MD   40 mg at 01/17/24 1439       ALLERGIES:     Allergies   Allergen Reactions     Benadryl [Diphenhydramine]      Given in the hospital and had a difficult time waking as expected.     External Allergen Needs Reconciliation - See Comment      Please reconcile the Patient's allergy reported as LEAD ACETATEMORPHINE SULFATE and update accordingly     Protamine      sob     Tylox [Oxycodone-Acetaminophen]      unknown     Ciprofloxacin Rash     Escitalopram Other (See Comments)     Brain fog, forgetfulness       FAMILY HISTORY:  No pertinent family history    SOCIAL HISTORY:  Social History     Tobacco Use     Smoking status: Never     Smokeless tobacco: Never   Vaping Use     Vaping status: Never Used   Substance Use Topics     Alcohol use: Yes     Alcohol/week: 7.0 standard drinks of alcohol     Types: 7 Glasses of wine per week     Comment: glass wine most days     Drug use: Never       The patient's past medical, family, and social history was reviewed and confirmed.    REVIEW OF SYMPTOMS:      General: Negative   Eyes: Negative   Ear, Nose and Throat: Negative   Respiratory: Negative   Cardiovascular: Negative   Gastrointestinal: Negative   Genito-urinary: Negative   Musculoskeletal: Negative  Neurological: Negative   Psychological: Negative  HEME: Negative   ENDO: Negative   SKIN: Negative    VITALS:  There were no vitals filed for this  visit.    EXAM:  General: NAD, A&Ox3  HEENT: NC/AT  CV: RRR by peripheral pulse  Pulmonary: Non-labored breathing on RA  RUE:  Skin intact, no deformity, no atrophy  Sensation is intact to light touch in the median, radial, ulnar nerve distributions  Diminished sensation to light touch along the radial border of the fourth finger, intact on the ulnar border  2-point discrimination 6 mm median and ulnar  Positive Tinel's and Durkan's compression test at the carpal tunnel  Positive Tinel's at the cubital tunnel on the right  5/5 EPL, FPL, APB, hand intrinsics  Warm well-perfused, capillary refill less than 2 seconds       IMAGING:    FINDINGS:   Anterolisthesis of C4 on C5 measuring 2 mm and C7 on T1 measuring 1 mm. Straightening of the normal cervical lordosis. The vertebral bodies of the cervical spine otherwise have normal stature, alignment, and marrow signal. No evidence of signal   abnormality or expansion within the cervical spinal cord. No extraspinal abnormality.     Craniovertebral junction and C1-C2: Normal.     C2-C3: Loss of disc signal with normal disc height. No posterior disc bulge or spinal canal narrowing. No neural foraminal narrowing.      C3-C4: Mild loss of disc signal with normal disc height. No posterior disc bulge or spinal canal narrowing. Uncovertebral joint disease and facet arthropathy with severe left and moderate right neural foraminal narrowing.      C4-C5: Broad bar disc osteophyte complex with mild spinal canal narrowing. Uncovertebral joint disease and facet arthropathy with severe left neural foraminal narrowing. No right neural foraminal narrowing.      C5-C6: Mild loss of disc signal and disc height. Broad bar disc osteophyte complex with moderate spinal canal narrowing and near complete effacement of the CSF surrounding the cervical spinal cord. Uncovertebral joint disease and facet arthropathy with   severe bilateral neural foraminal narrowing.      C6-C7: Mild loss of disc  signal and disc height. No posterior disc bulge or spinal canal narrowing. Uncovertebral joint disease and facet arthropathy with mild bilateral neural foraminal narrowing.      C7-T1: Mild loss of disc signal and disc height. No posterior disc bulge or spinal canal narrowing. No neural foraminal narrowing.                                                                      IMPRESSION:  1.  At the C5/C6 level, there is a broad bar disc osteophyte complex with moderate spinal canal narrowing and near complete effacement of the CSF surrounding the cervical spinal cord.  2.  Multilevel uncovertebral joint disease and facet arthropathy with severe multilevel neural foraminal narrowing as described above.    X-rays right wrist 12/5/2023 demonstrate STT arthrosis.  Otherwise no acute bony abnormality    EMG/NCS 4/11/2024 scanned into media  Electrodiagnostic evidence for a moderate median neuropathy across the right carpal tunnel.  Supported by increased onset latency of the right median motor nerve, small median SNAP and increased peak latency of the right median antidromic sensory nerve conduction study.  No evidence of active denervation on EMG of the right APB.  No evidence of ulnar neuropathy or cervical radiculopathy.    I have personally reviewed the above images and labs.         IMPRESSION AND RECOMMENDATIONS:  Ms. Anali Sanchez is a 73 year old female right hand dominant with right carpal tunnel syndrome.    I discussed the diagnosis, prognosis, and treatment options with the patient.  The patient has had extensive conservative treatment.  We discussed further treatment including nighttime bracing, occupational therapy, steroid injections, and surgery.    Symptoms are quite bothersome and patient would like to proceed with surgery    The indications for surgery were discussed with the patient. The benefits, risks, and alternatives of operative management were discussed in detail with the patient. The  patient understands that the risks of surgery include, but are not limited to: infection, bleeding, injury to nearby structures (such as nerves, blood vessels, and tendons), pillar pain, temporary weakness in , need for additional surgery, pain, stiffness, scarring, need for rehabilitation, and anesthetic complications.  Patient expressed understanding and elected to proceed with surgery. All questions were answered to the patient's satisfaction.    Case request placed.  [facility].    Tristian Milligan MD    Hand, Upper Extremity & Microvascular Surgery  Department of Orthopaedic Surgery  [facility]           Again, thank you for allowing me to participate in the care of your patient.        Sincerely,        Tristian Milligan MD

## 2024-04-25 ENCOUNTER — TELEPHONE (OUTPATIENT)
Dept: ORTHOPEDICS | Facility: CLINIC | Age: 74
End: 2024-04-25

## 2024-04-25 NOTE — TELEPHONE ENCOUNTER
PROCEDURE INFORMATION: 

Exam: XR Chest 

Exam date and time: 7/23/2021 12:51 AM 

Age: 36 years old 

Clinical indication: Pain; Chest pressure; Additional info: Chest pain, right 

arm weakness 



TECHNIQUE: 

Imaging protocol:  Portable AP view of the chest



COMPARISON: No prior study is available for comparison at the time of this 

interpretation.



FINDINGS: 

No evidence for cardiopulmonary pathology.

The bony thorax and soft tissues are unremarkable.



IMPRESSION: 

Negative portable chest. Patient has been scheduled for surgery. Details are below.    Date of Surgery: 06/28/24    Approximate Arrival Time: SURGERY CENTER WILL CALL 3/4 DAYS PRIOR TO CONFIRM A TIME   Surgeon:  DR. CINDY CHAVARRIA     Procedure: RELEASE, CARPAL TUNNEL (Right)      Location: [facility] [place], [address] [address] [facility] [id number] [id number]  Surgery Consult: NA  PreOp Physical: CALLING NIDIA MCKEON  PostOp: 07/08/24  Packet Mailed/MyChart Sent: YES  Added to [facility]: YES    Spoke to: CHELA

## 2024-04-29 ENCOUNTER — OFFICE VISIT (OUTPATIENT)
Dept: FAMILY MEDICINE | Facility: CLINIC | Age: 74
End: 2024-04-29
Payer: COMMERCIAL

## 2024-04-29 VITALS — DIASTOLIC BLOOD PRESSURE: 64 MMHG | SYSTOLIC BLOOD PRESSURE: 122 MMHG

## 2024-04-29 DIAGNOSIS — K13.0 CHEILITIS: Primary | ICD-10-CM

## 2024-04-29 PROCEDURE — 99213 OFFICE O/P EST LOW 20 MIN: CPT | Performed by: PHYSICIAN ASSISTANT

## 2024-04-29 RX ORDER — CLOTRIMAZOLE 1 %
CREAM (GRAM) TOPICAL
Qty: 30 G | Refills: 1 | Status: SHIPPED | OUTPATIENT
Start: 2024-04-29

## 2024-04-29 NOTE — PATIENT INSTRUCTIONS
Try switching from aquaphor to vaseline on the lips  Try using toothpaste without sodium lauryl sulfate     Try topical antifungal cream  If no improvement after 2 weeks, can try topical hydrocortisone instead    Can apply hydrocortisone 1% (available over the counter) twice daily for 1-2 weeks.     Apply vaseline liberally to lips as well.

## 2024-04-29 NOTE — PROGRESS NOTES
Assessment & Plan     Cheilitis  Suspect angular cheilitis vs eczematous cheilitis. Plan will be to try topical antifungal cream. If no improvement after 2 weeks, stop antifungal and try topical hydrocortisone instead. Can apply hydrocortisone 1% (available over the counter) twice daily for 1-2 weeks. Recommend switching from aquaphor to vaseline and make sure she is using toothpaste without sodium lauryl sulfate as well. Follow-up if no improvement or if worsening.  - clotrimazole (LOTRIMIN) 1 % external cream; Apply topically 2 times daily to affected areas on lips. Use until resolution, typically 1-3 weeks.    Kyree Kahn is a 73 year old, presenting for the following health issues:  lip issue    History of Present Illness       Reason for visit:  Possible allergic reaction to medication.    She eats 2-3 servings of fruits and vegetables daily.She consumes 0 sweetened beverage(s) daily.She exercises with enough effort to increase her heart rate 9 or less minutes per day.  She exercises with enough effort to increase her heart rate 3 or less days per week.   She is taking medications regularly.     Mild upper lip swelling with erythema, dryness, and scaling over past few weeks. She initially noticed after she increased gabapentin dose so thought it was possible medication side effect. However, she has been decreasing gabapentin dose (currently taking 100 mg nightly) and has not noticed change in symptoms.    She did have a small sore/cracking in right corner of mouth when symptoms started which has improved.    She has been using aquaphor without improvement.    No known new exposures - no new soaps, lotions, make up, toothpaste, food, medications, travel.    No tongue, throat swelling, shortness of breath, dizziness, lightheadedness. No other rash.     She does wear mouthguard at night but has been doing this well before lip symptom started without any changes. She cleans it regularly. She stopped  wearing the mouth guard a few nights ago but hasn't noticed a change in symptoms so far.    She saw ortho for carpal tunnel and recommendation is to have right carpal tunnel release. She can't get in for surgery until the end of June through [facility] so is going to [facility] this afternoon to see if they could get her in earlier for surgery.    Review of Systems  Constitutional, HEENT, cardiovascular, pulmonary, gi and gu systems are negative, except as otherwise noted.      Objective    /64   LMP  (LMP Unknown)   There is no height or weight on file to calculate BMI.  Physical Exam   GENERAL: alert and no distress  HENT: mouth without ulcers or lesions. Mild upper lip swelling with erythema, dryness, and scaling; does not extend to perioral skin, no vesicles or ulcers. Small healing fissure left lateral commissure.   NEURO: Mentation intact and speech normal  PSYCH: mentation appears normal, affect normal/bright          Signed Electronically by: Jovita Bauman PA-C

## 2024-04-30 NOTE — TELEPHONE ENCOUNTER
Hello,    I'm with ortho con.  Pt of [facility] is ready to have a cortisone injection of her wrist.  I believe [facility] is doing these ultrasound guided now.  I cannot get a schedule to come up at  showing Procedure.  Can you help the pt schedule?  Pt leaves on 1/25 for 6 weeks so could she be scheduled before then? Thank you.      
Please advise if this is an injection you'd perform, or if patient would be referred to other sports medicine provider for this injection to be done ultrasound guided.     Kristie Parada MSA, ATC  Certified Athletic Trainer   
Talked with patient to get her scheduled for an ultrasound guided R wrist injection with one of our Sports Med providers.     Scheduled patient with Dr. Yeo for 1/17/24 at 3:00 pm with an arrival time of 2:45 pm.     Patient verbalized understanding and agreement.     Ester Lyn ATC    
2 = A lot of assistance

## 2024-05-02 DIAGNOSIS — E03.9 HYPOTHYROIDISM, UNSPECIFIED TYPE: ICD-10-CM

## 2024-05-02 RX ORDER — LEVOTHYROXINE SODIUM 25 UG/1
25 TABLET ORAL DAILY
Qty: 30 TABLET | Refills: 0 | Status: SHIPPED | OUTPATIENT
Start: 2024-05-02 | End: 2024-05-29

## 2024-05-02 NOTE — TELEPHONE ENCOUNTER
Will check TSH at upcoming visit next week. Sending in 30 days for now and will send in additional refills after repeat lab.    Jovita Bauman PA-C

## 2024-05-07 ENCOUNTER — OFFICE VISIT (OUTPATIENT)
Dept: FAMILY MEDICINE | Facility: CLINIC | Age: 74
End: 2024-05-07
Payer: COMMERCIAL

## 2024-05-07 VITALS
RESPIRATION RATE: 16 BRPM | DIASTOLIC BLOOD PRESSURE: 62 MMHG | WEIGHT: 137 LBS | OXYGEN SATURATION: 96 % | HEART RATE: 60 BPM | SYSTOLIC BLOOD PRESSURE: 105 MMHG | BODY MASS INDEX: 24.27 KG/M2 | TEMPERATURE: 98 F

## 2024-05-07 DIAGNOSIS — K21.9 GASTROESOPHAGEAL REFLUX DISEASE, UNSPECIFIED WHETHER ESOPHAGITIS PRESENT: ICD-10-CM

## 2024-05-07 DIAGNOSIS — Z01.818 PREOP GENERAL PHYSICAL EXAM: Primary | ICD-10-CM

## 2024-05-07 DIAGNOSIS — R76.8 JKA ANTIBODY POSITIVE: ICD-10-CM

## 2024-05-07 DIAGNOSIS — I10 ESSENTIAL HYPERTENSION: ICD-10-CM

## 2024-05-07 DIAGNOSIS — G56.01 CARPAL TUNNEL SYNDROME OF RIGHT WRIST: ICD-10-CM

## 2024-05-07 DIAGNOSIS — Z86.79 HISTORY OF WOLFF-PARKINSON-WHITE (WPW) SYNDROME: ICD-10-CM

## 2024-05-07 DIAGNOSIS — D64.9 ANEMIA, UNSPECIFIED TYPE: ICD-10-CM

## 2024-05-07 DIAGNOSIS — I65.23 MILD ATHEROSCLEROSIS OF CAROTID ARTERY, BILATERAL: ICD-10-CM

## 2024-05-07 DIAGNOSIS — E67.2 HYPERVITAMINOSIS B6: ICD-10-CM

## 2024-05-07 DIAGNOSIS — D68.2 FACTOR VII DEFICIENCY (H): ICD-10-CM

## 2024-05-07 DIAGNOSIS — Z87.898 HISTORY OF BLOOD LOSS: ICD-10-CM

## 2024-05-07 DIAGNOSIS — K13.0 CHEILITIS: ICD-10-CM

## 2024-05-07 DIAGNOSIS — Z86.79 HISTORY OF ATRIAL FLUTTER: ICD-10-CM

## 2024-05-07 DIAGNOSIS — I45.6 ANOMALOUS ATRIOVENTRICULAR EXCITATION: ICD-10-CM

## 2024-05-07 DIAGNOSIS — E03.9 HYPOTHYROIDISM, UNSPECIFIED TYPE: ICD-10-CM

## 2024-05-07 DIAGNOSIS — Z87.898 HISTORY OF TRANSFUSION REACTION: ICD-10-CM

## 2024-05-07 PROBLEM — D16.4 OSTEOMA OF FACE: Status: RESOLVED | Noted: 2022-11-11 | Resolved: 2024-05-07

## 2024-05-07 LAB
ERYTHROCYTE [DISTWIDTH] IN BLOOD BY AUTOMATED COUNT: 12 % (ref 10–15)
HCT VFR BLD AUTO: 30.9 % (ref 35–47)
HGB BLD-MCNC: 10.1 G/DL (ref 11.7–15.7)
MCH RBC QN AUTO: 30.2 PG (ref 26.5–33)
MCHC RBC AUTO-ENTMCNC: 32.7 G/DL (ref 31.5–36.5)
MCV RBC AUTO: 93 FL (ref 78–100)
PLATELET # BLD AUTO: 272 10E3/UL (ref 150–450)
RBC # BLD AUTO: 3.34 10E6/UL (ref 3.8–5.2)
WBC # BLD AUTO: 7.5 10E3/UL (ref 4–11)

## 2024-05-07 PROCEDURE — 80048 BASIC METABOLIC PNL TOTAL CA: CPT | Performed by: PHYSICIAN ASSISTANT

## 2024-05-07 PROCEDURE — 85027 COMPLETE CBC AUTOMATED: CPT | Performed by: PHYSICIAN ASSISTANT

## 2024-05-07 PROCEDURE — 99215 OFFICE O/P EST HI 40 MIN: CPT | Performed by: PHYSICIAN ASSISTANT

## 2024-05-07 PROCEDURE — 36415 COLL VENOUS BLD VENIPUNCTURE: CPT | Performed by: PHYSICIAN ASSISTANT

## 2024-05-07 PROCEDURE — 99207 E-CONSULT TO HEMATOLOGY (ADULT OUTPT PROVIDER TO SPECIALIST WRITTEN QUESTION & RESPONSE): CPT | Performed by: PHYSICIAN ASSISTANT

## 2024-05-07 PROCEDURE — 84207 ASSAY OF VITAMIN B-6: CPT | Mod: 90 | Performed by: PHYSICIAN ASSISTANT

## 2024-05-07 PROCEDURE — 93000 ELECTROCARDIOGRAM COMPLETE: CPT | Performed by: PHYSICIAN ASSISTANT

## 2024-05-07 PROCEDURE — 99000 SPECIMEN HANDLING OFFICE-LAB: CPT | Performed by: PHYSICIAN ASSISTANT

## 2024-05-07 PROCEDURE — 84443 ASSAY THYROID STIM HORMONE: CPT | Performed by: PHYSICIAN ASSISTANT

## 2024-05-07 NOTE — PROGRESS NOTES
Preoperative Evaluation  [facility]  [address] [facility] [address]  Phone: [phone]  Primary Provider: Jovita Bauman  Pre-op Performing Provider: JOVITA BAUMAN  May 7, 2024       Criss is a 73 year old, presenting for the following:  Pre-Op Exam        5/7/2024     2:39 PM   Additional Questions   Roomed by jovana     Surgical Information  Surgery/Procedure: Right CTR  Surgery Location: [facility]  Surgeon: Dr Serrano  Surgery Date: 5/15/2024  Time of Surgery: TD  Where patient plans to recover: At home with family  Fax number for surgical facility: [phone]    Assessment & Plan     The proposed surgical procedure is considered LOW risk.    Preop general physical exam  - CBC with platelets; Future  - Basic metabolic panel  (Ca, Cl, CO2, Creat, Gluc, K, Na, BUN); Future  - CBC with platelets  - Basic metabolic panel  (Ca, Cl, CO2, Creat, Gluc, K, Na, BUN)    Carpal tunnel syndrome of right wrist  Anticipating surgery    Essential hypertension  Chronic, controlled  - Basic metabolic panel  (Ca, Cl, CO2, Creat, Gluc, K, Na, BUN); Future  - Basic metabolic panel  (Ca, Cl, CO2, Creat, Gluc, K, Na, BUN)    Hypothyroidism, unspecified type  Recently started levothyroxine, due for lab recheck. TSH normal at 1.79.  - TSH with free T4 reflex    History of Gabino-Parkinson-White (WPW) syndrome  Anomalous atrioventricular excitation  She has a history of WPW status post surgical ablation of a left lateral electrical pathway at age 37. She did not have any recurrent palpitations until 2014. At that time, she was seen at [facility] in [place] and had ECG consistent with atrial flutter. This episode of atrial flutter lasted several hours and terminated spontaneously. She saw cardiology after that and plan was for observation, follow-up with cardiology if recurrent symptoms. No indication for Coumadin as she did not have any risk factors for  stroke.  No issues since that time.   - EKG 12-lead complete w/read - Clinics    Mild atherosclerosis of carotid artery, bilateral  Asymptomatic. Discussed that this could put her at higher risk for stroke and cardiovascular disease in the future. Encourage healthy lifestyle, would recommend starting statin medication. At this time, she wants to hold off on starting a new medication so she would like to discuss this at a later time. Would plan to repeat carotid artery US annually to evaluate for plaque progression.     Gastroesophageal reflux disease, unspecified whether esophagitis present  Symptoms present as cough, improves with PPI    History of atrial flutter  She has a history of WPW status post surgical ablation of a left lateral electrical pathway at age 37. She did not have any recurrent palpitations until 2014. At that time, she was seen at [facility] in [place] and had ECG consistent with atrial flutter. This episode of atrial flutter lasted several hours and terminated spontaneously. She saw cardiology after that and plan was for observation, follow-up with cardiology if recurrent symptoms. No indication for Coumadin as she did not have any risk factors for stroke. No issues since that time.     Cheilitis  Lips - no improvement wit clotrimazole  Tried hydrocortisone last night  Follow-up with dermatology if no improvement  - Adult Dermatology  Referral; Future    Hypervitaminosis B6  - Vitamin B6    Anemia, unspecified type  Recent labs are showing new normocytic anemia with hemoglobin of 10.1 today (decreased from 11.4 when checked 1/8/24). She has been feeling fatigued. She denies any recent bleeding symptoms.     History of blood loss  Jka antibody positive  History of transfusion reaction  History of postoperative hemorrhage after previous heart and colon surgeries many years ago. She had severe pericardial and pleural bleeding after open cardiac ablation procedure for  Gabino-Parkinson-White in 1987; after colectomy in 2002 she developed severe internal bleeding requiring a second operation immediately after her first surgery. 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    Through extensive chart review, I found that she was seen by hematology ([facility]) in 2/2014 for clearance prior to D&C. 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 There was no evidence of von Willebrand's or a factor inhibitor at that time. They completed additional labs including INR, PTT, fibrinogen level, and platelet count. If INR normal, she was able to proceed with surgery without further evaluation. I don't see lab results from 2/2014 but they were reportedly normal and she was able to proceed with surgery as scheduled. She did not have any bleeding issues after surgery.     Records from 5/2014 show mildly elevated PT and INR  PT 13.7 (normal 9.2-13.0)  INR 1.3 (normal 0.8-1.2)    She underwent incision and removal of osteoma on forehead 1/2/23 without bleeding issues.     Recent labs are showing new normocytic anemia with hemoglobin of 10.1 today (decreased from 11.4 when checked 1/8/24). She has been feeling fatigued. She denies any recent bleeding symptoms.     Hemoglobin   Date Value Ref Range Status   05/07/2024 10.1 (L) 11.7 - 15.7 g/dL Final   01/08/2024 11.4 (L) 11.7 - 15.7 g/dL Final   12/02/2019 12.6 11.7 - 15.7 g/dL Final   04/17/2008 13.5 11.7 - 15.7 g/dL Final     She has appointment with hematology scheduled for 5/29/24 as recommended by her neurosurgeon for clearance for possible anterior cervical discectomy and fusion. We called hematology to see if there were any openings prior to 5/15 but they are booked out. Called patient and discussed doing e-consult with hematology to see if they are able to weigh in if further work up is needed prior to  surgery or if she can proceed with surgery as scheduled. If needed, she is okay postponing surgery until after her visit with hematology on 5/29 but she would prefer to have surgery as scheduled due to level of discomfort with carpal tunnel.     UPDATE 5/10/24: e-consult with hematology denied - too complex for e-consult and they are unable to say if it is safe for her to undergo surgery without a complete review with patient of her bleeding history, labs, etc. They advise keeping hematology appointment as scheduled 5/29/24.      Risks and Recommendations  The patient has the following additional risks and recommendations for perioperative complications:  Anemia/Bleeding/Clotting:    - Significant bleeding history   - Anemia and requires further evaluation prior to surgery.    Antiplatelet or Anticoagulation Medication Instructions   - Patient is on no antiplatelet or anticoagulation medications.    Additional Medication Instructions  Patient is to take all scheduled medications on the day of surgery EXCEPT for modifications listed below:   - ACE/ARB: HOLD on day of surgery (minimum 11 hours for general anesthesia).   - pregabalin, gabapentin: Continue without modification.   - celecoxib (Celebrex): HOLD 3 days before surgery. May continue without modification for management of severe pain.    - SSRIs, SNRIs, TCAs, Antipsychotics: Continue without modification.    - Topicals: HOLD day of surgery.    Recommendation  Patient referred to hematology for evaluation before surgery - scheduled for visit 5/29/24. Will need to postpone surgery scheduled for 5/15/24. Surgery approval pending completion of consultation.     43 minutes spent on the date of the encounter doing chart review, history and exam, documentation and further activities per the note     Subjective       HPI related to upcoming procedure: carpal tunnel right wrist with pain/weakness.    Has also been treated for cervical radiculopathy but arm pain is  significantly improved  Stopped gabapentin about 1 week ago  Celebrex every other day for past week and she plans to stop taking completely  No increase in pain     Lips - no improvement wit clotrimazole  Tried hydrocortisone last night        5/7/2024     8:48 AM   Preop Questions   1. Have you ever had a heart attack or stroke? No   2. Have you ever had surgery on your heart or blood vessels, such as a stent placement, a coronary artery bypass, or surgery on an artery in your head, neck, heart, or legs? YES -  WPW syndrome s/p open surgical ablation at the age of 36 years    3. Do you have chest pain with activity? No   4. Do you have a history of  heart failure? No   5. Do you currently have a cold, bronchitis or symptoms of other infection? No   6. Do you have a cough, shortness of breath, or wheezing? No   7. Do you or anyone in your family have previous history of blood clots? YES - dad had DVT after COVID, no other family history of blood clots. No personal history of blood clots.   8. Do you or does anyone in your family have a serious bleeding problem such as prolonged bleeding following surgeries or cuts? YES - She has a history of open heart surgery with ablation for her WPW syndrome at the age of 36 years and complete colectomy [facility] years ago at [facility] for chronic constipation. During both of her surgeries she had postoperative hemorrhage requiring reexploration.   She had incision and removal of osteoma from forehead 1/2023 without bleeding complications   9. Have you ever had problems with anemia or been told to take iron pills? No   10. Have you had any abnormal blood loss such as black, tarry or bloody stools, or abnormal vaginal bleeding? YES - hemorrhage after previous heart and colon surgeries as above. Also in the past had post menopausal bleeding, had d&c many years ago and no problems since then. No recent bleeding problems.   11. Have you ever had a blood transfusion? YES - after heart  surgery and colon surgery many years ago    11a. Have you ever had a transfusion reaction? YES - she had problems with blood transfusion. She is blood type A positive, anti-JKa red cell antibody positive.    12. Are you willing to have a blood transfusion if it is medically needed before, during, or after your surgery? Yes   13. Have you or any of your relatives ever had problems with anesthesia? No   14. Do you have sleep apnea, excessive snoring or daytime drowsiness? No   15. Do you have any artifical heart valves or other implanted medical devices like a pacemaker, defibrillator, or continuous glucose monitor? No   16. Do you have artificial joints? No   17. Are you allergic to latex? No       Health Care Directive  Patient does not have a Health Care Directive or Living Will: Patient states has Advance Directive and will bring in a copy to clinic.    Preoperative Review of    reviewed - only rx is for gabapentin which she is no longer taking      Status of Chronic Conditions:  See problem list for active medical problems.  Problems all longstanding and stable, except as noted/documented.  See ROS for pertinent symptoms related to these conditions.    Patient Active Problem List    Diagnosis Date Noted    Mild atherosclerosis of carotid artery, bilateral 04/04/2024     Priority: Medium     Carotid artery US 4/1/24 showed   1.  RIGHT CAROTID:  Mild atherosclerotic plaque. Peak systolic velocities in the ICA are 88 cm/s which correspond to the <50% stenosis range based on NASCET criteria.  2.  LEFT CAROTID:  Mild atherosclerotic plaque. Peak systolic velocities in the ICA are 103 cm/s which correspond to the <50% stenosis range based on NASCET criteria.  3.  Antegrade flow within the vertebral arteries bilaterally.    The 10-year ASCVD risk score (Jr CORRALES, et al., 2019) is: 13.7%    Values used to calculate the score:      Age: 73 years      Sex: Female      Is Non- : No       Diabetic: No      Tobacco smoker: No      Systolic Blood Pressure: 115 mmHg      Is BP treated: Yes      HDL Cholesterol: 100 mg/dL      Total Cholesterol: 209 mg/dL    Asymptomatic.     4/16/24: Discussed that this could put her at higher risk for stroke and cardiovascular disease in the future. Encourage healthy lifestyle, would recommend starting statin medication. At this time, she wants to hold off on starting a new medication so she would like to discuss this at a later time. Would plan to repeat carotid artery US annually to evaluate for plaque progression.      Neck pain 03/07/2024     Priority: Medium    DDD (degenerative disc disease), cervical 03/07/2024     Priority: Medium    Cervical radiculopathy 03/07/2024     Priority: Medium    Hypothyroidism, unspecified type 01/08/2024     Priority: Medium    Macrocytic anemia 01/08/2024     Priority: Medium    Daily consumption of alcohol 01/08/2024     Priority: Medium    Essential hypertension 11/21/2023     Priority: Medium    Generalized anxiety disorder 07/28/2023     Priority: Medium    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     Priority: Medium     Blood type A positive      Anomalous atrioventricular excitation 01/02/2023     Priority: Medium     surg for taylor 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 37      Bilateral low back pain without sciatica 04/22/2022     Priority: Medium    Impaired fasting glucose 03/10/2021     Priority: Medium    Gastroesophageal reflux disease, unspecified whether esophagitis present 03/09/2021     Priority: Medium     Presented as cough that resolved p PPI.  3/9/2021: will do trial of going off or to H2 blocker.      History of colectomy 02/23/2019     Priority: Medium    Hip pain, right 02/19/2019     Priority: Medium      Past Medical History:   Diagnosis Date    Anemia     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37    Coagulation disorder (H24)     hx hemorrhage after heart and after colon surg    History of atrial  flutter     She has a history of WPW status post surgical ablation of a left lateral electrical pathway at age 37. She did not have any recurrent palpitations until 2014. At that time, she was seen at [facility] in [place] and had ECG consistent with atrial flutter. This episode of atrial flutter lasted several hours and terminated spontaneously. She saw cardiology after that and plan was for obser    History of blood transfusion     History of Gabino-Parkinson-White (WPW) syndrome     She has a history of WPW status post surgical ablation of a left lateral electrical pathway at age 37.     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    Added automatically from request for surgery [id number]      Other and unspecified nonspecific immunological findings     anti Jka red cell antibody     Past Surgical History:   Procedure Laterality Date    APPENDECTOMY  1968    CARDIAC SURGERY  age 37    WPW surg; open ablation; complicated with hemorrhage    COLECTOMY  age 52    7-8 inches rectum remain. cx with hemorrhage. no cancer. slow transit.(2 surgeries; first for low transit; second due to the bleeding)    DILATION AND CURETTAGE, HYSTEROSCOPY DIAGNOSTIC, COMBINED  2/21/2014    Procedure: COMBINED DILATION AND CURETTAGE, HYSTEROSCOPY DIAGNOSTIC;   DILATION AND CURETTAGE, HYSTEROSCOPY DIAGNOSTIC ;  Surgeon: Willie Osborn MD;  Location: [facility] OR    ENT SURGERY      t and a    EXCISE LESION FACE N/A 1/11/2023    Procedure: incision and removal of osteoma on forehead;  Surgeon: Dl Davenport MD;  Location: [facility] OR     Current Outpatient Medications   Medication Sig Dispense Refill    Calcium-Vitamin D-Vitamin K 500-200-40 MG-UNT-MCG CHEW Takes 3 chewables daily      celecoxib (CELEBREX) 200 MG capsule Take 1 capsule (200 mg) by mouth daily 30 capsule 1    clotrimazole (LOTRIMIN) 1 % external cream Apply topically 2 times daily to affected areas on lips. Use until resolution, typically 1-3 weeks. 30 g 1    dexAMETHasone  (DECADRON) 4 MG/ML injection Use 4 mg or dose determined by provider for iontophoresis. 30 mL 0    levothyroxine (SYNTHROID/LEVOTHROID) 25 MCG tablet TAKE 1 TABLET BY MOUTH EVERY DAY 30 tablet 0    olmesartan (BENICAR) 20 MG tablet Take 0.5 tablets (10 mg) by mouth daily 45 tablet 1    sertraline (ZOLOFT) 25 MG tablet Take 1 tablet (25 mg) by mouth daily 90 tablet 1       Allergies   Allergen Reactions    Benadryl [Diphenhydramine]      Given in the hospital and had a difficult time waking as expected.    External Allergen Needs Reconciliation - See Comment      Please reconcile the Patient's allergy reported as LEAD ACETATEMORPHINE SULFATE and update accordingly    Protamine      sob    Tylox [Oxycodone-Acetaminophen]      unknown    Ciprofloxacin Rash    Escitalopram Other (See Comments)     Brain fog, forgetfulness        Social History     Tobacco Use    Smoking status: Never     Passive exposure: Never    Smokeless tobacco: Never   Substance Use Topics    Alcohol use: Yes     Alcohol/week: 7.0 standard drinks of alcohol     Types: 7 Glasses of wine per week     Comment: A glass of wine or beer, usually no more than one     Family History   Problem Relation Age of Onset    Bronchitis Mother         copd    Alcoholism Mother     Varicose Veins Mother     Diabetes Father     Coronary Artery Disease Father     Deep Vein Thrombosis Father         after having COVID    Leukemia Sister         chronic    Impaired Fasting Glucose Sister     Cancer Brother         myelofibrosis; got BMT 2019    Diabetes Type 2  Brother     Impaired Fasting Glucose Brother      History   Drug Use Unknown         Review of Systems    Review of Systems  Constitutional, neuro, ENT, endocrine, pulmonary, cardiac, gastrointestinal, genitourinary, musculoskeletal, integument and psychiatric systems are negative, except as otherwise noted.    Objective    /62   Pulse 60   Temp 98  F (36.7  C)   Resp 16   Wt 62.1 kg (137 lb)   LMP   "(LMP Unknown)   SpO2 96%   BMI 24.27 kg/m     Estimated body mass index is 24.27 kg/m  as calculated from the following:    Height as of 4/23/24: 1.6 m (5' 3\").    Weight as of this encounter: 62.1 kg (137 lb).  Physical Exam  GENERAL: alert and no distress  EYES: Eyes grossly normal to inspection, PERRL and conjunctivae and sclerae normal  HENT: ear canals and TM's normal, nose and mouth without ulcers or lesions  NECK: no adenopathy, no asymmetry, masses, or scars  RESP: lungs clear to auscultation - no rales, rhonchi or wheezes  CV: regular rate and rhythm, normal S1 S2, no S3 or S4, no murmur, click or rub, no peripheral edema  ABDOMEN: soft, nontender, no hepatosplenomegaly, no masses and bowel sounds normal  MS: no gross musculoskeletal defects noted, no edema  SKIN: no suspicious lesions or rashes  NEURO: Normal strength and tone, mentation intact and speech normal  PSYCH: mentation appears normal, affect normal/bright    Recent Labs   Lab Test 01/08/24  1327 12/12/23  1317 08/04/23  0903   HGB 11.4* 12.4  --     254  --     137 137   POTASSIUM 4.7 5.0 4.1   CR 0.79 0.91 0.85   A1C  --   --  5.6        Diagnostics  Recent Results (from the past 168 hour(s))   CBC with platelets    Collection Time: 05/07/24  3:41 PM   Result Value Ref Range    WBC Count 7.5 4.0 - 11.0 10e3/uL    RBC Count 3.34 (L) 3.80 - 5.20 10e6/uL    Hemoglobin 10.1 (L) 11.7 - 15.7 g/dL    Hematocrit 30.9 (L) 35.0 - 47.0 %    MCV 93 78 - 100 fL    MCH 30.2 26.5 - 33.0 pg    MCHC 32.7 31.5 - 36.5 g/dL    RDW 12.0 10.0 - 15.0 %    Platelet Count 272 150 - 450 10e3/uL      EKG: appears normal, NSR, normal axis, normal intervals, no acute ST/T changes c/w ischemia, no LVH by voltage criteria, occasional PVC noted, unifocal, # PACs = 1; no significant change compared to EKG in 2014. Negative T waves in V2 different from EKG 1/2024 however she had negative T waves in V2 on EKG in 2014.    Revised Cardiac Risk Index (RCRI)  The " patient has the following serious cardiovascular risks for perioperative complications:   - No serious cardiac risks = 0 points     RCRI Interpretation: 0 points: Class I (very low risk - 0.4% complication rate)         Signed Electronically by: Jovita Bauman PA-C  Copy of this evaluation report is provided to requesting physician.

## 2024-05-07 NOTE — PATIENT INSTRUCTIONS
Preparing for Your Surgery  Getting started  A nurse will call you to review your health history and instructions. They will give you an arrival time based on your scheduled surgery time. Please be ready to share:  Your doctor's clinic name and phone number  Your medical, surgical, and anesthesia history  A list of allergies and sensitivities  A list of medicines, including herbal treatments and over-the-counter drugs  Whether the patient has a legal guardian (ask how to send us the papers in advance)  Please tell us if you're pregnant--or if there's any chance you might be pregnant. Some surgeries may injure a fetus (unborn baby), so they require a pregnancy test. Surgeries that are safe for a fetus don't always need a test, and you can choose whether to have one.   If you have a child who's having surgery, please ask for a copy of Preparing for Your Child's Surgery.    Preparing for surgery  Within 10 to 30 days of surgery: Have a pre-op exam (sometimes called an H&P, or History and Physical). This can be done at a clinic or pre-operative center.  If you're having a , you may not need this exam. Talk to your care team.  At your pre-op exam, talk to your care team about all medicines you take. If you need to stop any medicines before surgery, ask when to start taking them again.  We do this for your safety. Many medicines can make you bleed too much during surgery. Some change how well surgery (anesthesia) drugs work.  Call your insurance company to let them know you're having surgery. (If you don't have insurance, call [phone].)  Call your clinic if there's any change in your health. This includes signs of a cold or flu (sore throat, runny nose, cough, rash, fever). It also includes a scrape or scratch near the surgery site.  If you have questions on the day of surgery, call your hospital or surgery center.  Eating and drinking guidelines  For your safety: Unless your surgeon tells you otherwise,  follow the guidelines below.  Eat and drink as usual until 8 hours before you arrive for surgery. After that, no food or milk.  Drink clear liquids until 2 hours before you arrive. These are liquids you can see through, like water, Gatorade, and Propel Water. They also include plain black coffee and tea (no cream or milk), candy, and breath mints. You can spit out gum when you arrive.  If you drink alcohol: Stop drinking it the night before surgery.  If your care team tells you to take medicine on the morning of surgery, it's okay to take it with a sip of water.  Preventing infection  Shower or bathe the night before and morning of your surgery. Follow the instructions your clinic gave you. (If no instructions, use regular soap.)  Don't shave or clip hair near your surgery site. We'll remove the hair if needed.  Don't smoke or vape the morning of surgery. You may chew nicotine gum up to 2 hours before surgery. A nicotine patch is okay.  Note: Some surgeries require you to completely quit smoking and nicotine. Check with your surgeon.  Your care team will make every effort to keep you safe from infection. We will:  Clean our hands often with soap and water (or an alcohol-based hand rub).  Clean the skin at your surgery site with a special soap that kills germs.  Give you a special gown to keep you warm. (Cold raises the risk of infection.)  Wear special hair covers, masks, gowns and gloves during surgery.  Give antibiotic medicine, if prescribed. Not all surgeries need antibiotics.  What to bring on the day of surgery  Photo ID and insurance card  Copy of your health care directive, if you have one  Glasses and hearing aids (bring cases)  You can't wear contacts during surgery  Inhaler and eye drops, if you use them (tell us about these when you arrive)  CPAP machine or breathing device, if you use them  A few personal items, if spending the night  If you have . . .  A pacemaker, ICD (cardiac defibrillator) or other  implant: Bring the ID card.  An implanted stimulator: Bring the remote control.  A legal guardian: Bring a copy of the certified (court-stamped) guardianship papers.  Please remove any jewelry, including body piercings. Leave jewelry and other valuables at home.  If you're going home the day of surgery  You must have a responsible adult drive you home. They should stay with you overnight as well.  If you don't have someone to stay with you, and you aren't safe to go home alone, we may keep you overnight. Insurance often won't pay for this.  After surgery  If it's hard to control your pain or you need more pain medicine, please call your surgeon's office.  Questions?   If you have any questions for your care team, list them here: _________________________________________________________________________________________________________________________________________________________________________ ____________________________________ ____________________________________ ____________________________________  For informational purposes only. Not to replace the advice of your health care provider. Copyright   2003, 2019 [facility]. All rights reserved. Clinically reviewed by Angeles Cross MD. SMARTworks 655092 - REV 12/22.    How to Take Your Medication Before Surgery  - Take all of your medications before surgery except as noted below  Hold (do not take) your losartan on morning of surgery  Avoid NSAIDS (Motrin, Ibuprofen, Aleve, Naprosyn) for one week prior to surgery. If needed, Tylenol or Acetaminophen are okay to use.  Avoid supplements for one week prior to surgery.

## 2024-05-07 NOTE — Clinical Note
Please call and let her know that hematology could not make a recommendation with the e-consult and they recommend that she keep her appointment as scheduled on 5/29/24 which means that she will need to postpone her carpal tunnel surgery until after she sees hematology. (I talked to her about this possibility yesterday so she is aware)  Please also fax preop to surgery center.

## 2024-05-08 LAB
ANION GAP SERPL CALCULATED.3IONS-SCNC: 10 MMOL/L (ref 7–15)
BUN SERPL-MCNC: 27.3 MG/DL (ref 8–23)
CALCIUM SERPL-MCNC: 9.7 MG/DL (ref 8.8–10.2)
CHLORIDE SERPL-SCNC: 102 MMOL/L (ref 98–107)
CREAT SERPL-MCNC: 0.99 MG/DL (ref 0.51–0.95)
DEPRECATED HCO3 PLAS-SCNC: 24 MMOL/L (ref 22–29)
EGFRCR SERPLBLD CKD-EPI 2021: 60 ML/MIN/1.73M2
GLUCOSE SERPL-MCNC: 91 MG/DL (ref 70–99)
POTASSIUM SERPL-SCNC: 4.4 MMOL/L (ref 3.4–5.3)
SODIUM SERPL-SCNC: 136 MMOL/L (ref 135–145)
TSH SERPL DL<=0.005 MIU/L-ACNC: 1.79 UIU/ML (ref 0.3–4.2)

## 2024-05-09 ENCOUNTER — E-CONSULT (OUTPATIENT)
Dept: ONCOLOGY | Facility: CLINIC | Age: 74
End: 2024-05-09
Payer: COMMERCIAL

## 2024-05-09 ENCOUNTER — TELEPHONE (OUTPATIENT)
Dept: FAMILY MEDICINE | Facility: CLINIC | Age: 74
End: 2024-05-09

## 2024-05-09 DIAGNOSIS — R79.89 ELEVATED SERUM CREATININE: Primary | ICD-10-CM

## 2024-05-09 PROBLEM — Z87.898 HISTORY OF TRANSFUSION REACTION: Status: ACTIVE | Noted: 2024-05-09

## 2024-05-09 PROBLEM — D68.2 FACTOR VII DEFICIENCY (H): Status: ACTIVE | Noted: 2024-05-09

## 2024-05-09 NOTE — TELEPHONE ENCOUNTER
Jovita Bauman requests RN call Hematology to see if they can get pt in before her carpal tunnel surgery on 5/15/24 for clearance due to new anemia.    5/7/24 Pre-op notes:  History of postoperative hemorrhage after previous heart and colon surgeries many years ago. She had severe pericardial and pleural bleeding after open cardiac ablation procedure for Gabino-Parkinson-White in 1987; after colectomy in 2002 she developed severe internal bleeding requiring a second operation immediately after her first surgery. 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     Through extensive chart review, I found that she was seen by hematology ([facility]) in 2/2014 for clearance prior to D&C. 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 There was no evidence of von Willebrand's or a factor inhibitor at that time. They completed additional labs including INR, PTT, fibrinogen level, and platelet count. If INR normal, she was able to proceed with surgery without further evaluation. I don't see lab results from 2/2014 but they were reportedly normal and she was able to proceed with surgery as scheduled. She did not have any bleeding issues after surgery.      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      She has appointment with hematology scheduled for 5/29/24 as recommended by her neurosurgeon for clearance for possible anterior cervical discectomy and fusion. She denies any recent bleeding symptoms.        Called Dr Bain's Hematologist office.  Spoke to nurse, Tr.  They do not have any openings prior to 5/15/24.     Lashay Connor RN, BSN  [facility]

## 2024-05-09 NOTE — PROGRESS NOTES
5/9/2024     E-Consult has been denied due to: Complexity of question, needs in-person referral.    Interprofessional consultation requested by:  Jovita Bauman PA-C      Clinical Question/Purpose: MY CLINICAL QUESTION IS: Patient undergoing preoperative evaluation for right carpal tunnel release scheduled for 5/15/24. She has a history of postoperative hemorrhage after previous heart and colon surgeries many years ago. She had severe pericardial and pleural bleeding after open cardiac ablation procedure for Gabino-Parkinson-White in 1987; after colectomy in 2002 she developed severe internal bleeding requiring a second operation immediately after her first surgery. 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 She was seen by hematology ([facility]) in 2/2014 for clearance prior to D&C. 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 There was no evidence of von Willebrand's or a factor inhibitor at that time. They completed additional labs including INR, PTT, fibrinogen level, and platelet count. If INR normal, she was able to proceed with surgery without further evaluation. I don't see lab results from 2/2014 but they were reportedly normal and she was able to proceed with surgery as scheduled. She did not have any bleeding issues after surgery. Records from 5/2014 show mildly elevated PT and INR: PT 13.7 (normal 9.2-13.0); INR 1.3 (normal 0.8-1.2). 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     Patient assessment and information reviewed: This is too complex for an e-consults. I am unable to say if it is safe for her to undergo carpal tunnel release without a complete review with her of her bleeding history labs, etc.     Recommendations: Keep the hematology appointment that is already scheduled. I am unable to confidently  make any recommendations regarding the wrist surgery with this limited information.       The recommendations provided in this E-Consult are based on a review of clinical data pertinent to the clinical question presented, without a review of the patient's complete medical record or, the benefit of a comprehensive in-person or virtual patient evaluation. This consultation should not replace the clinical judgement and evaluation of the provider ordering this E-Consult. Any new clinical issues, or changes in patient status since the filing of this E-Consult will need to be taken into account when assessing these recommendations. Please contact me if you have further questions.    My total time spent reviewing clinical information and formulating assessment was 10 minutes.        Almaz De Dios MD

## 2024-05-10 ENCOUNTER — TELEPHONE (OUTPATIENT)
Dept: ORTHOPEDICS | Facility: CLINIC | Age: 74
End: 2024-05-10
Payer: COMMERCIAL

## 2024-05-10 LAB — PYRIDOXAL PHOS SERPL-SCNC: 51.5 NMOL/L

## 2024-05-10 NOTE — TELEPHONE ENCOUNTER
Patient Returning Call    Reason for call:  pt is under the impression she has surgery set up for 6/28, advised nothing is currently set up on chart,requesting callback for further details    Information relayed to patient:  te sent to clinic     Patient has additional questions:  No      Could we send this information to you in [facility] or would you prefer to receive a phone call?:   Patient would prefer a phone call   Okay to leave a detailed message?: Yes at Cell number on file:    Telephone Information:   Mobile [phone]

## 2024-05-10 NOTE — TELEPHONE ENCOUNTER
Spoke to Criss, advised that she does have surgery on 06/28/24 at the [facility] [place]. The center does not come up on [address] [facility] [address], the facility is not part of [facility].    Patient understood and thanked me for calling her.

## 2024-05-28 DIAGNOSIS — E03.9 HYPOTHYROIDISM, UNSPECIFIED TYPE: ICD-10-CM

## 2024-05-29 ENCOUNTER — OFFICE VISIT (OUTPATIENT)
Dept: HEMATOLOGY | Facility: CLINIC | Age: 74
End: 2024-05-29
Attending: NEUROLOGICAL SURGERY
Payer: COMMERCIAL

## 2024-05-29 VITALS
OXYGEN SATURATION: 99 % | HEIGHT: 63 IN | TEMPERATURE: 98.3 F | DIASTOLIC BLOOD PRESSURE: 74 MMHG | SYSTOLIC BLOOD PRESSURE: 120 MMHG | BODY MASS INDEX: 24.26 KG/M2 | HEART RATE: 66 BPM | WEIGHT: 136.9 LBS

## 2024-05-29 DIAGNOSIS — Z01.818 PREOP TESTING: ICD-10-CM

## 2024-05-29 DIAGNOSIS — D64.9 ANEMIA, UNSPECIFIED TYPE: ICD-10-CM

## 2024-05-29 DIAGNOSIS — D69.9 HEMORRHAGIC DIATHESIS (H): Primary | ICD-10-CM

## 2024-05-29 LAB
APTT PPP: 30 SECONDS (ref 22–38)
FERRITIN SERPL-MCNC: 256 NG/ML (ref 11–328)
INR PPP: 1.24 (ref 0.85–1.15)

## 2024-05-29 PROCEDURE — 99205 OFFICE O/P NEW HI 60 MIN: CPT | Performed by: INTERNAL MEDICINE

## 2024-05-29 PROCEDURE — 85220 BLOOC CLOT FACTOR V TEST: CPT | Performed by: INTERNAL MEDICINE

## 2024-05-29 PROCEDURE — 36415 COLL VENOUS BLD VENIPUNCTURE: CPT | Performed by: INTERNAL MEDICINE

## 2024-05-29 PROCEDURE — 85260 CLOT FACTOR X STUART-POWER: CPT | Performed by: INTERNAL MEDICINE

## 2024-05-29 PROCEDURE — G0463 HOSPITAL OUTPT CLINIC VISIT: HCPCS | Performed by: INTERNAL MEDICINE

## 2024-05-29 PROCEDURE — 85230 CLOT FACTOR VII PROCONVERTIN: CPT | Performed by: INTERNAL MEDICINE

## 2024-05-29 PROCEDURE — 82728 ASSAY OF FERRITIN: CPT | Performed by: INTERNAL MEDICINE

## 2024-05-29 PROCEDURE — 85670 THROMBIN TIME PLASMA: CPT | Performed by: INTERNAL MEDICINE

## 2024-05-29 PROCEDURE — 85250 CLOT FACTOR IX PTC/CHRSTMAS: CPT | Performed by: INTERNAL MEDICINE

## 2024-05-29 PROCEDURE — 85730 THROMBOPLASTIN TIME PARTIAL: CPT | Performed by: INTERNAL MEDICINE

## 2024-05-29 PROCEDURE — 85290 CLOT FACTOR XIII FIBRIN STAB: CPT | Performed by: INTERNAL MEDICINE

## 2024-05-29 PROCEDURE — 85610 PROTHROMBIN TIME: CPT | Performed by: INTERNAL MEDICINE

## 2024-05-29 PROCEDURE — 85210 CLOT FACTOR II PROTHROM SPEC: CPT | Performed by: INTERNAL MEDICINE

## 2024-05-29 PROCEDURE — 85240 CLOT FACTOR VIII AHG 1 STAGE: CPT | Performed by: INTERNAL MEDICINE

## 2024-05-29 RX ORDER — LEVOTHYROXINE SODIUM 25 UG/1
25 TABLET ORAL DAILY
Qty: 90 TABLET | Refills: 2 | Status: SHIPPED | OUTPATIENT
Start: 2024-05-29

## 2024-05-29 NOTE — TELEPHONE ENCOUNTER
Prescription approved per [facility] Refill Protocol.  Audrey Leonard, RN  [facility] Triage Nurse

## 2024-05-29 NOTE — PROGRESS NOTES
[facility]  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    For her post-surgical bleeding, we will repeat and expand on her prior workup.  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  This suggests an acquired deficiency, which could fit with her history.  Given this workup was over 10 years ago, we will repeat these factors along with several others to try to more clearly define the etiology of bleeding.    Given insidious onset of anemia, normal Vit B 12 and normocytic, will start with ferritin today for suspected ERVIN.    Recommendations:  I counseled the patient about my assessment of potential causes for bleeding diathesis as above  Lab testing today for multiple factor levels and ferritin, for suspected ERVIN  Counseled about IV iron if she does have ERVIN  Discussed perioperative plan based on workup today.  If no diagnosis can be made I reassured her that given the low bleeding risk with carpal tunnel surgery I suspect nothing would be required perioperatively.  If bleeding occurs, then would give tranexamic acid 1000mg IV and continue either 1000mg Q8 hours IV or 1300 mg PO every 8 hours.  I will reach out to her by phone with results when they return for a formal perioperative plan and possible IV iron    60 minutes spent by me on the date of the  encounter doing chart review, review of outside records, review of test results, interpretation of tests, patient visit, and documentation     Adair Bain MD   of Medicine, Division of Hematology, Oncology and Transplantation  [facility]     Lab addendum:   Latest Reference Range & Units 05/29/24 12:40   INR 0.85 - 1.15  1.24 (H)   PTT 22 - 38 Seconds 30   Thrombin Time 13.0 - 19.0 Seconds 16.7   Factor 2 Assay 60 - 140 % 96   Factor 5 Assay 60 - 140 % 89   Factor 7 Assay 50 - 129 % 62   Factor 8 Assay 55 - 200 % 166   Factor 9 Assay 65 - 150 % 149   Factor 10 Assay 60 - 140 % 98   (H): Data is abnormally high    Factor levels are now within normal limits, which suggests that previous factor levels may be been temporarily low due to liver dysfunction.  Currently it suggests that her bleeding risk is low, especially since her low factor levels have returned to normal.    For possible cervical spine procedure, I would recommend giving tranexamic acid 1000mg IV 30-60 minutes prior to the surgery and then continue Q8 hours while hospitalized.  It's unclear what the etiology of her bleeding was, but it seems factor deficiencies have resolved.  Tranexamic acid is very low-risk and so I think given that we don't know exactly why she bled after prior procures (surgical bleeding vs temporary factor deficiencies vs both) it would be reasonable to try this therapy as the risks are low of this treatment.     Adair Bain MD   of Medicine, Division of Hematology, Oncology and Transplantation  [facility]     -------------------------------  History: Anali Sanchez is a 73 year old woman with a history of postoperative bleeding on a couple occasions who presents today for perioperative management prior to carpal tunnel syndrome and possibly cervical spine procedure in the near future.    She reports that she had no bleeding  with both wisdom tooth extraction and tonsillectomy as a child.  She never had a history of heavy menstrual cycles during her childbearing years.  She had 3 pregnancies all which were delivered vaginally and had no bleeding complications.  She had Gabino-Parkinson-White syndrome ever since she was a child and had dealt with for many years without medical intervention, and then unfortunately, during her second pregnancy became very symptomatic and ultimately, following her third pregnancy went on to have an ablation procedure.  This occurred in 1987 and was complicated by what sounds to be both pericardial and parapneumonic hemorrhagic effusions.  She did have to have a repeat procedure and draining of these effusions.  The underlying cause was not determined.    She did well and had no procedures after this until 2002.  At that point she required a colectomy, which was done laparoscopically at [facility].  She reports that following the surgery she was in the recovery room and was very somnolent on so of several hours later she went back to the operating room for suspected bleeding.  This time, she had not open incision and bleeding was stopped.  However, she was never told that a bleeder was found or there was a clear etiology for this.  This then led to further evaluation at [facility].  Per notes from [facility] in February 2014, that evaluation showed prolonged PT/INR  with factor VII of 13%, factor to 49%, factor V 46% factor X 42%.  Her von Willebrand panel and PTT were normal.    Since then she has done well.  According to those notes they repeated her INR and some factor levels but the results were not reported so I do not have access to those.  She did not receive any perioperative therapy that she is aware of.  She had a D&C in 2014 and had no complications.    Reports no current bleeding symptoms.  She has had persistent pain in the right wrist and diagnosed with carpal tunnel syndrome and is  scheduled for release of the carpal tunnel at the end of June 2024.  She is also had problems with neck pain radiating down the right arm.  This improved with steroid injection.  She has seen an orthopedist to consider surgical management at some point.    She is essentially otherwise well except for hypothyroidism..    She is retired schoolteacher.  She not smoke or drink alcohol.    Has no family history of bleeding disorders or bleeding after surgery.  She does have a family history of 1 sibling with CML and another sibling with myelofibrosis he had a bone marrow transplant.      Current Outpatient Medications   Medication Sig Dispense Refill    Calcium-Vitamin D-Vitamin K 500-200-40 MG-UNT-MCG CHEW Takes 3 chewables daily      clotrimazole (LOTRIMIN) 1 % external cream Apply topically 2 times daily to affected areas on lips. Use until resolution, typically 1-3 weeks. 30 g 1    dexAMETHasone (DECADRON) 4 MG/ML injection Use 4 mg or dose determined by provider for iontophoresis. 30 mL 0    olmesartan (BENICAR) 20 MG tablet Take 0.5 tablets (10 mg) by mouth daily 45 tablet 1    sertraline (ZOLOFT) 25 MG tablet Take 1 tablet (25 mg) by mouth daily 90 tablet 1    levothyroxine (SYNTHROID/LEVOTHROID) 25 MCG tablet TAKE 1 TABLET BY MOUTH EVERY DAY 90 tablet 2     Current Facility-Administered Medications   Medication Dose Route Frequency Provider Last Rate Last Admin    lidocaine 1 % injection 1 mL  1 mL   Yeo, Albert, MD   1 mL at 01/17/24 1439    methylPREDNISolone (DEPO-Medrol) injection 40 mg  40 mg   Yeo, Albert, MD   40 mg at 01/17/24 1439       Physical Exam:  Vitals: B/P: 120/74, T: 98.3, P: 66, R: Data Unavailable, Wt: 136 lbs 14.4 oz Body mass index is 24.25 kg/m .   Exam:   Gen: Appears well, no distress  HEENT: no scleral icterus or hemorrhage, no wet purpura, no lymphadenopathy  Ext: no edema  Skin: no ecchymoses or hematomas  Neuro: no focal deficits, affect and cognition are normal    Labs:  Pending  today    Imaging:  none

## 2024-05-30 LAB
FACT IX ACT/NOR PPP: 149 %
FACT V ACT/NOR PPP: 89 % (ref 60–140)
FACT VII ACT/NOR PPP: 62 % (ref 50–129)
FACT VIII ACT/NOR PPP: 166 % (ref 55–200)
FACT X ACT/NOR PPP: 98 % (ref 60–140)
PROTHROM ACT/NOR PPP: 96 % (ref 60–140)
THROMBIN TIME: 16.7 SECONDS (ref 13–19)

## 2024-05-31 LAB — FACT XIII AG ACT/NOR PPP IA: 64 % (ref 75–155)

## 2024-06-03 ENCOUNTER — TELEPHONE (OUTPATIENT)
Dept: HEMATOLOGY | Facility: CLINIC | Age: 74
End: 2024-06-03
Payer: COMMERCIAL

## 2024-06-03 NOTE — TELEPHONE ENCOUNTER
"[id number]  Anali A Laura  73 year old female  CBCD Diagnosis: Bleeding Diathesis  CBCD Provider: Odell    Incoming voicemail from patient wanting to review recent results. She has an appointment with neurosurgery tomorrow and wanted to discuss hematology recommendations with them.    RN touched base with Dr. Bain. Per provider, \"She previously had low factor levels when she was worked up many years ago at [facility], but this time those all came back normal. I'm hopeful that that means whatever put her at risk for bleeding last time is now resolved.\" He does recommend tranexamic acid 1000mg IV 30-60 minutes prior to the surgery and then continue Q8 hours while hospitalized. Overall, per provider, \"all her testing was essentially normal.\" Dr. Bain did pass these results and recommendations on to her neurosurgery team who saw her about a possible cervical spine procedure.    RN called patient and went over above information. She was grateful for call and feedback. She is also wondering if she needs an IV iron infusion. Ferritin came back normal per provider. She is having ongoing exhaustion.    Dr. Bain reviewed ferritin level which was okay per provider. No need for oral or IV supplementation. He recommends patient see PCP to further investigate fatigue.    RN encouraged Criss to call clinic once she has neurosurgery scheduled. She will call with any questions/concerns.      Sienna Huang RN, BSN, PCCN  Nurse Clinician    [facility]  [address] [place] [address], [address], [place] [address]   Office, direct: [phone]  Main office number: [phone]  Pronouns: She, her, hers      "

## 2024-06-04 ENCOUNTER — OFFICE VISIT (OUTPATIENT)
Dept: NEUROSURGERY | Facility: CLINIC | Age: 74
End: 2024-06-04
Payer: COMMERCIAL

## 2024-06-04 VITALS — DIASTOLIC BLOOD PRESSURE: 71 MMHG | OXYGEN SATURATION: 100 % | SYSTOLIC BLOOD PRESSURE: 100 MMHG | HEART RATE: 62 BPM

## 2024-06-04 DIAGNOSIS — M54.12 CERVICAL RADICULOPATHY: Primary | ICD-10-CM

## 2024-06-04 PROCEDURE — 99213 OFFICE O/P EST LOW 20 MIN: CPT | Performed by: NEUROLOGICAL SURGERY

## 2024-06-04 ASSESSMENT — PAIN SCALES - GENERAL: PAINLEVEL: SEVERE PAIN (6)

## 2024-06-04 NOTE — PATIENT INSTRUCTIONS
Patient Next Steps:    Please follow up with us on an as needed basis    Please call us if you have any further questions or concerns.    [facility]   Phone: [phone]  Fax: [phone]

## 2024-06-04 NOTE — LETTER
"    6/4/2024         RE: Anali Sanchez  [address] [facility] [address]        Dear Colleague,    Thank you for referring your patient, Anali Sanchez, to the [facility]UROLOG[facility]. Please see a copy of my visit note below.    NEUROSURGERY FOLLOWUP  NOTE    Anali Sanchez comes today in f/u with EMG/NCV studies and hematology consult for her bleeding diastasis on 5/29/2024.    Hematology consult:  \"Recommendations:  I counseled the patient about my assessment of potential causes for bleeding diathesis as above  Lab testing today for multiple factor levels and ferritin, for suspected ERVIN  Counseled about IV iron if she does have ERVIN  Discussed perioperative plan based on workup today.  If no diagnosis can be made I reassured her that given the low bleeding risk with carpal tunnel surgery I suspect nothing would be required perioperatively.  If bleeding occurs, then would give tranexamic acid 1000mg IV and continue either 1000mg Q8 hours IV or 1300 mg PO every 8 hours.  I will reach out to her by phone with results when they return for a formal perioperative plan and possible IV iron.\"    4/12/2024 EMG/NCV studies:  The study shows electrodiagnostic evidence for a moderate median neuropathy across the right carpal tunnel.  This is supported by the increased onset latency of the right median motor nerve conduction study, small median snap and increased peak latency of the right median antidromic sensory nerve conduction study.  There is no evidence for active denervation on EMG needle sampling of the right abductor pollicis brevis muscle.  The remainder of the study did not reveal electrodiagnostic evidence for an ulnar neuropathy or cervical radiculopathy on the right.    Patient reports significant improvement in her right upper extremity radiculopathy symptoms with conservative treatment with gabapentin and Celebrex.  She also received epidural steroid injection at " an outside facility with significant improvement in her symptoms.  She reports pain primarily involving her right hand which initially started as de Quervain's tenosynovitis.  Patient is scheduled to have right carpal tunnel surgery at [facility] with orthopedics on 6/28/2024.    PHYSICAL EXAM:   Constitutional: LMP  (LMP Unknown)      Mental Status: A & O in no acute distress.  Affect is appropriate.  Speech is fluent.  Recent and remote memory are intact.  Attention span and concentration are normal.     Motor: No pronator drift of upper extremity.   Normal bulk and tone all muscle groups of upper and lower extremities.       Delt Bi Tri Hand Flex/  Ext Iliopsoas Quadriceps Tibialis Anterior EHL Gastroc     C5 C6 C7 C8/T1 L2 L3 L4 L5 S1   R 5 5 5 4 5 5 5 5 5   L 5 5 5 5 5 5 5 5 5     Patient using wrist brace in her right hand.    Sensory: Sensations intact    Coordination: Heel/toe/ gait intact. Intact tandem gait      Reflexes; supinator, biceps, triceps, knee/ ankle jerk intact. No hoffmans/   no babinski/ clonus.    IMAGING: No new images today.      CONSULTATION ASSESSMENT AND PLAN:    Ms. 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  She was managed conservatively and was recommended physical therapy with gabapentin and anti-inflammatory.  She is in the clinic today with EMG/NCV studies and hematology consult for her bleeding diastasis.    She reports significant improvement in her radiculopathy symptoms with conservative treatment including epidural steroid injections.     I explained to the MRI cervical spine  findings of degenerative disc disease with HNP primarily at C5-6 to the patient.  I also discussed the EMG/NCV study suggestive of right carpal tunnel syndrome to the patient.  Since she improved with conservative management we will continue the same at this point.  She can follow-up with me on as-needed basis if she start developing worsening symptoms.  She is scheduled to have carpal tunnel surgery with orthopedics at [facility] on 6/28/2024.  She can also follow with the hematologist as recommended.    Patient agreed with the plan.  All the questions were answered and patient sounded understanding.  He can contact us if there are any further questions or concerns or worsening neurological deficits.I spent more than 20 minutes in this apt, examining the pt, reviewing the scans, reviewing notes from chart, discussing treatment options with risks and benefits and coordinating care. This note was created in part by the use of Dragon voice recognition system. Inadvertent grammatical errors and typographical errors may have occurred due to inherent limitation of voice recognition software.  Reasonable attempts made to avoid errors, but this document may contain an error not identified before finalizing.  Please contact me for any clarification needed.        Manuel Stout MD      CC:     Jovita Bauman  [address] [address]      Again, thank you for allowing me to participate in the care of your patient.        Sincerely,        Manuel Stout MD

## 2024-06-04 NOTE — PROGRESS NOTES
"NEUROSURGERY FOLLOWUP  NOTE    Anali Sanchez comes today in f/u with EMG/NCV studies and hematology consult for her bleeding diastasis on 5/29/2024.    Hematology consult:  \"Recommendations:  I counseled the patient about my assessment of potential causes for bleeding diathesis as above  Lab testing today for multiple factor levels and ferritin, for suspected ERVIN  Counseled about IV iron if she does have ERVIN  Discussed perioperative plan based on workup today.  If no diagnosis can be made I reassured her that given the low bleeding risk with carpal tunnel surgery I suspect nothing would be required perioperatively.  If bleeding occurs, then would give tranexamic acid 1000mg IV and continue either 1000mg Q8 hours IV or 1300 mg PO every 8 hours.  I will reach out to her by phone with results when they return for a formal perioperative plan and possible IV iron.\"    4/12/2024 EMG/NCV studies:  The study shows electrodiagnostic evidence for a moderate median neuropathy across the right carpal tunnel.  This is supported by the increased onset latency of the right median motor nerve conduction study, small median snap and increased peak latency of the right median antidromic sensory nerve conduction study.  There is no evidence for active denervation on EMG needle sampling of the right abductor pollicis brevis muscle.  The remainder of the study did not reveal electrodiagnostic evidence for an ulnar neuropathy or cervical radiculopathy on the right.    Patient reports significant improvement in her right upper extremity radiculopathy symptoms with conservative treatment with gabapentin and Celebrex.  She also received epidural steroid injection at an outside facility with significant improvement in her symptoms.  She reports pain primarily involving her right hand which initially started as de Quervain's tenosynovitis.  Patient is scheduled to have right carpal tunnel surgery at [facility] with " orthopedics on 6/28/2024.    PHYSICAL EXAM:   Constitutional: LMP  (LMP Unknown)      Mental Status: A & O in no acute distress.  Affect is appropriate.  Speech is fluent.  Recent and remote memory are intact.  Attention span and concentration are normal.     Motor: No pronator drift of upper extremity.   Normal bulk and tone all muscle groups of upper and lower extremities.       Delt Bi Tri Hand Flex/  Ext Iliopsoas Quadriceps Tibialis Anterior EHL Gastroc     C5 C6 C7 C8/T1 L2 L3 L4 L5 S1   R 5 5 5 4 5 5 5 5 5   L 5 5 5 5 5 5 5 5 5     Patient using wrist brace in her right hand.    Sensory: Sensations intact    Coordination: Heel/toe/ gait intact. Intact tandem gait      Reflexes; supinator, biceps, triceps, knee/ ankle jerk intact. No hoffmans/   no babinski/ clonus.    IMAGING: No new images today.      CONSULTATION ASSESSMENT AND PLAN:    Ms. 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  She was managed conservatively and was recommended physical therapy with gabapentin and anti-inflammatory.  She is in the clinic today with EMG/NCV studies and hematology consult for her bleeding diastasis.    She reports significant improvement in her radiculopathy symptoms with conservative treatment including epidural steroid injections.     I explained to the MRI cervical spine findings of degenerative disc disease with HNP primarily at C5-6 to the patient.  I also discussed the EMG/NCV study suggestive of right carpal tunnel syndrome to the patient.  Since she improved with conservative management we will continue the same at this  point.  She can follow-up with me on as-needed basis if she start developing worsening symptoms.  She is scheduled to have carpal tunnel surgery with orthopedics at [facility] on 6/28/2024.  She can also follow with the hematologist as recommended.    Patient agreed with the plan.  All the questions were answered and patient sounded understanding.  He can contact us if there are any further questions or concerns or worsening neurological deficits.I spent more than 20 minutes in this apt, examining the pt, reviewing the scans, reviewing notes from chart, discussing treatment options with risks and benefits and coordinating care. This note was created in part by the use of Dragon voice recognition system. Inadvertent grammatical errors and typographical errors may have occurred due to inherent limitation of voice recognition software.  Reasonable attempts made to avoid errors, but this document may contain an error not identified before finalizing.  Please contact me for any clarification needed.        aMnuel Stout MD      CC:     Jovita Bauman  [address] [id number]

## 2024-06-04 NOTE — NURSING NOTE
"Anali Sanchez is a 73 year old female who presents for:  Chief Complaint   Patient presents with    RECHECK     Cervical Stenosis, reports having muscle pain        Initial Vitals:  /71   Pulse 62   LMP  (LMP Unknown)   SpO2 100%  Estimated body mass index is 24.25 kg/m  as calculated from the following:    Height as of 5/29/24: 5' 3\" (1.6 m).    Weight as of 5/29/24: 136 lb 14.4 oz (62.1 kg).. There is no height or weight on file to calculate BSA. BP completed using cuff size: regular  Severe Pain (6)    Nursing Comments:       Kim Miller    "

## 2024-06-05 DIAGNOSIS — D64.9 ANEMIA, UNSPECIFIED TYPE: Primary | ICD-10-CM

## 2024-06-17 ENCOUNTER — MYC MEDICAL ADVICE (OUTPATIENT)
Dept: FAMILY MEDICINE | Facility: CLINIC | Age: 74
End: 2024-06-17
Payer: COMMERCIAL

## 2024-06-21 ENCOUNTER — OFFICE VISIT (OUTPATIENT)
Dept: FAMILY MEDICINE | Facility: CLINIC | Age: 74
End: 2024-06-21
Payer: COMMERCIAL

## 2024-06-21 ENCOUNTER — TELEPHONE (OUTPATIENT)
Dept: FAMILY MEDICINE | Facility: CLINIC | Age: 74
End: 2024-06-21

## 2024-06-21 ENCOUNTER — ANCILLARY PROCEDURE (OUTPATIENT)
Dept: GENERAL RADIOLOGY | Facility: CLINIC | Age: 74
End: 2024-06-21
Attending: PHYSICIAN ASSISTANT
Payer: COMMERCIAL

## 2024-06-21 VITALS
BODY MASS INDEX: 24.1 KG/M2 | TEMPERATURE: 97.7 F | WEIGHT: 136 LBS | DIASTOLIC BLOOD PRESSURE: 81 MMHG | HEIGHT: 63 IN | OXYGEN SATURATION: 99 % | SYSTOLIC BLOOD PRESSURE: 122 MMHG | RESPIRATION RATE: 13 BRPM | HEART RATE: 64 BPM

## 2024-06-21 DIAGNOSIS — R05.9 COUGH, UNSPECIFIED TYPE: ICD-10-CM

## 2024-06-21 DIAGNOSIS — I10 ESSENTIAL HYPERTENSION: ICD-10-CM

## 2024-06-21 DIAGNOSIS — R06.02 SOB (SHORTNESS OF BREATH): ICD-10-CM

## 2024-06-21 DIAGNOSIS — Z01.818 PREOP GENERAL PHYSICAL EXAM: Primary | ICD-10-CM

## 2024-06-21 DIAGNOSIS — K21.9 GASTROESOPHAGEAL REFLUX DISEASE, UNSPECIFIED WHETHER ESOPHAGITIS PRESENT: ICD-10-CM

## 2024-06-21 DIAGNOSIS — G56.01 CARPAL TUNNEL SYNDROME OF RIGHT WRIST: ICD-10-CM

## 2024-06-21 DIAGNOSIS — R94.39 ABNORMAL STRESS ECHO: ICD-10-CM

## 2024-06-21 DIAGNOSIS — D64.9 ANEMIA, UNSPECIFIED TYPE: ICD-10-CM

## 2024-06-21 DIAGNOSIS — I65.29 CAROTID ARTERY CALCIFICATION, UNSPECIFIED LATERALITY: ICD-10-CM

## 2024-06-21 DIAGNOSIS — R53.83 FATIGUE, UNSPECIFIED TYPE: ICD-10-CM

## 2024-06-21 DIAGNOSIS — D69.9 HEMORRHAGIC DIATHESIS (H): ICD-10-CM

## 2024-06-21 DIAGNOSIS — Z86.79 HISTORY OF ATRIAL FLUTTER: ICD-10-CM

## 2024-06-21 DIAGNOSIS — Z86.79 HISTORY OF WOLFF-PARKINSON-WHITE (WPW) SYNDROME: ICD-10-CM

## 2024-06-21 LAB
ALBUMIN SERPL BCG-MCNC: 4.3 G/DL (ref 3.5–5.2)
ALP SERPL-CCNC: 60 U/L (ref 40–150)
ALT SERPL W P-5'-P-CCNC: 8 U/L (ref 0–50)
ANION GAP SERPL CALCULATED.3IONS-SCNC: 11 MMOL/L (ref 7–15)
AST SERPL W P-5'-P-CCNC: 19 U/L (ref 0–45)
BASOPHILS # BLD AUTO: 0 10E3/UL (ref 0–0.2)
BASOPHILS NFR BLD AUTO: 1 %
BILIRUB SERPL-MCNC: 0.4 MG/DL
BUN SERPL-MCNC: 16.4 MG/DL (ref 8–23)
CALCIUM SERPL-MCNC: 9.5 MG/DL (ref 8.8–10.2)
CHLORIDE SERPL-SCNC: 102 MMOL/L (ref 98–107)
CREAT SERPL-MCNC: 0.82 MG/DL (ref 0.51–0.95)
DEPRECATED HCO3 PLAS-SCNC: 25 MMOL/L (ref 22–29)
EGFRCR SERPLBLD CKD-EPI 2021: 75 ML/MIN/1.73M2
EOSINOPHIL # BLD AUTO: 0.3 10E3/UL (ref 0–0.7)
EOSINOPHIL NFR BLD AUTO: 4 %
ERYTHROCYTE [DISTWIDTH] IN BLOOD BY AUTOMATED COUNT: 12.1 % (ref 10–15)
GLUCOSE SERPL-MCNC: 93 MG/DL (ref 70–99)
HCT VFR BLD AUTO: 34.8 % (ref 35–47)
HGB BLD-MCNC: 11.2 G/DL (ref 11.7–15.7)
IMM GRANULOCYTES # BLD: 0 10E3/UL
IMM GRANULOCYTES NFR BLD: 0 %
LYMPHOCYTES # BLD AUTO: 2.2 10E3/UL (ref 0.8–5.3)
LYMPHOCYTES NFR BLD AUTO: 31 %
MCH RBC QN AUTO: 29.6 PG (ref 26.5–33)
MCHC RBC AUTO-ENTMCNC: 32.2 G/DL (ref 31.5–36.5)
MCV RBC AUTO: 92 FL (ref 78–100)
MONOCYTES # BLD AUTO: 0.6 10E3/UL (ref 0–1.3)
MONOCYTES NFR BLD AUTO: 8 %
NEUTROPHILS # BLD AUTO: 4.1 10E3/UL (ref 1.6–8.3)
NEUTROPHILS NFR BLD AUTO: 57 %
PLATELET # BLD AUTO: 251 10E3/UL (ref 150–450)
POTASSIUM SERPL-SCNC: 4.1 MMOL/L (ref 3.4–5.3)
PROT SERPL-MCNC: 7.6 G/DL (ref 6.4–8.3)
RBC # BLD AUTO: 3.79 10E6/UL (ref 3.8–5.2)
SODIUM SERPL-SCNC: 138 MMOL/L (ref 135–145)
TSH SERPL DL<=0.005 MIU/L-ACNC: 2.14 UIU/ML (ref 0.3–4.2)
WBC # BLD AUTO: 7.3 10E3/UL (ref 4–11)

## 2024-06-21 PROCEDURE — 99214 OFFICE O/P EST MOD 30 MIN: CPT | Performed by: PHYSICIAN ASSISTANT

## 2024-06-21 PROCEDURE — 85025 COMPLETE CBC W/AUTO DIFF WBC: CPT | Performed by: PHYSICIAN ASSISTANT

## 2024-06-21 PROCEDURE — 36415 COLL VENOUS BLD VENIPUNCTURE: CPT | Performed by: PHYSICIAN ASSISTANT

## 2024-06-21 PROCEDURE — 71046 X-RAY EXAM CHEST 2 VIEWS: CPT | Mod: TC | Performed by: RADIOLOGY

## 2024-06-21 PROCEDURE — 93000 ELECTROCARDIOGRAM COMPLETE: CPT | Performed by: PHYSICIAN ASSISTANT

## 2024-06-21 PROCEDURE — 80053 COMPREHEN METABOLIC PANEL: CPT | Performed by: PHYSICIAN ASSISTANT

## 2024-06-21 PROCEDURE — 84443 ASSAY THYROID STIM HORMONE: CPT | Performed by: PHYSICIAN ASSISTANT

## 2024-06-21 PROCEDURE — G2211 COMPLEX E/M VISIT ADD ON: HCPCS | Performed by: PHYSICIAN ASSISTANT

## 2024-06-21 ASSESSMENT — PAIN SCALES - GENERAL: PAINLEVEL: SEVERE PAIN (6)

## 2024-06-21 NOTE — PATIENT INSTRUCTIONS

## 2024-06-21 NOTE — TELEPHONE ENCOUNTER
Updated Echocardiogram has been faxed to: [phone] as requested below.     Christel Najera     [facility] [place]

## 2024-06-21 NOTE — TELEPHONE ENCOUNTER
Echo cannot be scheduled before next Friday unless order is updated to stat. Routing to provider to amend order.    Updated order can be faxed directly to: [phone]    Malia Lester RN on 6/21/2024 at 12:37 PM

## 2024-06-27 ENCOUNTER — HOSPITAL ENCOUNTER (OUTPATIENT)
Dept: CARDIOLOGY | Facility: CLINIC | Age: 74
Discharge: HOME | End: 2024-06-27
Attending: PHYSICIAN ASSISTANT | Admitting: PHYSICIAN ASSISTANT
Payer: COMMERCIAL

## 2024-06-27 ENCOUNTER — MYC REFILL (OUTPATIENT)
Dept: FAMILY MEDICINE | Facility: CLINIC | Age: 74
End: 2024-06-27
Payer: COMMERCIAL

## 2024-06-27 DIAGNOSIS — R06.02 SOB (SHORTNESS OF BREATH): ICD-10-CM

## 2024-06-27 DIAGNOSIS — F41.1 GENERALIZED ANXIETY DISORDER: ICD-10-CM

## 2024-06-27 DIAGNOSIS — R53.83 FATIGUE, UNSPECIFIED TYPE: ICD-10-CM

## 2024-06-27 PROCEDURE — 93325 DOPPLER ECHO COLOR FLOW MAPG: CPT | Mod: TC

## 2024-06-27 PROCEDURE — 93325 DOPPLER ECHO COLOR FLOW MAPG: CPT | Mod: 26 | Performed by: INTERNAL MEDICINE

## 2024-06-27 PROCEDURE — 93016 CV STRESS TEST SUPVJ ONLY: CPT | Performed by: INTERNAL MEDICINE

## 2024-06-27 PROCEDURE — 93018 CV STRESS TEST I&R ONLY: CPT | Performed by: INTERNAL MEDICINE

## 2024-06-27 PROCEDURE — 93350 STRESS TTE ONLY: CPT | Mod: 26 | Performed by: INTERNAL MEDICINE

## 2024-06-27 PROCEDURE — 93350 STRESS TTE ONLY: CPT | Mod: TC

## 2024-06-27 PROCEDURE — 93321 DOPPLER ECHO F-UP/LMTD STD: CPT | Mod: 26 | Performed by: INTERNAL MEDICINE

## 2024-06-27 RX ORDER — SERTRALINE HYDROCHLORIDE 25 MG/1
25 TABLET, FILM COATED ORAL DAILY
Qty: 90 TABLET | Refills: 1 | Status: SHIPPED | OUTPATIENT
Start: 2024-06-27

## 2024-06-28 ENCOUNTER — TELEPHONE (OUTPATIENT)
Dept: FAMILY MEDICINE | Facility: CLINIC | Age: 74
End: 2024-06-28
Payer: COMMERCIAL

## 2024-06-28 ENCOUNTER — PATIENT OUTREACH (OUTPATIENT)
Dept: CARE COORDINATION | Facility: CLINIC | Age: 74
End: 2024-06-28
Payer: COMMERCIAL

## 2024-06-28 ENCOUNTER — NON-VISIT BILLABLE ENCOUNTER (OUTPATIENT)
Dept: ORTHOPEDICS | Facility: CLINIC | Age: 74
End: 2024-06-28
Payer: COMMERCIAL

## 2024-06-28 DIAGNOSIS — I10 ESSENTIAL HYPERTENSION: ICD-10-CM

## 2024-06-28 PROCEDURE — 64721 CARPAL TUNNEL SURGERY: CPT | Mod: RT | Performed by: STUDENT IN AN ORGANIZED HEALTH CARE EDUCATION/TRAINING PROGRAM

## 2024-06-28 RX ORDER — OLMESARTAN MEDOXOMIL 20 MG/1
10 TABLET ORAL DAILY
Qty: 45 TABLET | Refills: 0 | Status: SHIPPED | OUTPATIENT
Start: 2024-06-28 | End: 2024-09-06

## 2024-06-28 NOTE — TELEPHONE ENCOUNTER
----- Message from Jovita Bauman sent at 6/28/2024  8:25 AM CDT -----  Please call    Her stress test had a few changes and placed her at intermediate risk. She was supposed to have carpal tunnel surgery today but because of the results, I want her to see cardiology prior to having surgery. Unfortunately that means she will have to reschedule her surgery. I placed a priority cardiology referral so she should hopefully be able to be scheduled with them soon. After meeting with cardiology, we can determine next steps.    Thanks!  Jovita Bauman PA-C

## 2024-06-28 NOTE — PROCEDURES
Patient: Anali Sanchez  : 1950  MRN: [id number]    DATE OF OPERATION: 2024      OPERATIVE REPORT       PREOPERATIVE DIAGNOSIS:  Right carpal tunnel syndrome     POSTOPERATIVE DIAGNOSIS:  Right carpal tunnel syndrome     PROCEDURE:  Right open carpal tunnel release    SURGEON:  Tristian Milligan MD     ASSISTANT(S):  SARITA Prasad's assistance was required as there were no qualified residents available.  His assistance allowed the case to proceed in a safe, efficient manner. The assistance that Adam Tapia provided decreased operative time and thereby, reduced the risk of infection and complications from prolonged time of anesthesia. Their assistance was vital in achieving best practices.     ANESTHESIA:  Local + MAC (9cc 1% lidocaine 1:100,000 epinephrine, 1cc 8.4% sodium bicarbonate)     IMPLANTS:   None    ESTIMATED BLOOD LOSS:  1cc    DVT PROPHYLAXIS:  SCDs    TOURNIQUET TIME:  3 minutes    SPECIMENS REMOVED:  None    INTRAOPERATIVE FINDINGS:  Compressed median nerve at the carpal tunnel. Recurrent motor branch extraligamentous and radial    COMPLICATIONS:  None    DISPOSITION:  Stable to PACU.     INDICATIONS:  Anali Sanchez is a 73 year old female with a history of right hand numbness and tingling refractory to conservative measures. Electrodiagnostic studies demonstrated evidence of carpal tunnel syndrome.     Indications for surgery were discussed with the patient in detail. After discussing risks, benefits and alternatives to procedure, the patient elected to proceed with surgical intervention.     The patient understood that risks include, but are not limited to: Bleeding, infection, damage to surrounding structures (such as nerve, vessel or tendon), persistent symptoms or numbness, need for additional procedures, pillar pain, stiffness, need for therapy, and anesthetic complications. The patient expressed understanding and agreement and wished to proceed.      Consent was obtained for the procedure.     DESCRIPTION OF PROCEDURE IN DETAIL:  The patient was seen in the preoperative care unit. Patient identity, consent, procedure to be performed, and operative site were verified with the patient. The right upper extremity was marked.     The patient was brought to the operating room and placed supine on the operating room table. The right upper extremity was placed on an armboard. SCDs were placed on the bilateral lower extremities.      Sedation was administered by anesthesia. The skin over the carpal tunnel was cleansed with chlorhexidine and local anesthetic (10cc of 1% lidocaine 1:100,000 epinephrine) was infiltrated in the skin and subcutaneous tissues over the carpal tunnel.      The right upper extremity was prepped and draped in the usual sterile fashion. A timeout was performed confirming the correct patient, procedure, and operative site. All were in agreement.    A standard longitudinal 2.5cm incision was made in the skin directly over the carpal tunnel.  Blunt dissection was carried down through the skin and subcutaneous tissues to the superficial palmar fascia.  Hemostasis was achieved with bipolar cautery.  The superficial palmar fascia was divided sharply exposing the transverse carpal ligament. The transverse carpal ligament was then divided sharply and released in its entirety, exposing the median nerve which appeared pale and compressed. Distally, the superficial palmar fascia was divided with scissors under direct visualization, protecting the superficial palmar arch below. Attention was then turned proximally, and the remaining transverse carpal ligament and antebrachial fascia were released under direct visualization. A full median nerve release was performed. The median nerve had excellent excursion at the end of the case. The recurrent motor branch was noted to be extraligamentous and radial.    The wound was copiously irrigated. The incision was  then closed with interrupted, buried 4-0 Monocryl sutures. A steri-strip was applied. A sterile, bulky soft dressing was placed.     All needle and sponge counts were correct at the end of the procedure. There were no complications.     The patient was awoken from anesthesia and taken to the postoperative recovery unit in stable condition.     I was present and scrubbed for the entire procedure.     POSTOPERATIVE PLAN:  The patient will be discharged home. The patient was instructed to be non-weight bearing. The patient was instructed on finger range of motion exercises. The dressing will remain in place until follow-up. The patient was instructed to keep it clean and dry. The patient will return to clinic in 10 days for a wound check.    Tristian Milligan MD     Hand, Upper Extremity & Microvascular Surgery  Department of Orthopedic Surgery  [facility]

## 2024-06-28 NOTE — TELEPHONE ENCOUNTER
Pt notified of of message from Jovita Bauman PA-C. They will proceed with surgery using a block.    Malia Lester RN on 6/28/2024 at 9:24 AM

## 2024-06-28 NOTE — TELEPHONE ENCOUNTER
Pt had called to request block for surgery as seen below. Informed pt (at that time) that the provider still had to review her request.     This has been resolved since, see below.  Adama Ricketts RN on 6/28/2024 at 10:48 AM

## 2024-06-28 NOTE — TELEPHONE ENCOUNTER
Called patient to relay message. She is asking if she could proceed with surgery using a block instead of anesthesia. She spoke to a nurse at her surgeon's office yesterday who said this is an option.    Surgery is scheduled for 10:30am today.    Malia Lester RN on 6/28/2024 at 8:43 AM

## 2024-07-02 NOTE — PROGRESS NOTES
HISTORY OF PRESENT ILLNESS:    Anali Sanchez is a 73 year old female who is seen in follow up for Right Carpal tunnel release 6/28/24 dr no.  Present symptoms: Pt reports improvement to CT symptoms; pain gone, mild numbness.  Is keeping covered. Pt is using hand for activities. No new complaints.  Denies Chest pain, Calve pain, Fever, Chills.    Current Treatment: Postop.    PHYSICAL EXAM:  There were no vitals taken for this visit.  There is no weight on file to calculate BMI.   GENERAL APPEARANCE: healthy, alert and no distress   PSYCH: mentation appears normal and affect normal/bright    MSK:  Right:  Volar wrist.  Incision clean and dry, Sutures present, healing.  No incisional erythema.   No Ecchymosis.  Edema min at wrist, hand and digits.  CMS: yu incisional numbness, otherwise grossly intact to digits.  AROM: mild restriction in palmar wrist ext otherwise WNL.      ASSESSMENT:  Anali Sanchez is a 73 year old female  S/P Right CTR.  symptom improvement. Healing.  We discussed that CT symptoms may improve for several months after surgery.    PLAN:  - Surgery discussed, images reviewed if applicable, and all questions were answered at this time.  - Sutures removed with sterile technique, steri-strips applied in usual fashion, care instructions given and verbally acknowledged.  - Medications: Taper RX pain meds, OTC PRN.  - Physical Therapy: As instructed / RICE and PROM.  - AAT    Return to clinic PRN.    Rajiv Tapia PA-C    Dept. Orthopedic Surgery  [facility]     7/2/2024      Never

## 2024-07-08 ENCOUNTER — OFFICE VISIT (OUTPATIENT)
Dept: ORTHOPEDICS | Facility: CLINIC | Age: 74
End: 2024-07-08
Payer: COMMERCIAL

## 2024-07-08 VITALS
DIASTOLIC BLOOD PRESSURE: 50 MMHG | SYSTOLIC BLOOD PRESSURE: 113 MMHG | WEIGHT: 136 LBS | HEIGHT: 63 IN | BODY MASS INDEX: 24.1 KG/M2

## 2024-07-08 DIAGNOSIS — Z47.89 ORTHOPEDIC AFTERCARE: Primary | ICD-10-CM

## 2024-07-08 PROCEDURE — 99024 POSTOP FOLLOW-UP VISIT: CPT | Performed by: PHYSICIAN ASSISTANT

## 2024-07-08 NOTE — PATIENT INSTRUCTIONS
Incision Care:  Showering is okay at this time, however no soaking, submerging or scrubbing of incision until all scabs have resolved.  Any applied Steri-strips will most likely fall off on their own, however they may be removed after 1 weeks with rubbing alcohol if they have not.    If there is no draining or bleeding, the tape-strips or clean garments are enough coverage unless you were instructed otherwise or you would like to cover for comfort.  If drainage or bleeding occurs, please cover the incision with clean dressings.  If drainage or bleeding is significant or does not stop within 48 hours please notify the office.    Gradually increase your activities as you can tolerated them, starting at a level well below what you would normally do.  Lifting may be uncomfortable for a while.    The skin around the incision may peel or have a hard skin edge over the next couple weeks.  Also, the incision may be sensitive to the touch and be prominent due to scar tissue.  You may massage the area when tolerated several times per day, using lotion if needed, to desensitize the area and reduce scar tissue.    Also, the nerve involved may heal for over a year from the time of surgery.  During this time your symptoms may continue to improve or even recur temporarily and then resolve as part of the healing.    Follow up as needed in clinic.

## 2024-07-08 NOTE — Clinical Note
7/8/2024      Anali Sanchez  [address] [facility] [address]      Dear Colleague,    Thank you for referring your patient, Anali Sanchez, to the [facility]THOP[facility] [place]. Please see a copy of my visit note below.    HISTORY OF PRESENT ILLNESS:    Anali Sanchez is a 73 year old female who is seen in follow up for right Carpal tunnel release 6/28/24 dr no.  Present symptoms: Pt reports improvement to CT symptoms.  Is keeping covered. Pt is using hand for activities. No new complaints.  Denies Chest pain, Calve pain, Fever, Chills.    Current Treatment: Postop.    PHYSICAL EXAM:  There were no vitals taken for this visit.  There is no weight on file to calculate BMI.   GENERAL APPEARANCE: healthy, alert and no distress   PSYCH: mentation appears normal and affect normal/bright    MSK:  {RIGHT:109096} Volar wrist.  Incision clean and dry, Sutures present, healing.  *** incisional erythema.   No Ecchymosis.  Edema *** at *** hand and digits.  CMS: yu incisional numbness, otherwise grossly intact to digits.  AROM: mild restriction in palmar wrist *** flexion, otherwise WNL.      ASSESSMENT:  Anali Sanchez is a 73 year old female  S/P *** CTR.  *** improvement. Healing.  We discussed that CT symptoms may improve for several months after surgery.    PLAN:  - Surgery discussed, images reviewed if applicable, and all questions were answered at this time.  - Sutures removed with sterile technique, steri-strips applied in usual fashion, care instructions given and verbally acknowledged.  - Medications: Taper RX pain meds, OTC PRN.  - Physical Therapy: As instructed / RICE and PROM.  - AAT    Return to clinic PRN.    Rajiv Tapia PA-C    Dept. Orthopedic Surgery  [facility]     7/2/2024         Again, thank you for allowing me to participate in the care of your patient.        Sincerely,        Rajiv Tapia PA-C

## 2024-07-11 PROBLEM — M54.2 NECK PAIN: Status: RESOLVED | Noted: 2024-03-07 | Resolved: 2024-07-11

## 2024-07-11 NOTE — PROGRESS NOTES
DISCHARGE  Reason for Discharge: No further expectation of progress.    Equipment Issued: none    Discharge Plan: Patient to continue home program.    Referring Provider:  Jovita Bauman

## 2024-07-12 ENCOUNTER — PATIENT OUTREACH (OUTPATIENT)
Dept: CARE COORDINATION | Facility: CLINIC | Age: 74
End: 2024-07-12
Payer: COMMERCIAL

## 2024-07-17 ENCOUNTER — LAB (OUTPATIENT)
Dept: LAB | Facility: CLINIC | Age: 74
End: 2024-07-17
Payer: COMMERCIAL

## 2024-07-17 DIAGNOSIS — D64.9 ANEMIA, UNSPECIFIED TYPE: ICD-10-CM

## 2024-07-17 LAB
BASOPHILS # BLD AUTO: 0 10E3/UL (ref 0–0.2)
BASOPHILS NFR BLD AUTO: 0 %
EOSINOPHIL # BLD AUTO: 0.3 10E3/UL (ref 0–0.7)
EOSINOPHIL NFR BLD AUTO: 4 %
ERYTHROCYTE [DISTWIDTH] IN BLOOD BY AUTOMATED COUNT: 12.8 % (ref 10–15)
ERYTHROCYTE [SEDIMENTATION RATE] IN BLOOD BY WESTERGREN METHOD: 26 MM/HR (ref 0–30)
HCT VFR BLD AUTO: 33.2 % (ref 35–47)
HGB BLD-MCNC: 10.8 G/DL (ref 11.7–15.7)
IMM GRANULOCYTES # BLD: 0 10E3/UL
IMM GRANULOCYTES NFR BLD: 0 %
LYMPHOCYTES # BLD AUTO: 2 10E3/UL (ref 0.8–5.3)
LYMPHOCYTES NFR BLD AUTO: 26 %
MCH RBC QN AUTO: 29.8 PG (ref 26.5–33)
MCHC RBC AUTO-ENTMCNC: 32.5 G/DL (ref 31.5–36.5)
MCV RBC AUTO: 92 FL (ref 78–100)
MONOCYTES # BLD AUTO: 0.6 10E3/UL (ref 0–1.3)
MONOCYTES NFR BLD AUTO: 8 %
NEUTROPHILS # BLD AUTO: 4.7 10E3/UL (ref 1.6–8.3)
NEUTROPHILS NFR BLD AUTO: 62 %
PLATELET # BLD AUTO: 272 10E3/UL (ref 150–450)
RBC # BLD AUTO: 3.63 10E6/UL (ref 3.8–5.2)
WBC # BLD AUTO: 7.7 10E3/UL (ref 4–11)

## 2024-07-17 PROCEDURE — 85652 RBC SED RATE AUTOMATED: CPT

## 2024-07-17 PROCEDURE — 85025 COMPLETE CBC W/AUTO DIFF WBC: CPT

## 2024-07-17 PROCEDURE — 36415 COLL VENOUS BLD VENIPUNCTURE: CPT

## 2024-07-19 ENCOUNTER — TELEPHONE (OUTPATIENT)
Dept: HEMATOLOGY | Facility: CLINIC | Age: 74
End: 2024-07-19
Payer: COMMERCIAL

## 2024-07-19 DIAGNOSIS — D64.9 ANEMIA, UNSPECIFIED TYPE: ICD-10-CM

## 2024-07-19 DIAGNOSIS — D69.9 HEMORRHAGIC DIATHESIS (H): Primary | ICD-10-CM

## 2024-07-19 NOTE — TELEPHONE ENCOUNTER
[id number]  Anali PADRON Laura  73 year old female  CBCD Diagnosis: Mild anemia, post-surgical bleeding  CBCD Provider: Odell    Incoming voicemail left by Criss asking RN to call her back and review recent labs.     Labs from 7/17 reviewed by Dr. Bain.  Labs are stable and no changes to plan of care need to be made.    Patient would like all lab values to be communicated to them via phone or mychart.    Next set of labs are due in 3 to 4 months. RN assisted patient in getting scheduled for a lab appointment on 10/17.    The following labs are ordered:  CBC with platelets and differential    Sienna Huang RN, BSN, PCCN  Nurse Clinician    [facility]  [address] [place] [address], [address], [place] [address]   Office, direct: [phone]  Main office number: [phone]  Pronouns: She, her, hers

## 2024-07-24 ENCOUNTER — OFFICE VISIT (OUTPATIENT)
Dept: CARDIOLOGY | Facility: CLINIC | Age: 74
End: 2024-07-24
Attending: PHYSICIAN ASSISTANT
Payer: COMMERCIAL

## 2024-07-24 VITALS
SYSTOLIC BLOOD PRESSURE: 116 MMHG | HEART RATE: 60 BPM | DIASTOLIC BLOOD PRESSURE: 66 MMHG | WEIGHT: 138.4 LBS | HEIGHT: 63 IN | BODY MASS INDEX: 24.52 KG/M2 | OXYGEN SATURATION: 100 %

## 2024-07-24 DIAGNOSIS — R06.02 SOB (SHORTNESS OF BREATH): ICD-10-CM

## 2024-07-24 DIAGNOSIS — R94.39 ABNORMAL STRESS ECHO: ICD-10-CM

## 2024-07-24 DIAGNOSIS — R53.83 FATIGUE, UNSPECIFIED TYPE: ICD-10-CM

## 2024-07-24 PROCEDURE — 99204 OFFICE O/P NEW MOD 45 MIN: CPT | Mod: 24 | Performed by: INTERNAL MEDICINE

## 2024-07-24 NOTE — PATIENT INSTRUCTIONS
It was a pleasure seeing you today and thank you for allowing me to be a part of your health care team.  Should   you have any questions regarding your visit or future needs please feel free to reach out to my care team for assistance.      Thank you, Dr. Hany Oh        **Nursing: [phone]       **Scheduling: [phone]

## 2024-07-24 NOTE — PROGRESS NOTES
HISTORY:    Anali Sanchez is a very pleasant 73-year-old female with history of hypertension and a history of previous WPW for which she underwent open heart surgery at age 37.  She had some postoperative complications including pericardial tamponade, but she recovered and has not had a recurrence of her arrhythmia.  She also has a history of treated hypothyroidism.  She was asked to see cardiology because of an abnormal stress echo.    Anali reports that she feels that her energy level is diminished.  She feels that she is fatigued and weak and has a lot of trouble with muscle aches particularly in her body including her chest arms and neck.  She states that any type of physical activity gets her tired out easily.  This is associated with some mild dyspnea, but the dyspnea is not a prominent component of her symptoms.  He denies any symptoms of palpitations, PND/orthopnea, syncope or near syncope, strokelike symptoms, peripheral edema, or claudication.    Anali reports that she feels that she sleeps fairly soundly at night but she reports that her  tells her she snores loudly although he is never commented that she pauses in her breathing.  She does not wake up refreshed and is sleepy throughout the day.  She can take a nap very easily just by closing her eyes.    Basically the patient reports that she has discomfort or aching in her neck and jaw constantly.  This involves her shoulder girdle as well.  Did some rehab but finds it very uncomfortable to move her upper extremities.    Stress echo was done and was essentially normal.  The patient had a reduced exercise capacity, not surprising considering her complaints, but there was no evidence of inducible ischemia on that study.      ASSESSMENT/PLAN:    1.  Easy fatigability and muscle aches.  This does not sound like it is cardiac in origin.  I will arrange a echocardiogram to make sure she does not have diastolic dysfunction but does not  have PND or orthopnea peripheral edema etc. to suggest that and her volume status on exam seems normal.  As long as her echocardiogram is normal no further cardiac evaluation is needed.  She should see her primary care physician for further evaluation of the possible sleep apnea and muscle disorders (question myasthenia gravis).    Thank you for inviting me to participate in the care of your patient.  Please don't hesitate to call if I can be of further assistance.  49 minutes were spent today reviewing the chart and other records, interviewing and examining the patient, and documenting our visit.    Chart documentation was completed, in part, with Mozy voice-recognition software. Even though reviewed, some grammatical, spelling, and word errors may remain.       Orders Placed This Encounter   Procedures    Echocardiogram Complete     No orders of the defined types were placed in this encounter.    There are no discontinued medications.    10 year ASCVD risk: The 10-year ASCVD risk score (Jr CORRALES, et al., 2019) is: 13.9%    Values used to calculate the score:      Age: 73 years      Sex: Female      Is Non- : No      Diabetic: No      Tobacco smoker: No      Systolic Blood Pressure: 116 mmHg      Is BP treated: Yes      HDL Cholesterol: 100 mg/dL      Total Cholesterol: 209 mg/dL    Encounter Diagnoses   Name Primary?    SOB (shortness of breath)     Fatigue, unspecified type     Abnormal stress echo        CURRENT MEDICATIONS:  Current Outpatient Medications   Medication Sig Dispense Refill    Calcium-Vitamin D-Vitamin K 500-200-40 MG-UNT-MCG CHEW Takes 3 chewables daily      levothyroxine (SYNTHROID/LEVOTHROID) 25 MCG tablet TAKE 1 TABLET BY MOUTH EVERY DAY 90 tablet 2    olmesartan (BENICAR) 20 MG tablet TAKE 0.5 TABLETS BY MOUTH DAILY 45 tablet 0    sertraline (ZOLOFT) 25 MG tablet Take 1 tablet (25 mg) by mouth daily 90 tablet 1    clotrimazole (LOTRIMIN) 1 % external cream Apply  topically 2 times daily to affected areas on lips. Use until resolution, typically 1-3 weeks. (Patient not taking: Reported on 7/24/2024) 30 g 1    dexAMETHasone (DECADRON) 4 MG/ML injection Use 4 mg or dose determined by provider for iontophoresis. (Patient not taking: Reported on 7/24/2024) 30 mL 0       ALLERGIES     Allergies   Allergen Reactions    Benadryl [Diphenhydramine]      Given in the hospital and had a difficult time waking as expected.    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 She did not have any recurrent palpitations until 2014. At that time, she was seen at [facility] in [place] and had ECG consistent with atrial flutter. This episode of atrial flutter lasted several hours and terminated spontaneously. She saw cardiology after that and plan was for obser    History of blood transfusion     History of Gabino-Parkinson-White (WPW) syndrome     She has a history of WPW status post surgical ablation of a left lateral electrical pathway at age 37.     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 cx with hemorrhage. no cancer. slow transit.(2 surgeries; first for low transit; second due to the bleeding)    DILATION AND CURETTAGE, HYSTEROSCOPY DIAGNOSTIC, COMBINED  2/21/2014    Procedure: COMBINED DILATION AND CURETTAGE, HYSTEROSCOPY DIAGNOSTIC;   DILATION AND CURETTAGE, HYSTEROSCOPY DIAGNOSTIC ;  Surgeon: Willie Osborn MD;  Location: [facility] OR    ENT SURGERY      t and a    EXCISE LESION FACE N/A 1/11/2023    Procedure: incision and removal of osteoma on forehead;  Surgeon: Dl Davenport MD;  Location: [facility] OR       FAMILY HISTORY:  Family History   Problem Relation Age of Onset    Bronchitis Mother         copd    Alcoholism Mother     Varicose Veins Mother     Diabetes Father     Coronary Artery Disease Father     Deep Vein Thrombosis Father         after having COVID    Leukemia Sister         chronic    Impaired Fasting Glucose Sister     Cancer Brother         myelofibrosis; got BMT 2019    Diabetes Type 2  Brother     Impaired Fasting Glucose Brother        SOCIAL HISTORY:  Social History     Socioeconomic History    Marital status:      Spouse name: None    Number of children: None    Years of education: None    Highest education level: None   Tobacco Use    Smoking status: Never     Passive exposure: Never    Smokeless tobacco: Never   Vaping Use    Vaping status: Never Used   Substance and Sexual Activity    Alcohol use: Yes     Alcohol/week: 7.0 standard drinks of alcohol     Types: 7 Glasses of wine per week     Comment: A glass of wine or beer, usually no more than one    Drug use: Never    Sexual activity: Yes     Partners: Male     Comment: Stopped using hormone     Social Determinants of Health     Financial Resource Strain: Low Risk  (11/16/2023)    Financial Resource Strain     Within the past 12 months,  have you or your family members you live with been unable to get utilities (heat, electricity) when it was really needed?: No   Food Insecurity: Low Risk  (11/16/2023)    Food Insecurity     Within the past 12 months, did you worry that your food would run out before you got money to buy more?: No     Within the past 12 months, did the food you bought just not last and you didn t have money to get more?: No   Transportation Needs: Low Risk  (11/16/2023)    Transportation Needs     Within the past 12 months, has lack of transportation kept you from medical appointments, getting your medicines, non-medical meetings or appointments, work, or from getting things that you need?: No   Physical Activity: Not on File (2/25/2021)    Received from [facility]    Physical Activity     Physical Activity: 0   Stress: Not on File (2/25/2021)    Received from [facility]    Stress     Stress: 0   Social Connections: Not on File (2/25/2021)    Received from [facility]    Social Connections     Social Connections and Isolation: 0   Interpersonal Safety: Low Risk  (11/17/2023)    Interpersonal Safety     Do you feel physically and emotionally safe where you currently live?: Yes     Within the past 12 months, have you been hit, slapped, kicked or otherwise physically hurt by someone?: No     Within the past 12 months, have you been humiliated or emotionally abused in other ways by your partner or ex-partner?: No   Housing Stability: Low Risk  (11/16/2023)    Housing Stability     Do you have housing? : Yes     Are you worried about losing your housing?: No       Review of Systems:  Skin:  Negative     Eyes:  Positive for glasses  ENT:  Negative    Respiratory:  Positive for dyspnea on exertion  Cardiovascular:    Positive for;chest pain;fatigue  Gastroenterology: Negative    Genitourinary:  Negative    Musculoskeletal:  Positive for joint pain;neck pain  Neurologic:  Negative    Psychiatric:  Positive for anxiety  Heme/Lymph/Imm:   "Negative    Endocrine:  Positive for thyroid disorder    Physical Exam:  Vitals: /66   Pulse 60   Ht 1.6 m (5' 3\")   Wt 62.8 kg (138 lb 6.4 oz)   LMP  (LMP Unknown)   SpO2 100%   BMI 24.52 kg/m      Constitutional:  cooperative, alert and oriented, well developed, well nourished, in no acute distress        Skin:  warm and dry to the touch, no apparent skin lesions or masses noted        Head:  normocephalic, no masses or lesions        Eyes:  pupils equal and round, conjunctivae and lids unremarkable, sclera white, no xanthalasma, EOMS intact, no nystagmus        ENT:  no pallor or cyanosis, dentition good        Neck:  carotid pulses are full and equal bilaterally, JVP normal, no carotid bruit        Chest:  normal breath sounds, clear to auscultation, normal A-P diameter, normal symmetry, normal respiratory excursion, no use of accessory muscles        Cardiac: regular rhythm, normal S1/S2, no S3 or S4, apical impulse not displaced, no murmurs, gallops or rubs                  Abdomen:  abdomen soft;BS normoactive        Vascular: pulses full and equal                                      Extremities and Muscular Skeletal:  no edema           Neurological:  no gross motor deficits        Psych:  affect appropriate, oriented to time, person and place     Recent Lab Results:  LIPID RESULTS:  Lab Results   Component Value Date    CHOL 209 (H) 08/04/2023    CHOL 217 (H) 10/25/2017     08/04/2023     10/25/2017    LDL 94 08/04/2023    LDL 85 10/25/2017    TRIG 77 08/04/2023    TRIG 62 10/25/2017       LIVER ENZYME RESULTS:  Lab Results   Component Value Date    AST 19 06/21/2024    ALT 8 06/21/2024       CBC RESULTS:  Lab Results   Component Value Date    WBC 7.7 07/17/2024    WBC 8.0 12/02/2019    RBC 3.63 (L) 07/17/2024    RBC 4.06 12/02/2019    HGB 10.8 (L) 07/17/2024    HGB 12.6 12/02/2019    HCT 33.2 (L) 07/17/2024    HCT 38.1 12/02/2019    MCV 92 07/17/2024    MCV 94 12/02/2019    MCH " 29.8 07/17/2024    MCH 31.0 12/02/2019    MCHC 32.5 07/17/2024    MCHC 33.1 12/02/2019    RDW 12.8 07/17/2024    RDW 12.4 12/02/2019     07/17/2024     12/02/2019       BMP RESULTS:  Lab Results   Component Value Date     06/21/2024     12/02/2019    POTASSIUM 4.1 06/21/2024    POTASSIUM 4.0 04/26/2022    POTASSIUM 4.0 12/02/2019    CHLORIDE 102 06/21/2024    CHLORIDE 108 04/26/2022    CHLORIDE 108 12/02/2019    CO2 25 06/21/2024    CO2 28 04/26/2022    CO2 28 12/02/2019    ANIONGAP 11 06/21/2024    ANIONGAP 4 04/26/2022    ANIONGAP 4 12/02/2019    GLC 93 06/21/2024    GLC 87 04/26/2022     (H) 03/09/2021    BUN 16.4 06/21/2024    BUN 19 04/26/2022    BUN 20 12/02/2019    CR 0.82 06/21/2024    CR 0.70 12/02/2019    GFRESTIMATED 75 06/21/2024    GFRESTIMATED >60 01/25/2023    GFRESTIMATED 88 12/02/2019    GFRESTBLACK >90 12/02/2019    MITCH 9.5 06/21/2024    MITCH 8.7 12/02/2019        A1C RESULTS:  Lab Results   Component Value Date    A1C 5.6 08/04/2023       INR RESULTS:  Lab Results   Component Value Date    INR 1.24 (H) 05/29/2024         Hany Oh MD, FACC    CC  Jovita Bauman PA-C  [id number] [place] [address]

## 2024-07-24 NOTE — LETTER
7/24/2024    Jovita Bauman PA-C  [id number] [id number]    RE: Anali Sanchez       Dear Colleague,     I had the pleasure of seeing Anali Sanchez in the [facility].  HISTORY:    Anali Sanchez is a very pleasant 73-year-old female with history of hypertension and a history of previous WPW for which she underwent open heart surgery at age 37.  She had some postoperative complications including pericardial tamponade, but she recovered and has not had a recurrence of her arrhythmia.  She also has a history of treated hypothyroidism.  She was asked to see cardiology because of an abnormal stress echo.    Anali reports that she feels that her energy level is diminished.  She feels that she is fatigued and weak and has a lot of trouble with muscle aches particularly in her body including her chest arms and neck.  She states that any type of physical activity gets her tired out easily.  This is associated with some mild dyspnea, but the dyspnea is not a prominent component of her symptoms.  He denies any symptoms of palpitations, PND/orthopnea, syncope or near syncope, strokelike symptoms, peripheral edema, or claudication.    Anali reports that she feels that she sleeps fairly soundly at night but she reports that her  tells her she snores loudly although he is never commented that she pauses in her breathing.  She does not wake up refreshed and is sleepy throughout the day.  She can take a nap very easily just by closing her eyes.    Basically the patient reports that she has discomfort or aching in her neck and jaw constantly.  This involves her shoulder girdle as well.  Did some rehab but finds it very uncomfortable to move her upper extremities.    Stress echo was done and was essentially normal.  The patient had a reduced exercise capacity, not surprising considering her complaints, but there was no evidence of inducible ischemia on that  [FreeTextEntry1] : b/l hand pain/ stiffness/ Heberden's node\par referral ortho Dr. Ozuna for further evaluation and mgmt as appropriate \par in no acute distress and offers no complaints \par refuses sx mgmt for now\par \par HTN/ hyperlipidemia \par life style modifications\par on OTC Omega 3 fish oil \par BP well managed \par on Metoprolol \par check lipid panel and cmp today - "labs drawn in office" \par in no acute distress and offers no complaints \par \par Hypothyroid\par on Levothyroxine\par check tsh and Ft4 today\par in no acute distress and offers no complaints \par \par Anxiety & depression \par reports as well managed\par on Venlafaxine\par denies need for counseling at this time.\par in no acute distress and offers no complaints \par \par all medication renewals provided as requested\par \par preventative health\par annual wellness- 10/16/2022\par flu vaccine- refuses\par colonoscopy- reports previously normal results, and next rec'd for 3 years\par ophthalmology dilated eye exam- 2019, normal result\par GYN for Pap- needs, scheduling\par mammo- needs, requesting to f/u for annual\par DEXA-  hx osteopenia reports last check 2019, on Vitd and Calcium, needs recheck, requesting to f/u for annual\par \par  study.      ASSESSMENT/PLAN:    1.  Easy fatigability and muscle aches.  This does not sound like it is cardiac in origin.  I will arrange a echocardiogram to make sure she does not have diastolic dysfunction but does not have PND or orthopnea peripheral edema etc. to suggest that and her volume status on exam seems normal.  As long as her echocardiogram is normal no further cardiac evaluation is needed.  She should see her primary care physician for further evaluation of the possible sleep apnea and muscle disorders (question myasthenia gravis).    Thank you for inviting me to participate in the care of your patient.  Please don't hesitate to call if I can be of further assistance.  49 minutes were spent today reviewing the chart and other records, interviewing and examining the patient, and documenting our visit.    Chart documentation was completed, in part, with [employer] voice-recognition software. Even though reviewed, some grammatical, spelling, and word errors may remain.       Orders Placed This Encounter   Procedures    Echocardiogram Complete     No orders of the defined types were placed in this encounter.    There are no discontinued medications.    10 year ASCVD risk: The 10-year ASCVD risk score (Jr DK, et al., 2019) is: 13.9%    Values used to calculate the score:      Age: 73 years      Sex: Female      Is Non- : No      Diabetic: No      Tobacco smoker: No      Systolic Blood Pressure: 116 mmHg      Is BP treated: Yes      HDL Cholesterol: 100 mg/dL      Total Cholesterol: 209 mg/dL    Encounter Diagnoses   Name Primary?    SOB (shortness of breath)     Fatigue, unspecified type     Abnormal stress echo        CURRENT MEDICATIONS:  Current Outpatient Medications   Medication Sig Dispense Refill    Calcium-Vitamin D-Vitamin K 500-200-40 MG-UNT-MCG CHEW Takes 3 chewables daily      levothyroxine (SYNTHROID/LEVOTHROID) 25 MCG tablet TAKE 1 TABLET BY MOUTH EVERY DAY 90 tablet 2     olmesartan (BENICAR) 20 MG tablet TAKE 0.5 TABLETS BY MOUTH DAILY 45 tablet 0    sertraline (ZOLOFT) 25 MG tablet Take 1 tablet (25 mg) by mouth daily 90 tablet 1    clotrimazole (LOTRIMIN) 1 % external cream Apply topically 2 times daily to affected areas on lips. Use until resolution, typically 1-3 weeks. (Patient not taking: Reported on 7/24/2024) 30 g 1    dexAMETHasone (DECADRON) 4 MG/ML injection Use 4 mg or dose determined by provider for iontophoresis. (Patient not taking: Reported on 7/24/2024) 30 mL 0       ALLERGIES     Allergies   Allergen Reactions    Benadryl [Diphenhydramine]      Given in the hospital and had a difficult time waking as expected.    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 She did not have any recurrent palpitations until 2014. At that time, she was seen at [facility] in [place] and had ECG consistent with atrial flutter. This episode of atrial flutter lasted several hours and terminated spontaneously. She saw cardiology after that and plan was for obser    History of blood transfusion     History of Gabino-Parkinson-White (WPW) syndrome     She has a history of WPW status post surgical ablation of a left lateral electrical pathway at age 37.     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 cx with hemorrhage. no cancer. slow transit.(2 surgeries; first for low transit; second due to the bleeding)    DILATION AND CURETTAGE, HYSTEROSCOPY DIAGNOSTIC, COMBINED  2/21/2014    Procedure: COMBINED DILATION AND CURETTAGE, HYSTEROSCOPY DIAGNOSTIC;   DILATION AND CURETTAGE, HYSTEROSCOPY DIAGNOSTIC ;  Surgeon: Willie Osborn MD;  Location: [facility] OR    ENT SURGERY      t and a    EXCISE LESION FACE N/A 1/11/2023    Procedure: incision and removal of osteoma on forehead;  Surgeon: Dl Davenport MD;  [place] [facility] [place]       FAMILY HISTORY:  Family History   Problem Relation Age of Onset    Bronchitis Mother         copd    Alcoholism Mother     Varicose Veins Mother     Diabetes Father     Coronary Artery Disease Father     Deep Vein Thrombosis Father         after having COVID    Leukemia Sister         chronic    Impaired Fasting Glucose Sister     Cancer Brother         myelofibrosis; got BMT 2019    Diabetes Type 2  Brother     Impaired Fasting Glucose Brother        SOCIAL HISTORY:  Social History     Socioeconomic History    Marital status:      Spouse name: None    Number of children: None    Years of education: None    Highest education level: None   Tobacco Use    Smoking status: Never     Passive exposure: Never    Smokeless tobacco: Never   Vaping Use    Vaping status: Never Used   Substance and Sexual Activity    Alcohol use: Yes     Alcohol/week: 7.0 standard drinks of alcohol     Types: 7 Glasses of wine per week     Comment: A glass of wine or beer, usually no more than one    Drug use: Never    Sexual  activity: Yes     Partners: Male     Comment: Stopped using hormone     Social Determinants of Health     Financial Resource Strain: Low Risk  (11/16/2023)    Financial Resource Strain     Within the past 12 months, have you or your family members you live with been unable to get utilities (heat, electricity) when it was really needed?: No   Food Insecurity: Low Risk  (11/16/2023)    Food Insecurity     Within the past 12 months, did you worry that your food would run out before you got money to buy more?: No     Within the past 12 months, did the food you bought just not last and you didn t have money to get more?: No   Transportation Needs: Low Risk  (11/16/2023)    Transportation Needs     Within the past 12 months, has lack of transportation kept you from medical appointments, getting your medicines, non-medical meetings or appointments, work, or from getting things that you need?: No   Physical Activity: Not on File (2/25/2021)    Received from [facility]    Physical Activity     Physical Activity: 0   Stress: Not on File (2/25/2021)    Received from [facility]    Stress     Stress: 0   Social Connections: Not on File (2/25/2021)    Received from [facility]    Social Connections     Social Connections and Isolation: 0   Interpersonal Safety: Low Risk  (11/17/2023)    Interpersonal Safety     Do you feel physically and emotionally safe where you currently live?: Yes     Within the past 12 months, have you been hit, slapped, kicked or otherwise physically hurt by someone?: No     Within the past 12 months, have you been humiliated or emotionally abused in other ways by your partner or ex-partner?: No   Housing Stability: Low Risk  (11/16/2023)    Housing Stability     Do you have housing? : Yes     Are you worried about losing your housing?: No       Review of Systems:  Skin:  Negative     Eyes:  Positive for glasses  ENT:  Negative    Respiratory:  Positive for dyspnea on exertion  Cardiovascular:     "Positive for;chest pain;fatigue  Gastroenterology: Negative    Genitourinary:  Negative    Musculoskeletal:  Positive for joint pain;neck pain  Neurologic:  Negative    Psychiatric:  Positive for anxiety  Heme/Lymph/Imm:  Negative    Endocrine:  Positive for thyroid disorder    Physical Exam:  Vitals: /66   Pulse 60   Ht 1.6 m (5' 3\")   Wt 62.8 kg (138 lb 6.4 oz)   LMP  (LMP Unknown)   SpO2 100%   BMI 24.52 kg/m      Constitutional:  cooperative, alert and oriented, well developed, well nourished, in no acute distress        Skin:  warm and dry to the touch, no apparent skin lesions or masses noted        Head:  normocephalic, no masses or lesions        Eyes:  pupils equal and round, conjunctivae and lids unremarkable, sclera white, no xanthalasma, EOMS intact, no nystagmus        ENT:  no pallor or cyanosis, dentition good        Neck:  carotid pulses are full and equal bilaterally, JVP normal, no carotid bruit        Chest:  normal breath sounds, clear to auscultation, normal A-P diameter, normal symmetry, normal respiratory excursion, no use of accessory muscles        Cardiac: regular rhythm, normal S1/S2, no S3 or S4, apical impulse not displaced, no murmurs, gallops or rubs                  Abdomen:  abdomen soft;BS normoactive        Vascular: pulses full and equal                                      Extremities and Muscular Skeletal:  no edema           Neurological:  no gross motor deficits        Psych:  affect appropriate, oriented to time, person and place     Recent Lab Results:  LIPID RESULTS:  Lab Results   Component Value Date    CHOL 209 (H) 08/04/2023    CHOL 217 (H) 10/25/2017     08/04/2023     10/25/2017    LDL 94 08/04/2023    LDL 85 10/25/2017    TRIG 77 08/04/2023    TRIG 62 10/25/2017       LIVER ENZYME RESULTS:  Lab Results   Component Value Date    AST 19 06/21/2024    ALT 8 06/21/2024       CBC RESULTS:  Lab Results   Component Value Date    WBC 7.7 07/17/2024 "    WBC 8.0 12/02/2019    RBC 3.63 (L) 07/17/2024    RBC 4.06 12/02/2019    HGB 10.8 (L) 07/17/2024    HGB 12.6 12/02/2019    HCT 33.2 (L) 07/17/2024    HCT 38.1 12/02/2019    MCV 92 07/17/2024    MCV 94 12/02/2019    MCH 29.8 07/17/2024    MCH 31.0 12/02/2019    MCHC 32.5 07/17/2024    MCHC 33.1 12/02/2019    RDW 12.8 07/17/2024    RDW 12.4 12/02/2019     07/17/2024     12/02/2019       BMP RESULTS:  Lab Results   Component Value Date     06/21/2024     12/02/2019    POTASSIUM 4.1 06/21/2024    POTASSIUM 4.0 04/26/2022    POTASSIUM 4.0 12/02/2019    CHLORIDE 102 06/21/2024    CHLORIDE 108 04/26/2022    CHLORIDE 108 12/02/2019    CO2 25 06/21/2024    CO2 28 04/26/2022    CO2 28 12/02/2019    ANIONGAP 11 06/21/2024    ANIONGAP 4 04/26/2022    ANIONGAP 4 12/02/2019    GLC 93 06/21/2024    GLC 87 04/26/2022     (H) 03/09/2021    BUN 16.4 06/21/2024    BUN 19 04/26/2022    BUN 20 12/02/2019    CR 0.82 06/21/2024    CR 0.70 12/02/2019    GFRESTIMATED 75 06/21/2024    GFRESTIMATED >60 01/25/2023    GFRESTIMATED 88 12/02/2019    GFRESTBLACK >90 12/02/2019    MITCH 9.5 06/21/2024    MITCH 8.7 12/02/2019        A1C RESULTS:  Lab Results   Component Value Date    A1C 5.6 08/04/2023       INR RESULTS:  Lab Results   Component Value Date    INR 1.24 (H) 05/29/2024         Hany Oh MD, FACC    CC  Jovita Bauman PA-C  [address] [place] [address]                  Thank you for allowing me to participate in the care of your patient.      Sincerely,     Hany Oh MD     [facility]

## 2024-08-05 ENCOUNTER — VIRTUAL VISIT (OUTPATIENT)
Dept: FAMILY MEDICINE | Facility: CLINIC | Age: 74
End: 2024-08-05
Payer: COMMERCIAL

## 2024-08-05 ENCOUNTER — HOSPITAL ENCOUNTER (OUTPATIENT)
Dept: CARDIOLOGY | Facility: CLINIC | Age: 74
Discharge: HOME | End: 2024-08-05
Attending: INTERNAL MEDICINE | Admitting: INTERNAL MEDICINE
Payer: COMMERCIAL

## 2024-08-05 DIAGNOSIS — R53.83 FATIGUE, UNSPECIFIED TYPE: ICD-10-CM

## 2024-08-05 DIAGNOSIS — R05.3 CHRONIC COUGH: ICD-10-CM

## 2024-08-05 DIAGNOSIS — R06.02 SOB (SHORTNESS OF BREATH): ICD-10-CM

## 2024-08-05 DIAGNOSIS — M62.89 MUSCLE FATIGUE: Primary | ICD-10-CM

## 2024-08-05 LAB — LVEF ECHO: NORMAL

## 2024-08-05 PROCEDURE — 93306 TTE W/DOPPLER COMPLETE: CPT | Mod: 26 | Performed by: INTERNAL MEDICINE

## 2024-08-05 PROCEDURE — G2211 COMPLEX E/M VISIT ADD ON: HCPCS | Mod: 95 | Performed by: PHYSICIAN ASSISTANT

## 2024-08-05 PROCEDURE — 93306 TTE W/DOPPLER COMPLETE: CPT

## 2024-08-05 PROCEDURE — 99214 OFFICE O/P EST MOD 30 MIN: CPT | Mod: 95 | Performed by: PHYSICIAN ASSISTANT

## 2024-08-05 NOTE — PROGRESS NOTES
Criss is a 73 year old who is being evaluated via a billable video visit.    How would you like to obtain your AVS? MyChart  If the video visit is dropped, the invitation should be resent by: Text to cell phone: [phone]  Will anyone else be joining your video visit? No      Assessment & Plan     Muscle fatigue  Ongoing concerns. Cardiac evaluation unrevealing, symptoms not suspected to be cardiac in nature. Following with hematology for anemia, overall stable. Broadening work up with labs below given significant fatigue and muscle fatigue/weakness. Will follow-up and consider neurology evaluation pending results/symptoms.  - Comprehensive metabolic panel (BMP + Alb, Alk Phos, ALT, AST, Total. Bili, TP); Future  - TSH with free T4 reflex; Future  - ESR: Erythrocyte sedimentation rate; Future  - CRP, inflammation; Future  - CK total; Future  - Aldolase; Future  - Lactate Dehydrogenase; Future  - Anti Nuclear Ailin IgG by IFA with Reflex; Future  - Rheumatoid factor; Future    Fatigue, unspecified type  As above. Also recommend sleep evaluation, consider YAIMA.  - Comprehensive metabolic panel (BMP + Alb, Alk Phos, ALT, AST, Total. Bili, TP); Future  - TSH with free T4 reflex; Future  - ESR: Erythrocyte sedimentation rate; Future  - CRP, inflammation; Future  - Adult Sleep Eval & Management  Referral; Future    Chronic cough  Chronic dry cough, more bothersome over past few months  History of GERD with symptoms that presented as cough  Previously cough resolved with PPI  No wheezing, shortness of breath with cough  Tickle in back of throat leads to cough  Cough drop and water helps but takes a little bit to calm down  She started omeprazole 5 days ago, hasn't noticed any difference yet  No improvement with delsym  Normal chest x-ray 6/21/24  Continue omeprazole for now. Follow-up if no improvement in next 1-2 months.      Subjective   Criss is a 73 year old, presenting for the following health  "issues:  Fatigue        8/5/2024     2:39 PM   Additional Questions   Roomed by arlette hull   Accompanied by self         8/5/2024     2:39 PM   Patient Reported Additional Medications   Patient reports taking the following new medications na     Video Start Time: 3:10 PM    History of Present Illness       Reason for visit:  Talk about fatigue    She eats 4 or more servings of fruits and vegetables daily.She consumes 0 sweetened beverage(s) daily.She exercises with enough effort to increase her heart rate 10 to 19 minutes per day.  She exercises with enough effort to increase her heart rate 3 or less days per week.   She is taking medications regularly.     Still has ongoing fatigue  Describes muscle fatigue as well as overall fatigue  When muscles feel fatigued, they do feel weak - she needs to sit down and rest. Doesn't think she would be able to open a jar or lift things normally when fatigued.    Always feels tired, but describes feeling \"overwhelming exhaustion\" when she is active  For example, has to stop and rest after pulling weeds for 5 minutes, doing dishes for a short time, or walking 1 block. She recently went for a walk and had to have  come pick her up because she felt too tired to continue. Her activities were not limited like this in the past. She has associated shortness of breath with activity. Exhaustion and shortness of breath improve after she has been resting for a few minutes. Napping often which is unusual for her.    Wakes up feeling tired   says she does snore  No witnessed apneic episodes  No history of sleep apnea    No droopy eyelids  No double vision  No trouble swallowing, speech changes    No dizziness/lightheadedness  No palpitations  No nausea, sweating  No chest pain with exertion    Saw cardiology 7/24/24  Stress echo was done and was essentially normal. The patient had a reduced exercise capacity, not surprising considering her complaints, but there was no evidence of " inducible ischemia on that study.     Muscle pain in neck, shoulders, upper chest  Finds it very uncomfortable to lift arms up  She has been following with neurosurgery for cervical radiculopathy   She had EMG/NCV studies 4/12/24:  The study shows electrodiagnostic evidence for a moderate median neuropathy across the right carpal tunnel.  This is supported by the increased onset latency of the right median motor nerve conduction study, small median snap and increased peak latency of the right median antidromic sensory nerve conduction study.  There is no evidence for active denervation on EMG needle sampling of the right abductor pollicis brevis muscle.  The remainder of the study did not reveal electrodiagnostic evidence for an ulnar neuropathy or cervical radiculopathy on the right.  She is s/p right carpal tunnel release 6/28/24    Chronic dry cough, more bothersome over past few months  History of GERD with symptoms that presented as cough  Previously cough resolved with PPI  No wheezing, shortness of breath with cough  Tickle in back of throat leads to cough  Cough drop and water helps but takes a little bit to calm down    She started omeprazole 5 days ago, hasn't noticed any difference yet  No improvement with delsym    No feelings of heartburn. No abdominal pain, nausea, vomiting, blood in stool. No fever, weight loss. No sore throat. No difficulty or pain with swallowing. No feelings of regurgitation.    Has had diarrhea this last week, 2 episodes where she couldn't make it to the bathroom on time. It is improving in last day or so. No blood in stool. No fever. Eating and drinking normally.        Objective           Vitals:  No vitals were obtained today due to virtual visit.    Physical Exam   GENERAL: alert and no distress  EYES: Eyes grossly normal to inspection.  No discharge or erythema, or obvious scleral/conjunctival abnormalities.  RESP: No audible wheeze, cough, or visible cyanosis.    SKIN:  Visible skin clear. No significant rash, abnormal pigmentation or lesions.  NEURO: Cranial nerves grossly intact.  Mentation and speech appropriate for age.  PSYCH: Appropriate affect, tone, and pace of words          Video-Visit Details    Type of service:  Video Visit   Video End Time:3:36 PM  Originating Location (pt. Location): Home    Distant Location (provider location):  On-site  Platform used for Video Visit: [place]  Signed Electronically by: Jovita Bauman PA-C

## 2024-08-06 ENCOUNTER — MYC MEDICAL ADVICE (OUTPATIENT)
Dept: FAMILY MEDICINE | Facility: CLINIC | Age: 74
End: 2024-08-06
Payer: COMMERCIAL

## 2024-08-06 NOTE — TELEPHONE ENCOUNTER
Routing to provider to advise. Only due for Annual Wellness?    Brea Zamora  Lead   [facility]

## 2024-08-13 ENCOUNTER — LAB (OUTPATIENT)
Dept: LAB | Facility: CLINIC | Age: 74
End: 2024-08-13
Payer: COMMERCIAL

## 2024-08-13 DIAGNOSIS — M62.89 MUSCLE FATIGUE: ICD-10-CM

## 2024-08-13 DIAGNOSIS — R53.83 FATIGUE, UNSPECIFIED TYPE: ICD-10-CM

## 2024-08-13 LAB — ERYTHROCYTE [SEDIMENTATION RATE] IN BLOOD BY WESTERGREN METHOD: 33 MM/HR (ref 0–30)

## 2024-08-13 PROCEDURE — 83615 LACTATE (LD) (LDH) ENZYME: CPT

## 2024-08-13 PROCEDURE — 36415 COLL VENOUS BLD VENIPUNCTURE: CPT

## 2024-08-13 PROCEDURE — 86140 C-REACTIVE PROTEIN: CPT

## 2024-08-13 PROCEDURE — 85652 RBC SED RATE AUTOMATED: CPT

## 2024-08-13 PROCEDURE — 82085 ASSAY OF ALDOLASE: CPT | Mod: 90

## 2024-08-13 PROCEDURE — 86039 ANTINUCLEAR ANTIBODIES (ANA): CPT

## 2024-08-13 PROCEDURE — 84443 ASSAY THYROID STIM HORMONE: CPT

## 2024-08-13 PROCEDURE — 86431 RHEUMATOID FACTOR QUANT: CPT

## 2024-08-13 PROCEDURE — 99000 SPECIMEN HANDLING OFFICE-LAB: CPT

## 2024-08-13 PROCEDURE — 80053 COMPREHEN METABOLIC PANEL: CPT

## 2024-08-13 PROCEDURE — 82550 ASSAY OF CK (CPK): CPT

## 2024-08-13 PROCEDURE — 86038 ANTINUCLEAR ANTIBODIES: CPT

## 2024-08-14 LAB
ALBUMIN SERPL BCG-MCNC: 4 G/DL (ref 3.5–5.2)
ALP SERPL-CCNC: 51 U/L (ref 40–150)
ALT SERPL W P-5'-P-CCNC: 7 U/L (ref 0–50)
ANA PAT SER IF-IMP: ABNORMAL
ANA SER QL IF: ABNORMAL
ANA TITR SER IF: ABNORMAL {TITER}
ANION GAP SERPL CALCULATED.3IONS-SCNC: 11 MMOL/L (ref 7–15)
AST SERPL W P-5'-P-CCNC: 17 U/L (ref 0–45)
BILIRUB SERPL-MCNC: 0.2 MG/DL
BUN SERPL-MCNC: 22.6 MG/DL (ref 8–23)
CALCIUM SERPL-MCNC: 9.2 MG/DL (ref 8.8–10.4)
CHLORIDE SERPL-SCNC: 105 MMOL/L (ref 98–107)
CK SERPL-CCNC: 50 U/L (ref 26–192)
CREAT SERPL-MCNC: 0.98 MG/DL (ref 0.51–0.95)
CRP SERPL-MCNC: 6.47 MG/L
EGFRCR SERPLBLD CKD-EPI 2021: 61 ML/MIN/1.73M2
GLUCOSE SERPL-MCNC: 129 MG/DL (ref 70–99)
HCO3 SERPL-SCNC: 23 MMOL/L (ref 22–29)
LDH SERPL L TO P-CCNC: 220 U/L (ref 0–250)
POTASSIUM SERPL-SCNC: 4.2 MMOL/L (ref 3.4–5.3)
PROT SERPL-MCNC: 6.8 G/DL (ref 6.4–8.3)
RHEUMATOID FACT SERPL-ACNC: <10 IU/ML
SODIUM SERPL-SCNC: 139 MMOL/L (ref 135–145)
TSH SERPL DL<=0.005 MIU/L-ACNC: 2.08 UIU/ML (ref 0.3–4.2)

## 2024-08-15 LAB — ALDOLASE SERPL-CCNC: 3.5 U/L

## 2024-08-22 DIAGNOSIS — M62.81 GENERALIZED MUSCLE WEAKNESS: ICD-10-CM

## 2024-08-22 DIAGNOSIS — M62.89 MUSCLE FATIGUE: Primary | ICD-10-CM

## 2024-08-22 DIAGNOSIS — R53.83 FATIGUE, UNSPECIFIED TYPE: ICD-10-CM

## 2024-08-26 ENCOUNTER — MYC MEDICAL ADVICE (OUTPATIENT)
Dept: FAMILY MEDICINE | Facility: CLINIC | Age: 74
End: 2024-08-26
Payer: COMMERCIAL

## 2024-09-05 ENCOUNTER — MYC MEDICAL ADVICE (OUTPATIENT)
Dept: ORTHOPEDICS | Facility: CLINIC | Age: 74
End: 2024-09-05
Payer: COMMERCIAL

## 2024-09-06 DIAGNOSIS — I10 ESSENTIAL HYPERTENSION: ICD-10-CM

## 2024-09-06 RX ORDER — OLMESARTAN MEDOXOMIL 20 MG/1
10 TABLET ORAL DAILY
Qty: 45 TABLET | Refills: 0 | Status: SHIPPED | OUTPATIENT
Start: 2024-09-06

## 2024-09-12 ENCOUNTER — MYC MEDICAL ADVICE (OUTPATIENT)
Dept: FAMILY MEDICINE | Facility: CLINIC | Age: 74
End: 2024-09-12
Payer: COMMERCIAL

## 2024-09-12 NOTE — TELEPHONE ENCOUNTER
"Routing to Jovita. Would you like pt to schedule VV or OV for inquiry of switching BP medications?    Per 8/5/24 VV with Jovita:    \"Continue omeprazole for now. Follow-up if no improvement in next 1-2 months. \"    RAUDEL ReidN, RN     [facility]    09/12/2024 at 11:06 AM    "

## 2024-09-18 ENCOUNTER — VIRTUAL VISIT (OUTPATIENT)
Dept: FAMILY MEDICINE | Facility: CLINIC | Age: 74
End: 2024-09-18
Payer: COMMERCIAL

## 2024-09-18 DIAGNOSIS — M25.652 HIP JOINT STIFFNESS, BILATERAL: ICD-10-CM

## 2024-09-18 DIAGNOSIS — R05.3 CHRONIC COUGH: Primary | ICD-10-CM

## 2024-09-18 DIAGNOSIS — M25.651 HIP JOINT STIFFNESS, BILATERAL: ICD-10-CM

## 2024-09-18 DIAGNOSIS — M25.611 SHOULDER JOINT STIFFNESS, BILATERAL: ICD-10-CM

## 2024-09-18 DIAGNOSIS — M25.612 SHOULDER JOINT STIFFNESS, BILATERAL: ICD-10-CM

## 2024-09-18 PROCEDURE — 99215 OFFICE O/P EST HI 40 MIN: CPT | Mod: 95 | Performed by: PHYSICIAN ASSISTANT

## 2024-09-18 PROCEDURE — G2211 COMPLEX E/M VISIT ADD ON: HCPCS | Mod: 95 | Performed by: PHYSICIAN ASSISTANT

## 2024-09-18 RX ORDER — PREDNISONE 10 MG/1
15 TABLET ORAL DAILY
Qty: 45 TABLET | Refills: 0 | Status: SHIPPED | OUTPATIENT
Start: 2024-09-18 | End: 2024-09-25

## 2024-09-18 RX ORDER — OMEPRAZOLE 40 MG/1
40 CAPSULE, DELAYED RELEASE ORAL DAILY
Qty: 90 CAPSULE | Refills: 0 | Status: SHIPPED | OUTPATIENT
Start: 2024-09-18

## 2024-09-18 NOTE — PROGRESS NOTES
Criss is a 73 year old who is being evaluated via a billable video visit.    How would you like to obtain your AVS? MyChart  If the video visit is dropped, the invitation should be resent by: Text to cell phone: [phone]  Will anyone else be joining your video visit? No      Assessment & Plan     Chronic cough  Consider laryngopharyngeal reflux. Will try increasing omeprazole to 40 mg daily and refer to ENT for further evaluation. Could consider chest CT and/or PFTs vs pulmonology evaluation but start with ENT for now as cough is described as more of a throat tickle vs feeling deep in the lungs.  - omeprazole (PRILOSEC) 40 MG DR capsule; Take 1 capsule (40 mg) by mouth daily.  - Adult ENT  Referral; Future    Shoulder joint stiffness, bilateral  Hip joint stiffness, bilateral  After discussing symptoms further today they do sound suspicious for polymyalgia rheumatica. She had mildly elevated ESR and CRP about 1 month ago which would support diagnosis. She does endorse morning stiffness lasting >45 minutes. PMR could also explain her increased fatigue and overall malaise. She does have dry cough as above along with fatigue but no other symptoms of GCA. No fever, weight loss, vision changes, headache, jaw claudication. Will start with prednisone 15 mg daily and we will follow-up in 1 week to recheck. If symptoms are not significantly improved, could try increasing to 20 mg daily but if still no improvement after that would consider other etiology. She will keep visits with neurology and sleep clinic as scheduled for now.  - predniSONE (DELTASONE) 10 MG tablet; Take 1.5 tablets (15 mg) by mouth daily.    The longitudinal plan of care for the diagnosis(es)/condition(s) as documented were addressed during this visit. Due to the added complexity in care, I will continue to support Criss in the subsequent management and with ongoing continuity of care.    50 minutes spent on the date of the encounter doing  chart review, history and exam, documentation and further activities per the note      Subjective   Criss is a 73 year old, presenting for the following health issues:  Chronic Cough (For 6 months), Fatigue, and muscle pain        9/18/2024     2:41 PM   Additional Questions   Roomed by Shannon CARMICHAEL     Video Start Time: 3:18 PM    History of Present Illness       Reason for visit:  Chronic cough    She eats 2-3 servings of fruits and vegetables daily.She consumes 0 sweetened beverage(s) daily.She exercises with enough effort to increase her heart rate 10 to 19 minutes per day.  She exercises with enough effort to increase her heart rate 3 or less days per week.   She is taking medications regularly.       Chronic dry cough, more bothersome over past few months, maybe 6 months  History of GERD with symptoms that presented as cough; previously cough resolved with PPI several years ago  Current cough feels like it is mostly coming from the throat vs coming from deep in the chest  No wheezing or shortness of breath with cough  Tickle in back of throat leads to cough  Cough drop and water helps but takes a little bit to calm down  Wakes up overnight with cough  Finding herself avoiding going places due to cough    She was seen for virtual visit 8/5/24 and omeprazole 20 mg daily was recommended  She did notice improvement in cough after taking omeprazole for 1 month but then had increase in cough again despite continuing with omeprazole    Normal chest x-ray 6/21/24. Non smoker. No history of asthma or lung problems. No hoarse voice. No globus sensation. No PND or nasal congestion. No feelings of heartburn. No abdominal pain, nausea, vomiting, blood in stool. No fever, weight loss. No sore throat. No difficulty or pain with swallowing. No feelings of regurgitation. Eating and drinking normally.     Ongoing muscle aches  Primarily bilateral shoulders, neck, hips  Stiffness, worse in mornings (lasting at least 45 minutes and  often persisting into afternoon) and after inactivity  Pain much worse with overhead reaching or trying to reach arms around her back    Was referred to neurology and sleep medicine for evaluation of ongoing muscle fatigue and overall fatigue.   - Neurology visit scheduled 11/13/24   - Sleep clinic visit scheduled for 1/30/25      Review of Systems  Constitutional, HEENT, cardiovascular, pulmonary, gi and gu systems are negative, except as otherwise noted.      Objective           Vitals:  No vitals were obtained today due to virtual visit.    Physical Exam   GENERAL: alert and no distress  EYES: Eyes grossly normal to inspection.  No discharge or erythema, or obvious scleral/conjunctival abnormalities.  RESP: No audible wheeze, cough, or visible cyanosis.    SKIN: Visible skin clear. No significant rash, abnormal pigmentation or lesions.  NEURO: Cranial nerves grossly intact.  Mentation and speech appropriate for age.  PSYCH: Appropriate affect, tone, and pace of words          Video-Visit Details    Type of service:  Video Visit   Video End Time:3:49 PM  Originating Location (pt. Location): Home    Distant Location (provider location):  On-site  Platform used for Video Visit: [place]  Signed Electronically by: Jovita Bauman PA-C

## 2024-09-24 ENCOUNTER — TELEPHONE (OUTPATIENT)
Dept: FAMILY MEDICINE | Facility: CLINIC | Age: 74
End: 2024-09-24

## 2024-09-24 NOTE — TELEPHONE ENCOUNTER
Patient Quality Outreach    Patient is due for the following:   Colon Cancer Screening  Physical Annual Wellness Visit    Next Steps:   No follow up needed at this time.    Type of outreach:    Sent [employer] message.      Questions for provider review:    None           Shannon Huang MA

## 2024-09-25 ENCOUNTER — VIRTUAL VISIT (OUTPATIENT)
Dept: FAMILY MEDICINE | Facility: CLINIC | Age: 74
End: 2024-09-25
Payer: COMMERCIAL

## 2024-09-25 DIAGNOSIS — M25.652 HIP JOINT STIFFNESS, BILATERAL: ICD-10-CM

## 2024-09-25 DIAGNOSIS — M85.80 OSTEOPENIA, UNSPECIFIED LOCATION: ICD-10-CM

## 2024-09-25 DIAGNOSIS — R05.3 CHRONIC COUGH: ICD-10-CM

## 2024-09-25 DIAGNOSIS — M25.651 HIP JOINT STIFFNESS, BILATERAL: ICD-10-CM

## 2024-09-25 DIAGNOSIS — M25.611 SHOULDER JOINT STIFFNESS, BILATERAL: Primary | ICD-10-CM

## 2024-09-25 DIAGNOSIS — M25.612 SHOULDER JOINT STIFFNESS, BILATERAL: Primary | ICD-10-CM

## 2024-09-25 DIAGNOSIS — Z78.0 POST-MENOPAUSAL: ICD-10-CM

## 2024-09-25 PROCEDURE — G2211 COMPLEX E/M VISIT ADD ON: HCPCS | Mod: 95 | Performed by: PHYSICIAN ASSISTANT

## 2024-09-25 PROCEDURE — 99214 OFFICE O/P EST MOD 30 MIN: CPT | Mod: 95 | Performed by: PHYSICIAN ASSISTANT

## 2024-09-25 RX ORDER — PREDNISONE 10 MG/1
15 TABLET ORAL DAILY
Qty: 45 TABLET | Refills: 0 | Status: SHIPPED | OUTPATIENT
Start: 2024-09-25

## 2024-09-25 NOTE — PROGRESS NOTES
Criss is a 73 year old who is being evaluated via a billable video visit.    How would you like to obtain your AVS? MyChart  If the video visit is dropped, the invitation should be resent by: Text to cell phone: [phone]  Will anyone else be joining your video visit? No      Assessment & Plan     Shoulder joint stiffness, bilateral  Hip joint stiffness, bilateral  Significant, rapid improvement with prednisone, pain is 100% gone and she feels more herself. While her presentation has been complex, this rapid and marked improvement with prednisone helps support diagnosis of polymyalgia rheumatica. She has visit with neurology scheduled in November for evaluation of muscle fatigue and I would still like her to keep that visit. However, for now we will plan to continue with prednisone 15 mg daily and reevaluate symptoms at upcoming visit with me on 10/8/24. Will plan to recheck ESR/CRP as well at next visit. If symptoms are still controlled at that time, consider starting gradual taper. Educated on signs/symptoms of GCA and she denies concerns - she has had dry cough (see below). Discussed risk of GCA and symptoms to watch for such as headaches, change in vision, or pain in the jaw with chewing, fever, fatigue, weight loss, a new cough, and unexplained dental or facial pain. We discussed diagnosis and treatment recommendation with low dose glucocorticoids, expected response to medication, and anticipated duration of treatment with slow tapering over 1-2 years. We discussed risks of long term glucocorticoid use including osteoporosis, glucose intolerance, hypertension, glaucoma, and cataracts. She does not have diabetes. She has hypertension which has been well controlled. She does have osteopenia with last DEXA 1/31/22. Will repeat DEXA and discuss starting bisphosphonate while on long term prednisone pending results.  - predniSONE (DELTASONE) 10 MG tablet; Take 1.5 tablets (15 mg) by mouth daily.    Chronic  cough  She tried 40 mg of omeprazole but didn't like it so dropped back to 20 mg omeprazole. Cough has resolved in the last week and she's not sure if it is related to the omeprazole or from starting the prednisone. Reviewed possible symptom of unexplained dry cough with GCA but she has no other symptoms to suggest GCA. Also wouldn't expect GCA to resolve with low dose steroid. She denies headaches, change in vision, or pain in the jaw with chewing, fever, fatigue, weight loss, and unexplained dental or facial pain. She will stop omeprazole and monitor cough. We have a visit scheduled in a few weeks so we will recheck at that time.     Osteopenia, unspecified location  Post-menopausal  She does have osteopenia with last DEXA 1/31/22. Will repeat DEXA and discuss starting bisphosphonate while on long term prednisone pending results.  - DX Bone Density; Future      The longitudinal plan of care for the diagnosis(es)/condition(s) as documented were addressed during this visit. Due to the added complexity in care, I will continue to support Criss in the subsequent management and with ongoing continuity of care.        Kyree Kahn is a 73 year old, presenting for the following health issues:  Follow Up        9/25/2024     2:40 PM   Additional Questions   Roomed by kb       Video Start Time: 3:08 PM    History of Present Illness       Reason for visit:  Chronic cough    She eats 2-3 servings of fruits and vegetables daily.She consumes 0 sweetened beverage(s) daily.She exercises with enough effort to increase her heart rate 10 to 19 minutes per day.  She exercises with enough effort to increase her heart rate 3 or less days per week.   She is taking medications regularly.     She started prednisone 15 mg daily 1 week ago (9/18/24) for suspected polymyalgia rheumatica - bilateral shoulder pain/aching/stiffness which was worse in the mornings and made it hard to sleep at night. She had also been having some aching in  her low back/hips.     She has had significant improvement with prednisone, pain is 100% gone and she feels more herself. General fatigue is improved and muscle fatigue she previously had is also resolved. She is able to use her arms with full strength, raise arms above her head without pain. AM stiffness is gone. She is sleeping much better at night.     She tried 40 mg of omeprazole but didn't like it so dropped back to 20 mg omeprazole. Cough has resolved in the last week and she's not sure if it is related to the omeprazole or from starting the prednisone.    She denies headaches, change in vision, or pain in the jaw with chewing, fever, fatigue, weight loss, and unexplained dental or facial pain.     Osteopenia from DEXA 1/31/22        Objective           Vitals:  No vitals were obtained today due to virtual visit.    Physical Exam   GENERAL: alert and no distress  EYES: Eyes grossly normal to inspection.  No discharge or erythema, or obvious scleral/conjunctival abnormalities.  RESP: No audible wheeze, cough, or visible cyanosis.    SKIN: Visible skin clear. No significant rash, abnormal pigmentation or lesions.  NEURO: Cranial nerves grossly intact.  Mentation and speech appropriate for age.  PSYCH: Appropriate affect, tone, and pace of words        Video-Visit Details    Type of service:  Video Visit   Video End Time:3:29 PM  Originating Location (pt. Location): Home    Distant Location (provider location):  On-site  Platform used for Video Visit: [place]  Signed Electronically by: Jovita Bauman PA-C

## 2024-10-01 PROBLEM — M25.551 HIP PAIN, RIGHT: Status: RESOLVED | Noted: 2019-02-19 | Resolved: 2024-10-01

## 2024-10-01 NOTE — PROGRESS NOTES
DISCHARGE  Reason for Discharge: Patient has met all goals.    Equipment Issued: none    Discharge Plan: Patient to continue home program.    Referring Provider:  Jovita Bauman

## 2024-10-03 SDOH — HEALTH STABILITY: PHYSICAL HEALTH: ON AVERAGE, HOW MANY MINUTES DO YOU ENGAGE IN EXERCISE AT THIS LEVEL?: 60 MIN

## 2024-10-03 SDOH — HEALTH STABILITY: PHYSICAL HEALTH: ON AVERAGE, HOW MANY DAYS PER WEEK DO YOU ENGAGE IN MODERATE TO STRENUOUS EXERCISE (LIKE A BRISK WALK)?: 3 DAYS

## 2024-10-03 ASSESSMENT — SOCIAL DETERMINANTS OF HEALTH (SDOH): HOW OFTEN DO YOU GET TOGETHER WITH FRIENDS OR RELATIVES?: THREE TIMES A WEEK

## 2024-10-08 ENCOUNTER — OFFICE VISIT (OUTPATIENT)
Dept: FAMILY MEDICINE | Facility: CLINIC | Age: 74
End: 2024-10-08
Attending: PHYSICIAN ASSISTANT
Payer: COMMERCIAL

## 2024-10-08 VITALS
RESPIRATION RATE: 16 BRPM | OXYGEN SATURATION: 97 % | HEIGHT: 63 IN | SYSTOLIC BLOOD PRESSURE: 103 MMHG | HEART RATE: 76 BPM | DIASTOLIC BLOOD PRESSURE: 66 MMHG | WEIGHT: 138.2 LBS | TEMPERATURE: 97.8 F | BODY MASS INDEX: 24.49 KG/M2

## 2024-10-08 DIAGNOSIS — I10 ESSENTIAL HYPERTENSION: ICD-10-CM

## 2024-10-08 DIAGNOSIS — Z12.31 VISIT FOR SCREENING MAMMOGRAM: ICD-10-CM

## 2024-10-08 DIAGNOSIS — Z13.220 LIPID SCREENING: ICD-10-CM

## 2024-10-08 DIAGNOSIS — Z00.00 ENCOUNTER FOR MEDICARE ANNUAL WELLNESS EXAM: Primary | ICD-10-CM

## 2024-10-08 DIAGNOSIS — M25.611 SHOULDER JOINT STIFFNESS, BILATERAL: ICD-10-CM

## 2024-10-08 DIAGNOSIS — M25.612 SHOULDER JOINT STIFFNESS, BILATERAL: ICD-10-CM

## 2024-10-08 DIAGNOSIS — M35.3 POLYMYALGIA RHEUMATICA (H): ICD-10-CM

## 2024-10-08 DIAGNOSIS — I65.23 MILD ATHEROSCLEROSIS OF CAROTID ARTERY, BILATERAL: ICD-10-CM

## 2024-10-08 DIAGNOSIS — M25.651 HIP JOINT STIFFNESS, BILATERAL: ICD-10-CM

## 2024-10-08 DIAGNOSIS — F41.1 GENERALIZED ANXIETY DISORDER: ICD-10-CM

## 2024-10-08 DIAGNOSIS — M25.652 HIP JOINT STIFFNESS, BILATERAL: ICD-10-CM

## 2024-10-08 DIAGNOSIS — D64.9 ANEMIA, UNSPECIFIED TYPE: ICD-10-CM

## 2024-10-08 DIAGNOSIS — M85.80 OSTEOPENIA, UNSPECIFIED LOCATION: ICD-10-CM

## 2024-10-08 LAB
BASOPHILS # BLD AUTO: 0 10E3/UL (ref 0–0.2)
BASOPHILS NFR BLD AUTO: 0 %
EOSINOPHIL # BLD AUTO: 0 10E3/UL (ref 0–0.7)
EOSINOPHIL NFR BLD AUTO: 0 %
ERYTHROCYTE [DISTWIDTH] IN BLOOD BY AUTOMATED COUNT: 13.9 % (ref 10–15)
ERYTHROCYTE [SEDIMENTATION RATE] IN BLOOD BY WESTERGREN METHOD: 9 MM/HR (ref 0–30)
HCT VFR BLD AUTO: 35.8 % (ref 35–47)
HGB BLD-MCNC: 11.7 G/DL (ref 11.7–15.7)
IMM GRANULOCYTES # BLD: 0 10E3/UL
IMM GRANULOCYTES NFR BLD: 0 %
LYMPHOCYTES # BLD AUTO: 1 10E3/UL (ref 0.8–5.3)
LYMPHOCYTES NFR BLD AUTO: 12 %
MCH RBC QN AUTO: 29.9 PG (ref 26.5–33)
MCHC RBC AUTO-ENTMCNC: 32.7 G/DL (ref 31.5–36.5)
MCV RBC AUTO: 92 FL (ref 78–100)
MONOCYTES # BLD AUTO: 0.2 10E3/UL (ref 0–1.3)
MONOCYTES NFR BLD AUTO: 2 %
NEUTROPHILS # BLD AUTO: 7.6 10E3/UL (ref 1.6–8.3)
NEUTROPHILS NFR BLD AUTO: 86 %
PLATELET # BLD AUTO: 271 10E3/UL (ref 150–450)
RBC # BLD AUTO: 3.91 10E6/UL (ref 3.8–5.2)
WBC # BLD AUTO: 8.8 10E3/UL (ref 4–11)

## 2024-10-08 PROCEDURE — 85025 COMPLETE CBC W/AUTO DIFF WBC: CPT | Performed by: PHYSICIAN ASSISTANT

## 2024-10-08 PROCEDURE — G0439 PPPS, SUBSEQ VISIT: HCPCS | Performed by: PHYSICIAN ASSISTANT

## 2024-10-08 PROCEDURE — 99214 OFFICE O/P EST MOD 30 MIN: CPT | Mod: 25 | Performed by: PHYSICIAN ASSISTANT

## 2024-10-08 PROCEDURE — 80061 LIPID PANEL: CPT | Performed by: PHYSICIAN ASSISTANT

## 2024-10-08 PROCEDURE — 86140 C-REACTIVE PROTEIN: CPT | Performed by: PHYSICIAN ASSISTANT

## 2024-10-08 PROCEDURE — 36415 COLL VENOUS BLD VENIPUNCTURE: CPT | Performed by: PHYSICIAN ASSISTANT

## 2024-10-08 PROCEDURE — 85652 RBC SED RATE AUTOMATED: CPT | Performed by: PHYSICIAN ASSISTANT

## 2024-10-08 RX ORDER — OLMESARTAN MEDOXOMIL 20 MG/1
10 TABLET ORAL DAILY
Qty: 45 TABLET | Refills: 1 | Status: SHIPPED | OUTPATIENT
Start: 2024-10-08

## 2024-10-08 RX ORDER — SERTRALINE HYDROCHLORIDE 25 MG/1
25 TABLET, FILM COATED ORAL DAILY
Qty: 90 TABLET | Refills: 1 | Status: SHIPPED | OUTPATIENT
Start: 2024-10-08

## 2024-10-08 ASSESSMENT — ANXIETY QUESTIONNAIRES
5. BEING SO RESTLESS THAT IT IS HARD TO SIT STILL: SEVERAL DAYS
6. BECOMING EASILY ANNOYED OR IRRITABLE: NOT AT ALL
1. FEELING NERVOUS, ANXIOUS, OR ON EDGE: SEVERAL DAYS
3. WORRYING TOO MUCH ABOUT DIFFERENT THINGS: SEVERAL DAYS
GAD7 TOTAL SCORE: 6
7. FEELING AFRAID AS IF SOMETHING AWFUL MIGHT HAPPEN: SEVERAL DAYS
IF YOU CHECKED OFF ANY PROBLEMS ON THIS QUESTIONNAIRE, HOW DIFFICULT HAVE THESE PROBLEMS MADE IT FOR YOU TO DO YOUR WORK, TAKE CARE OF THINGS AT HOME, OR GET ALONG WITH OTHER PEOPLE: SOMEWHAT DIFFICULT
2. NOT BEING ABLE TO STOP OR CONTROL WORRYING: SEVERAL DAYS
GAD7 TOTAL SCORE: 6

## 2024-10-08 ASSESSMENT — ENCOUNTER SYMPTOMS: FATIGUE: 1

## 2024-10-08 ASSESSMENT — PATIENT HEALTH QUESTIONNAIRE - PHQ9
SUM OF ALL RESPONSES TO PHQ QUESTIONS 1-9: 0
5. POOR APPETITE OR OVEREATING: SEVERAL DAYS

## 2024-10-08 NOTE — PROGRESS NOTES
Preventive Care Visit  [facility] DANNYMOELIER Bauman PA-C, Family Medicine  Oct 8, 2024      Assessment & Plan     Encounter for Medicare annual wellness exam  She reports having flu and COVID vaccines yesterday at retail pharmacy    Polymyalgia rheumatica (H)  Shoulder joint stiffness, bilateral   Hip joint stiffness, bilateral  Significant, rapid improvement with prednisone, pain is 100% gone and she feels more herself. While her presentation has been complex, this rapid and marked improvement with prednisone helps support diagnosis of polymyalgia rheumatica. In addition, her ESR and CRP were elevated recently prior to starting prednisone and these are both normal now on recheck. For now we will plan to continue with prednisone 15 mg daily and follow-up in 2 weeks to reevaluate. If symptoms are still controlled at that time, consider starting gradual taper. Educated on signs/symptoms of GCA and she denies concerns. Discussed risk of GCA and symptoms to watch for such as headaches, change in vision, or pain in the jaw with chewing, fever, fatigue, weight loss, a new cough, and unexplained dental or facial pain. We discussed diagnosis and treatment recommendation with low dose glucocorticoids, expected response to medication, and anticipated duration of treatment with slow tapering over 1-2 years. We discussed risks of long term glucocorticoid use including osteoporosis, glucose intolerance, hypertension, glaucoma, and cataracts. She does not have diabetes. She has hypertension which has been well controlled. She does have osteopenia in both femoral necks (left with T-score of -1.2 and right with T-score of -1.6, FRAX score 10% for major fracture, 1.7% for hip fracture) with last DEXA 1/31/22. Will repeat DEXA and discuss starting bisphosphonate while on long term prednisone pending results - likely would recommend bisphosphonate for glucocorticoid-induced osteoporosis prevention even if  treatment not recommended based on DEXA results.  - ESR: Erythrocyte sedimentation rate; Future  - CRP, inflammation; Future  - ESR: Erythrocyte sedimentation rate  - CRP, inflammation    Osteopenia, unspecified location   She does have osteopenia in both femoral necks (left with T-score of -1.2 and right with T-score of -1.6, FRAX score 10% for major fracture, 1.7% for hip fracture) with last DEXA 1/31/22. Will repeat DEXA and discuss starting bisphosphonate while on long term prednisone pending results - likely would recommend bisphosphonate for glucocorticoid-induced osteoporosis prevention even if treatment not recommended based on DEXA results.    Generalized anxiety disorder  Chronic, stable. Continue.  - sertraline (ZOLOFT) 25 MG tablet; Take 1 tablet (25 mg) by mouth daily.    Visit for screening mammogram  - MA Screen Bilateral w/Karan; Future    Lipid screening  - Lipid panel reflex to direct LDL Non-fasting; Future  - Lipid panel reflex to direct LDL Non-fasting    Anemia, unspecified type  Following with hematology.   - CBC with platelets differential    Essential hypertension  Chronic, well controlled with olmesartan.    Mild atherosclerosis of carotid artery, bilateral  Carotid artery US 4/1/24 showed   1.  RIGHT CAROTID:  Mild atherosclerotic plaque. Peak systolic velocities in the ICA are 88 cm/s which correspond to the <50% stenosis range based on NASCET criteria.  2.  LEFT CAROTID:  Mild atherosclerotic plaque. Peak systolic velocities in the ICA are 103 cm/s which correspond to the <50% stenosis range based on NASCET criteria.  3.  Antegrade flow within the vertebral arteries bilaterally.    The 10-year ASCVD risk score (Jr DK, et al., 2019) is: 11.1%    Values used to calculate the score:      Age: 73 years      Sex: Female      Is Non- : No      Diabetic: No      Tobacco smoker: No      Systolic Blood Pressure: 103 mmHg      Is BP treated: Yes      HDL Cholesterol: 100  mg/dL      Total Cholesterol: 209 mg/dL     Asymptomatic. Statin was previously recommended but she declined. Will recheck lipids as above. Will be due to repeat carotid artery US in 4/2025.  - US Carotid Bilateral; Future     Counseling  Appropriate preventive services were discussed with this patient.  Checklist reviewing preventive services available has been given to the patient.      Kyree Kahn is a 73 year old, presenting for the following:  Annual Visit, Fatigue, and Musculoskeletal Problem        10/8/2024     1:02 PM   Additional Questions   Roomed by Shannon CARMICHAEL         Health Care Directive  Patient does not have a Health Care Directive or Living Will: Patient states has Advance Directive and will bring in a copy to clinic.    Fatigue  Associated symptoms include fatigue.   Musculoskeletal Problem  Associated symptoms include fatigue.     She started prednisone 15 mg daily on 9/18/24 for suspected polymyalgia rheumatica - bilateral shoulder pain/aching/stiffness which was worse in the mornings and made it hard to sleep at night. She had also been having some aching in her low back/hips.     At follow-up visit on 9/25/24, 1 week after starting prednisone, she reported significant, rapid improvement with prednisone, pain 100% gone and she feels more herself. She continues to be pain free and feels so much better. General fatigue is improved and muscle fatigue she previously had is also resolved. She is able to use her arms with full strength, raise arms above her head without pain. AM stiffness is gone. She is sleeping much better at night.     She had been having problems with a persistent cough but that has also been resolved since starting prednisone. She is no longer needing omeprazole.    She denies headaches, change in vision, or pain in the jaw with chewing, fever, fatigue, weight loss, and unexplained dental or facial pain.      Osteopenia from DEXA 1/31/22  DEXA ordered at last visit and she  will schedule    Anxiety:  Mood overall feels controlled with sertraline 25 mg daily  No side effects or concerns      7/28/2023    12:20 PM 9/6/2023     1:44 PM 10/8/2024     1:41 PM   PHQ   PHQ-9 Total Score 7 4 0   Q9: Thoughts of better off dead/self-harm past 2 weeks Not at all Not at all Not at all         9/6/2023     8:25 AM 10/4/2023     2:57 PM 10/8/2024     1:41 PM   MATEUS-7 SCORE   Total Score 17 (severe anxiety)     Total Score 17 13 6             10/3/2024   General Health   How would you rate your overall physical health? Good   Feel stress (tense, anxious, or unable to sleep) To some extent      (!) STRESS CONCERN      10/3/2024   Nutrition   Diet: Regular (no restrictions)            10/3/2024   Exercise   Days per week of moderate/strenous exercise 3 days   Average minutes spent exercising at this level 60 min            10/3/2024   Social Factors   Frequency of gathering with friends or relatives Three times a week   Worry food won't last until get money to buy more No   Food not last or not have enough money for food? No   Do you have housing? (Housing is defined as stable permanent housing and does not include staying ouside in a car, in a tent, in an abandoned building, in an overnight shelter, or couch-surfing.) Yes   Are you worried about losing your housing? No   Lack of transportation? No   Unable to get utilities (heat,electricity)? No            10/3/2024   Fall Risk   Fallen 2 or more times in the past year? No   Trouble with walking or balance? No             10/3/2024   Activities of Daily Living- Home Safety   Needs help with the following daily activites None of the above   Safety concerns in the home No grab bars in the bathroom            10/3/2024   Dental   Dentist two times every year? Yes            10/3/2024   Hearing Screening   Hearing concerns? (!) I FEEL THAT PEOPLE ARE MUMBLING OR NOT SPEAKING CLEARLY.    (!) I NEED TO ASK PEOPLE TO SPEAK UP OR REPEAT THEMSELVES.    (!)  IT'S HARD TO FOLLOW A CONVERSATION IN A NOISY RESTAURANT OR CROWDED ROOM.    (!) TROUBLE UNDERSTANDING SOFT OR WHISPERED SPEECH.    (!) TROUBLE UNDERSTANDING SPEECH ON THE TELEPHONE       Multiple values from one day are sorted in reverse-chronological order         10/3/2024   Driving Risk Screening   Patient/family members have concerns about driving No            10/3/2024   General Alertness/Fatigue Screening   Have you been more tired than usual lately? No            10/3/2024   Urinary Incontinence Screening   Bothered by leaking urine in past 6 months No            10/3/2024   TB Screening   Were you born outside of the [place]? No            Today's PHQ-2 Score:       10/7/2024     1:41 PM   PHQ-2 ( 1999 Pf[employer])   Q1: Little interest or pleasure in doing things 0   Q2: Feeling down, depressed or hopeless 0   PHQ-2 Score 0   Q1: Little interest or pleasure in doing things Not at all   Q2: Feeling down, depressed or hopeless Not at all   PHQ-2 Score 0           10/3/2024   Substance Use   Alcohol more than 3/day or more than 7/wk No   Do you have a current opioid prescription? No   How severe/bad is pain from 1 to 10? 0/10 (No Pain)   Do you use any other substances recreationally? No        Social History     Tobacco Use    Smoking status: Never     Passive exposure: Never    Smokeless tobacco: Never   Vaping Use    Vaping status: Never Used   Substance Use Topics    Alcohol use: Yes     Alcohol/week: 4.0 - 5.0 standard drinks of alcohol     Types: 4 - 5 Glasses of wine per week     Comment: A glass of wine or beer, usually no more than one    Drug use: Never           9/15/2023   LAST FHS-7 RESULTS   1st degree relative breast or ovarian cancer No   Any relative bilateral breast cancer No   Any male have breast cancer No   Any ONE woman have BOTH breast AND ovarian cancer No   Any woman with breast cancer before 50yrs No   2 or more relatives with breast AND/OR ovarian cancer No   2 or more relatives with breast  AND/OR bowel cancer No           Mammogram Screening - Mammogram every 1-2 years updated in Health Maintenance based on mutual decision making    ASCVD Risk   The 10-year ASCVD risk score (Jr CORRALES, et al., 2019) is: 11.1%    Values used to calculate the score:      Age: 73 years      Sex: Female      Is Non- : No      Diabetic: No      Tobacco smoker: No      Systolic Blood Pressure: 103 mmHg      Is BP treated: Yes      HDL Cholesterol: 100 mg/dL      Total Cholesterol: 209 mg/dL            Reviewed and updated as needed this visit by Provider   Tobacco  Allergies  Meds  Problems  Med Hx  Surg Hx  Fam Hx            Past Medical History:   Diagnosis Date    Anemia     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37    Coagulation disorder (H)     hx hemorrhage after heart and after colon surg    History of atrial flutter     She has a history of WPW status post surgical ablation of a left lateral electrical pathway at age 37. She did not have any recurrent palpitations until 2014. At that time, she was seen at [facility] in [place] and had ECG consistent with atrial flutter. This episode of atrial flutter lasted several hours and terminated spontaneously. She saw cardiology after that and plan was for obser    History of blood transfusion     History of Gabino-Parkinson-White (WPW) syndrome     She has a history of WPW status post surgical ablation of a left lateral electrical pathway at age 37.     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 cx with hemorrhage. no cancer. slow transit.(2 surgeries;  first for low transit; second due to the bleeding)    DILATION AND CURETTAGE, HYSTEROSCOPY DIAGNOSTIC, COMBINED  2/21/2014    Procedure: COMBINED DILATION AND CURETTAGE, HYSTEROSCOPY DIAGNOSTIC;   DILATION AND CURETTAGE, HYSTEROSCOPY DIAGNOSTIC ;  Surgeon: Willie Osborn MD;  Location:  OR    ENT SURGERY      t and a    EXCISE LESION FACE N/A 1/11/2023    Procedure: incision and removal of osteoma on forehead;  Surgeon: Dl Davenport MD;  [place] [facility] [place]     Lab work is in process  Labs reviewed in EPIC  BP Readings from Last 3 Encounters:   10/08/24 103/66   07/24/24 116/66   07/08/24 113/50    Wt Readings from Last 3 Encounters:   10/08/24 62.7 kg (138 lb 3.2 oz)   07/24/24 62.8 kg (138 lb 6.4 oz)   07/08/24 61.7 kg (136 lb)                  Patient Active Problem List   Diagnosis    History of colectomy    Gastroesophageal reflux disease, unspecified whether esophagitis present    Impaired fasting glucose    Bilateral low back pain without sciatica    Jka antibody positive    Anomalous atrioventricular excitation    Generalized anxiety disorder    Essential hypertension    Hypothyroidism, unspecified type    Macrocytic anemia    Daily consumption of alcohol    DDD (degenerative disc disease), cervical    Cervical radiculopathy    Mild atherosclerosis of carotid artery, bilateral    Factor VII deficiency (H)    History of transfusion reaction     Past Surgical History:   Procedure Laterality Date    APPENDECTOMY  1968    CARDIAC SURGERY  age 37    WPW surg; open ablation; complicated with hemorrhage    COLECTOMY  age 52    7-8 inches rectum remain. cx with hemorrhage. no cancer. slow transit.(2 surgeries; first for low transit; second due to the bleeding)    DILATION AND CURETTAGE, HYSTEROSCOPY DIAGNOSTIC, COMBINED  2/21/2014    Procedure: COMBINED DILATION AND CURETTAGE, HYSTEROSCOPY DIAGNOSTIC;   DILATION AND CURETTAGE, HYSTEROSCOPY DIAGNOSTIC ;  Surgeon: Willie Osborn MD;  Location:  OR     ENT SURGERY      t and a    EXCISE LESION FACE N/A 1/11/2023    Procedure: incision and removal of osteoma on forehead;  Surgeon: Dl Davenport MD;  Location: [facility] OR       Social History     Tobacco Use    Smoking status: Never     Passive exposure: Never    Smokeless tobacco: Never   Substance Use Topics    Alcohol use: Yes     Alcohol/week: 4.0 - 5.0 standard drinks of alcohol     Types: 4 - 5 Glasses of wine per week     Comment: A glass of wine or beer, usually no more than one     Family History   Problem Relation Age of Onset    Bronchitis Mother         copd    Alcoholism Mother     Varicose Veins Mother     Diabetes Father     Coronary Artery Disease Father     Deep Vein Thrombosis Father         after having COVID    Leukemia Sister         chronic    Impaired Fasting Glucose Sister     Cancer Brother         myelofibrosis; got BMT 2019    Diabetes Type 2  Brother     Impaired Fasting Glucose Brother          Current Outpatient Medications   Medication Sig Dispense Refill    Calcium-Vitamin D-Vitamin K 500-200-40 MG-UNT-MCG CHEW Takes 3 chewables daily      levothyroxine (SYNTHROID/LEVOTHROID) 25 MCG tablet TAKE 1 TABLET BY MOUTH EVERY DAY 90 tablet 2    olmesartan (BENICAR) 20 MG tablet Take 0.5 tablets (10 mg) by mouth daily. 45 tablet 1    predniSONE (DELTASONE) 10 MG tablet Take 1.5 tablets (15 mg) by mouth daily. 45 tablet 0    sertraline (ZOLOFT) 25 MG tablet Take 1 tablet (25 mg) by mouth daily. 90 tablet 1     Allergies   Allergen Reactions    Benadryl [Diphenhydramine]      Given in the hospital and had a difficult time waking as expected.    External Allergen Needs Reconciliation - See Comment      Please reconcile the Patient's allergy reported as LEAD ACETATEMORPHINE SULFATE and update accordingly    Morphine      Cerner Allergy Text Annotation: morphine    Protamine      sob    Tylox [Oxycodone-Acetaminophen]      unknown    Ciprofloxacin Rash    Escitalopram Other (See Comments)      Brain fog, forgetfulness     Recent Labs   Lab Test 08/13/24  1328 06/21/24  1144 05/07/24  1541 01/08/24  1327 12/12/23  1317 08/04/23  0903 01/25/23  1501 04/26/22  0804 12/02/19  1042 10/25/17  0857   0000   A1C  --   --   --   --   --  5.6  --  5.6  --   --   --    LDL  --   --   --   --   --  94  --  73  --  85  --    HDL  --   --   --   --   --  100  --  106  --  120  --    TRIG  --   --   --   --   --  77  --  69  --  62  --    ALT 7 8  --  14  --   --   --  18  --   --    < >   CR 0.98* 0.82   < > 0.79   < > 0.85   < > 0.70 0.70  --    < >   GFRESTIMATED 61 75   < > 79   < > 72   < > >90 88  --    < >   GFRESTBLACK  --   --   --   --   --   --   --   --  >90  --   --    POTASSIUM 4.2 4.1   < > 4.7   < > 4.1  --  4.0 4.0  --   --    TSH 2.08 2.14   < > 4.78*   < >  --   --   --   --   --   --     < > = values in this interval not displayed.      Current providers sharing in care for this patient include:  Patient Care Team:  Jovita Bauman PA-C as PCP - General (Family Medicine)  Jovita Bauman PA-C as Assigned PCP  Jovita Garcia PA-C as Physician Assistant (Dermatology)  Dl Davenport MD as MD (Otolaryngology)  Jovita Garcia PA-C as Referring Physician (Dermatology)  Dl Dallas PA-C as Assigned Musculoskeletal Provider  Manuel Stout MD as MD (Neurological Surgery)  Dl Reddy MD as MD (Neurology)  Manuel Stout MD as Assigned Neuroscience Provider  Adair Bain MD as Assigned Cancer Care Provider  Hany Oh MD as MD (Cardiovascular Disease)  Hany Oh MD as Assigned Heart and Vascular Provider  Ksenia Hernández DO as Physician (Neurology)    The following health maintenance items are reviewed in Epic and correct as of today:  Health Maintenance   Topic Date Due    INFLUENZA VACCINE (1) 09/01/2024    COVID-19 Vaccine (8 - 2024-25 season) 09/01/2024    DEXA  01/31/2025    BMP  08/13/2025    CMP  08/13/2025    TSH W/FREE T4 REFLEX   "08/13/2025    MAMMO SCREENING  09/15/2025    MEDICARE ANNUAL WELLNESS VISIT  10/08/2025    ANNUAL REVIEW OF HM ORDERS  10/08/2025    FALL RISK ASSESSMENT  10/08/2025    CBC  10/08/2025    GLUCOSE  08/13/2027    LIPID  08/04/2028    DTAP/TDAP/TD IMMUNIZATION (3 - Td or Tdap) 12/04/2028    ADVANCE CARE PLANNING  10/08/2029    HEPATITIS C SCREENING  Completed    PHQ-2 (once per calendar year)  Completed    Pneumococcal Vaccine: 65+ Years  Completed    ZOSTER IMMUNIZATION  Completed    RSV VACCINE  Completed    HPV IMMUNIZATION  Aged Out    MENINGITIS IMMUNIZATION  Aged Out    RSV MONOCLONAL ANTIBODY  Aged Out    COLORECTAL CANCER SCREENING  Discontinued         Review of Systems  Constitutional, neuro, ENT, endocrine, pulmonary, cardiac, gastrointestinal, genitourinary, musculoskeletal, integument and psychiatric systems are negative, except as otherwise noted.     Objective    Exam  /66 (BP Location: Right arm, Patient Position: Sitting, Cuff Size: Adult Regular)   Pulse 76   Temp 97.8  F (36.6  C) (Oral)   Resp 16   Ht 1.6 m (5' 3\")   Wt 62.7 kg (138 lb 3.2 oz)   LMP  (LMP Unknown)   SpO2 97%   BMI 24.48 kg/m     Estimated body mass index is 24.48 kg/m  as calculated from the following:    Height as of this encounter: 1.6 m (5' 3\").    Weight as of this encounter: 62.7 kg (138 lb 3.2 oz).    Physical Exam  GENERAL: alert and no distress  EYES: Eyes grossly normal to inspection, PERRL and conjunctivae and sclerae normal  HENT: ear canals and TM's normal, nose and mouth without ulcers or lesions  NECK: no adenopathy, no asymmetry, masses, or scars  RESP: lungs clear to auscultation - no rales, rhonchi or wheezes  CV: regular rate and rhythm, normal S1 S2, no S3 or S4, no murmur, click or rub, no peripheral edema  ABDOMEN: soft, nontender, no hepatosplenomegaly, no masses and bowel sounds normal  MS: no gross musculoskeletal defects noted, no edema  NEURO: Normal strength and tone, mentation intact and " speech normal  PSYCH: mentation appears normal, affect normal/bright        10/8/2024   Mini Cog   Clock Draw Score 2 Normal   3 Item Recall 3 objects recalled   Mini Cog Total Score 5                 Signed Electronically by: Jovita Bauman PA-C

## 2024-10-08 NOTE — PROGRESS NOTES
{PROVIDER CHARTING PREFERENCE:227359}    Kyree Kahn is a 73 year old, presenting for the following health issues:  Annual Visit, Fatigue, and Musculoskeletal Problem      10/8/2024     1:02 PM   Additional Questions   Roomed by Shannon CARMICHAEL     Fatigue  Associated symptoms include fatigue.   Musculoskeletal Problem  Associated symptoms include fatigue.        {MA/LPN/RN Pre-Provider Visit Orders- hCG/UA/Strep (Optional):188553}  {SUPERLIST (Optional):783897}  {additonal problems for provider to add (Optional):578599}    {ROS Picklists (Optional):535266}      Objective    LMP  (LMP Unknown)   There is no height or weight on file to calculate BMI.  Physical Exam   {Exam List (Optional):291889}    {Diagnostic Test Results (Optional):406084}        Signed Electronically by: Jovita Bauman PA-C  {Email feedback regarding this note to [email or web address]   :[id number]}

## 2024-10-08 NOTE — PATIENT INSTRUCTIONS
Schedule bone density scan  Call [phone] from 7 a.m. to 7 p.m. Monday through Friday or from 7 a.m. to 3 p.m. on Saturday. If you are calling, say  Imaging  when prompted to state the reason for your call.    Patient Education   Preventive Care Advice   This is general advice given by our system to help you stay healthy. However, your care team may have specific advice just for you. Please talk to your care team about your preventive care needs.  Nutrition  Eat 5 or more servings of fruits and vegetables each day.  Try wheat bread, brown rice and whole grain pasta (instead of white bread, rice, and pasta).  Get enough calcium and vitamin D. Check the label on foods and aim for 100% of the RDA (recommended daily allowance).  Lifestyle  Exercise at least 150 minutes each week  (30 minutes a day, 5 days a week).  Do muscle strengthening activities 2 days a week. These help control your weight and prevent disease.  No smoking.  Wear sunscreen to prevent skin cancer.  Have a dental exam and cleaning every 6 months.  Yearly exams  See your health care team every year to talk about:  Any changes in your health.  Any medicines your care team has prescribed.  Preventive care, family planning, and ways to prevent chronic diseases.  Shots (vaccines)   HPV shots (up to age 26), if you've never had them before.  Hepatitis B shots (up to age 59), if you've never had them before.  COVID-19 shot: Get this shot when it's due.  Flu shot: Get a flu shot every year.  Tetanus shot: Get a tetanus shot every 10 years.  Pneumococcal, hepatitis A, and RSV shots: Ask your care team if you need these based on your risk.  Shingles shot (for age 50 and up)  General health tests  Diabetes screening:  Starting at age 35, Get screened for diabetes at least every 3 years.  If you are younger than age 35, ask your care team if you should be screened for diabetes.  Cholesterol test: At age 39, start having a cholesterol test every 5 years, or  more often if advised.  Bone density scan (DEXA): At age 50, ask your care team if you should have this scan for osteoporosis (brittle bones).  Hepatitis C: Get tested at least once in your life.  STIs (sexually transmitted infections)  Before age 24: Ask your care team if you should be screened for STIs.  After age 24: Get screened for STIs if you're at risk. You are at risk for STIs (including HIV) if:  You are sexually active with more than one person.  You don't use condoms every time.  You or a partner was diagnosed with a sexually transmitted infection.  If you are at risk for HIV, ask about PrEP medicine to prevent HIV.  Get tested for HIV at least once in your life, whether you are at risk for HIV or not.  Cancer screening tests  Cervical cancer screening: If you have a cervix, begin getting regular cervical cancer screening tests starting at age 21.  Breast cancer scan (mammogram): If you've ever had breasts, begin having regular mammograms starting at age 40. This is a scan to check for breast cancer.  Colon cancer screening: It is important to start screening for colon cancer at age 45.  Have a colonoscopy test every 10 years (or more often if you're at risk) Or, ask your provider about stool tests like a FIT test every year or Cologuard test every 3 years.  To learn more about your testing options, visit:   .  For help making a decision, visit:   https://bit.ly/cr08808.  Prostate cancer screening test: If you have a prostate, ask your care team if a prostate cancer screening test (PSA) at age 55 is right for you.  Lung cancer screening: If you are a current or former smoker ages 50 to 80, ask your care team if ongoing lung cancer screenings are right for you.  For informational purposes only. Not to replace the advice of your health care provider. Copyright   2023 [employer]. All rights reserved. Clinically reviewed by the [facility]. Glisten [id number] - REV  01/24.  Learning About Activities of Daily Living  What are activities of daily living?     Activities of daily living (ADLs) are the basic self-care tasks you do every day. These include eating, bathing, dressing, and moving around.  As you age, and if you have health problems, you may find that it's harder to do some of these tasks. If so, your doctor can suggest ideas that may help.  To measure what kind of help you may need, your doctor will ask how well you are able to do ADLs. Let your doctor know if there are any tasks that you are having trouble doing. This is an important first step to getting help. And when you have the help you need, you can stay as independent as possible.  How will a doctor assess your ADLs?  Asking about ADLs is part of a routine health checkup your doctor will likely do as you age. Your health check might be done in a doctor's office, in your home, or at a hospital. The goal is to find out if you are having any problems that could make it hard to care for yourself or that make it unsafe for you to be on your own.  To measure your ADLs, your doctor will ask how hard it is for you to do routine tasks. Your doctor may also want to know if you have changed the way you do a task because of a health problem. Your doctor may watch how you:  Walk back and forth.  Keep your balance while you stand or walk.  Move from sitting to standing or from a bed to a chair.  Button or unbutton a shirt or sweater.  Remove and put on your shoes.  It's common to feel a little worried or anxious if you find you can't do all the things you used to be able to do. Talking with your doctor about ADLs is a way to make sure you're as safe as possible and able to care for yourself as well as you can. You may want to bring a caregiver, friend, or family member to your checkup. They can help you talk to your doctor.  Follow-up care is a key part of your treatment and safety. Be sure to make and go to all appointments,  and call your doctor if you are having problems. It's also a good idea to know your test results and keep a list of the medicines you take.  Current as of: October 24, 2023  Content Version: 14.2 2024 [employer].   Care instructions adapted under license by your healthcare professional. If you have questions about a medical condition or this instruction, always ask your healthcare professional. [employer] disclaims any warranty or liability for your use of this information.    Hearing Loss: Care Instructions  Overview     Hearing loss is a sudden or slow decrease in how well you hear. It can range from slight to profound. Permanent hearing loss can occur with aging. It also can happen when you are exposed long-term to loud noise. Examples include listening to loud music, riding motorcycles, or being around other loud machines.  Hearing loss can affect your work and home life. It can make you feel lonely or depressed. You may feel that you have lost your independence. But hearing aids and other devices can help you hear better and feel connected to others.  Follow-up care is a key part of your treatment and safety. Be sure to make and go to all appointments, and call your doctor if you are having problems. It's also a good idea to know your test results and keep a list of the medicines you take.  How can you care for yourself at home?  Avoid loud noises whenever possible. This helps keep your hearing from getting worse.  Always wear hearing protection around loud noises.  Wear a hearing aid as directed.  A professional can help you pick a hearing aid that will work best for you.  You can also get hearing aids over the counter for mild to moderate hearing loss.  Have hearing tests as your doctor suggests. They can show whether your hearing has changed. Your hearing aid may need to be adjusted.  Use other devices as needed. These may include:  Telephone amplifiers and hearing aids that can  "connect to a television, stereo, radio, or microphone.  Devices that use lights or vibrations. These alert you to the doorbell, a ringing telephone, or a baby monitor.  Television closed-captioning. This shows the words at the bottom of the screen. Most new TVs can do this.  TTY (text telephone). This lets you type messages back and forth on the telephone instead of talking or listening. These devices are also called TDD. When messages are typed on the keyboard, they are sent over the phone line to a receiving TTY. The message is shown on a monitor.  Use text messaging, social media, and email if it is hard for you to communicate by telephone.  Try to learn a listening technique called speechreading. It is not lipreading. You pay attention to people's gestures, expressions, posture, and tone of voice. These clues can help you understand what a person is saying. Face the person you are talking to, and have them face you. Make sure the lighting is good. You need to see the other person's face clearly.  Think about counseling if you need help to adjust to your hearing loss.  When should you call for help?  Watch closely for changes in your health, and be sure to contact your doctor if:    You think your hearing is getting worse.     You have new symptoms, such as dizziness or nausea.   Where can you learn more?  Go to https://www.Bycler.net/patiented  Enter R798 in the search box to learn more about \"Hearing Loss: Care Instructions.\"  Current as of: September 27, 2023  Content Version: 14.2 2024 [employer] [facility] [employer]   Care instructions adapted under license by your healthcare professional. If you have questions about a medical condition or this instruction, always ask your healthcare professional. [employer] disclaims any warranty or liability for your use of this information.       "

## 2024-10-09 ENCOUNTER — PATIENT OUTREACH (OUTPATIENT)
Dept: CARE COORDINATION | Facility: CLINIC | Age: 74
End: 2024-10-09
Payer: COMMERCIAL

## 2024-10-09 LAB
CHOLEST SERPL-MCNC: 229 MG/DL
CRP SERPL-MCNC: <3 MG/L
FASTING STATUS PATIENT QL REPORTED: ABNORMAL
HDLC SERPL-MCNC: 112 MG/DL
LDLC SERPL CALC-MCNC: 99 MG/DL
NONHDLC SERPL-MCNC: 117 MG/DL
TRIGL SERPL-MCNC: 90 MG/DL

## 2024-10-16 ENCOUNTER — MYC MEDICAL ADVICE (OUTPATIENT)
Dept: FAMILY MEDICINE | Facility: CLINIC | Age: 74
End: 2024-10-16
Payer: COMMERCIAL

## 2024-10-16 DIAGNOSIS — M35.3 POLYMYALGIA RHEUMATICA (H): Primary | ICD-10-CM

## 2024-10-16 RX ORDER — PREDNISONE 5 MG/1
2.5 TABLET ORAL DAILY
Qty: 30 TABLET | Refills: 1 | Status: SHIPPED | OUTPATIENT
Start: 2024-10-16 | End: 2024-10-30

## 2024-10-16 RX ORDER — PREDNISONE 10 MG/1
10 TABLET ORAL DAILY
Qty: 30 TABLET | Refills: 1 | Status: SHIPPED | OUTPATIENT
Start: 2024-10-16 | End: 2024-10-30

## 2024-10-16 NOTE — TELEPHONE ENCOUNTER
Routing to Jovita. Please see pt's MCM and advise.    Ok to decrease?     RAUEDL ReidN, RN     [facility]    10/16/2024 at 9:57 AM

## 2024-10-22 ENCOUNTER — MYC MEDICAL ADVICE (OUTPATIENT)
Dept: FAMILY MEDICINE | Facility: CLINIC | Age: 74
End: 2024-10-22

## 2024-10-22 NOTE — CONFIDENTIAL NOTE
Reason for visit: Muscle fatigue   Fatigue, unspecified type   Generalized muscle weakness    Referring Provider: Jovita Bauman PA-C    Office Visit Notes: 10/8/2024         IMAGING   STATUS/LOCATION   DATE/TYPE   MRI/MRA Internal - PACS 02/20/2023   CT/CTA Internnal - PACS 01/08/2024, 01/25/2023   LABS Internal    EEG     EMG External- [facility]  Epic- [facility] 04/16/2024   NEUOROPSYCH TEST:       NOTES:   STATUS/LOCATION   DATE/TYPE

## 2024-10-22 NOTE — TELEPHONE ENCOUNTER
Routing to Jovita. Please see pt's MCM and advise. Do you want pt to decrease dose of prednisone? Has appt with you on 10/30 for prednisone F/U, ok to address then?     CATRINA Reid, RN     [facility]    10/22/2024 at 10:44 AM

## 2024-10-30 ENCOUNTER — VIRTUAL VISIT (OUTPATIENT)
Dept: FAMILY MEDICINE | Facility: CLINIC | Age: 74
End: 2024-10-30
Attending: PHYSICIAN ASSISTANT
Payer: COMMERCIAL

## 2024-10-30 DIAGNOSIS — M54.2 NECK PAIN: ICD-10-CM

## 2024-10-30 DIAGNOSIS — M25.651 HIP JOINT STIFFNESS, BILATERAL: ICD-10-CM

## 2024-10-30 DIAGNOSIS — M85.80 OSTEOPENIA, UNSPECIFIED LOCATION: ICD-10-CM

## 2024-10-30 DIAGNOSIS — M35.3 POLYMYALGIA RHEUMATICA (H): Primary | ICD-10-CM

## 2024-10-30 DIAGNOSIS — M25.611 SHOULDER JOINT STIFFNESS, BILATERAL: ICD-10-CM

## 2024-10-30 DIAGNOSIS — M25.612 SHOULDER JOINT STIFFNESS, BILATERAL: ICD-10-CM

## 2024-10-30 DIAGNOSIS — M25.652 HIP JOINT STIFFNESS, BILATERAL: ICD-10-CM

## 2024-10-30 PROCEDURE — 99214 OFFICE O/P EST MOD 30 MIN: CPT | Mod: 95 | Performed by: PHYSICIAN ASSISTANT

## 2024-10-30 PROCEDURE — G2211 COMPLEX E/M VISIT ADD ON: HCPCS | Mod: 95 | Performed by: PHYSICIAN ASSISTANT

## 2024-10-30 RX ORDER — PREDNISONE 5 MG/1
2.5 TABLET ORAL DAILY
Qty: 30 TABLET | Refills: 1 | Status: SHIPPED | OUTPATIENT
Start: 2024-10-30

## 2024-10-30 RX ORDER — PREDNISONE 10 MG/1
10 TABLET ORAL DAILY
Qty: 30 TABLET | Refills: 1 | Status: SHIPPED | OUTPATIENT
Start: 2024-10-30

## 2024-10-30 NOTE — PROGRESS NOTES
Criss is a 73 year old who is being evaluated via a billable video visit.    How would you like to obtain your AVS? MyChart  If the video visit is dropped, the invitation should be resent by: Text to cell phone: [phone]  Will anyone else be joining your video visit? No      Assessment & Plan     Polymyalgia rheumatica (H)  Shoulder joint stiffness, bilateral   Hip joint stiffness, bilateral  Significant, rapid improvement with prednisone, pain is 100% gone and she feels more herself. She continues to be symptom free. Recent neck pain feels like her cervical radiculopathy pain and is not the same as the shoulder/hip pain/stiffness. Her ESR and CRP were elevated recently prior to starting prednisone and these were both normal on recheck after starting prednisone. Will decrease prednisone to 12.5 mg daily and follow-up in 4 weeks for recheck. If symptoms are still controlled at that time, will continue gradual taper. Educated on signs/symptoms of GCA and she denies concerns. Discussed risk of GCA and symptoms to watch for such as headaches, change in vision, or pain in the jaw with chewing, fever, fatigue, weight loss, a new cough, and unexplained dental or facial pain.   - predniSONE (DELTASONE) 5 MG tablet; Take 0.5 tablets (2.5 mg) by mouth daily. Take in addition to 10 mg tab for total dose of 12.5 mg once daily  - predniSONE (DELTASONE) 10 MG tablet; Take 1 tablet (10 mg) by mouth daily. Take in addition to half of a 5 mg tab for total dose of 12.5 mg once daily    Osteopenia, unspecified location  She does have osteopenia in both femoral necks (left with T-score of -1.2 and right with T-score of -1.6, FRAX score 10% for major fracture, 1.7% for hip fracture) with last DEXA 1/31/22. She has repeat DEXA scheduled for next week (11/4/24). Will discuss starting bisphosphonate while on long term prednisone pending results - likely would recommend bisphosphonate for glucocorticoid-induced osteoporosis prevention  even if treatment not recommended based on DEXA results.    Neck pain  Recent neck pain feels like her cervical radiculopathy pain and is not the same as the shoulder/hip pain/stiffness. She will monitor and reach out to neurosurgery (has been seeing Dr. Stout for cervical radiculopathy) for follow-up if neck pain is persistent or worsening.    The longitudinal plan of care for the diagnosis(es)/condition(s) as documented were addressed during this visit. Due to the added complexity in care, I will continue to support Criss in the subsequent management and with ongoing continuity of care.      Kyree Kahn is a 73 year old, presenting for the following health issues:  Recheck Medication and Consult (Upcoming neuro appointment)      10/30/2024     2:49 PM   Additional Questions   Roomed by marcus meraz   Accompanied by self     Video Start Time: 3:06 PM    History of Present Illness       Reason for visit:  Medication question    She eats 2-3 servings of fruits and vegetables daily.She consumes 0 sweetened beverage(s) daily.She exercises with enough effort to increase her heart rate 30 to 60 minutes per day.  She exercises with enough effort to increase her heart rate 3 or less days per week.   She is taking medications regularly.     Has had some neck pain over the past week but this feels more like pain she has had with cervical radiculopathy  The pain she was having in shoulders and hips is still resolved.    She started prednisone 15 mg daily on 9/18/24 for suspected polymyalgia rheumatica - bilateral shoulder pain/aching/stiffness which was worse in the mornings and made it hard to sleep at night. She had also been having some aching in her low back/hips.      At follow-up visit on 9/25/24, 1 week after starting prednisone, she reported significant, rapid improvement with prednisone, pain 100% gone and she feels more herself.     She continues to be pain free and feels so much better. General fatigue is  "improved and muscle fatigue she previously had is also resolved. She is able to use her arms with full strength, raise arms above her head without pain. AM stiffness is gone. She is sleeping much better at night.      She had been having problems with a persistent cough but that has also been resolved since starting prednisone. She is no longer needing omeprazole.     She denies headaches, change in vision, or pain in the jaw with chewing, fever, fatigue, weight loss, and unexplained dental or facial pain.         Objective    Vitals - Patient Reported  Weight (Patient Reported): 62.6 kg (138 lb)  Height (Patient Reported): 160 cm (5' 3\")  BMI (Based on Pt Reported Ht/Wt): 24.45  Pain Score: Mild Pain (2)  Pain Loc: Low Back        Physical Exam   GENERAL: alert and no distress  EYES: Eyes grossly normal to inspection.  No discharge or erythema, or obvious scleral/conjunctival abnormalities.  RESP: No audible wheeze, cough, or visible cyanosis.    SKIN: Visible skin clear. No significant rash, abnormal pigmentation or lesions.  NEURO: Cranial nerves grossly intact.  Mentation and speech appropriate for age.  PSYCH: Appropriate affect, tone, and pace of words        Video-Visit Details    Type of service:  Video Visit   Video End Time:3:25 PM  Originating Location (pt. Location): Home    Distant Location (provider location):  On-site  Platform used for Video Visit: [place]  Signed Electronically by: Jovita Bauman PA-C    "

## 2024-10-31 ENCOUNTER — MYC MEDICAL ADVICE (OUTPATIENT)
Dept: FAMILY MEDICINE | Facility: CLINIC | Age: 74
End: 2024-10-31
Payer: COMMERCIAL

## 2024-11-04 ENCOUNTER — ANCILLARY PROCEDURE (OUTPATIENT)
Dept: BONE DENSITY | Facility: CLINIC | Age: 74
End: 2024-11-04
Attending: PHYSICIAN ASSISTANT
Payer: COMMERCIAL

## 2024-11-04 DIAGNOSIS — Z78.0 POST-MENOPAUSAL: ICD-10-CM

## 2024-11-04 DIAGNOSIS — M85.80 OSTEOPENIA, UNSPECIFIED LOCATION: ICD-10-CM

## 2024-11-04 PROCEDURE — 77080 DXA BONE DENSITY AXIAL: CPT | Mod: TC | Performed by: PHYSICIAN ASSISTANT

## 2024-11-08 ENCOUNTER — ANCILLARY PROCEDURE (OUTPATIENT)
Dept: MAMMOGRAPHY | Facility: CLINIC | Age: 74
End: 2024-11-08
Attending: PHYSICIAN ASSISTANT
Payer: COMMERCIAL

## 2024-11-08 DIAGNOSIS — Z12.31 VISIT FOR SCREENING MAMMOGRAM: ICD-10-CM

## 2024-11-08 PROCEDURE — 77067 SCR MAMMO BI INCL CAD: CPT | Mod: TC | Performed by: RADIOLOGY

## 2024-11-08 PROCEDURE — 77063 BREAST TOMOSYNTHESIS BI: CPT | Mod: TC | Performed by: RADIOLOGY

## 2024-11-13 ENCOUNTER — PRE VISIT (OUTPATIENT)
Dept: NEUROLOGY | Facility: CLINIC | Age: 74
End: 2024-11-13

## 2024-11-18 ENCOUNTER — NURSE TRIAGE (OUTPATIENT)
Dept: FAMILY MEDICINE | Facility: CLINIC | Age: 74
End: 2024-11-18
Payer: COMMERCIAL

## 2024-11-18 ENCOUNTER — MYC MEDICAL ADVICE (OUTPATIENT)
Dept: FAMILY MEDICINE | Facility: CLINIC | Age: 74
End: 2024-11-18
Payer: COMMERCIAL

## 2024-11-18 NOTE — TELEPHONE ENCOUNTER
"See pauly of 11/18/24.      Called the pt.      She is having pain in her upper ab mostly on the left side.  She is rating the pain 7/10.  The pain started Saturday and came on gradual. The pain is constant.  She is having some diarrhea today.  She does have diarrhea sometimes.      Advised per protocol, to be seen in the ER.      She said she has not taken her prednisone today.  She wants Jovita to see if she should decrease the prednisone.  She wants Jovita's advisal as she thinks prednisone has slowed her digestive system.        Reason for Disposition   Pain lasting > 10 minutes and over 50 years old    Additional Information   Negative: SEVERE difficulty breathing (e.g., struggling for each breath, speaks in single words)   Negative: Shock suspected (e.g., cold/pale/clammy skin, too weak to stand, low BP, rapid pulse)   Negative: Difficult to awaken or acting confused (e.g., disoriented, slurred speech)   Negative: Passed out (i.e., lost consciousness, collapsed and was not responding)   Negative: Visible sweat on face or sweat is dripping down   Negative: Sounds like a life-threatening emergency to the triager   Negative: Followed an abdomen (stomach) injury   Negative: Chest pain   Negative: Abdominal pain and pregnant < 20 weeks   Negative: Abdominal pain and pregnant 20 or more weeks   Negative: Abdomen bloating or swelling are main symptoms   Negative: SEVERE abdominal pain (e.g., excruciating)    Answer Assessment - Initial Assessment Questions  1. LOCATION: \"Where does it hurt?\"       Upper ab, mostly on the left side   2. RADIATION: \"Does the pain shoot anywhere else?\" (e.g., chest, back)      No   3. ONSET: \"When did the pain begin?\" (e.g., minutes, hours or days ago)       Saturday   4. SUDDEN: \"Gradual or sudden onset?\"      Gradual   5. PATTERN \"Does the pain come and go, or is it constant?\"     - If it comes and goes: \"How long does it last?\" \"Do you have pain now?\"      (Note: Comes and goes means " "the pain is intermittent. It goes away completely between bouts.)     - If constant: \"Is it getting better, staying the same, or getting worse?\"       (Note: Constant means the pain never goes away completely; most serious pain is constant and gets worse.)       Constant   6. SEVERITY: \"How bad is the pain?\"  (e.g., Scale 1-10; mild, moderate, or severe)     - MILD (1-3): Doesn't interfere with normal activities, abdomen soft and not tender to touch..      - MODERATE (4-7): Interferes with normal activities or awakens from sleep, abdomen tender to touch.      - SEVERE (8-10): Excruciating pain, doubled over, unable to do any normal activities.        7/10   7. RECURRENT SYMPTOM: \"Have you ever had this type of stomach pain before?\" If Yes, ask: \"When was the last time?\" and \"What happened that time?\"       Not very often - it cleared on it's own, if she drinks hot tea, eventually it gets better and then she started to be able to go some, it is different - she said once starting prednisone, it was more difficult for her to go to the bathroom  8. AGGRAVATING FACTORS: \"Does anything seem to cause this pain?\" (e.g., foods, stress, alcohol)      No   9. CARDIAC SYMPTOMS: \"Do you have any of the following symptoms: chest pain, difficulty breathing, sweating, nausea?\"      No   10. OTHER SYMPTOMS: \"Do you have any other symptoms?\" (e.g., back pain, diarrhea, fever, urination pain, vomiting)        Diarrhea   11. PREGNANCY: \"Is there any chance you are pregnant?\" \"When was your last menstrual period?\"        N/a    Protocols used: Abdominal Pain - Upper-A-[place]    "

## 2024-11-18 NOTE — TELEPHONE ENCOUNTER
I am unable to determine if medication side effect and she would need to be evaluated; agree with ER.    Jovita Bauman PA-C

## 2024-11-18 NOTE — TELEPHONE ENCOUNTER
Called the pt to discuss.     Her main symptom is ab pain.  It is better today.  Today she has had a fair amount of diarrhea.  She said she does have diarrhea at times.  The stomach pain is still there, but not as bad.      See nurse triage of 11/18/24.

## 2024-11-19 ENCOUNTER — HOSPITAL ENCOUNTER (EMERGENCY)
Facility: CLINIC | Age: 74
Discharge: HOME | End: 2024-11-19
Attending: STUDENT IN AN ORGANIZED HEALTH CARE EDUCATION/TRAINING PROGRAM | Admitting: STUDENT IN AN ORGANIZED HEALTH CARE EDUCATION/TRAINING PROGRAM
Payer: COMMERCIAL

## 2024-11-19 ENCOUNTER — MYC MEDICAL ADVICE (OUTPATIENT)
Dept: FAMILY MEDICINE | Facility: CLINIC | Age: 74
End: 2024-11-19

## 2024-11-19 VITALS
OXYGEN SATURATION: 100 % | TEMPERATURE: 98.3 F | HEIGHT: 63 IN | SYSTOLIC BLOOD PRESSURE: 125 MMHG | RESPIRATION RATE: 18 BRPM | DIASTOLIC BLOOD PRESSURE: 56 MMHG | HEART RATE: 61 BPM | BODY MASS INDEX: 23.36 KG/M2 | WEIGHT: 131.84 LBS

## 2024-11-19 DIAGNOSIS — M25.611 SHOULDER JOINT STIFFNESS, BILATERAL: ICD-10-CM

## 2024-11-19 DIAGNOSIS — M35.3 POLYMYALGIA RHEUMATICA (H): Primary | ICD-10-CM

## 2024-11-19 DIAGNOSIS — M25.652 HIP JOINT STIFFNESS, BILATERAL: ICD-10-CM

## 2024-11-19 DIAGNOSIS — M25.651 HIP JOINT STIFFNESS, BILATERAL: ICD-10-CM

## 2024-11-19 DIAGNOSIS — K59.00 CONSTIPATION, UNSPECIFIED CONSTIPATION TYPE: ICD-10-CM

## 2024-11-19 DIAGNOSIS — M25.612 SHOULDER JOINT STIFFNESS, BILATERAL: ICD-10-CM

## 2024-11-19 LAB
ALBUMIN SERPL BCG-MCNC: 4.1 G/DL (ref 3.5–5.2)
ALBUMIN UR-MCNC: NEGATIVE MG/DL
ALP SERPL-CCNC: 38 U/L (ref 40–150)
ALT SERPL W P-5'-P-CCNC: 13 U/L (ref 0–50)
ANION GAP SERPL CALCULATED.3IONS-SCNC: 11 MMOL/L (ref 7–15)
APPEARANCE UR: CLEAR
AST SERPL W P-5'-P-CCNC: 19 U/L (ref 0–45)
BASOPHILS # BLD AUTO: 0 10E3/UL (ref 0–0.2)
BASOPHILS NFR BLD AUTO: 1 %
BILIRUB SERPL-MCNC: 0.5 MG/DL
BILIRUB UR QL STRIP: NEGATIVE
BUN SERPL-MCNC: 17.1 MG/DL (ref 8–23)
CALCIUM SERPL-MCNC: 8.9 MG/DL (ref 8.8–10.4)
CHLORIDE SERPL-SCNC: 99 MMOL/L (ref 98–107)
COLOR UR AUTO: NORMAL
CREAT SERPL-MCNC: 1.05 MG/DL (ref 0.51–0.95)
EGFRCR SERPLBLD CKD-EPI 2021: 55 ML/MIN/1.73M2
EOSINOPHIL # BLD AUTO: 0.1 10E3/UL (ref 0–0.7)
EOSINOPHIL NFR BLD AUTO: 2 %
ERYTHROCYTE [DISTWIDTH] IN BLOOD BY AUTOMATED COUNT: 14.2 % (ref 10–15)
GLUCOSE SERPL-MCNC: 85 MG/DL (ref 70–99)
GLUCOSE UR STRIP-MCNC: NEGATIVE MG/DL
HCO3 SERPL-SCNC: 25 MMOL/L (ref 22–29)
HCT VFR BLD AUTO: 37.5 % (ref 35–47)
HGB BLD-MCNC: 12.5 G/DL (ref 11.7–15.7)
HGB UR QL STRIP: NEGATIVE
HOLD SPECIMEN: NORMAL
HOLD SPECIMEN: NORMAL
IMM GRANULOCYTES # BLD: 0 10E3/UL
IMM GRANULOCYTES NFR BLD: 1 %
KETONES UR STRIP-MCNC: NEGATIVE MG/DL
LEUKOCYTE ESTERASE UR QL STRIP: NEGATIVE
LIPASE SERPL-CCNC: 25 U/L (ref 13–60)
LYMPHOCYTES # BLD AUTO: 2.3 10E3/UL (ref 0.8–5.3)
LYMPHOCYTES NFR BLD AUTO: 29 %
MCH RBC QN AUTO: 30.2 PG (ref 26.5–33)
MCHC RBC AUTO-ENTMCNC: 33.3 G/DL (ref 31.5–36.5)
MCV RBC AUTO: 91 FL (ref 78–100)
MONOCYTES # BLD AUTO: 0.7 10E3/UL (ref 0–1.3)
MONOCYTES NFR BLD AUTO: 9 %
NEUTROPHILS # BLD AUTO: 4.6 10E3/UL (ref 1.6–8.3)
NEUTROPHILS NFR BLD AUTO: 59 %
NITRATE UR QL: NEGATIVE
NRBC # BLD AUTO: 0 10E3/UL
NRBC BLD AUTO-RTO: 0 /100
PH UR STRIP: 5 [PH] (ref 5–7)
PLATELET # BLD AUTO: 266 10E3/UL (ref 150–450)
POTASSIUM SERPL-SCNC: 4.1 MMOL/L (ref 3.4–5.3)
PROT SERPL-MCNC: 6.9 G/DL (ref 6.4–8.3)
RBC # BLD AUTO: 4.14 10E6/UL (ref 3.8–5.2)
RBC URINE: 1 /HPF
SODIUM SERPL-SCNC: 135 MMOL/L (ref 135–145)
SP GR UR STRIP: 1.01 (ref 1–1.03)
UROBILINOGEN UR STRIP-MCNC: NORMAL MG/DL
WBC # BLD AUTO: 7.8 10E3/UL (ref 4–11)
WBC URINE: 1 /HPF

## 2024-11-19 PROCEDURE — 81001 URINALYSIS AUTO W/SCOPE: CPT | Performed by: STUDENT IN AN ORGANIZED HEALTH CARE EDUCATION/TRAINING PROGRAM

## 2024-11-19 PROCEDURE — 36415 COLL VENOUS BLD VENIPUNCTURE: CPT | Performed by: EMERGENCY MEDICINE

## 2024-11-19 PROCEDURE — 80053 COMPREHEN METABOLIC PANEL: CPT | Performed by: STUDENT IN AN ORGANIZED HEALTH CARE EDUCATION/TRAINING PROGRAM

## 2024-11-19 PROCEDURE — 85025 COMPLETE CBC W/AUTO DIFF WBC: CPT | Performed by: STUDENT IN AN ORGANIZED HEALTH CARE EDUCATION/TRAINING PROGRAM

## 2024-11-19 PROCEDURE — 82310 ASSAY OF CALCIUM: CPT | Performed by: EMERGENCY MEDICINE

## 2024-11-19 PROCEDURE — 85025 COMPLETE CBC W/AUTO DIFF WBC: CPT | Performed by: EMERGENCY MEDICINE

## 2024-11-19 PROCEDURE — 99283 EMERGENCY DEPT VISIT LOW MDM: CPT

## 2024-11-19 PROCEDURE — 84155 ASSAY OF PROTEIN SERUM: CPT | Performed by: EMERGENCY MEDICINE

## 2024-11-19 PROCEDURE — 83690 ASSAY OF LIPASE: CPT | Performed by: STUDENT IN AN ORGANIZED HEALTH CARE EDUCATION/TRAINING PROGRAM

## 2024-11-19 RX ORDER — POLYETHYLENE GLYCOL 3350 17 G/17G
1 POWDER, FOR SOLUTION ORAL DAILY
Qty: 527 G | Refills: 0 | Status: SHIPPED | OUTPATIENT
Start: 2024-11-19 | End: 2024-12-19

## 2024-11-19 ASSESSMENT — ACTIVITIES OF DAILY LIVING (ADL)
ADLS_ACUITY_SCORE: 0

## 2024-11-19 ASSESSMENT — COLUMBIA-SUICIDE SEVERITY RATING SCALE - C-SSRS
1. IN THE PAST MONTH, HAVE YOU WISHED YOU WERE DEAD OR WISHED YOU COULD GO TO SLEEP AND NOT WAKE UP?: NO
6. HAVE YOU EVER DONE ANYTHING, STARTED TO DO ANYTHING, OR PREPARED TO DO ANYTHING TO END YOUR LIFE?: NO
2. HAVE YOU ACTUALLY HAD ANY THOUGHTS OF KILLING YOURSELF IN THE PAST MONTH?: NO

## 2024-11-19 NOTE — DISCHARGE INSTRUCTIONS
Discharge Instructions  Constipation  Constipation can cause severe cramping pain and your provider thinks this might be the cause of your abdominal pain (belly pain) today.  People usually recognize that they are constipated because they have difficulty having bowel movements, are not having bowel movements frequently enough, or are not having large enough bowel movements. Sometimes, especially in children or older people, you do not recognize that you are constipated until it becomes severe. The most common causes of constipation are a lack of exercise and not eating enough fruits, vegetables, and whole grains. Constipation can also be a side effect of medications, such as narcotics, or may be caused by a disease of the digestive system.    Generally, every Emergency Department visit should have a follow-up clinic visit with either a primary or a specialty clinic/provider. Please follow-up as instructed by your emergency provider today. Sometimes, chronic constipation requires further testing to determine the cause. If you are over 50 years old, you may need a colonoscopy if you have not had one before.     Return to the Emergency Department if:  Your abdominal pain worsens or does not improve after a bowel movement.  You become very weak.  You get a temperature above 102oF or as directed by your provider.  You have blood in your stools (bright red or black, tarry stools).  You keep vomiting (throwing up) or cannot drink liquids.  Your see blood when you vomit.  Your stomach gets bloated or bigger.  You have new symptoms or anything that worries you.    What can I do to help myself?  If your provider gave you a cathartic medication, like magnesium citrate or GoLytely  (polyethylene glycol), you can expect to have cramps and gas pains after taking it. You can expect to have a number of bowel movements and even diarrhea (loose or watery stools) in the course of clearing your bowels.  You will know your bowels have  been cleaned out after you pass clear liquid. The cramps and gas should let up after you have emptied your bowels. You may want to wait until morning to take this type of medication so you aren t up in the night.   Sometimes instead of cathartics, we recommend laxatives like milk of magnesia to move your bowels more slowly, or an enema to help the bowels to move. Read and follow the package directions, or follow your provider s instructions.  Once you have become very constipated, it takes time for your bowels to return to normal and you need to be very careful to prevent becoming constipated again. Take a laxative if you do not move your bowels at least every two days.     Eat foods that have a lot of fiber. Good choices are fruits, vegetables, prune juice, apple juice, and high fiber cereal. Limit dairy products such as milk and cheese, since these can make constipation worse.   Drink plenty of water.   When you feel the need to go to the bathroom, go to the bathroom. Do not hold it.  Miralax , Metamucil , Colace , Senna or fiber supplements can be used daily.  Miralax  daily is often the best choice for children.  If you were given a prescription for medicine here today, be sure to read all of the information (including the package insert) that comes with your prescription.  This will include important information about the medicine, its side effects, and any warnings that you need to know about.  The pharmacist who fills the prescription can provide more information and answer questions you may have about the medicine.  If you have questions or concerns that the pharmacist cannot address, please call or return to the Emergency Department.   Remember that you can always come back to the Emergency Department if you are not able to see your regular provider in the amount of time listed above, if you get any new symptoms, or if there is anything that worries you.    Return to the emergency department if symptoms are  worsening, become concerning, or for any other concerns. Follow-up with your doctor in 2-3 and sooner if needed.

## 2024-11-19 NOTE — ED PROVIDER NOTES
"  Emergency Department Note      History of Present Illness     Chief Complaint  Constipation    HPI  Anali Sanchez is a 74 year old female with history of GERD, hypertension, WPW,  and polymyalgia rheumatica who presents to the ED for evaluation of constipation. She reports trouble passing a normal bowel movement for the past few days along with dysuria and less urine output. Since being in the ED, patient has been able to pass 4 bowel movements. Denies vomiting, nausea, chills or fever. She currently endorses mild abdominal discomfort but states it was worse earlier this morning. No diet change or weight loss. Patient has her colon removed 20 years ago for slow transit. She mentions taking prednisone for polymyalgia rheumatica for the past 2 months.    Independent Historian  None    Review of External Notes  I reviewed the   Past Medical History   Medical History and Problem List   Anemia  Coagulation disorder   Atrial flutter  Blood transfusion  Gabino-Parkinson-White (WPW) syndrome  Osteoma of face  GERD  Hypertension   MATEUS  Factor Vll deficiency   DDD   Cervical radiculopathy  polymyalgia rheumatica     Medications   Levothyroxine  Benicar  Prednisone  Zoloft     Surgical History   Appendectomy  Open ablation  D & C  T & A  Incision and removal of osteoma on forehead  Colectomy  Physical Exam   Patient Vitals for the past 24 hrs:   BP Temp Temp src Pulse Resp SpO2 Height Weight   11/19/24 1159 125/56 -- -- 61 -- 100 % -- --   11/19/24 1134 128/80 -- -- 68 -- 100 % -- --   11/19/24 0715 139/71 98.3  F (36.8  C) Temporal 70 18 99 % 1.6 m (5' 3\") 59.8 kg (131 lb 13.4 oz)     Physical Exam  GENERAL: Patient well-appearing  HEAD: Atraumatic.  NECK: No rigidity  CV: RRR, no murmurs, rubs or gallops  PULM: CTAB with good aeration; no retractions, rales, rhonchi, or wheezing  ABD: Soft, nontender, nondistended, no guarding  DERM: No rash. Skin warm and dry  EXTREMITY: Moving all extremities without difficulty.  "       Diagnostics   Lab Results   Labs Ordered and Resulted from Time of ED Arrival to Time of ED Departure   COMPREHENSIVE METABOLIC PANEL - Abnormal       Result Value    Sodium 135      Potassium 4.1      Carbon Dioxide (CO2) 25      Anion Gap 11      Urea Nitrogen 17.1      Creatinine 1.05 (*)     GFR Estimate 55 (*)     Calcium 8.9      Chloride 99      Glucose 85      Alkaline Phosphatase 38 (*)     AST 19      ALT 13      Protein Total 6.9      Albumin 4.1      Bilirubin Total 0.5     LIPASE - Normal    Lipase 25     ROUTINE UA WITH MICROSCOPIC REFLEX TO CULTURE - Normal    Color Urine Light Yellow      Appearance Urine Clear      Glucose Urine Negative      Bilirubin Urine Negative      Ketones Urine Negative      Specific Gravity Urine 1.013      Blood Urine Negative      pH Urine 5.0      Protein Albumin Urine Negative      Urobilinogen Urine Normal      Nitrite Urine Negative      Leukocyte Esterase Urine Negative      RBC Urine 1      WBC Urine 1     CBC WITH PLATELETS AND DIFFERENTIAL    WBC Count 7.8      RBC Count 4.14      Hemoglobin 12.5      Hematocrit 37.5      MCV 91      MCH 30.2      MCHC 33.3      RDW 14.2      Platelet Count 266      % Neutrophils 59      % Lymphocytes 29      % Monocytes 9      % Eosinophils 2      % Basophils 1      % Immature Granulocytes 1      NRBCs per 100 WBC 0      Absolute Neutrophils 4.6      Absolute Lymphocytes 2.3      Absolute Monocytes 0.7      Absolute Eosinophils 0.1      Absolute Basophils 0.0      Absolute Immature Granulocytes 0.0      Absolute NRBCs 0.0       Imaging  No orders to display     Report per radiology    Independent Interpretation  None  ED Course    Medications Administered  Medications   Enema Compound (docusate/mineral oil/NaPhos) NO MAG CIT PREMIX (226 mLs Rectal Not Given 11/19/24 1037)     Procedures  Procedures     Discussion of Management  None    Additional Documentation  None    ED Course  ED Course as of 11/19/24 1214   Tue Nov 19,  2024 1000 I obtained history and examined the patient as noted above.    1128 I prepared the patient to be discharged home.      Medical Decision Making / Diagnosis   CMS Diagnoses: None    MIPS     None    MDM  Symptoms appear most consistent with constipation.    Chronic conditions complicating- chronic hard small stools.    Differential diagnosis-considered appendicitis, pancreatitis, obstruction, among others.    Benign abdominal exam.    UA and labs unrevealing.    Abdominal exam is benign.  Patient had 4 bowel movements and now feels improved.  Do not see signs of obstruction.    Considered imaging, but no red flags and reassuring abdominal exam therefore do not think patient requires.    Discussed fiber supplementation.  Will also prescribe MiraLAX.    I have evaluated the patient for acute medical emergencies and have clinically decided no further acute medical interventions are required. Patient stable for discharge. All questions answered. Given strict return precautions. Patient content with plan. The differential diagnosis and treatment modalities were discussed thoroughly with the patient. Recommended PCP follow-up in 2-3 days.      Disposition  The patient was discharged.     ICD-10 Codes:    ICD-10-CM    1. Constipation, unspecified constipation type  K59.00            Discharge Medications  Discharge Medication List as of 11/19/2024 11:46 AM        START taking these medications    Details   polyethylene glycol (MIRALAX) 17 GM/Dose powder Take 17 g (1 Capful) by mouth daily., Disp-527 g, R-0, E-Prescribe           Scribe Disclosure:  I, Ora Durand, am serving as a scribe at 10:07 AM on 11/19/2024 to document services personally performed by Kush Mendoza MD based on my observations and the provider's statements to me.      Kush Mendoza MD  11/19/24 1210

## 2024-11-19 NOTE — ED TRIAGE NOTES
"HX: Colon resection > 20 years ago due to \"slow transit.\" Reports trouble having a BM since Saturday. Started steroids two months ago for myalgias. BM's have not been the same since then. Prior to steroids, normally stools 8X/day. Had a small BM in triage lobby but reports difficulty passing. Reports upper abd pain, \"tight elastic band.\" Denies N/V.      Triage Assessment (Adult)       Row Name 11/19/24 0717          Triage Assessment    Airway WDL WDL        Respiratory WDL    Respiratory WDL WDL        Skin Circulation/Temperature WDL    Skin Circulation/Temperature WDL WDL        Cardiac WDL    Cardiac WDL WDL        Peripheral/Neurovascular WDL    Peripheral Neurovascular WDL WDL                     "

## 2024-11-19 NOTE — TELEPHONE ENCOUNTER
Please see MC. Recommend ER follow up to discuss further? Can this be a VV?    Malia Miller RN on 11/19/2024 at 2:45 PM

## 2024-11-20 RX ORDER — PREDNISONE 10 MG/1
10 TABLET ORAL DAILY
Qty: 30 TABLET | Refills: 0 | Status: SHIPPED | OUTPATIENT
Start: 2024-11-20

## 2024-11-20 NOTE — TELEPHONE ENCOUNTER
Routing to Jovita. Pt would like to try the 10mg prednisone and will add miralax and fiber.     T'd up Prednisone 10mg and preferred pharmacy. Please review and edit order as necessary    CATRINA Reid, RN     [facility]    11/20/2024 at 8:29 AM

## 2024-11-26 ENCOUNTER — MYC MEDICAL ADVICE (OUTPATIENT)
Dept: FAMILY MEDICINE | Facility: CLINIC | Age: 74
End: 2024-11-26
Payer: COMMERCIAL

## 2024-11-26 DIAGNOSIS — Z90.49 HISTORY OF COLECTOMY: ICD-10-CM

## 2024-11-26 DIAGNOSIS — M25.652 HIP JOINT STIFFNESS, BILATERAL: ICD-10-CM

## 2024-11-26 DIAGNOSIS — M25.651 HIP JOINT STIFFNESS, BILATERAL: ICD-10-CM

## 2024-11-26 DIAGNOSIS — M25.611 SHOULDER JOINT STIFFNESS, BILATERAL: ICD-10-CM

## 2024-11-26 DIAGNOSIS — M35.3 POLYMYALGIA RHEUMATICA (H): Primary | ICD-10-CM

## 2024-11-26 DIAGNOSIS — M25.612 SHOULDER JOINT STIFFNESS, BILATERAL: ICD-10-CM

## 2024-11-26 DIAGNOSIS — K59.00 CONSTIPATION, UNSPECIFIED CONSTIPATION TYPE: ICD-10-CM

## 2024-11-26 NOTE — TELEPHONE ENCOUNTER
"Please see [employer] message with update.     Per [employer] message on 11/19/24 provider stated \"If it seems like you're not able to tolerate the prednisone then I would refer you to rheumatology for management.\"    Promise Mason RN 11/26/2024 12:07 PM  [facility]         "

## 2024-12-02 ENCOUNTER — VIRTUAL VISIT (OUTPATIENT)
Dept: FAMILY MEDICINE | Facility: CLINIC | Age: 74
End: 2024-12-02
Payer: COMMERCIAL

## 2024-12-02 DIAGNOSIS — M25.652 HIP JOINT STIFFNESS, BILATERAL: ICD-10-CM

## 2024-12-02 DIAGNOSIS — M25.612 SHOULDER JOINT STIFFNESS, BILATERAL: ICD-10-CM

## 2024-12-02 DIAGNOSIS — M85.80 OSTEOPENIA, UNSPECIFIED LOCATION: ICD-10-CM

## 2024-12-02 DIAGNOSIS — I65.23 MILD ATHEROSCLEROSIS OF CAROTID ARTERY, BILATERAL: ICD-10-CM

## 2024-12-02 DIAGNOSIS — Z90.49 HISTORY OF COLECTOMY: ICD-10-CM

## 2024-12-02 DIAGNOSIS — M25.651 HIP JOINT STIFFNESS, BILATERAL: ICD-10-CM

## 2024-12-02 DIAGNOSIS — M35.3 POLYMYALGIA RHEUMATICA (H): Primary | ICD-10-CM

## 2024-12-02 DIAGNOSIS — K59.00 CONSTIPATION, UNSPECIFIED CONSTIPATION TYPE: ICD-10-CM

## 2024-12-02 DIAGNOSIS — M25.611 SHOULDER JOINT STIFFNESS, BILATERAL: ICD-10-CM

## 2024-12-02 PROCEDURE — G2211 COMPLEX E/M VISIT ADD ON: HCPCS | Mod: 95 | Performed by: PHYSICIAN ASSISTANT

## 2024-12-02 PROCEDURE — 99214 OFFICE O/P EST MOD 30 MIN: CPT | Mod: 95 | Performed by: PHYSICIAN ASSISTANT

## 2024-12-02 RX ORDER — ROSUVASTATIN CALCIUM 20 MG/1
20 TABLET, COATED ORAL DAILY
Qty: 90 TABLET | Refills: 3 | Status: SHIPPED | OUTPATIENT
Start: 2024-12-02

## 2024-12-02 NOTE — PROGRESS NOTES
Criss is a 74 year old who is being evaluated via a billable video visit.    How would you like to obtain your AVS? MyChart  If the video visit is dropped, the invitation should be resent by: Text to cell phone: [phone]  Will anyone else be joining your video visit? No      Assessment & Plan     Polymyalgia rheumatica (H)  Hip joint stiffness, bilateral  Shoulder joint stiffness, bilateral  Difficulty with increased constipation since starting prednisone. Tried decreasing prednisone from 12.5 mg daily to 10 mg daily but had increase in shoulder pain/myalgias so went back to 12.5 mg daily and pain resolved again. Given difficulties with medication, she was referred to rheumatology and has appointment next week. Will continue with 12.5 mg daily until her scheduled follow-up with [facility] next week.    Osteopenia, unspecified location  Reviewed recent DEXA and recommendation for bisphosphonate while on long term prednisone. She is hesitant to start another new medication. She will be seeing rheumatology next week so would like to discuss with them. We discussed alendronate, r/b/se, instructions for use.    Constipation, unspecified constipation type  History of colectomy  Improved in last week or so, but still recommend follow-up with GI given history. She will call to schedule, provided with phone number.    Mild atherosclerosis of carotid artery, bilateral  Asymptomatic peripheral vascular disease. There is no indication for mechanical intervention for her asymptomatic carotid disease. Current American College of cardiology guidelines favor a Mediterranean-style weight loss diet, regular exercise, low dose aspirin and a moderate to high dose statin in patients with established vascular disease. We discussed statin and will start rosuvastatin. Discussed r/b/se. Recheck labs in 2 months.  - rosuvastatin (CRESTOR) 20 MG tablet; Take 1 tablet (20 mg) by mouth daily.  - Lipid panel reflex to  direct LDL Fasting; Future  - **ALT FUTURE 2mo; Future    The longitudinal plan of care for the diagnosis(es)/condition(s) as documented were addressed during this visit. Due to the added complexity in care, I will continue to support Criss in the subsequent management and with ongoing continuity of care.      Kyree Kahn is a 74 year old, presenting for the following health issues:  ER F/U      12/2/2024     2:31 PM   Additional Questions   Roomed by kb     Video Start Time: 2:53 PM     History of Present Illness       Reason for visit:  Medication    She eats 2-3 servings of fruits and vegetables daily.She consumes 0 sweetened beverage(s) daily.She exercises with enough effort to increase her heart rate 20 to 29 minutes per day.  She exercises with enough effort to increase her heart rate 3 or less days per week.   She is taking medications regularly.       ED/UC Followup:    Facility:  [facility]  Date of visit: 11/19/2024  Reason for visit: Constipation  Current Status: improving    Struggling with constipation which has been a chronic issue for her but worse since starting prednisone for suspected PMR. Feels like digestion slowed after starting prednisone.    Tried decreasing prednisone from 12.5 mg daily to 10 mg daily but had increase in shoulder pain/myalgias so went back to 12.5 mg daily and pain resolved again.    Trying to increase fluids, exercise, fiber. Miralax immediately gave her diarrhea.  Constipation has improved but still worried about it coming back.     She was given referrals last week to GI and [facility]. Rheumatology scheduled for next Wednesday. Will call to schedule with GI.          Objective           Vitals:  No vitals were obtained today due to virtual visit.    Physical Exam   GENERAL: alert and no distress  EYES: Eyes grossly normal to inspection.  No discharge or erythema, or obvious scleral/conjunctival abnormalities.  RESP: No audible wheeze, cough, or  visible cyanosis.    SKIN: Visible skin clear. No significant rash, abnormal pigmentation or lesions.  NEURO: Cranial nerves grossly intact.  Mentation and speech appropriate for age.  PSYCH: Appropriate affect, tone, and pace of words        Video-Visit Details    Type of service:  Video Visit   Video End Time:3:10 PM  Originating Location (pt. Location): Home    Distant Location (provider location):  On-site  Platform used for Video Visit: [place]  Signed Electronically by: Jovita Bauman PA-C

## 2024-12-07 DIAGNOSIS — E03.9 HYPOTHYROIDISM, UNSPECIFIED TYPE: ICD-10-CM

## 2024-12-09 RX ORDER — LEVOTHYROXINE SODIUM 25 UG/1
25 TABLET ORAL DAILY
Qty: 90 TABLET | Refills: 0 | Status: SHIPPED | OUTPATIENT
Start: 2024-12-09

## 2024-12-09 RX ORDER — LEVOTHYROXINE SODIUM 25 UG/1
25 TABLET ORAL DAILY
Qty: 90 TABLET | Refills: 2 | OUTPATIENT
Start: 2024-12-09

## 2024-12-09 NOTE — TELEPHONE ENCOUNTER
Change in pharmacy request, resent per refill protocol.     Woody GUZMAN RN 12/9/2024 at 8:01 AM

## 2024-12-11 ENCOUNTER — TRANSFERRED RECORDS (OUTPATIENT)
Dept: HEALTH INFORMATION MANAGEMENT | Facility: CLINIC | Age: 74
End: 2024-12-11
Payer: COMMERCIAL

## 2024-12-11 ENCOUNTER — MYC MEDICAL ADVICE (OUTPATIENT)
Dept: FAMILY MEDICINE | Facility: CLINIC | Age: 74
End: 2024-12-11
Payer: COMMERCIAL

## 2024-12-11 DIAGNOSIS — M85.80 OSTEOPENIA, UNSPECIFIED LOCATION: Primary | ICD-10-CM

## 2024-12-11 DIAGNOSIS — Z79.52 LONG TERM CURRENT USE OF SYSTEMIC STEROIDS: ICD-10-CM

## 2024-12-11 RX ORDER — ALENDRONATE SODIUM 70 MG/1
70 TABLET ORAL
Qty: 12 TABLET | Refills: 3 | Status: SHIPPED | OUTPATIENT
Start: 2024-12-11

## 2024-12-20 ENCOUNTER — MYC REFILL (OUTPATIENT)
Dept: FAMILY MEDICINE | Facility: CLINIC | Age: 74
End: 2024-12-20
Payer: COMMERCIAL

## 2024-12-20 DIAGNOSIS — E03.9 HYPOTHYROIDISM, UNSPECIFIED TYPE: ICD-10-CM

## 2024-12-23 RX ORDER — LEVOTHYROXINE SODIUM 25 UG/1
25 TABLET ORAL DAILY
Qty: 90 TABLET | Refills: 0 | OUTPATIENT
Start: 2024-12-23

## 2025-01-05 ENCOUNTER — ANCILLARY PROCEDURE (OUTPATIENT)
Dept: GENERAL RADIOLOGY | Facility: CLINIC | Age: 75
End: 2025-01-05
Attending: PHYSICIAN ASSISTANT
Payer: COMMERCIAL

## 2025-01-05 ENCOUNTER — OFFICE VISIT (OUTPATIENT)
Dept: URGENT CARE | Facility: URGENT CARE | Age: 75
End: 2025-01-05
Payer: COMMERCIAL

## 2025-01-05 VITALS
BODY MASS INDEX: 24.45 KG/M2 | OXYGEN SATURATION: 98 % | RESPIRATION RATE: 17 BRPM | SYSTOLIC BLOOD PRESSURE: 104 MMHG | DIASTOLIC BLOOD PRESSURE: 62 MMHG | HEART RATE: 88 BPM | TEMPERATURE: 100.6 F | WEIGHT: 138 LBS

## 2025-01-05 DIAGNOSIS — R50.9 FEVER IN ADULT: ICD-10-CM

## 2025-01-05 DIAGNOSIS — J18.9 PNEUMONIA OF RIGHT LOWER LOBE DUE TO INFECTIOUS ORGANISM: Primary | ICD-10-CM

## 2025-01-05 DIAGNOSIS — J01.00 ACUTE NON-RECURRENT MAXILLARY SINUSITIS: ICD-10-CM

## 2025-01-05 DIAGNOSIS — R05.1 ACUTE COUGH: ICD-10-CM

## 2025-01-05 DIAGNOSIS — H10.31 ACUTE BACTERIAL CONJUNCTIVITIS OF RIGHT EYE: ICD-10-CM

## 2025-01-05 LAB
FLUAV AG SPEC QL IA: NEGATIVE
FLUBV AG SPEC QL IA: NEGATIVE

## 2025-01-05 PROCEDURE — 99214 OFFICE O/P EST MOD 30 MIN: CPT | Performed by: PHYSICIAN ASSISTANT

## 2025-01-05 PROCEDURE — 71046 X-RAY EXAM CHEST 2 VIEWS: CPT | Mod: TC | Performed by: RADIOLOGY

## 2025-01-05 PROCEDURE — 87804 INFLUENZA ASSAY W/OPTIC: CPT | Performed by: PHYSICIAN ASSISTANT

## 2025-01-05 RX ORDER — AZITHROMYCIN 250 MG/1
TABLET, FILM COATED ORAL
Qty: 6 TABLET | Refills: 0 | Status: SHIPPED | OUTPATIENT
Start: 2025-01-05 | End: 2025-01-10

## 2025-01-05 RX ORDER — NEOMYCIN POLYMYXIN B SULFATES AND DEXAMETHASONE 3.5; 10000; 1 MG/ML; [USP'U]/ML; MG/ML
1 SUSPENSION/ DROPS OPHTHALMIC 4 TIMES DAILY
Qty: 5 ML | Refills: 0 | Status: SHIPPED | OUTPATIENT
Start: 2025-01-05 | End: 2025-01-12

## 2025-01-05 NOTE — PROGRESS NOTES
Assessment & Plan     Pneumonia of right lower lobe due to infectious organism  Noted on chest x-ray today.  Augmentin is prescribed.  Zithromax also prescribed.  Close monitoring of symptoms.  Patient educational information provided regarding course of symptoms.  Follow-up if any worsening symptoms.  Patient agrees with the plan.  - amoxicillin-clavulanate (AUGMENTIN) 875-125 MG tablet  Dispense: 14 tablet; Refill: 0  - azithromycin (ZITHROMAX) 250 MG tablet  Dispense: 6 tablet; Refill: 0    Acute bacterial conjunctivitis of right eye  Ofloxacin eyedrops are prescribed.  Good handwashing is advised.  Follow-up if any worsening symptoms.  Patient understands and agrees with the plan.    - neomycin-polymixin-dexAMETHasone (MAXITROL) 0.1 % ophthalmic suspension  Dispense: 5 mL; Refill: 0    Acute non-recurrent maxillary sinusitis   Augmentin Rx. Tylenol or motrin prn headache . Follow up if any worsening symptoms. Patient agrees.     - amoxicillin-clavulanate (AUGMENTIN) 875-125 MG tablet  Dispense: 14 tablet; Refill: 0    Acute cough  Influenza test is negative today.  Chest x-ray reviewed by me reveals patchy opacity in right lower lobe concerning for pneumonia.  Please see above treatment recommendations.  - Influenza A & B Antigen - Clinic Collect  - XR Chest 2 Views    Fever in adult  I believe in the setting of pneumonia and sinusitis.  Please see above treatment recommendations.  Tylenol or Motrin as needed for fever.  Follow-up if any worsening symptoms.  Patient agrees.  - XR Chest 2 Views     31 minutes spent on the date of the encounter doing chart review, history and exam, patient education, documentation, and further activities per the note.      Return in about 1 week (around 1/12/2025) for Symptoms failing to improve.    Halima Canela PA-C  [facility] is a 74 year old female who presents to clinic today for the following health issues:  Chief  Complaint   Patient presents with    Urgent Care     Cough x 9 days, sob, low grade fever, runny nose,body ache,  right eye redness, used over the counter cough med       HPI      URI Adult    Onset of symptoms was 9 day(s) ago.  Course of illness is worsening.    Severity moderate  Current and Associated symptoms: cough - productive, chills x 2 days, right eye redness, drainage, sinus pain/pressure, HA, LGF, feels SOB  Treatment measures tried include OTC Cough med.  Predisposing factors include None.  No  eye  pain.      Review of Systems  Constitutional, HEENT, cardiovascular, pulmonary, GI, , musculoskeletal, neuro, skin, endocrine and psych systems are negative, except as otherwise noted.      Objective    /62   Pulse 88   Temp (!) 100.6  F (38.1  C) (Tympanic)   Resp 17   Wt 62.6 kg (138 lb)   LMP  (LMP Unknown)   SpO2 98%   BMI 24.45 kg/m    Physical Exam   GENERAL: alert and no distress  EYES: PERRL, EOM are normal, Right eye: conjunctivae and sclerae injected with thick drainage noted in the medial canthus and on the eyelashes, left eye exam is normal  HENT: ear canals and TM's normal, nose with boggy turbinates, maxillary sinuses are tender to percussion, and mouth without ulcers or lesions, throat is moist and pink  RESP: lungs with coarse breath sounds over the right lung fields, no wheezing  CV: regular rate and rhythm, normal S1 S2  MS: no gross musculoskeletal defects noted, no edema  SKIN: no suspicious lesions or rashes      CXR - Reviewed and interpreted by me: Reveals hazy opacity in right lower lobe concerning for pneumonia, awaiting formal interpretation from Radiologist at this time      Results for orders placed or performed in visit on 01/05/25 (from the past 24 hours)   Influenza A & B Antigen - Clinic Collect    Specimen: Nose; Swab   Result Value Ref Range    Influenza A antigen Negative Negative    Influenza B antigen Negative Negative    Narrative    Test results must be  correlated with clinical data. If necessary, results should be confirmed by a molecular assay or viral culture.

## 2025-01-08 ENCOUNTER — TRANSFERRED RECORDS (OUTPATIENT)
Dept: HEALTH INFORMATION MANAGEMENT | Facility: CLINIC | Age: 75
End: 2025-01-08
Payer: COMMERCIAL

## 2025-01-13 ENCOUNTER — OFFICE VISIT (OUTPATIENT)
Dept: URGENT CARE | Facility: URGENT CARE | Age: 75
End: 2025-01-13
Payer: COMMERCIAL

## 2025-01-13 ENCOUNTER — NURSE TRIAGE (OUTPATIENT)
Dept: FAMILY MEDICINE | Facility: CLINIC | Age: 75
End: 2025-01-13

## 2025-01-13 VITALS
TEMPERATURE: 98 F | BODY MASS INDEX: 24.45 KG/M2 | HEART RATE: 62 BPM | OXYGEN SATURATION: 98 % | RESPIRATION RATE: 16 BRPM | DIASTOLIC BLOOD PRESSURE: 78 MMHG | SYSTOLIC BLOOD PRESSURE: 122 MMHG | WEIGHT: 138 LBS

## 2025-01-13 DIAGNOSIS — R53.83 OTHER FATIGUE: Primary | ICD-10-CM

## 2025-01-13 DIAGNOSIS — D72.829 LEUKOCYTOSIS, UNSPECIFIED TYPE: ICD-10-CM

## 2025-01-13 DIAGNOSIS — R30.0 DYSURIA: ICD-10-CM

## 2025-01-13 DIAGNOSIS — R05.3 PERSISTENT COUGH: ICD-10-CM

## 2025-01-13 LAB
ALBUMIN UR-MCNC: NEGATIVE MG/DL
ANION GAP SERPL CALCULATED.3IONS-SCNC: 9 MMOL/L (ref 7–15)
APPEARANCE UR: CLEAR
BASOPHILS # BLD AUTO: 0 10E3/UL (ref 0–0.2)
BASOPHILS NFR BLD AUTO: 0 %
BILIRUB UR QL STRIP: NEGATIVE
BUN SERPL-MCNC: 23.5 MG/DL (ref 8–23)
CALCIUM SERPL-MCNC: 9.7 MG/DL (ref 8.8–10.4)
CHLORIDE SERPL-SCNC: 104 MMOL/L (ref 98–107)
COLOR UR AUTO: YELLOW
CREAT SERPL-MCNC: 0.91 MG/DL (ref 0.51–0.95)
EGFRCR SERPLBLD CKD-EPI 2021: 66 ML/MIN/1.73M2
EOSINOPHIL # BLD AUTO: 0.1 10E3/UL (ref 0–0.7)
EOSINOPHIL NFR BLD AUTO: 1 %
ERYTHROCYTE [DISTWIDTH] IN BLOOD BY AUTOMATED COUNT: 14 % (ref 10–15)
GLUCOSE SERPL-MCNC: 116 MG/DL (ref 70–99)
GLUCOSE UR STRIP-MCNC: NEGATIVE MG/DL
HCO3 SERPL-SCNC: 25 MMOL/L (ref 22–29)
HCT VFR BLD AUTO: 32.3 % (ref 35–47)
HGB BLD-MCNC: 10.6 G/DL (ref 11.7–15.7)
HGB UR QL STRIP: NEGATIVE
IMM GRANULOCYTES # BLD: 0.6 10E3/UL
IMM GRANULOCYTES NFR BLD: 4 %
KETONES UR STRIP-MCNC: NEGATIVE MG/DL
LEUKOCYTE ESTERASE UR QL STRIP: NEGATIVE
LYMPHOCYTES # BLD AUTO: 1.9 10E3/UL (ref 0.8–5.3)
LYMPHOCYTES NFR BLD AUTO: 12 %
MCH RBC QN AUTO: 30.8 PG (ref 26.5–33)
MCHC RBC AUTO-ENTMCNC: 32.8 G/DL (ref 31.5–36.5)
MCV RBC AUTO: 94 FL (ref 78–100)
MONOCYTES # BLD AUTO: 0.4 10E3/UL (ref 0–1.3)
MONOCYTES NFR BLD AUTO: 3 %
NEUTROPHILS # BLD AUTO: 12.6 10E3/UL (ref 1.6–8.3)
NEUTROPHILS NFR BLD AUTO: 81 %
NITRATE UR QL: NEGATIVE
PH UR STRIP: 5 [PH] (ref 5–7)
PLATELET # BLD AUTO: 352 10E3/UL (ref 150–450)
POTASSIUM SERPL-SCNC: 4.5 MMOL/L (ref 3.4–5.3)
RBC # BLD AUTO: 3.44 10E6/UL (ref 3.8–5.2)
SODIUM SERPL-SCNC: 138 MMOL/L (ref 135–145)
SP GR UR STRIP: 1.01 (ref 1–1.03)
UROBILINOGEN UR STRIP-ACNC: 0.2 E.U./DL
WBC # BLD AUTO: 15.6 10E3/UL (ref 4–11)

## 2025-01-13 PROCEDURE — 99214 OFFICE O/P EST MOD 30 MIN: CPT | Performed by: FAMILY MEDICINE

## 2025-01-13 PROCEDURE — 85025 COMPLETE CBC W/AUTO DIFF WBC: CPT | Performed by: FAMILY MEDICINE

## 2025-01-13 PROCEDURE — 36415 COLL VENOUS BLD VENIPUNCTURE: CPT | Performed by: FAMILY MEDICINE

## 2025-01-13 PROCEDURE — 80048 BASIC METABOLIC PNL TOTAL CA: CPT | Performed by: FAMILY MEDICINE

## 2025-01-13 PROCEDURE — 81003 URINALYSIS AUTO W/O SCOPE: CPT | Performed by: FAMILY MEDICINE

## 2025-01-13 NOTE — TELEPHONE ENCOUNTER
Called patient to triage symptoms. Pt was seen at  on 1/5/25 an dx with pneumonia. Completed abx yesterday. While cough has improved, patient continues to have intermittent fevers. Temp today was 100.3 temporal. She feels weak at times. She also reports dyspnea on exertion. No shortness of breath at rest. No chest pain or wheezing. No pain when taking a deep breath. Dispo is ED/UC now. Pt declines ED but agreeable to . I have cancelled appointment scheduled today for a VV-not appropriate mode for visit, must be seen in person.     Patient was advised to seek care sooner with any worsening symptoms.    Malia Miller RN on 1/13/2025 at 12:04 PM      Reason for Disposition   Fever > 100 F (37.8 C) and diabetes mellitus or weak immune system (e.g., HIV positive, cancer chemo, splenectomy, organ transplant, chronic steroids)    Additional Information   Negative: SEVERE difficulty breathing (e.g., struggling for each breath, speaks in single words, pulse > 120)   Negative: Breathing stopped and hasn't returned   Negative: Choking on something   Negative: Bluish (or gray) lips or face   Negative: Difficult to awaken or acting confused (e.g., disoriented, slurred speech)   Negative: Passed out (e.g., fainted, lost consciousness, blacked out and was not responding)   Negative: Wheezing started suddenly after medicine, an allergic food, or bee sting   Negative: Stridor (harsh sound while breathing in)   Negative: Slow, shallow and weak breathing   Negative: Sounds like a life-threatening emergency to the triager   Negative: Chest pain   Negative: Wheezing (high pitched whistling sound) and previous asthma attacks or use of asthma medicines   Negative: Breathing difficulty and within 14 days of COVID-19 EXPOSURE (close contact) with someone diagnosed with COVID-19 (e.g., COVID test positive)   Negative: Breathing difficulty and COVID-19 is widespread in the community   Negative: Breathing diffculty and only present when  "coughing   Negative: Breathing difficulty and only from stuffy nose   Negative: Breathing diffculty and only from stuffy nose or runny nose from common cold   Negative: MODERATE difficulty breathing (e.g., speaks in phrases, SOB even at rest, pulse 100-120) of new-onset or worse than normal   Negative: Oxygen level (e.g., pulse oximetry) 90% or lower   Negative: Wheezing can be heard across the room   Negative: Drooling or spitting out saliva (because can't swallow)   Negative: Any history of prior \"blood clot\" in leg or lungs   Negative: Illness requiring prolonged bedrest in past month (e.g., immobilization, long hospital stay)   Negative: Hip or leg fracture (broken bone) in past month (or had cast on leg or ankle in past month)   Negative: Major surgery in the past month   Negative: Long-distance travel in past month (e.g., car, bus, train, plane; with trip lasting 6 or more hours)   Negative: Cancer treatment in past six months (or has cancer now)   Negative: Extra heartbeats, irregular heart beating, or heart is beating very fast (i.e., 'palpitations')   Negative: Fever > 103 F (39.4 C)   Negative: Fever > 101 F (38.3 C) and over 60 years of age   Negative: Fever > 100 F (37.8 C) and bedridden (e.g., nursing home patient, stroke, chronic illness, recovering from surgery)    Answer Assessment - Initial Assessment Questions  1. RESPIRATORY STATUS: \"Describe your breathing?\" (e.g., wheezing, shortness of breath, unable to speak, severe coughing)       Feels dyspneic on exertion at times, no shortness of breath at rest   2. ONSET: \"When did this breathing problem begin?\"       Ongoing for more than a week   3. PATTERN \"Does the difficult breathing come and go, or has it been constant since it started?\"       Intermittent   4. SEVERITY: \"How bad is your breathing?\" (e.g., mild, moderate, severe)       No shortness of breath at rest, breathing normally during our call per pt  5. RECURRENT SYMPTOM: \"Have you had " "difficulty breathing before?\" If Yes, ask: \"When was the last time?\" and \"What happened that time?\"       Denies  6. CARDIAC HISTORY: \"Do you have any history of heart disease?\" (e.g., heart attack, angina, bypass surgery, angioplasty)       Open heart surgery for a birth defect, surgery done at age 37  7. LUNG HISTORY: \"Do you have any history of lung disease?\"  (e.g., pulmonary embolus, asthma, emphysema)      Denies  8. CAUSE: \"What do you think is causing the breathing problem?\"       Infection   9. OTHER SYMPTOMS: \"Do you have any other symptoms?\" (e.g., chest pain, cough, dizziness, fever, runny nose)      Cough, intermittent fever, runny nose  10. O2 SATURATION MONITOR:  \"Do you use an oxygen saturation monitor (pulse oximeter) at home?\" If Yes, ask: \"What is your reading (oxygen level) today?\" \"What is your usual oxygen saturation reading?\" (e.g., 95%)        Does not have one   11. PREGNANCY: \"Is there any chance you are pregnant?\" \"When was your last menstrual period?\"        N/a  12. TRAVEL: \"Have you traveled out of the country in the last month?\" (e.g., travel history, exposures)        Denies    Protocols used: Breathing Difficulty-A-[place]    "

## 2025-01-13 NOTE — PROGRESS NOTES
Chief Complaint   Patient presents with    Sick     Some phlem Coug, SOB, temp, dizzy, fatigue x 5 weeks --- WAS here 8 days ago in  [facility] and was given antibiotics -HAD pnuemonia  -- taking nothing  - MAY want blood work     Criss was seen today for sick.    Diagnoses and all orders for this visit:    Other fatigue  -     CBC with platelets and differential  -     Basic metabolic panel  (Ca, Cl, CO2, Creat, Gluc, K, Na, BUN); Future  -     Basic metabolic panel  (Ca, Cl, CO2, Creat, Gluc, K, Na, BUN)    Persistent cough    Leukocytosis, unspecified type    Dysuria  -     UA Macroscopic with reflex to Microscopic and Culture - Lab Collect; Future  -     UA Macroscopic with reflex to Microscopic and Culture - Lab Collect      Results for orders placed or performed in visit on 01/13/25   CBC with platelets and differential     Status: Abnormal   Result Value Ref Range    WBC Count 15.6 (H) 4.0 - 11.0 10e3/uL    RBC Count 3.44 (L) 3.80 - 5.20 10e6/uL    Hemoglobin 10.6 (L) 11.7 - 15.7 g/dL    Hematocrit 32.3 (L) 35.0 - 47.0 %    MCV 94 78 - 100 fL    MCH 30.8 26.5 - 33.0 pg    MCHC 32.8 31.5 - 36.5 g/dL    RDW 14.0 10.0 - 15.0 %    Platelet Count 352 150 - 450 10e3/uL    % Neutrophils 81 %    % Lymphocytes 12 %    % Monocytes 3 %    % Eosinophils 1 %    % Basophils 0 %    % Immature Granulocytes 4 %    Absolute Neutrophils 12.6 (H) 1.6 - 8.3 10e3/uL    Absolute Lymphocytes 1.9 0.8 - 5.3 10e3/uL    Absolute Monocytes 0.4 0.0 - 1.3 10e3/uL    Absolute Eosinophils 0.1 0.0 - 0.7 10e3/uL    Absolute Basophils 0.0 0.0 - 0.2 10e3/uL    Absolute Immature Granulocytes 0.6 (H) <=0.4 10e3/uL   UA Macroscopic with reflex to Microscopic and Culture - Lab Collect     Status: Normal    Specimen: Urine, Midstream   Result Value Ref Range    Color Urine Yellow Colorless, Straw, Light Yellow, Yellow    Appearance Urine Clear Clear    Glucose Urine Negative Negative mg/dL    Bilirubin Urine Negative Negative    Ketones Urine Negative  Negative mg/dL    Specific Gravity Urine 1.015 1.003 - 1.035    Blood Urine Negative Negative    pH Urine 5.0 5.0 - 7.0    Protein Albumin Urine Negative Negative mg/dL    Urobilinogen Urine 0.2 0.2, 1.0 E.U./dL    Nitrite Urine Negative Negative    Leukocyte Esterase Urine Negative Negative    Narrative    Microscopic not indicated   CBC with platelets and differential     Status: Abnormal    Narrative    The following orders were created for panel order CBC with platelets and differential.  Procedure                               Abnormality         Status                     ---------                               -----------         ------                     CBC with platelets and d...[[id number]]  Abnormal            Final result                 Please view results for these tests on the individual orders.       D/d  Acute Bronchitis  Acute Sinusitis  Cough  Pneumonia  Post-Nasal Drip  RSV  Upper Respiratory Infection  Electrolyte derangement    PLAN:    Symptomatic measures encouraged, humidified air, plenty of fluids.  Tylenol or Ibuprofen for pain or fever  Though the complete blood count did show an elevated white blood cell count but has cough is better would consider giving some more time.  Patient just completed course of antibiotic    should follow-up if notices high fever, worsening symptoms of fatigue.  Continue antihistamine with decongestant to help with his ear congestion.  Also consider checking blood pressure when has symptoms of dizziness.  When the BMP is back if there is any electrolyte abnormality will be notified.  Labs are pending,patient is on prednisone which could explain the elevated white blood cell count  Follow-up in 2 weeks if not improving and a chest x-ray will be considered.  Reviewed result with patient     SUBJECTIVE:  Anali Sanchez is a 74 year old female who presents to the clinic today with a occasional, cough , dizziness and tiredness , got treated with Augmentin and  azithromycin finished antibiotic course yesterday.  for pneumonia she is on  blood pressure lowering medication but has not been checking her bp Denies any fever or chills but has been noticing some dizzy spells.  Associated symptoms include congestion, cough, post-nasal drip, and ear congestion.  The patient's symptoms are improving.  The patient's symptoms are exacerbated by no particular triggers.  Patient has been using OTC cough suppressants to improve symptoms.    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 She did not have any recurrent palpitations until 2014. At that time, she was seen at [facility] in [place] and had ECG consistent with atrial flutter. This episode of atrial flutter lasted several hours and terminated spontaneously. She saw cardiology after that and plan was for obser    History of blood transfusion     History of Gabino-Parkinson-White (WPW) syndrome     She has a history of WPW status post surgical ablation of a left lateral electrical pathway at age 37.     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 rate 16, weight 62.6 kg (138  lb), SpO2 98%, not currently breastfeeding.  General: healthy, alert and no distress, appears hydarated, vital signs stable   Ears: Fluid behind TM   Eyes: NORMAL - no injection no discharge, no periorbital swelling.  Nose: NORMAL - no drainage, turbinates normal in size.  Neck: supple, non-tender, free range of motion, no adenopathy  Throat: NORMAL - no erythema, no adenopathy, no exudates.  Resp: Normal - Clear to auscultation without rales, rhonchi, or wheezing.  Cardiac: NORMAL - regular rate and rhythm without murmur.    Juliana Garay MD

## 2025-01-13 NOTE — PATIENT INSTRUCTIONS
follow-up if noticing  high fever, worsening symptoms of fatigue.  Continue antihistamine with decongestant to help with his ear congestion.  Also consider checking blood pressure when has symptoms of dizziness.  When the BMP is back if there is any electrolyte abnormality will be notified.    Juliana Garay MD

## 2025-01-15 ENCOUNTER — MYC MEDICAL ADVICE (OUTPATIENT)
Dept: FAMILY MEDICINE | Facility: CLINIC | Age: 75
End: 2025-01-15
Payer: COMMERCIAL

## 2025-01-15 NOTE — TELEPHONE ENCOUNTER
Called the pt discuss.  She is dizzy when she gets up from sitting and up from lying down.  She gets light headed.  Advised to make position changes slowly.  Advised to drink lots of fluids.  She is about the same as when she was seen in UC.  The UC provider thought possibly related to her bp.     Advised if still having problems she should have an UC follow up appt.  Offered appts today.  She can't make that work.  Appt scheduled for the morning.  Advised if worse before then to be seen more urgently.

## 2025-01-16 ENCOUNTER — OFFICE VISIT (OUTPATIENT)
Dept: PEDIATRICS | Facility: CLINIC | Age: 75
End: 2025-01-16
Payer: COMMERCIAL

## 2025-01-16 VITALS
HEIGHT: 63 IN | WEIGHT: 143.1 LBS | RESPIRATION RATE: 14 BRPM | BODY MASS INDEX: 25.36 KG/M2 | TEMPERATURE: 97.3 F | OXYGEN SATURATION: 100 % | DIASTOLIC BLOOD PRESSURE: 63 MMHG | HEART RATE: 64 BPM | SYSTOLIC BLOOD PRESSURE: 103 MMHG

## 2025-01-16 DIAGNOSIS — I95.9 HYPOTENSION, UNSPECIFIED HYPOTENSION TYPE: ICD-10-CM

## 2025-01-16 DIAGNOSIS — R42 DIZZINESS: Primary | ICD-10-CM

## 2025-01-16 ASSESSMENT — PAIN SCALES - GENERAL: PAINLEVEL_OUTOF10: NO PAIN (0)

## 2025-01-16 NOTE — PROGRESS NOTES
Assessment & Plan     Dizziness    - EKG 12-lead complete w/read - Clinics    Hypotension, unspecified hypotension type    Orthostatic BP:  113/65 - Laying  123/72 - Stand 1 minute  127/74 - Stand 3 minutes.      Patient is here for followup after being seen again in the  on 01/13.  She states that she feels like her URI and pneumonia is improving, but she is still fatigued, feeling weak and has episodes of occasional dizziness.  She has noted over the last couple of days to have low BP at home.  She does take a 1/2 tab of Olmesartan for HTN.  She states she has not been on that medication all that long, maybe about 6 months.  Today on exam, she is well appearing.  She has normal lung sounds and normal heart tones.  She also shows overall normal vitals, though the BP is on the low end.  Orthostatic blood pressures are normal today.  Seeing that she is symptomatic with dizziness on position changes, I have advised that she stop the ARB medication altogether.  She has been on a low dose, but perhaps does not need this.  She has been advised to stay well hydrated and to followup early next week for a nurse only BP check.  She should make it an office visit if she is still having symptoms.  Of note, we did review recent labs, which overall look fine.  Patient did have a mildly elevated WBC, but she is on Prednisone right now to treat PMR, which can elevated the WBC.  Patient was advised to followup or be seen sooner if needed.  Patient understands and agrees with the plan today.         Patient Plan:  We will have you stop the Olmesartan medication today.  Please continue to monitor your BP at home, and followup early next week for a recheck by nurse appointment.  Please make this a clinical/office visit if you are still having symptoms of dizziness.      Be sure to stay well hydrated and get rest as you recover from the pneumonia as well.     Please seek more immediate followup if your symptoms change or worsen in  "any way.        BMI  Estimated body mass index is 25.36 kg/m  as calculated from the following:    Height as of this encounter: 1.6 m (5' 2.99\").    Weight as of this encounter: 64.9 kg (143 lb 1.6 oz).       Kryee Kahn is a 74 year old, presenting for the following health issues:  Follow Up (dizzy)      1/16/2025     8:35 AM   Additional Questions   Roomed by BB VF   Accompanied by none         1/16/2025     8:35 AM   Patient Reported Additional Medications   Patient reports taking the following new medications none     History of Present Illness       Reason for visit:  Low blood pressure    She eats 2-3 servings of fruits and vegetables daily.She consumes 0 sweetened beverage(s) daily.She exercises with enough effort to increase her heart rate 20 to 29 minutes per day.  She exercises with enough effort to increase her heart rate 3 or less days per week.   She is taking medications regularly.       Patient has been struggling with pneumonia.  She was started on treatment on 01/05/2025.  The cough is better and almost gone, but she has still had some bothersome dizziness and she has some weakness.    She has been sick since the week after [place].  She was seen in followup at another  on 01/13/2025 for fatigue and had labs done.  She was told to monitor her BP at that time, and over the last couple of days, she has noted her BP to be on the lower end.  She states that she is getting readings of low 100's/50's.  She states that her dizziness feels like she could fall or faint and it seems to be more with positional changes.          Review of Systems  Constitutional, neuro, ENT, endocrine, pulmonary, cardiac, gastrointestinal, genitourinary, musculoskeletal, integument and psychiatric systems are negative, except as otherwise noted.      Objective    /63 (BP Location: Right arm, Patient Position: Sitting, Cuff Size: Adult Regular)   Pulse 64   Temp 97.3  F (36.3  C) (Temporal)   Resp 14   Ht " "1.6 m (5' 2.99\")   Wt 64.9 kg (143 lb 1.6 oz)   LMP  (LMP Unknown)   SpO2 100%   BMI 25.36 kg/m    Body mass index is 25.36 kg/m .  Physical Exam   GENERAL: alert and no distress  EYES: Eyes grossly normal to inspection, PERRL and conjunctivae and sclerae normal  NECK: no adenopathy, no asymmetry, masses, or scars  RESP: lungs clear to auscultation - no rales, rhonchi or wheezes  CV: regular rate and rhythm, normal S1 S2, no S3 or S4, no murmur, click or rub, no peripheral edema  MS: no gross musculoskeletal defects noted, no edema  SKIN: no suspicious lesions or rashes  NEURO: Normal strength and tone, mentation intact and speech normal    EKG - Reviewed and interpreted by me appears normal, NSR, normal axis, normal intervals, no acute ST/T changes c/w ischemia, no LVH by voltage criteria, unchanged from previous tracings  Orthostatic BP:          Signed Electronically by: Mercedes Fontaine PA-C    "

## 2025-01-16 NOTE — PATIENT INSTRUCTIONS
We will have you stop the Olmesartan medication today.  Please continue to monitor your BP at home, and followup early next week for a recheck by nurse appointment.  Please make this a clinical/office visit if you are still having symptoms of dizziness.      Be sure to stay well hydrated and get rest as you recover from the pneumonia as well.     Please seek more immediate followup if your symptoms change or worsen in any way.

## 2025-01-21 ENCOUNTER — ALLIED HEALTH/NURSE VISIT (OUTPATIENT)
Dept: FAMILY MEDICINE | Facility: CLINIC | Age: 75
End: 2025-01-21
Payer: COMMERCIAL

## 2025-01-21 ENCOUNTER — TELEPHONE (OUTPATIENT)
Dept: FAMILY MEDICINE | Facility: CLINIC | Age: 75
End: 2025-01-21

## 2025-01-21 VITALS — DIASTOLIC BLOOD PRESSURE: 73 MMHG | SYSTOLIC BLOOD PRESSURE: 125 MMHG

## 2025-01-21 DIAGNOSIS — Z01.30 BP CHECK: Primary | ICD-10-CM

## 2025-01-21 PROCEDURE — 99207 PR NO CHARGE NURSE ONLY: CPT

## 2025-01-21 NOTE — PROGRESS NOTES
Anali Sanchez is a 74 year old patient who comes in today for a Blood Pressure check.  Initial BP:  /73 (BP Location: Right arm, Patient Position: Sitting, Cuff Size: Adult Regular)   LMP  (LMP Unknown)      Data Unavailable  Disposition: follow-up as previously indicated by provider

## 2025-01-21 NOTE — TELEPHONE ENCOUNTER
BP Readings from Last 6 Encounters:   01/21/25 125/73   01/16/25 103/63   01/13/25 122/78   01/05/25 104/62   11/19/24 125/56   10/08/24 103/66     Pt states she has been tracking readings at home and bp's have been steady in 120's over 70's the last few days.    She is going out of town for a month and will continue tracking blood pressure and if numbers begin to rise again, pt will reach out to provider to decide whether or not she should go back on bp medication.

## 2025-03-04 ENCOUNTER — OFFICE VISIT (OUTPATIENT)
Dept: ORTHOPEDICS | Facility: CLINIC | Age: 75
End: 2025-03-04
Payer: COMMERCIAL

## 2025-03-04 ENCOUNTER — ANCILLARY PROCEDURE (OUTPATIENT)
Dept: GENERAL RADIOLOGY | Facility: CLINIC | Age: 75
End: 2025-03-04
Attending: PHYSICIAN ASSISTANT
Payer: COMMERCIAL

## 2025-03-04 DIAGNOSIS — M35.3 POLYMYALGIA RHEUMATICA: ICD-10-CM

## 2025-03-04 DIAGNOSIS — M85.80 OSTEOPENIA, UNSPECIFIED LOCATION: ICD-10-CM

## 2025-03-04 DIAGNOSIS — H25.9 AGE-RELATED CATARACT OF BOTH EYES, UNSPECIFIED AGE-RELATED CATARACT TYPE: ICD-10-CM

## 2025-03-04 DIAGNOSIS — M16.11 PRIMARY OSTEOARTHRITIS OF RIGHT HIP: ICD-10-CM

## 2025-03-04 DIAGNOSIS — M16.11 PRIMARY OSTEOARTHRITIS OF RIGHT HIP: Primary | ICD-10-CM

## 2025-03-04 PROCEDURE — 73502 X-RAY EXAM HIP UNI 2-3 VIEWS: CPT | Mod: TC | Performed by: RADIOLOGY

## 2025-03-04 NOTE — PATIENT INSTRUCTIONS
Thank you for allowing me to be part of your care team. My personal goal for your visit today is that you felt that I listened to you, you understood your diagnosis and treatment options and our staff/clinic met your expectations. We strive to provide you excellent care.  If you felt like your expectations were not met at your visit today or if you have further questions about your visit or care, please send me a [employer] message and I would be happy answer any questions or listen to your feedback.  If you do not have MyChart, you can call [phone] to speak with me.      If advanced imaging (MRI, CT scan) or blood work was ordered at your appointment today, I will contact you as we discussed today either by telephone call or [employer] message within 48 hours after your advanced imaging testing has been completed or when ALL your lab results are available to review/discuss with you.       Today we discussed the underlying etiology/pathology of patient's   1. Primary osteoarthritis of right hip    2. Age-related cataract of both eyes, unspecified age-related cataract type- upcoming surgery in 03/2025    3. Osteopenia, unspecified location    4. Polymyalgia rheumatica-currently managed by rheumatology with 6 mg prednisone daily with biweekly tapering dosing        Today a shared decision making model was used. The patient's values and choices were respected. The following information represents what was discussed and decided upon by the provider and the patient.  -We discussed the patient has worsening right anterior hemipelvic pain for the past month after going on vacation in doing more physical activity such as walking along the beach as well as sightseeing.  - We reviewed updated x-rays of her pelvis and right hip which shows some progressive changes of the right hip with central narrowing osteoarthritis.  No evidence of fracture or avascular necrosis radiographically.  - We discussed patient's symptoms are  reproduced with rotational testing of the right hip consistent with right hip osteoarthritis.  - Patient did have overall improved her symptoms when last seen in December 2024 with formal physical therapy program and patient continuing home exercise program as well as [employer] exercise program but symptoms again have been aggravated over the last month.  - We discussed the etiology of osteoarthritis today.  There are various treatment options for the mainstay of management of osteoarthritis including rest, activity modification, proper technique with ambulation of steps/stairs which is to lead (start) with good leg going up and lead (start) with painful/symptomatic leg going down,  maintaining healthy weight management due to the 1:4 rule for joint stress with excess weight/load, herbal supplementation such as turmeric (1000 mg daily) to decrease inflammation in the body , healthy nutrition, use of oral NSAIDs (if not contraindicated due to patient being on chronic blood thinner medication or other medical conditions) with supplemental Tylenol up to 3000 mg daily, topical pain relievers such as Voltaren 1% gel to be applied 3 times a day to the area of pain, consideration for intra-articular cortisone injections, visco supplementation injections or PRP injections, formal physical therapy for joint mobilization and strengthening and possible surgical consideration if all conservative treatments fail.   -We did discuss the patient was diagnosed with Polymyalgia rheumatica in October 2024 and currently is under the care of rheumatology.  Symptoms were largely proximal shoulder girdle and neck pain and has responded favorably with oral steroid treatment and tapering dose of the current oral prednisone is not controlling patient's right hip pain.  -We discussed the patient's upcoming bilateral cataract surgery this month.  We discussed risk and benefits of prolonged use of corticosteroids including cataract  formation, immunosuppression, possible development of avascular necrosis, weight gain, worsening of osteoporosis as well as other concerns.  - Since symptoms seem to be isolated to the right hip with reproduction of pain with rotational testing we will get the patient set up for ultrasound-guided right hip intra-articular corticosteroid injection with one of my partners at the [place] office near future.  Patient will in the meantime reach out to her optometrist/eye surgeon further recommendations in regards to timing for intra-articular injection so we do not delay cataract surgery.  - Patient should follow-up with me 2 weeks after intra-articular right hip injection to determine response.  Patient should pay attention to her pre and postinjection pain scores.      - Appointment line phone number is [[phone]]     Dl Dallas PA-C  [facility]    This note was completed in part using a voice recognition software, any grammatical or context distortion are unintentional and inherent to the software.

## 2025-03-04 NOTE — LETTER
3/4/2025      Anali Sanchez  [address] [facility] [address]      Dear Colleague,    Thank you for referring your patient, Anali Sanchez, to the [facility]. Please see a copy of my visit note below.    ASSESSMENT & PLAN       Today we discussed the underlying etiology/pathology of patient's   1. Primary osteoarthritis of right hip    2. Age-related cataract of both eyes, unspecified age-related cataract type- upcoming surgery in 03/2025    3. Osteopenia, unspecified location    4. Polymyalgia rheumatica-currently managed by rheumatology with 6 mg prednisone daily with biweekly tapering dosing        Today a shared decision making model was used. The patient's values and choices were respected. The following information represents what was discussed and decided upon by the provider and the patient.  -We discussed the patient has worsening right anterior hemipelvic pain for the past month after going on vacation in doing more physical activity such as walking along the beach as well as sightseeing.  - We reviewed updated x-rays of her pelvis and right hip which shows some progressive changes of the right hip with central narrowing osteoarthritis.  No evidence of fracture or avascular necrosis radiographically.  - We discussed patient's symptoms are reproduced with rotational testing of the right hip consistent with right hip osteoarthritis.  - Patient did have overall improved her symptoms when last seen in December 2024 with formal physical therapy program and patient continuing home exercise program as well as Silver sneakers exercise program but symptoms again have been aggravated over the last month.  - We discussed the etiology of osteoarthritis today.  There are various treatment options for the mainstay of management of osteoarthritis including rest, activity modification, proper technique with ambulation of steps/stairs which is to lead  (start) with good leg going up and lead (start) with painful/symptomatic leg going down,  maintaining healthy weight management due to the 1:4 rule for joint stress with excess weight/load, herbal supplementation such as turmeric (1000 mg daily) to decrease inflammation in the body , healthy nutrition, use of oral NSAIDs (if not contraindicated due to patient being on chronic blood thinner medication or other medical conditions) with supplemental Tylenol up to 3000 mg daily, topical pain relievers such as Voltaren 1% gel to be applied 3 times a day to the area of pain, consideration for intra-articular cortisone injections, visco supplementation injections or PRP injections, formal physical therapy for joint mobilization and strengthening and possible surgical consideration if all conservative treatments fail.   -We did discuss the patient was diagnosed with Polymyalgia rheumatica in October 2024 and currently is under the care of rheumatology.  Symptoms were largely proximal shoulder girdle and neck pain and has responded favorably with oral steroid treatment and tapering dose of the current oral prednisone is not controlling patient's right hip pain.  -We discussed the patient's upcoming bilateral cataract surgery this month.  We discussed risk and benefits of prolonged use of corticosteroids including cataract formation, immunosuppression, possible development of avascular necrosis, weight gain, worsening of osteoporosis as well as other concerns.  - Since symptoms seem to be isolated to the right hip with reproduction of pain with rotational testing we will get the patient set up for ultrasound-guided right hip intra-articular corticosteroid injection with one of my partners at the [place] office near future.  Patient will in the meantime reach out to her optometrist/eye surgeon further recommendations in regards to timing for intra-articular injection so we do not delay cataract surgery.  - Patient should  follow-up with me 2 weeks after intra-articular right hip injection to determine response.  Patient should pay attention to her pre and postinjection pain scores.      - Appointment line phone number is [[phone]]     Dl Dallas PA-C  [facility]    This note was completed in part using a voice recognition software, any grammatical or context distortion are unintentional and inherent to the software.         SUBJECTIVE  Anali Sanchez is a/an 74 year old female who is seen for follow up of right hip pain. The patient is seen by themselves.  Since last visit on 12/01/2023 patient has gotten much better as long as was doing PT.  Was on vacation last month in [place] and doing a lot of walking on the soft sand and patient has worsening right anterior hip pain for the past month.  Original Date of Injury: 15 months ago    Expanded HPI: Patient was diagnosed with polymyalgia rheumatica in October 2024 due to proximal shoulder girdle pain and neck pain with elevated inflammatory markers.  Patient was placed on oral prednisone and currently is under the care of rheumatology with 6 mg dosing daily with decreasing biweekly dose of 1 mg.  Patient complains of pain in the anterior inguinal groin region.  Patient has remained active with home exercise program as well as Minded exercise program.  She still ambulates without assistive device.  Patient has not utilize any other medication or treatment for her right knee pelvic pain.  Patient does have upcoming bilateral cataract surgery in the month of March 2025.      Has previous treatment plan been beneficial for condition: Yes overall but worsening symptoms for the past month after going on vacation doing sightseeing and walking activities  Treatment used since last visit:  PT  Location of Pain: right hip, groin area  Rating of Pain at worst: 7/10  Rating of Pain Currently: 7/10  Worsened by: activity  Better with: rest and  avoidance  Treatments tried: rest/activity avoidance and elevation, tylenol.  Home exercises from PT.  Quality: aching  Associated symptoms: no distal numbness or tingling; denies swelling or warmth      Past Medical History:   Diagnosis Date     Anemia      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37     Coagulation disorder     hx hemorrhage after heart and after colon surg     History of atrial flutter     She has a history of WPW status post surgical ablation of a left lateral electrical pathway at age 37. She did not have any recurrent palpitations until 2014. At that time, she was seen at [facility] in [place] and had ECG consistent with atrial flutter. This episode of atrial flutter lasted several hours and terminated spontaneously. She saw cardiology after that and plan was for obser     History of blood transfusion      History of Gabino-Parkinson-White (WPW) syndrome     She has a history of WPW status post surgical ablation of a left lateral electrical pathway at age 37.      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    Added automatically from request for surgery [id number]       Other and unspecified nonspecific immunological findings     anti Jka red cell antibody     Social History     Socioeconomic History     Marital status:    Tobacco Use     Smoking status: Never     Passive exposure: Never     Smokeless tobacco: Never   Vaping Use     Vaping status: Never Used   Substance and Sexual Activity     Alcohol use: Yes     Alcohol/week: 4.0 - 5.0 standard drinks of alcohol     Types: 4 - 5 Glasses of wine per week     Comment: A glass of wine or beer, usually no more than one     Drug use: Never     Sexual activity: Yes     Partners: Male     Comment: Stopped using hormone     Social Drivers of Health     Financial Resource Strain: Low Risk  (10/3/2024)    Financial Resource Strain      Within the past 12 months, have you or your family members you live with been unable to  get utilities (heat, electricity) when it was really needed?: No   Food Insecurity: Low Risk  (10/3/2024)    Food Insecurity      Within the past 12 months, did you worry that your food would run out before you got money to buy more?: No      Within the past 12 months, did the food you bought just not last and you didn t have money to get more?: No   Transportation Needs: Low Risk  (10/3/2024)    Transportation Needs      Within the past 12 months, has lack of transportation kept you from medical appointments, getting your medicines, non-medical meetings or appointments, work, or from getting things that you need?: No   Physical Activity: Sufficiently Active (10/3/2024)    Exercise Vital Sign      Days of Exercise per Week: 3 days      Minutes of Exercise per Session: 60 min   Stress: Stress Concern Present (10/3/2024)    [place] [employer] - Occupational Stress Questionnaire      Feeling of Stress : To some extent   Social Connections: Unknown (10/3/2024)    Social Connection and Isolation Panel [NHANES]      Frequency of Social Gatherings with Friends and Family: Three times a week   Interpersonal Safety: Low Risk  (1/16/2025)    Interpersonal Safety      Do you feel physically and emotionally safe where you currently live?: Yes      Within the past 12 months, have you been hit, slapped, kicked or otherwise physically hurt by someone?: No      Within the past 12 months, have you been humiliated or emotionally abused in other ways by your partner or ex-partner?: No   Housing Stability: Low Risk  (10/3/2024)    Housing Stability      Do you have housing? : Yes      Are you worried about losing your housing?: No         Patient's past medical, surgical, social, and family histories were personally reviewed today and no changes are noted.  REVIEW OF SYSTEMS:  Review of Systems    OBJECTIVE:  Vital signs as noted in EPIC for 3/4/2025    General: healthy, alert and in no distress  HEENT: no scleral  icterus or conjunctival erythema  Skin: no suspicious lesions or rash. No jaundice.  CV: no pedal edema  Resp: normal respiratory effort without conversational dyspnea   Psych: normal mood and affect  Neuro: Normal light sensory exam of lower extremity      MSK:  Exam shows a pleasant 74-year-old female with slender build who ambulates full weightbearing without assistive device.  No antalgia.  Negative Trendelenburg gait.  Patient has no pain with forward flexion of the lumbar spine.  Lumbar extension causes anterior groin pain but no back pain.  Lateral bending does not generate pain.  Twisting motion does generate right hip pain.  Patient is neurovascular intact L2-S1 bilaterally.  Negative straight leg raise other than some slight hamstring tension bilaterally but no radicular component.  Patient is nontender over the anterior superior and inferior iliac spine.  Slight tenderness noted over the anterior hip capsule on the right side.  Logrolling on the right side does reproduce right anterior hemipelvic pain and FADIR testing is significantly positive reproducing right anterior hemipelvic pain.  Resisted hip flexion on the right side shows adequate iliopsoas muscle tone but reproduction of right anterior hemipelvic pain.  No pain over the greater trochanters bilaterally or posterior buttock/sciatic notch region.  Patient has mild discomfort over the right anterior hip with resisted quad testing but adequate quad and hamstring tone are noted on the right lower extremity.  No lower extremity edema.        RADIOLOGY AND LABORATORY:   Personal Independent visualization of the below images done today:  2 view x-ray of the patient's pelvis and right hip are obtained and reviewed today.  There is arthritic changes of the right hip with more central narrowing.  No evidence of fracture, dislocation or AVN.  Degenerative changes noted of the lumbar spine greatest at the L4/L5 and L5/S1 column with osteophyte spondylosis  noted on the right greater than left side.  Pelvic ring is intact.    Patient's conditions were thoroughly discussed during today's clinical visit with total time reviewing patient's previous medical records/history/radiology, face-to-face examination and discussion and plan of care with the patient and documentation being 40 minutes on today's clinical visit  Dl Dallas PA-C  [facility]    This note was completed in part using a voice recognition software, any grammatical or context distortion are unintentional and inherent to the software.       Again, thank you for allowing me to participate in the care of your patient.        Sincerely,        Dl Dallas PA-C    Electronically signed

## 2025-03-04 NOTE — PROGRESS NOTES
ASSESSMENT & PLAN       Today we discussed the underlying etiology/pathology of patient's   1. Primary osteoarthritis of right hip    2. Age-related cataract of both eyes, unspecified age-related cataract type- upcoming surgery in 03/2025    3. Osteopenia, unspecified location    4. Polymyalgia rheumatica-currently managed by rheumatology with 6 mg prednisone daily with biweekly tapering dosing        Today a shared decision making model was used. The patient's values and choices were respected. The following information represents what was discussed and decided upon by the provider and the patient.  -We discussed the patient has worsening right anterior hemipelvic pain for the past month after going on vacation in doing more physical activity such as walking along the beach as well as sightseeing.  - We reviewed updated x-rays of her pelvis and right hip which shows some progressive changes of the right hip with central narrowing osteoarthritis.  No evidence of fracture or avascular necrosis radiographically.  - We discussed patient's symptoms are reproduced with rotational testing of the right hip consistent with right hip osteoarthritis.  - Patient did have overall improved her symptoms when last seen in December 2024 with formal physical therapy program and patient continuing home exercise program as well as "[employer] exercise program but symptoms again have been aggravated over the last month.  - We discussed the etiology of osteoarthritis today.  There are various treatment options for the mainstay of management of osteoarthritis including rest, activity modification, proper technique with ambulation of steps/stairs which is to lead (start) with good leg going up and lead (start) with painful/symptomatic leg going down,  maintaining healthy weight management due to the 1:4 rule for joint stress with excess weight/load, herbal supplementation such as turmeric (1000 mg daily) to decrease inflammation in  the body , healthy nutrition, use of oral NSAIDs (if not contraindicated due to patient being on chronic blood thinner medication or other medical conditions) with supplemental Tylenol up to 3000 mg daily, topical pain relievers such as Voltaren 1% gel to be applied 3 times a day to the area of pain, consideration for intra-articular cortisone injections, visco supplementation injections or PRP injections, formal physical therapy for joint mobilization and strengthening and possible surgical consideration if all conservative treatments fail.   -We did discuss the patient was diagnosed with Polymyalgia rheumatica in October 2024 and currently is under the care of rheumatology.  Symptoms were largely proximal shoulder girdle and neck pain and has responded favorably with oral steroid treatment and tapering dose of the current oral prednisone is not controlling patient's right hip pain.  -We discussed the patient's upcoming bilateral cataract surgery this month.  We discussed risk and benefits of prolonged use of corticosteroids including cataract formation, immunosuppression, possible development of avascular necrosis, weight gain, worsening of osteoporosis as well as other concerns.  - Since symptoms seem to be isolated to the right hip with reproduction of pain with rotational testing we will get the patient set up for ultrasound-guided right hip intra-articular corticosteroid injection with one of my partners at the [place] office near future.  Patient will in the meantime reach out to her optometrist/eye surgeon further recommendations in regards to timing for intra-articular injection so we do not delay cataract surgery.  - Patient should follow-up with me 2 weeks after intra-articular right hip injection to determine response.  Patient should pay attention to her pre and postinjection pain scores.      - Appointment line phone number is [[phone]]     Dl Dallas PA-C  [facility]    This note was completed in part using a voice recognition software, any grammatical or context distortion are unintentional and inherent to the software.         SUBJECTIVE  Anali Sanchez is a/an 74 year old female who is seen for follow up of right hip pain. The patient is seen by themselves.  Since last visit on 12/01/2023 patient has gotten much better as long as was doing PT.  Was on vacation last month in [place] and doing a lot of walking on the soft sand and patient has worsening right anterior hip pain for the past month.  Original Date of Injury: 15 months ago    Expanded HPI: Patient was diagnosed with polymyalgia rheumatica in October 2024 due to proximal shoulder girdle pain and neck pain with elevated inflammatory markers.  Patient was placed on oral prednisone and currently is under the care of rheumatology with 6 mg dosing daily with decreasing biweekly dose of 1 mg.  Patient complains of pain in the anterior inguinal groin region.  Patient has remained active with home exercise program as well as [employer] exercise program.  She still ambulates without assistive device.  Patient has not utilize any other medication or treatment for her right knee pelvic pain.  Patient does have upcoming bilateral cataract surgery in the month of March 2025.      Has previous treatment plan been beneficial for condition: Yes overall but worsening symptoms for the past month after going on vacation doing sightseeing and walking activities  Treatment used since last visit:  PT  Location of Pain: right hip, groin area  Rating of Pain at worst: 7/10  Rating of Pain Currently: 7/10  Worsened by: activity  Better with: rest and avoidance  Treatments tried: rest/activity avoidance and elevation, tylenol.  Home exercises from PT.  Quality: aching  Associated symptoms: no distal numbness or tingling; denies swelling or warmth      Past Medical History:   Diagnosis Date    Anemia     Anomalous  atrioventricular excitation     surg for taylor 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 37    Coagulation disorder     hx hemorrhage after heart and after colon surg    History of atrial flutter     She has a history of WPW status post surgical ablation of a left lateral electrical pathway at age 37. She did not have any recurrent palpitations until 2014. At that time, she was seen at [facility] in [place] and had ECG consistent with atrial flutter. This episode of atrial flutter lasted several hours and terminated spontaneously. She saw cardiology after that and plan was for obser    History of blood transfusion     History of Gabino-Parkinson-White (WPW) syndrome     She has a history of WPW status post surgical ablation of a left lateral electrical pathway at age 37.     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    Added automatically from request for surgery [id number]      Other and unspecified nonspecific immunological findings     anti Jka red cell antibody     Social History     Socioeconomic History    Marital status:    Tobacco Use    Smoking status: Never     Passive exposure: Never    Smokeless tobacco: Never   Vaping Use    Vaping status: Never Used   Substance and Sexual Activity    Alcohol use: Yes     Alcohol/week: 4.0 - 5.0 standard drinks of alcohol     Types: 4 - 5 Glasses of wine per week     Comment: A glass of wine or beer, usually no more than one    Drug use: Never    Sexual activity: Yes     Partners: Male     Comment: Stopped using hormone     Social Drivers of Health     Financial Resource Strain: Low Risk  (10/3/2024)    Financial Resource Strain     Within the past 12 months, have you or your family members you live with been unable to get utilities (heat, electricity) when it was really needed?: No   Food Insecurity: Low Risk  (10/3/2024)    Food Insecurity     Within the past 12 months, did you worry that your food would run out before you got money to buy more?: No     Within the past 12 months, did the food you  bought just not last and you didn t have money to get more?: No   Transportation Needs: Low Risk  (10/3/2024)    Transportation Needs     Within the past 12 months, has lack of transportation kept you from medical appointments, getting your medicines, non-medical meetings or appointments, work, or from getting things that you need?: No   Physical Activity: Sufficiently Active (10/3/2024)    Exercise Vital Sign     Days of Exercise per Week: 3 days     Minutes of Exercise per Session: 60 min   Stress: Stress Concern Present (10/3/2024)    [place] [employer] - Occupational Stress Questionnaire     Feeling of Stress : To some extent   Social Connections: Unknown (10/3/2024)    Social Connection and Isolation Panel [NHANES]     Frequency of Social Gatherings with Friends and Family: Three times a week   Interpersonal Safety: Low Risk  (1/16/2025)    Interpersonal Safety     Do you feel physically and emotionally safe where you currently live?: Yes     Within the past 12 months, have you been hit, slapped, kicked or otherwise physically hurt by someone?: No     Within the past 12 months, have you been humiliated or emotionally abused in other ways by your partner or ex-partner?: No   Housing Stability: Low Risk  (10/3/2024)    Housing Stability     Do you have housing? : Yes     Are you worried about losing your housing?: No         Patient's past medical, surgical, social, and family histories were personally reviewed today and no changes are noted.  REVIEW OF SYSTEMS:  Review of Systems    OBJECTIVE:  Vital signs as noted in EPIC for 3/4/2025    General: healthy, alert and in no distress  HEENT: no scleral icterus or conjunctival erythema  Skin: no suspicious lesions or rash. No jaundice.  CV: no pedal edema  Resp: normal respiratory effort without conversational dyspnea   Psych: normal mood and affect  Neuro: Normal light sensory exam of lower extremity      MSK:  Exam shows a pleasant  74-year-old female with slender build who ambulates full weightbearing without assistive device.  No antalgia.  Negative Trendelenburg gait.  Patient has no pain with forward flexion of the lumbar spine.  Lumbar extension causes anterior groin pain but no back pain.  Lateral bending does not generate pain.  Twisting motion does generate right hip pain.  Patient is neurovascular intact L2-S1 bilaterally.  Negative straight leg raise other than some slight hamstring tension bilaterally but no radicular component.  Patient is nontender over the anterior superior and inferior iliac spine.  Slight tenderness noted over the anterior hip capsule on the right side.  Logrolling on the right side does reproduce right anterior hemipelvic pain and FADIR testing is significantly positive reproducing right anterior hemipelvic pain.  Resisted hip flexion on the right side shows adequate iliopsoas muscle tone but reproduction of right anterior hemipelvic pain.  No pain over the greater trochanters bilaterally or posterior buttock/sciatic notch region.  Patient has mild discomfort over the right anterior hip with resisted quad testing but adequate quad and hamstring tone are noted on the right lower extremity.  No lower extremity edema.        RADIOLOGY AND LABORATORY:   Personal Independent visualization of the below images done today:  2 view x-ray of the patient's pelvis and right hip are obtained and reviewed today.  There is arthritic changes of the right hip with more central narrowing.  No evidence of fracture, dislocation or AVN.  Degenerative changes noted of the lumbar spine greatest at the L4/L5 and L5/S1 column with osteophyte spondylosis noted on the right greater than left side.  Pelvic ring is intact.    Patient's conditions were thoroughly discussed during today's clinical visit with total time reviewing patient's previous medical records/history/radiology, face-to-face examination and discussion and plan of care  with the patient and documentation being 40 minutes on today's clinical visit  Dl Dallas PA-C  [facility]    This note was completed in part using a voice recognition software, any grammatical or context distortion are unintentional and inherent to the software.

## 2025-03-05 ENCOUNTER — PATIENT OUTREACH (OUTPATIENT)
Dept: CARE COORDINATION | Facility: CLINIC | Age: 75
End: 2025-03-05
Payer: COMMERCIAL

## 2025-03-14 NOTE — PROGRESS NOTES
Sports Medicine Procedure Note    Criss was seen today for injections.    Diagnoses and all orders for this visit:    Primary osteoarthritis of right hip  -     Orthopedic  Referral  -     Large Joint Injection/Arthocentesis: R hip joint        Anali Sanchez is a/an 74 year old female who is seen for Corticosteroid injection of intraarticular right hip.   Last injection: none    -CSI to R hip performed today under [facility], see procedure note below for details   -Post-injection instructions were provided to the patient    Follow up with LEROY Cheek as directed       Post-Injection Discharge Instructions    You may shower, however avoid swimming, tub baths or hot tubs for 24 hours following your procedure  You may have a mild to moderate increase in pain for a few days following the injection.  The lidocaine (local numbing medicine) will wear off in several hours. It usually takes 3-5 days for the steroid medication to start working although it may take up to 14 days for full effect.   You may use ice packs for 10-15 minutes, 3 to 4 times a day at the injection site for comfort if needed  You may use extra strength Tylenol for pain control if necessary   If you were fasting, you may resume your normal diet and medications after the procedure  If you have diabetes, your blood sugar may be higher than normal for 10-14 days following a steroid injection. Contact your doctor who manages your diabetes if your blood sugar is significantly higher than usual    If you experience any of the following, call Sports Medicine @  [phone]  -Fever over 100 degrees F  -Swelling, bleeding, redness, drainage, warmth at the injection site  -New or significant worsening pain        Dr. SAADIA Leung DO CAQ  [facility]  Sports Medicine     -----    Large Joint Injection/Arthocentesis: R hip joint    Date/Time: 3/17/2025 10:23 AM    Performed by: David Leung DO  Authorized by: David Leung,  DO    Indications:  Pain and osteoarthritis  Needle Size:  22 G  Guidance: ultrasound    Approach:  Anterior  Location:  Hip      Site:  R hip joint  Medications:  6 mg betamethasone acet & sod phos 6 (3-3) MG/ML; 5 mL lidocaine 1 %; 2 mL ROPivacaine 5 MG/ML  Medications comment:  1ml of 8.4% Sodium Bicarbonate solution was used to buffer the local numbing agent for today's injection    Outcome:  Tolerated well, no immediate complications  Procedure discussed: discussed risks, benefits, and alternatives    Consent Given by:  Patient  Timeout: timeout called immediately prior to procedure    Prep: patient was prepped and draped in usual sterile fashion     Ultrasound was used to ensure safe and accurate needle placement and injection. Ultrasound images of the procedure were permanently stored.

## 2025-03-17 ENCOUNTER — OFFICE VISIT (OUTPATIENT)
Dept: ORTHOPEDICS | Facility: CLINIC | Age: 75
End: 2025-03-17
Attending: PHYSICIAN ASSISTANT
Payer: COMMERCIAL

## 2025-03-17 VITALS — HEIGHT: 63 IN | BODY MASS INDEX: 26.4 KG/M2 | WEIGHT: 149 LBS

## 2025-03-17 DIAGNOSIS — M16.11 PRIMARY OSTEOARTHRITIS OF RIGHT HIP: Primary | ICD-10-CM

## 2025-03-17 PROCEDURE — 20611 DRAIN/INJ JOINT/BURSA W/US: CPT | Mod: RT | Performed by: STUDENT IN AN ORGANIZED HEALTH CARE EDUCATION/TRAINING PROGRAM

## 2025-03-17 RX ORDER — BETAMETHASONE SODIUM PHOSPHATE AND BETAMETHASONE ACETATE 3; 3 MG/ML; MG/ML
6 INJECTION, SUSPENSION INTRA-ARTICULAR; INTRALESIONAL; INTRAMUSCULAR; SOFT TISSUE
Status: COMPLETED | OUTPATIENT
Start: 2025-03-17 | End: 2025-03-17

## 2025-03-17 RX ORDER — LIDOCAINE HYDROCHLORIDE 10 MG/ML
5 INJECTION, SOLUTION INFILTRATION; PERINEURAL
Status: COMPLETED | OUTPATIENT
Start: 2025-03-17 | End: 2025-03-17

## 2025-03-17 RX ORDER — ROPIVACAINE HYDROCHLORIDE 5 MG/ML
2 INJECTION, SOLUTION EPIDURAL; INFILTRATION; PERINEURAL
Status: COMPLETED | OUTPATIENT
Start: 2025-03-17 | End: 2025-03-17

## 2025-03-17 RX ADMIN — BETAMETHASONE SODIUM PHOSPHATE AND BETAMETHASONE ACETATE 6 MG: 3; 3 INJECTION, SUSPENSION INTRA-ARTICULAR; INTRALESIONAL; INTRAMUSCULAR; SOFT TISSUE at 10:23

## 2025-03-17 RX ADMIN — ROPIVACAINE HYDROCHLORIDE 2 ML: 5 INJECTION, SOLUTION EPIDURAL; INFILTRATION; PERINEURAL at 10:23

## 2025-03-17 RX ADMIN — LIDOCAINE HYDROCHLORIDE 5 ML: 10 INJECTION, SOLUTION INFILTRATION; PERINEURAL at 10:23

## 2025-03-17 NOTE — LETTER
3/17/2025      Anali Sanchez  [address] [facility] [address]      Dear Colleague,    Thank you for referring your patient, Anali Sanchez, to the [facility] [place]. Please see a copy of my visit note below.    Sports Medicine Procedure Note    Criss was seen today for injections.    Diagnoses and all orders for this visit:    Primary osteoarthritis of right hip  -     Orthopedic  Referral  -     Large Joint Injection/Arthocentesis: R hip joint        Anali Sanchez is a/an 74 year old female who is seen for Corticosteroid injection of intraarticular right hip.   Last injection: none    -CSI to R hip performed today under [facility], see procedure note below for details   -Post-injection instructions were provided to the patient    Follow up with LEROY Cheek as directed       Post-Injection Discharge Instructions    You may shower, however avoid swimming, tub baths or hot tubs for 24 hours following your procedure  You may have a mild to moderate increase in pain for a few days following the injection.  The lidocaine (local numbing medicine) will wear off in several hours. It usually takes 3-5 days for the steroid medication to start working although it may take up to 14 days for full effect.   You may use ice packs for 10-15 minutes, 3 to 4 times a day at the injection site for comfort if needed  You may use extra strength Tylenol for pain control if necessary   If you were fasting, you may resume your normal diet and medications after the procedure  If you have diabetes, your blood sugar may be higher than normal for 10-14 days following a steroid injection. Contact your doctor who manages your diabetes if your blood sugar is significantly higher than usual    If you experience any of the following, call Sports Medicine @  [phone]  -Fever over 100 degrees F  -Swelling, bleeding, redness, drainage, warmth at the injection site  -New or  significant worsening pain        Dr. SAADIA Leung DO CAQ  [facility]  Sports Medicine     -----    Large Joint Injection/Arthocentesis: R hip joint    Date/Time: 3/17/2025 10:23 AM    Performed by: David Leung DO  Authorized by: David Leung DO    Indications:  Pain and osteoarthritis  Needle Size:  22 G  Guidance: ultrasound    Approach:  Anterior  Location:  Hip      Site:  R hip joint  Medications:  6 mg betamethasone acet & sod phos 6 (3-3) MG/ML; 5 mL lidocaine 1 %; 2 mL ROPivacaine 5 MG/ML  Medications comment:  1ml of 8.4% Sodium Bicarbonate solution was used to buffer the local numbing agent for today's injection    Outcome:  Tolerated well, no immediate complications  Procedure discussed: discussed risks, benefits, and alternatives    Consent Given by:  Patient  Timeout: timeout called immediately prior to procedure    Prep: patient was prepped and draped in usual sterile fashion     Ultrasound was used to ensure safe and accurate needle placement and injection. Ultrasound images of the procedure were permanently stored.          Again, thank you for allowing me to participate in the care of your patient.        Sincerely,        David Leung DO    Electronically signed

## 2025-03-20 NOTE — PROGRESS NOTES
ASSESSMENT & PLAN       Today we discussed the underlying etiology/pathology of patient's   1. Primary osteoarthritis of right hip    2. Age-related cataract of both eyes, unspecified age-related cataract type- recoverying from surgery in 03/2025    3. Polymyalgia rheumatica-currently managed by rheumatology with 4 mg prednisone daily with biweekly tapering dosing        Today a shared decision making model was used. The patient's values and choices were respected. The following information represents what was discussed and decided upon by the provider and the patient.  -We discussed the patient follows up today to discuss response to right hip intra-articular cortisone injection done on 03/17/2025 under ultrasound guidance.  Patient had 100% pain relief for approximately 24 hours but now is back to baseline.  - We discussed that she is still under guidance and treatment at [facility] orthopedics for polymyalgia rheumatica currently on 4 mg a day with tapering dose of 1 mg every 2 weeks.  Those generalized myalgia symptoms are well managed at this time.  - We discussed the patient is having difficulty with ambulation specifically going up steps, needs to compensate by actively helping her right hip flexed to get in and out of the car or bed and has difficulty with prolonged ambulation/walking activities.  - Her physical exam shows reproduction of right anterior hemipelvic pain with FADIR testing as well as pain with resisted active hip flexion.  Due to failure of conservative treatment we will proceed with a right hip MRI to evaluate for severity of underlying arthritis, possible avascular necrosis due to use of prednisone or any other muscle involvement.  I will contact the patient via telephone/MyChart with results when known and update treatment plan at that time  - We discussed the etiology of osteoarthritis today.  There are various treatment options for the mainstay of management of osteoarthritis including  rest, activity modification, proper technique with ambulation of steps/stairs which is to lead (start) with good leg going up and lead (start) with painful/symptomatic leg going down,  maintaining healthy weight management due to the 1:4 rule for joint stress with excess weight/load, herbal supplementation such as turmeric (1000 mg daily) to decrease inflammation in the body , healthy nutrition, use of oral NSAIDs (if not contraindicated due to patient being on chronic blood thinner medication or other medical conditions) with supplemental Tylenol up to 3000 mg daily, topical pain relievers such as Voltaren 1% gel to be applied 3 times a day to the area of pain, consideration for intra-articular cortisone injections, visco supplementation injections or PRP injections, formal physical therapy for joint mobilization and strengthening and possible surgical consideration if all conservative treatments fail.   -We discussed currently patient is using Tylenol 325 mg tablet 3 times daily and we discussed that it would be acceptable to go up to 3000 mg a day for additional pain control if desired.  Patient will avoid additional NSAIDs at this time while taking prednisone.  -We discussed utilizing a cane or some type of assistive device in her left hand with walking to help minimize stress on her right hip.  -Patient will review attached AVS literature in regards to hip arthritis as well as potential indications for hip arthroplasty/replacement    - Appointment line phone number is [[phone]]     Dl Dallas PA-C  [facility]    This note was completed in part using a voice recognition software, any grammatical or context distortion are unintentional and inherent to the software.         SUBJECTIVE  Anali Sanchez is a/an 74 year old female who is seen for follow up of right hip pain.  Osteoarthritis. The patient is seen by themselves.  Since last visit on 03/04/2025 patient feels she has  gotten worse.  Patient had a intra-articular hip injection under ultrasound guidance with cortisone on 03/17/2025 with 100% temporary relief for 24 hours but now her symptoms have returned.  She is having difficulty with ambulation of steps moving up, difficulty with prolonged walking activities as well as needing to assist to  her leg for active hip flexion swinging her leg in and out of the car or bed.  We did discuss the patient has had formal physical therapy dating back all the way to 2019 in regards to hemipelvic pain and history of back pain.  Original Date of Injury: 15 months ago    Has previous treatment plan been beneficial for condition: Yes, temporarily with intra-articular injection but now symptoms are back to baseline and/or worsening  Treatment used since last visit:  corticosteroid injection by Dr Leung on 03/17/2025.   Location of Pain: right hip, groin area  Rating of Pain at worst: 7/10  Rating of Pain Currently: 7/10  Worsened by: walking, going up the stairs.  Hard to lift right leg to get into car.  Better with: ice helps temporarily.  Tylenol makes the pain more tolerable.  Treatments tried: rest/activity avoidance, ice, heat, Tylenol, home exercises, previous imaging (xray 03/04/2025), and corticosteroid injection (most recent date: 03/17/2025) that provided  1 day(s) of relief  Quality: aching, deep  Associated symptoms: feeling of instability.  Used to lock and cath but since doing home exercises, this has gone away.      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 She did not have any recurrent palpitations until 2014. At that time, she was seen at [facility] in [place] and had  ECG consistent with atrial flutter. This episode of atrial flutter lasted several hours and terminated spontaneously. She saw cardiology after that and plan was for obser    History of blood transfusion     History of Gabino-Parkinson-White (WPW) syndrome     She has a history of WPW status post surgical ablation of a left lateral electrical pathway at age 37.     Hypertension     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    Added automatically from request for surgery [id number]      Other and unspecified nonspecific immunological findings     anti Jka red cell antibody    Thyroid disease      Social History     Socioeconomic History    Marital status:    Tobacco Use    Smoking status: Never     Passive exposure: Never    Smokeless tobacco: Never   Vaping Use    Vaping status: Never Used   Substance and Sexual Activity    Alcohol use: Yes     Alcohol/week: 4.0 - 5.0 standard drinks of alcohol     Types: 4 - 5 Glasses of wine per week     Comment: A glass of wine or beer, usually no more than one    Drug use: Never    Sexual activity: Not Currently     Partners: Male     Birth control/protection: None     Comment: Stopped using hormone   Other Topics Concern    Parent/sibling w/ CABG, MI or angioplasty before 65F 55M? No     Social Drivers of Health     Financial Resource Strain: Low Risk  (10/3/2024)    Financial Resource Strain     Within the past 12 months, have you or your family members you live with been unable to get utilities (heat, electricity) when it was really needed?: No   Food Insecurity: Low Risk  (10/3/2024)    Food Insecurity     Within the past 12 months, did you worry that your food would run out before you got money to buy more?: No     Within the past 12 months, did the food you bought just not last and you didn t have money to get more?: No   Transportation Needs: Low Risk  (10/3/2024)    Transportation Needs     Within the past 12 months, has lack of transportation kept you from medical appointments,  getting your medicines, non-medical meetings or appointments, work, or from getting things that you need?: No   Physical Activity: Sufficiently Active (10/3/2024)    Exercise Vital Sign     Days of Exercise per Week: 3 days     Minutes of Exercise per Session: 60 min   Stress: Stress Concern Present (10/3/2024)    [employer] [place] [employer] - Occupational Stress Questionnaire     Feeling of Stress : To some extent   Social Connections: Unknown (10/3/2024)    Social Connection and Isolation Panel [NHANES]     Frequency of Social Gatherings with Friends and Family: Three times a week   Interpersonal Safety: Low Risk  (1/16/2025)    Interpersonal Safety     Do you feel physically and emotionally safe where you currently live?: Yes     Within the past 12 months, have you been hit, slapped, kicked or otherwise physically hurt by someone?: No     Within the past 12 months, have you been humiliated or emotionally abused in other ways by your partner or ex-partner?: No   Housing Stability: Low Risk  (10/3/2024)    Housing Stability     Do you have housing? : Yes     Are you worried about losing your housing?: No         Patient's past medical, surgical, social, and family histories were personally reviewed today and no changes are noted.  REVIEW OF SYSTEMS:  Review of Systems    OBJECTIVE:  Vital signs as noted in EPIC for 3/20/2025    General: healthy, alert and in no distress  HEENT: no scleral icterus or conjunctival erythema  Skin: no suspicious lesions or rash. No jaundice.  CV: no pedal edema  Resp: normal respiratory effort without conversational dyspnea   Psych: normal mood and affect  Neuro: Normal light sensory exam of lower extremity      MSK:  Exam shows a very pleasant 74-year-old female who ambulates full weightbearing without assistive device.  Negative Trendelenburg gait.  Alert and orientated x 3.  Patient has reproduction of right anterior hemipelvic pain with FADIR testing with loss of  internal rotation.  She has increased right hemipelvic pain anteriorly with resisted hip flexion.  She still shows adequate motor tone of iliopsoas, quadriceps and hamstring muscles.  No pain to palpation around the hemipelvis including greater trochanter.  Straight leg raises negative for radicular component.  Patient is grossly neurovascularly intact L2-S1 bilaterally        RADIOLOGY AND LABORATORY:   Personal Independent visualization of the below images done today:  Patient's previous pelvis and right hip x-rays are reviewed again today with the patient.  X-rays show maintenance of joint space superiorly.  There appears to be some slight narrowing centrally.  No evidence of fracture or AVN radiographically.  Degenerative changes noted of the lumbar spine as well as the SI joints.  Narrative & Impression   EXAM: XR PELVIS AND HIP RIGHT 1 VIEW  LOCATION: [facility]  DATE: 3/4/2025     INDICATION: progressive right hip pain.  evaluate for progressive OA changes  COMPARISON: 11/17/2023                                                                       IMPRESSION: Mild degenerative arthrosis of both hips. No acute fracture.     Mild degenerative arthrosis of both SI joints. Lower lumbar facet arthropathy and degenerative interspace narrowing.     Radiopaque suture material overlies the sacrum.      Patient's conditions were thoroughly discussed during today's clinical visit with total time reviewing patient's previous medical records/history/radiology, face-to-face examination and discussion and plan of care with the patient and documentation being 30 minutes on today's clinical visit  Dl Dallas PA-C  [facility]    This note was completed in part using a voice recognition software, any grammatical or context distortion are unintentional and inherent to the software.

## 2025-04-01 ENCOUNTER — HOSPITAL ENCOUNTER (OUTPATIENT)
Dept: ULTRASOUND IMAGING | Facility: CLINIC | Age: 75
Discharge: HOME | End: 2025-04-01
Attending: PHYSICIAN ASSISTANT | Admitting: PHYSICIAN ASSISTANT
Payer: COMMERCIAL

## 2025-04-01 DIAGNOSIS — I65.23 MILD ATHEROSCLEROSIS OF CAROTID ARTERY, BILATERAL: ICD-10-CM

## 2025-04-01 PROCEDURE — 93880 EXTRACRANIAL BILAT STUDY: CPT

## 2025-04-02 ENCOUNTER — OFFICE VISIT (OUTPATIENT)
Dept: ORTHOPEDICS | Facility: CLINIC | Age: 75
End: 2025-04-02
Payer: COMMERCIAL

## 2025-04-02 VITALS — HEIGHT: 63 IN | BODY MASS INDEX: 26.39 KG/M2

## 2025-04-02 DIAGNOSIS — M35.3 POLYMYALGIA RHEUMATICA: ICD-10-CM

## 2025-04-02 DIAGNOSIS — H25.9 AGE-RELATED CATARACT OF BOTH EYES, UNSPECIFIED AGE-RELATED CATARACT TYPE: ICD-10-CM

## 2025-04-02 DIAGNOSIS — M16.11 PRIMARY OSTEOARTHRITIS OF RIGHT HIP: Primary | ICD-10-CM

## 2025-04-02 RX ORDER — OLMESARTAN MEDOXOMIL 20 MG/1
10 TABLET ORAL DAILY
COMMUNITY

## 2025-04-02 NOTE — LETTER
4/2/2025      Anali Sanchez  [address] [facility] [id number]      Dear Colleague,    Thank you for referring your patient, Anali Sanchez, to the [facility]. Please see a copy of my visit note below.    ASSESSMENT & PLAN       Today we discussed the underlying etiology/pathology of patient's   1. Primary osteoarthritis of right hip    2. Age-related cataract of both eyes, unspecified age-related cataract type- recoverying from surgery in 03/2025    3. Polymyalgia rheumatica-currently managed by rheumatology with 4 mg prednisone daily with biweekly tapering dosing        Today a shared decision making model was used. The patient's values and choices were respected. The following information represents what was discussed and decided upon by the provider and the patient.  -We discussed the patient follows up today to discuss response to right hip intra-articular cortisone injection done on 03/17/2025 under ultrasound guidance.  Patient had 100% pain relief for approximately 24 hours but now is back to baseline.  - We discussed that she is still under guidance and treatment at [facility] orthopedics for polymyalgia rheumatica currently on 4 mg a day with tapering dose of 1 mg every 2 weeks.  Those generalized myalgia symptoms are well managed at this time.  - We discussed the patient is having difficulty with ambulation specifically going up steps, needs to compensate by actively helping her right hip flexed to get in and out of the car or bed and has difficulty with prolonged ambulation/walking activities.  - Her physical exam shows reproduction of right anterior hemipelvic pain with FADIR testing as well as pain with resisted active hip flexion.  Due to failure of conservative treatment we will proceed with a right hip MRI to evaluate for severity of underlying arthritis, possible avascular necrosis due to use of prednisone or any other muscle  involvement.  I will contact the patient via telephone/MyChart with results when known and update treatment plan at that time  - We discussed the etiology of osteoarthritis today.  There are various treatment options for the mainstay of management of osteoarthritis including rest, activity modification, proper technique with ambulation of steps/stairs which is to lead (start) with good leg going up and lead (start) with painful/symptomatic leg going down,  maintaining healthy weight management due to the 1:4 rule for joint stress with excess weight/load, herbal supplementation such as turmeric (1000 mg daily) to decrease inflammation in the body , healthy nutrition, use of oral NSAIDs (if not contraindicated due to patient being on chronic blood thinner medication or other medical conditions) with supplemental Tylenol up to 3000 mg daily, topical pain relievers such as Voltaren 1% gel to be applied 3 times a day to the area of pain, consideration for intra-articular cortisone injections, visco supplementation injections or PRP injections, formal physical therapy for joint mobilization and strengthening and possible surgical consideration if all conservative treatments fail.   -We discussed currently patient is using Tylenol 325 mg tablet 3 times daily and we discussed that it would be acceptable to go up to 3000 mg a day for additional pain control if desired.  Patient will avoid additional NSAIDs at this time while taking prednisone.  -We discussed utilizing a cane or some type of assistive device in her left hand with walking to help minimize stress on her right hip.  -Patient will review attached AVS literature in regards to hip arthritis as well as potential indications for hip arthroplasty/replacement    - Appointment line phone number is [[phone]]     Dl Dallas PA-C  [facility]    This note was completed in part using a voice recognition software, any grammatical or context  distortion are unintentional and inherent to the software.         SUBJECTIVE  Anali Sanchez is a/an 74 year old female who is seen for follow up of right hip pain.  Osteoarthritis. The patient is seen by themselves.  Since last visit on 03/04/2025 patient feels she has gotten worse.  Patient had a intra-articular hip injection under ultrasound guidance with cortisone on 03/17/2025 with 100% temporary relief for 24 hours but now her symptoms have returned.  She is having difficulty with ambulation of steps moving up, difficulty with prolonged walking activities as well as needing to assist to  her leg for active hip flexion swinging her leg in and out of the car or bed.  We did discuss the patient has had formal physical therapy dating back all the way to 2019 in regards to hemipelvic pain and history of back pain.  Original Date of Injury: 15 months ago    Has previous treatment plan been beneficial for condition: Yes, temporarily with intra-articular injection but now symptoms are back to baseline and/or worsening  Treatment used since last visit:  corticosteroid injection by Dr Leung on 03/17/2025.   Location of Pain: right hip, groin area  Rating of Pain at worst: 7/10  Rating of Pain Currently: 7/10  Worsened by: walking, going up the stairs.  Hard to lift right leg to get into car.  Better with: ice helps temporarily.  Tylenol makes the pain more tolerable.  Treatments tried: rest/activity avoidance, ice, heat, Tylenol, home exercises, previous imaging (xray 03/04/2025), and corticosteroid injection (most recent date: 03/17/2025) that provided  1 day(s) of relief  Quality: aching, deep  Associated symptoms: feeling of instability.  Used to lock and cath but since doing home exercises, this has gone away.      Past Medical History:   Diagnosis Date     Anemia      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37     Coagulation disorder     hx hemorrhage after heart and  after colon surg     History of atrial flutter     She has a history of WPW status post surgical ablation of a left lateral electrical pathway at age 37. She did not have any recurrent palpitations until 2014. At that time, she was seen at [facility] in [place] and had ECG consistent with atrial flutter. This episode of atrial flutter lasted several hours and terminated spontaneously. She saw cardiology after that and plan was for obser     History of blood transfusion      History of Gabino-Parkinson-White (WPW) syndrome     She has a history of WPW status post surgical ablation of a left lateral electrical pathway at age 37.      Hypertension      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    Added automatically from request for surgery [id number]       Other and unspecified nonspecific immunological findings     anti Jka red cell antibody     Thyroid disease      Social History     Socioeconomic History     Marital status:    Tobacco Use     Smoking status: Never     Passive exposure: Never     Smokeless tobacco: Never   Vaping Use     Vaping status: Never Used   Substance and Sexual Activity     Alcohol use: Yes     Alcohol/week: 4.0 - 5.0 standard drinks of alcohol     Types: 4 - 5 Glasses of wine per week     Comment: A glass of wine or beer, usually no more than one     Drug use: Never     Sexual activity: Not Currently     Partners: Male     Birth control/protection: None     Comment: Stopped using hormone   Other Topics Concern     Parent/sibling w/ CABG, MI or angioplasty before 65F 55M? No     Social Drivers of Health     Financial Resource Strain: Low Risk  (10/3/2024)    Financial Resource Strain      Within the past 12 months, have you or your family members you live with been unable to get utilities (heat, electricity) when it was really needed?: No   Food Insecurity: Low Risk  (10/3/2024)    Food Insecurity      Within the past 12 months, did you worry that your food would run out before you got money  to buy more?: No      Within the past 12 months, did the food you bought just not last and you didn t have money to get more?: No   Transportation Needs: Low Risk  (10/3/2024)    Transportation Needs      Within the past 12 months, has lack of transportation kept you from medical appointments, getting your medicines, non-medical meetings or appointments, work, or from getting things that you need?: No   Physical Activity: Sufficiently Active (10/3/2024)    Exercise Vital Sign      Days of Exercise per Week: 3 days      Minutes of Exercise per Session: 60 min   Stress: Stress Concern Present (10/3/2024)    [employer] [place] [employer] - Occupational Stress Questionnaire      Feeling of Stress : To some extent   Social Connections: Unknown (10/3/2024)    Social Connection and Isolation Panel [NHANES]      Frequency of Social Gatherings with Friends and Family: Three times a week   Interpersonal Safety: Low Risk  (1/16/2025)    Interpersonal Safety      Do you feel physically and emotionally safe where you currently live?: Yes      Within the past 12 months, have you been hit, slapped, kicked or otherwise physically hurt by someone?: No      Within the past 12 months, have you been humiliated or emotionally abused in other ways by your partner or ex-partner?: No   Housing Stability: Low Risk  (10/3/2024)    Housing Stability      Do you have housing? : Yes      Are you worried about losing your housing?: No         Patient's past medical, surgical, social, and family histories were personally reviewed today and no changes are noted.  REVIEW OF SYSTEMS:  Review of Systems    OBJECTIVE:  Vital signs as noted in EPIC for 3/20/2025    General: healthy, alert and in no distress  HEENT: no scleral icterus or conjunctival erythema  Skin: no suspicious lesions or rash. No jaundice.  CV: no pedal edema  Resp: normal respiratory effort without conversational dyspnea   Psych: normal mood and affect  Neuro: Normal  light sensory exam of lower extremity      MSK:  Exam shows a very pleasant 74-year-old female who ambulates full weightbearing without assistive device.  Negative Trendelenburg gait.  Alert and orientated x 3.  Patient has reproduction of right anterior hemipelvic pain with FADIR testing with loss of internal rotation.  She has increased right hemipelvic pain anteriorly with resisted hip flexion.  She still shows adequate motor tone of iliopsoas, quadriceps and hamstring muscles.  No pain to palpation around the hemipelvis including greater trochanter.  Straight leg raises negative for radicular component.  Patient is grossly neurovascularly intact L2-S1 bilaterally        RADIOLOGY AND LABORATORY:   Personal Independent visualization of the below images done today:  Patient's previous pelvis and right hip x-rays are reviewed again today with the patient.  X-rays show maintenance of joint space superiorly.  There appears to be some slight narrowing centrally.  No evidence of fracture or AVN radiographically.  Degenerative changes noted of the lumbar spine as well as the SI joints.  Narrative & Impression   EXAM: XR PELVIS AND HIP RIGHT 1 VIEW  LOCATION: [facility]  DATE: 3/4/2025     INDICATION: progressive right hip pain.  evaluate for progressive OA changes  COMPARISON: 11/17/2023                                                                       IMPRESSION: Mild degenerative arthrosis of both hips. No acute fracture.     Mild degenerative arthrosis of both SI joints. Lower lumbar facet arthropathy and degenerative interspace narrowing.     Radiopaque suture material overlies the sacrum.      Patient's conditions were thoroughly discussed during today's clinical visit with total time reviewing patient's previous medical records/history/radiology, face-to-face examination and discussion and plan of care with the patient and documentation being 30 minutes on today's clinical visit  Dl  Law STEIN  [facility]    This note was completed in part using a voice recognition software, any grammatical or context distortion are unintentional and inherent to the software.       Again, thank you for allowing me to participate in the care of your patient.        Sincerely,        Dl Dallas PA-C    Electronically signed none

## 2025-04-02 NOTE — PATIENT INSTRUCTIONS
Thank you for allowing me to be part of your care team. My personal goal for your visit today is that you felt that I listened to you, you understood your diagnosis and treatment options and our staff/clinic met your expectations. We strive to provide you excellent care.  If you felt like your expectations were not met at your visit today or if you have further questions about your visit or care, please send me a [employer] message and I would be happy answer any questions or listen to your feedback.  If you do not have MyChart, you can call [phone] to speak with me.      If advanced imaging (MRI, CT scan) or blood work was ordered at your appointment today, I will contact you as we discussed today either by telephone call or Qio message within 48 hours after your advanced imaging testing has been completed or when ALL your lab results are available to review/discuss with you.       Today we discussed the underlying etiology/pathology of patient's   1. Primary osteoarthritis of right hip    2. Age-related cataract of both eyes, unspecified age-related cataract type- recoverying from surgery in 03/2025    3. Polymyalgia rheumatica-currently managed by rheumatology with 4 mg prednisone daily with biweekly tapering dosing        Today a shared decision making model was used. The patient's values and choices were respected. The following information represents what was discussed and decided upon by the provider and the patient.  -We discussed the patient follows up today to discuss response to right hip intra-articular cortisone injection done on 03/17/2025 under ultrasound guidance.  Patient had 100% pain relief for approximately 24 hours but now is back to baseline.  - We discussed that she is still under guidance and treatment at [facility] for polymyalgia rheumatica currently on 4 mg a day with tapering dose of 1 mg every 2 weeks.  Those generalized myalgia symptoms are well managed at this  time.  - We discussed the patient is having difficulty with ambulation specifically going up steps, needs to compensate by actively helping her right hip flexed to get in and out of the car or bed and has difficulty with prolonged ambulation/walking activities.  - Her physical exam shows reproduction of right anterior hemipelvic pain with FADIR testing as well as pain with resisted active hip flexion.  Due to failure of conservative treatment we will proceed with a right hip MRI to evaluate for severity of underlying arthritis, possible avascular necrosis due to use of prednisone or any other muscle involvement.  I will contact the patient via telephone/MyChart with results when known and update treatment plan at that time  - We discussed the etiology of osteoarthritis today.  There are various treatment options for the mainstay of management of osteoarthritis including rest, activity modification, proper technique with ambulation of steps/stairs which is to lead (start) with good leg going up and lead (start) with painful/symptomatic leg going down,  maintaining healthy weight management due to the 1:4 rule for joint stress with excess weight/load, herbal supplementation such as turmeric (1000 mg daily) to decrease inflammation in the body , healthy nutrition, use of oral NSAIDs (if not contraindicated due to patient being on chronic blood thinner medication or other medical conditions) with supplemental Tylenol up to 3000 mg daily, topical pain relievers such as Voltaren 1% gel to be applied 3 times a day to the area of pain, consideration for intra-articular cortisone injections, visco supplementation injections or PRP injections, formal physical therapy for joint mobilization and strengthening and possible surgical consideration if all conservative treatments fail.   -We discussed currently patient is using Tylenol 325 mg tablet 3 times daily and we discussed that it would be acceptable to go up to 3000 mg a day  for additional pain control if desired.  Patient will avoid additional NSAIDs at this time while taking prednisone.  -We discussed utilizing a cane or some type of assistive device in her left hand with walking to help minimize stress on her right hip.  -Patient will review attached AVS literature in regards to hip arthritis as well as potential indications for hip arthroplasty/replacement    - Appointment line phone number is [[phone]]     Dl Dallas PA-C  [facility]    This note was completed in part using a voice recognition software, any grammatical or context distortion are unintentional and inherent to the software.

## 2025-04-10 ENCOUNTER — HOSPITAL ENCOUNTER (OUTPATIENT)
Dept: MRI IMAGING | Facility: CLINIC | Age: 75
Discharge: HOME | End: 2025-04-10
Attending: PHYSICIAN ASSISTANT
Payer: COMMERCIAL

## 2025-04-10 DIAGNOSIS — M35.3 POLYMYALGIA RHEUMATICA: ICD-10-CM

## 2025-04-10 DIAGNOSIS — M16.11 PRIMARY OSTEOARTHRITIS OF RIGHT HIP: ICD-10-CM

## 2025-04-10 PROCEDURE — 73721 MRI JNT OF LWR EXTRE W/O DYE: CPT | Mod: RT

## 2025-04-14 ENCOUNTER — PATIENT OUTREACH (OUTPATIENT)
Dept: CARE COORDINATION | Facility: CLINIC | Age: 75
End: 2025-04-14
Payer: COMMERCIAL

## 2025-04-17 ENCOUNTER — PATIENT OUTREACH (OUTPATIENT)
Dept: CARE COORDINATION | Facility: CLINIC | Age: 75
End: 2025-04-17

## 2025-04-23 ENCOUNTER — TRANSFERRED RECORDS (OUTPATIENT)
Dept: HEALTH INFORMATION MANAGEMENT | Facility: CLINIC | Age: 75
End: 2025-04-23
Payer: COMMERCIAL

## 2025-04-24 ENCOUNTER — MYC MEDICAL ADVICE (OUTPATIENT)
Dept: ORTHOPEDICS | Facility: CLINIC | Age: 75
End: 2025-04-24
Payer: COMMERCIAL

## 2025-04-24 DIAGNOSIS — M16.11 PRIMARY OSTEOARTHRITIS OF RIGHT HIP: Primary | ICD-10-CM

## 2025-04-24 NOTE — CONFIDENTIAL NOTE
Patient has consulted for her total hip arthroplasty at [facility].  Patient requesting single-point cane.  Order was placed for patient to  single-point cane at the [facility] location at [facility].  I spoke with patient today and this request has been completed   Dl Dallas PA-C

## 2025-05-05 ENCOUNTER — TELEPHONE (OUTPATIENT)
Dept: HEMATOLOGY | Facility: CLINIC | Age: 75
End: 2025-05-05
Payer: COMMERCIAL

## 2025-05-05 NOTE — CONFIDENTIAL NOTE
[id number]  Anali A Laura  74 year old female  CBCD Diagnosis: Bleeding Diathesis  CBCD Provider: Odell    Incoming call from Marybeth Rivera with [facility] - direct number [phone]. She reports Criss will be scheduled with their team for a total hip arthroplasty in June. They are wondering about DVT prophylaxis recommendations and also if any preventative measures need to be done for her bleeding history. RN will with review with Dr. Bain and call Marybeth back with guidance.     Ivonne HARPER, RN, N  [facility]  Office: [phone]  Fax: [phone]    Addendum  RN reviewed plan with Carissa Bojorquez PA-C. Per Dr. Bain's previous note all of Criss's factor levels are now within normal limits suggesting her previously low factor levels may have been temporariliy low due to liver dysfunction. We believe her bleeding risk is low. Our recommendations for a total hip arthroplasty are:     Tranexamic acid 1000mg IV 30-60 minutes prior to the surgery and then continue Q8 hours while hospitalized. We are okay with low dose asprin post procedure for DVT prophylaxis- however if she has increased bruising and bleeding we would recommend discontinuing the use. She has used and tolerated this medication in the past ([id number]).     RN did call and leave a voicemail asking for a call fax number to send over recommendations.    Ivonne HARPER, RN, N  [facility]  Office: [phone]  Fax: [phone]    Addendum  SAGAR Guerrero, with [facility] called back and provided direct fax number. RN also called and spoke with Criss who said she is scheduled on 6/25. RN did review recommendations with her and she was fine with this plan. These notes will be faxed along with contact information should any further questions arise.     Ivonne HARPER, RN, N  [facility]  [facility]  Office: [phone]  Fax: [phone]

## 2025-05-14 DIAGNOSIS — E03.9 HYPOTHYROIDISM, UNSPECIFIED TYPE: ICD-10-CM

## 2025-05-15 RX ORDER — LEVOTHYROXINE SODIUM 25 UG/1
25 TABLET ORAL DAILY
Qty: 90 TABLET | Refills: 0 | Status: SHIPPED | OUTPATIENT
Start: 2025-05-15

## 2025-05-27 ENCOUNTER — RESULTS FOLLOW-UP (OUTPATIENT)
Dept: FAMILY MEDICINE | Facility: CLINIC | Age: 75
End: 2025-05-27

## 2025-05-27 ENCOUNTER — OFFICE VISIT (OUTPATIENT)
Dept: FAMILY MEDICINE | Facility: CLINIC | Age: 75
End: 2025-05-27
Payer: COMMERCIAL

## 2025-05-27 VITALS
OXYGEN SATURATION: 100 % | DIASTOLIC BLOOD PRESSURE: 68 MMHG | TEMPERATURE: 97.8 F | HEART RATE: 69 BPM | SYSTOLIC BLOOD PRESSURE: 103 MMHG | WEIGHT: 142.9 LBS | BODY MASS INDEX: 25.32 KG/M2 | RESPIRATION RATE: 16 BRPM | HEIGHT: 63 IN

## 2025-05-27 DIAGNOSIS — Z86.79 HISTORY OF WOLFF-PARKINSON-WHITE (WPW) SYNDROME: ICD-10-CM

## 2025-05-27 DIAGNOSIS — Z87.898 HISTORY OF TRANSFUSION REACTION: ICD-10-CM

## 2025-05-27 DIAGNOSIS — R73.01 IMPAIRED FASTING GLUCOSE: ICD-10-CM

## 2025-05-27 DIAGNOSIS — Z86.79 HISTORY OF ATRIAL FLUTTER: ICD-10-CM

## 2025-05-27 DIAGNOSIS — D64.9 ANEMIA, UNSPECIFIED TYPE: ICD-10-CM

## 2025-05-27 DIAGNOSIS — K21.9 GASTROESOPHAGEAL REFLUX DISEASE, UNSPECIFIED WHETHER ESOPHAGITIS PRESENT: ICD-10-CM

## 2025-05-27 DIAGNOSIS — M35.3 POLYMYALGIA RHEUMATICA: ICD-10-CM

## 2025-05-27 DIAGNOSIS — E03.9 HYPOTHYROIDISM, UNSPECIFIED TYPE: ICD-10-CM

## 2025-05-27 DIAGNOSIS — Z01.818 PREOP GENERAL PHYSICAL EXAM: Primary | ICD-10-CM

## 2025-05-27 DIAGNOSIS — I10 ESSENTIAL HYPERTENSION: ICD-10-CM

## 2025-05-27 DIAGNOSIS — R76.8 JKA ANTIBODY POSITIVE: ICD-10-CM

## 2025-05-27 DIAGNOSIS — Z79.52 LONG TERM CURRENT USE OF SYSTEMIC STEROIDS: ICD-10-CM

## 2025-05-27 DIAGNOSIS — M16.11 PRIMARY OSTEOARTHRITIS OF RIGHT HIP: ICD-10-CM

## 2025-05-27 DIAGNOSIS — D69.9 HEMORRHAGIC DIATHESIS: ICD-10-CM

## 2025-05-27 PROBLEM — Z78.9 DAILY CONSUMPTION OF ALCOHOL: Status: RESOLVED | Noted: 2024-01-08 | Resolved: 2025-05-27

## 2025-05-27 PROBLEM — D68.2 FACTOR VII DEFICIENCY (H): Status: RESOLVED | Noted: 2024-05-09 | Resolved: 2025-05-27

## 2025-05-27 LAB
ERYTHROCYTE [DISTWIDTH] IN BLOOD BY AUTOMATED COUNT: 12.7 % (ref 10–15)
EST. AVERAGE GLUCOSE BLD GHB EST-MCNC: 128 MG/DL
HBA1C MFR BLD: 6.1 % (ref 0–5.6)
HCT VFR BLD AUTO: 32.3 % (ref 35–47)
HGB BLD-MCNC: 10.9 G/DL (ref 11.7–15.7)
MCH RBC QN AUTO: 31.1 PG (ref 26.5–33)
MCHC RBC AUTO-ENTMCNC: 33.7 G/DL (ref 31.5–36.5)
MCV RBC AUTO: 92 FL (ref 78–100)
PLATELET # BLD AUTO: 222 10E3/UL (ref 150–450)
RBC # BLD AUTO: 3.5 10E6/UL (ref 3.8–5.2)
WBC # BLD AUTO: 7.9 10E3/UL (ref 4–11)

## 2025-05-27 PROCEDURE — 85027 COMPLETE CBC AUTOMATED: CPT | Performed by: PHYSICIAN ASSISTANT

## 2025-05-27 PROCEDURE — 36415 COLL VENOUS BLD VENIPUNCTURE: CPT | Performed by: PHYSICIAN ASSISTANT

## 2025-05-27 PROCEDURE — 84443 ASSAY THYROID STIM HORMONE: CPT | Performed by: PHYSICIAN ASSISTANT

## 2025-05-27 PROCEDURE — 82728 ASSAY OF FERRITIN: CPT | Performed by: PHYSICIAN ASSISTANT

## 2025-05-27 PROCEDURE — 83036 HEMOGLOBIN GLYCOSYLATED A1C: CPT | Performed by: PHYSICIAN ASSISTANT

## 2025-05-27 PROCEDURE — 80048 BASIC METABOLIC PNL TOTAL CA: CPT | Performed by: PHYSICIAN ASSISTANT

## 2025-05-27 RX ORDER — PREDNISONE 1 MG/1
1 TABLET ORAL DAILY
COMMUNITY
Start: 2025-03-28

## 2025-05-27 NOTE — PROGRESS NOTES
Preoperative Evaluation  [facility] [place]  [address] [facility] [address]  Phone: [phone]  Primary Provider: Jovita Bauman PA-C  Pre-op Performing Provider: Jovita Bauman PA-C  May 27, 2025   {Provider  Link to PREOP SmartSet  REQUIRED to apply standard patient instructions and medication directions to the AVS :078340}  {ROOMER review and update patient entered surgical information if needed :961636}        5/22/2025   Surgical Information   What procedure is being done? Hip Replacement   Facility or Hospital where procedure/surgery will be performed: [facility]   Who is doing the procedure / surgery? Dr. Nabil Martinez   Date of surgery / procedure: 6/25/25   Time of surgery / procedure: A.M.   Where do you plan to recover after surgery? at home with family     Fax number for surgical facility: [phone]    Assessment & Plan     The proposed surgical procedure is considered INTERMEDIATE risk.    Preop general physical exam  - Basic metabolic panel  (Ca, Cl, CO2, Creat, Gluc, K, Na, BUN); Future  - CBC with platelets; Future  - EKG 12-lead complete w/read - Clinics  - Basic metabolic panel  (Ca, Cl, CO2, Creat, Gluc, K, Na, BUN)  - CBC with platelets    Primary osteoarthritis of right hip  Anticipating surgery    Polymyalgia rheumatica  Long term current use of systemic steroids  She is currently being treated for PMR, weaning from prednisone and taking 1 mg daily. Noticing increase in symptoms since decreasing from 2 mg daily to 1 mg daily. Had visit with rheumatologist the day after our visit (5/28/25) and plan is to keep her on low dose 1-2 mg prednisone through the surgery to prevent a more significant flare but will confirm that orthopedic surgeon (Dr. Martinez) is okay with this.     Gastroesophageal reflux disease, unspecified whether esophagitis present  Denies current or recent symptoms    Hypothyroidism, unspecified type  Feels stable, TSH is  "normal.  - TSH with free T4 reflex    Impaired fasting glucose  A1c up to 6.1 (from 5.6 last year). This may be higher due to long term prednisone therapy over the past 9 months for PMR treatment. We will continue to monitor.   - Hemoglobin A1c    Essential hypertension  Controlled    Hemorrhagic diathesis  Followed by hematology. SAE[facility] has reached out to hematology regarding recommendations regarding upcoming hip surgery. Per response: \"Per Dr. Bain's previous note all of Criss's factor levels are now within normal limits suggesting her previously low factor levels may have been temporariliy low due to liver dysfunction. We believe her bleeding risk is low. Our recommendations for a total hip arthroplasty are:      Tranexamic acid 1000mg IV 30-60 minutes prior to the surgery and then continue Q8 hours while hospitalized. We are okay with low dose asprin post procedure for DVT prophylaxis- however if she has increased bruising and bleeding we would recommend discontinuing the use. She has used and tolerated this medication in the past ([id number]).\"    Anemia, unspecified type  Anemia over the last year, following with hematology. Criss was seen by hematology (Dr. Bain) last year in May 2024 for bleeding diathesis and anemia. Her anemia improved back to normal about 6 months ago but then was back down to 10.6 when checked 4 months ago, and still low at 10.9 today. Normocytic anemia, has previously had normal B12 in 1/2024 and normal ferritin in 5/2024. Ferritin again normal today so her anemia still does not appear to be iron deficiency.  - Ferritin    History of atrial flutter  History of Gabino-Parkinson-White (WPW) syndrome  She has a history of WPW status post surgical ablation of a left lateral electrical pathway at age 37. She did not have any recurrent palpitations until 2014. At that time, she was seen at [facility] in [place] and had ECG consistent with atrial flutter. This episode of atrial " flutter lasted several hours and terminated spontaneously. She saw cardiology after that and plan was for observation, follow-up with cardiology if recurrent symptoms. No indication for Coumadin as she did not have any risk factors for stroke. No issues since that time. Normal EKG today.    History of transfusion reaction  Jka antibody positive  Blood type A positive. She required blood transfusions after postoperative hemorrhages (severe pericardial and pleural bleeding after open cardiac ablation procedure for Gabino-Parkinson-White in 1987; after colectomy in 2002 she developed severe internal bleeding requiring a second operation immediately after her first surgery). She developed hemolytic transfusion reaction due to anti-JKA antibody.       Risks and Recommendations  The patient has the following additional risks and recommendations for perioperative complications:  Anemia/Bleeding/Clotting:   {:358174}    Preoperative Medication Instructions  Antiplatelet or Anticoagulation Medication Instructions   - We reviewed the medication list and the patient is not on an antiplatelet or anticoagulation medications.    Alendronate: do not take on day of surgery  Olmesartan: do not take on day of surgery  Avoid NSAIDS (Motrin, Ibuprofen, Aleve, Naprosyn) for one week prior to surgery. If needed, Tylenol or Acetaminophen are okay to use.  Avoid supplements for one week prior to surgery.    Recommendation  {IMPORTANT - Approval:212700:}        52 minutes spent on the date of the encounter doing chart review, history and exam, documentation and further activities per the note      The longitudinal plan of care for the diagnosis(es)/condition(s) as documented were addressed during this visit. Due to the added complexity in care, I will continue to support Criss in the subsequent management and with ongoing continuity of care.      Kyree Kahn is a 74 year old, presenting for the following:  Pre-Op Exam and Recheck  Medication (What medications to take up until surgery)          5/27/2025     2:09 PM   Additional Questions   Roomed by Shannon COLVIN     HPI: following with [facility] for advanced right hip osteoarthritis. She has failed consdrvative measures so plan is for [facility]LEX[facility]Y.    She is currently being treated for PMR, weaning from prednisone and taking 1 mg daily. Has visit with rheumatologist tomorrow.            5/22/2025   Pre-Op Questionnaire   Have you ever had a heart attack or stroke? No   Have you ever had surgery on your heart or blood vessels, such as a stent placement, a coronary artery bypass, or surgery on an artery in your head, neck, heart, or legs? (!) YES -has heart surgery to repair heart defect   Do you have chest pain with activity? No   Do you have a history of heart failure? No   Do you currently have a cold, bronchitis or symptoms of other infection? No   Do you have a cough, shortness of breath, or wheezing? No   Do you or anyone in your family have previous history of blood clots? (!) YES - dad had DVT after COVID, no other family history of blood clots. No personal history of blood clots.    Do you or does anyone in your family have a serious bleeding problem such as prolonged bleeding following surgeries or cuts? (!) YES -She has had bleeding issues in the past after surgery   Have you ever had problems with anemia or been told to take iron pills? (!) YES -has had anemia   Have you had any abnormal blood loss such as black, tarry or bloody stools, or abnormal vaginal bleeding? (!) YES -abnormal vaginal bleeding in the past; no recent bleeding   Have you ever had a blood transfusion? (!) YES   Have you ever had a transfusion reaction? (!) YES - she had problems with blood transfusion. She is blood type A positive, anti-JKa red cell antibody positive.    Are you willing to have a blood transfusion if it is medically needed before, during, or after your surgery? Yes   Have you or any of your relatives ever had  problems with anesthesia? No   Do you have sleep apnea, excessive snoring or daytime drowsiness? No   Do you have any artifical heart valves or other implanted medical devices like a pacemaker, defibrillator, or continuous glucose monitor? No   Do you have artificial joints? No   Are you allergic to latex? No     Advance Care Planning  {The storyboard will display whether the patient has ACP docs on file. Hover over the Code section in the storyboard to access the ACP documents. :[id number]}  Patient states has Health Care Directive and will send to [facility] [employer].    Preoperative Review of    reviewed - no record of controlled substances prescribed.  {Review MNPMP for all patients per [facility] MNPMP Profile:145704}    Status of Chronic Conditions:  See problem list for active medical problems.  Problems all longstanding and stable, except as noted/documented.  See ROS for pertinent symptoms related to these conditions.    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 There was no evidence of von Willebrand's or a factor inhibitor at that time. They completed additional labs including INR, PTT, fibrinogen level, and platelet count. If INR normal, she was able to proceed with surgery without further evaluation. I don't see lab results from 2/2014 but they were reportedly normal and she was able to proceed with surgery as  scheduled. She did not have any bleeding issues after surgery.    Records from 5/2014 show mildly elevated PT and INR  PT 13.7 (normal 9.2-13.0)  INR 1.3 (normal 0.8-1.2)      History of transfusion reaction 05/09/2024     Priority: Medium     Blood type A positive    Required blood transfusions after postoperative hemorrhages (severe pericardial and pleural bleeding after open cardiac ablation procedure for Gabino-Parkinson-White in 1987; after colectomy in 2002 she developed severe internal bleeding requiring a second operation immediately after her first surgery). She developed hemolytic transfusion reaction due to anti-JKA antibody.      Mild atherosclerosis of carotid artery, bilateral - REPEAT CAROTID US 3/2026 04/04/2024     Priority: Medium     Carotid artery US 4/1/24 showed   1.  RIGHT CAROTID:  Mild atherosclerotic plaque. Peak systolic velocities in the ICA are 88 cm/s which correspond to the <50% stenosis range based on NASCET criteria.  2.  LEFT CAROTID:  Mild atherosclerotic plaque. Peak systolic velocities in the ICA are 103 cm/s which correspond to the <50% stenosis range based on NASCET criteria.  3.  Antegrade flow within the vertebral arteries bilaterally.    The 10-year ASCVD risk score (Jr DK, et al., 2019) is: 13.7%    Values used to calculate the score:      Age: 73 years      Sex: Female      Is Non- : No      Diabetic: No      Tobacco smoker: No      Systolic Blood Pressure: 115 mmHg      Is BP treated: Yes      HDL Cholesterol: 100 mg/dL      Total Cholesterol: 209 mg/dL    Asymptomatic.     Discussed that this could put her at higher risk for stroke and cardiovascular disease in the future. Encourage healthy lifestyle. Taking rosuvastatin 20 mg daily. Would plan to repeat carotid artery US annually to evaluate for plaque progression.    Repeat carotid artery US 3/2025 stable, <50% stenosis right and left carotid.      DDD (degenerative disc disease), cervical  03/07/2024     Priority: Medium    Cervical radiculopathy 03/07/2024     Priority: Medium    Hypothyroidism, unspecified type 01/08/2024     Priority: Medium    Macrocytic anemia 01/08/2024     Priority: Medium    Daily consumption of alcohol 01/08/2024     Priority: Medium    Essential hypertension 11/21/2023     Priority: Medium    Generalized anxiety disorder 07/28/2023     Priority: Medium    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     Priority: Medium     Blood type A positive    Required blood transfusions after postoperative hemorrhages (severe pericardial and pleural bleeding after open cardiac ablation procedure for Gabino-Parkinson-White in 1987; after colectomy in 2002 she developed severe internal bleeding requiring a second operation immediately after her first surgery). She developed hemolytic transfusion reaction due to anti-JKA antibody.      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  3/9/2021: will do trial of going off or to H2 blocker.      History of colectomy 02/23/2019     Priority: Medium      Past Medical History:   Diagnosis Date    Anemia     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37    Coagulation disorder     hx hemorrhage after heart and after colon surg    History of atrial flutter 8/8/2023    She has a history of WPW status post surgical ablation of a left lateral electrical pathway at age 37. She did not have any recurrent palpitations until 2014. At that time, she was seen at [facility] in [place] and had ECG consistent with atrial flutter. This episode of atrial flutter lasted several hours and  terminated spontaneously. She saw cardiology after that and plan was for obser    History of blood transfusion     History of Gabino-Parkinson-White (WPW) syndrome 8/8/2023    She has a history of WPW status post surgical ablation of a left lateral electrical pathway at age 37.     Hypertension     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    Added automatically from request for surgery [id number]      Other and unspecified nonspecific immunological findings     anti Jka red cell antibody    Thyroid disease      Past Surgical History:   Procedure Laterality Date    ABDOMEN SURGERY      APPENDECTOMY  1968    CARDIAC SURGERY  age 37    WPW surg; open ablation; complicated with hemorrhage    COLECTOMY  age 52    7-8 inches rectum remain. cx with hemorrhage. no cancer. slow transit.(2 surgeries; first for low transit; second due to the bleeding)    DILATION AND CURETTAGE, HYSTEROSCOPY DIAGNOSTIC, COMBINED  02/21/2014    Procedure: COMBINED DILATION AND CURETTAGE, HYSTEROSCOPY DIAGNOSTIC;   DILATION AND CURETTAGE, HYSTEROSCOPY DIAGNOSTIC ;  Surgeon: Willie Osborn MD;  Location: [facility] OR    ENT SURGERY      t and a    EXCISE LESION FACE N/A 01/11/2023    Procedure: incision and removal of osteoma on forehead;  Surgeon: Dl Davenport MD;  Location: [facility] OR     Current Outpatient Medications   Medication Sig Dispense Refill    alendronate (FOSAMAX) 70 MG tablet Take 1 tablet (70 mg) by mouth every 7 days. 12 tablet 3    Calcium-Vitamin D-Vitamin K 500-200-40 MG-UNT-MCG CHEW Takes 3 chewables daily      levothyroxine (SYNTHROID/LEVOTHROID) 25 MCG tablet TAKE 1 TABLET(25 MCG) BY MOUTH DAILY 90 tablet 0    olmesartan (BENICAR) 20 MG tablet Take 10 mg by mouth daily.      predniSONE (DELTASONE) 1 MG tablet Take 1 mg by mouth daily.      rosuvastatin (CRESTOR) 20 MG tablet Take 1 tablet (20 mg) by mouth daily. 90 tablet 3    sertraline (ZOLOFT) 25 MG tablet Take 1 tablet (25 mg) by mouth daily. 90 tablet 1       Allergies   Allergen  "Reactions    Benadryl [Diphenhydramine]      Given in the hospital and had a difficult time waking as expected.    External Allergen Needs Reconciliation - See Comment      Please reconcile the Patient's allergy reported as LEAD ACETATEMORPHINE SULFATE and update accordingly    Morphine      [facility] Allergy Text Annotation: morphine    Protamine      sob    Tylox [Oxycodone-Acetaminophen]      unknown    Ciprofloxacin Rash    Escitalopram Other (See Comments)     Brain fog, forgetfulness        Social History     Tobacco Use    Smoking status: Never     Passive exposure: Never    Smokeless tobacco: Never   Substance Use Topics    Alcohol use: Yes     Alcohol/week: 4.0 - 5.0 standard drinks of alcohol     Types: 4 - 5 Glasses of wine per week     Comment: A glass of wine or beer, usually no more than one     Family History   Problem Relation Age of Onset    Bronchitis Mother         copd    Alcoholism Mother     Varicose Veins Mother     Diabetes Father     Coronary Artery Disease Father     Deep Vein Thrombosis Father         after having COVID    Leukemia Sister         chronic    Impaired Fasting Glucose Sister     Cancer Brother         myelofibrosis; got BMT 2019    Diabetes Type 2  Brother     Impaired Fasting Glucose Brother      History   Drug Use Unknown             Review of Systems  Constitutional, neuro, ENT, endocrine, pulmonary, cardiac, gastrointestinal, genitourinary, musculoskeletal, integument and psychiatric systems are negative, except as otherwise noted.    Objective    /68   Pulse 69   Temp 97.8  F (36.6  C) (Oral)   Resp 16   Ht 1.6 m (5' 3\")   Wt 64.8 kg (142 lb 14.4 oz)   LMP  (LMP Unknown)   SpO2 100%   BMI 25.31 kg/m     Estimated body mass index is 25.31 kg/m  as calculated from the following:    Height as of this encounter: 1.6 m (5' 3\").    Weight as of this encounter: 64.8 kg (142 lb 14.4 oz).  Physical Exam  GENERAL: alert and no distress  EYES: Eyes grossly normal to " inspection, PERRL and conjunctivae and sclerae normal  HENT: ear canals and TM's normal, nose and mouth without ulcers or lesions  NECK: no adenopathy, no asymmetry, masses, or scars  RESP: lungs clear to auscultation - no rales, rhonchi or wheezes  CV: regular rate and rhythm, normal S1 S2, no S3 or S4, no murmur, click or rub, no peripheral edema  ABDOMEN: soft, nontender, no hepatosplenomegaly, no masses and bowel sounds normal  MS: no gross musculoskeletal defects noted, no edema  SKIN: no suspicious lesions or rashes  NEURO: Normal strength and tone, mentation intact and speech normal  PSYCH: mentation appears normal, affect normal/bright    Recent Labs   Lab Test 01/13/25  1356 11/19/24  0726 06/21/24  1144 05/29/24  1240   HGB 10.6* 12.5   < >  --     266   < >  --    INR  --   --   --  1.24*    135   < >  --    POTASSIUM 4.5 4.1   < >  --    CR 0.91 1.05*   < >  --     < > = values in this interval not displayed.        Diagnostics  Recent Results (from the past week)   CBC with platelets    Collection Time: 05/27/25  3:30 PM   Result Value Ref Range    WBC Count 7.9 4.0 - 11.0 10e3/uL    RBC Count 3.50 (L) 3.80 - 5.20 10e6/uL    Hemoglobin 10.9 (L) 11.7 - 15.7 g/dL    Hematocrit 32.3 (L) 35.0 - 47.0 %    MCV 92 78 - 100 fL    MCH 31.1 26.5 - 33.0 pg    MCHC 33.7 31.5 - 36.5 g/dL    RDW 12.7 10.0 - 15.0 %    Platelet Count 222 150 - 450 10e3/uL      EKG: appears normal, NSR, normal axis, normal intervals, no acute ST/T changes c/w ischemia, no LVH by voltage criteria, unchanged from previous tracings    Revised Cardiac Risk Index (RCRI)  The patient has the following serious cardiovascular risks for perioperative complications:   - No serious cardiac risks = 0 points     RCRI Interpretation: 0 points: Class I (very low risk - 0.4% complication rate)         Signed Electronically by: Jovita Bauman PA-C  A copy of this evaluation report is provided to the requesting physician.    {Provider  Resources  [facility] Guidelines  Revised Cardiac Risk Index :234508}   {Email feedback regarding this note to [email or web address]   :[id number]}

## 2025-05-27 NOTE — PATIENT INSTRUCTIONS
How to Take Your Medication Before Surgery  Preoperative Medication Instructions   Antiplatelet or Anticoagulation Medication Instructions   - We reviewed the medication list and the patient is not on an antiplatelet or anticoagulation medications.    Alendronate: do not take on day of surgery  Olmesartan: do not take on day of surgery  Avoid NSAIDS (Motrin, Ibuprofen, Aleve, Naprosyn) for one week prior to surgery. If needed, Tylenol or Acetaminophen are okay to use.  Avoid supplements for one week prior to surgery.    Patient Education   Preparing for Your Surgery  For Adults  Getting started  In most cases, a nurse will call to review your health history and instructions. They will give you an arrival time based on your scheduled surgery time. Please be ready to share:  Your doctor's clinic name and phone number  Your medical, surgical, and anesthesia history  A list of allergies and sensitivities  A list of medicines, including herbal treatments and over-the-counter drugs  Whether the patient has a legal guardian (ask how to send us the papers in advance)  Note: You may not receive a call if you were seen at our PAC (Preoperative Assessment Center).  Please tell us if you're pregnant--or if there's any chance you might be pregnant. Some surgeries may injure a fetus (unborn baby), so they require a pregnancy test. Surgeries that are safe for a fetus don't always need a test, and you can choose whether to have one.   Preparing for surgery  Within 10 to 30 days of surgery: Have a pre-op exam (sometimes called an H&P, or History and Physical). This can be done at a clinic or pre-operative center.  If you're having a , you may not need this exam. Talk to your care team.  At your pre-op exam, talk to your care team about all medicines you take. (This includes CBD oil and any drugs, such as THC, marijuana, and other forms of cannabis.) If you need to stop any medicine before surgery, ask when to start taking  it again.  This is for your safety. Many medicines and drugs can make you bleed too much during surgery. Some change how well surgery (anesthesia) drugs work.  Call your insurance company to let them know you're having surgery. (If you don't have insurance, call [phone].)  Call your clinic if there's any change in your health. This includes a scrape or scratch near the surgery site, or any signs of a cold (sore throat, runny nose, cough, rash, fever).  Eating and drinking guidelines  For your safety: Unless your surgeon tells you otherwise, follow the guidelines below.  Eat and drink as normal until 8 hours before you arrive for surgery. After that, no food or milk. You can spit out gum when you arrive.  Drink clear liquids until 2 hours before you arrive. These are liquids you can see through, like water, Gatorade, and Propel Water. They also include plain black coffee and tea (no cream or milk).  No alcohol for 24 hours before you arrive. The night before surgery, stop any drinks that contain THC.  If your care team tells you to take medicine on the morning of surgery, it's okay to take it with a sip of water. No other medicines or drugs are allowed (including CBD oil)--follow your care team's instructions.  If you have questions the day of surgery, call your hospital or surgery center.   Preventing infection  Shower or bathe the night before and the morning of surgery. Follow the instructions your clinic gave you. (If no instructions, use regular soap.)  Don't shave or clip hair near your surgery site. We'll remove the hair if needed.  Don't smoke or vape the morning of surgery. No chewing tobacco for 6 hours before you arrive. A nicotine patch is okay. You may spit out nicotine gum when you arrive.  For some surgeries, the surgeon will tell you to fully quit smoking and nicotine.  We will make every effort to keep you safe from infection. We will:  Clean our hands often with soap and water (or an  alcohol-based hand rub).  Clean the skin at your surgery site with a special soap that kills germs.  Give you a special gown to keep you warm. (Cold raises the risk of infection.)  Wear hair covers, masks, gowns, and gloves during surgery.  Give antibiotic medicine, if prescribed. Not all surgeries need this medicine.  What to bring on the day of surgery  Photo ID and insurance card  Copy of your health care directive, if you have one  Glasses and hearing aids (bring cases)  You can't wear contacts during surgery  Inhaler and eye drops, if you use them (tell us about these when you arrive)  CPAP machine or breathing device, if you use them  A few personal items, if spending the night  If you have . . .  A pacemaker, ICD (cardiac defibrillator), or other implant: Bring the ID card.  An implanted stimulator: Bring the remote control.  A legal guardian: Bring a copy of the certified (court-stamped) guardianship papers.  Please remove any jewelry, including body piercings. Leave jewelry and other valuables at home.  If you're going home the day of surgery  You must have a responsible adult drive you home. They should stay with you overnight as well.  If you don't have someone to stay with you, and you aren't safe to go home alone, we may keep you overnight. Insurance often won't pay for this.  After surgery  If it's hard to control your pain or you need more pain medicine, please call your surgeon's office.  Questions?   If you have any questions for your care team, list them here:   ____________________________________________________________________________________________________________________________________________________________________________________________________________________________________________________________  For informational purposes only. Not to replace the advice of your health care provider. Copyright   2003, 2019 [facility]. All rights reserved. Clinically reviewed by Evelio  MD Kanika. [employer] [id number] - REV 08/24.

## 2025-05-28 ENCOUNTER — TRANSFERRED RECORDS (OUTPATIENT)
Dept: HEALTH INFORMATION MANAGEMENT | Facility: CLINIC | Age: 75
End: 2025-05-28
Payer: COMMERCIAL

## 2025-05-28 LAB
ANION GAP SERPL CALCULATED.3IONS-SCNC: 12 MMOL/L (ref 7–15)
BUN SERPL-MCNC: 24.7 MG/DL (ref 8–23)
CALCIUM SERPL-MCNC: 9.5 MG/DL (ref 8.8–10.4)
CHLORIDE SERPL-SCNC: 103 MMOL/L (ref 98–107)
CREAT SERPL-MCNC: 0.92 MG/DL (ref 0.51–0.95)
EGFRCR SERPLBLD CKD-EPI 2021: 65 ML/MIN/1.73M2
FERRITIN SERPL-MCNC: 203 NG/ML (ref 11–328)
GLUCOSE SERPL-MCNC: 89 MG/DL (ref 70–99)
HCO3 SERPL-SCNC: 25 MMOL/L (ref 22–29)
POTASSIUM SERPL-SCNC: 4.7 MMOL/L (ref 3.4–5.3)
SODIUM SERPL-SCNC: 140 MMOL/L (ref 135–145)
TSH SERPL DL<=0.005 MIU/L-ACNC: 2.17 UIU/ML (ref 0.3–4.2)

## 2025-05-29 ENCOUNTER — TELEPHONE (OUTPATIENT)
Dept: FAMILY MEDICINE | Facility: CLINIC | Age: 75
End: 2025-05-29
Payer: COMMERCIAL

## 2025-05-29 DIAGNOSIS — D53.9 MACROCYTIC ANEMIA: Primary | ICD-10-CM

## 2025-05-29 DIAGNOSIS — I10 ESSENTIAL HYPERTENSION: Primary | ICD-10-CM

## 2025-05-29 RX ORDER — OLMESARTAN MEDOXOMIL 20 MG/1
10 TABLET ORAL
Qty: 45 TABLET | Refills: 3 | Status: SHIPPED | OUTPATIENT
Start: 2025-05-29

## 2025-05-29 NOTE — TELEPHONE ENCOUNTER
Called pt. Pt states she is still taking this.     Routing to [facility]. Please review and order olmesartan if appropriate    Hawa Veras RN   [facility]

## 2025-05-29 NOTE — TELEPHONE ENCOUNTER
Clinic RN: Please investigate patient's chart or contact patient if the information cannot be found because the medication is listed as historical or discontinued. Confirm patient is taking this medication. Document findings and route refill encounter to provider for approval or denial.  Lashay Connor RN, BSN  [facility]

## 2025-05-29 NOTE — TELEPHONE ENCOUNTER
Called Hematology. SAGAR Nuno, states that since pt has not been seen by clinic in over a year, will have to have appt in order to get recommendations. Let her know that surgery is  and Jovita would like pt to be seen ASAP.     Nurys states that providers are booking out months in advanced but will talk with Dr. Bain. Gave Nurys direct phone number in order to give writer a call back.     Routing to Jovita, T'd up urgent referral to hematology since old referral is . Please edit and sign if appropriate    Will await call back at this time. Postponing encounter until tomorrow.         Hawa Veras RN   [facility]

## 2025-05-29 NOTE — TELEPHONE ENCOUNTER
I saw Criss earlier this week for a pre op for a right total hip arthroplasty. Her surgery is through [facility] and scheduled for 6/25/25.    Criss was seen by hematology (Dr. Bain) last year in May 2024 for bleeding diathesis and anemia. Her anemia improved back to normal about 6 months ago but then was back down to 10.6 when checked 4 months ago, and still low at 10.9 when checked at her pre op earlier this week. Normocytic anemia, has previously had normal B12 in 1/2024 and normal ferritin in 5/2024. I checked ferritin again at her pre op and it is still normal so her anemia still does not appear to be iron deficiency.    Could we call over to hematology to see if they would need to see her for evaluation of anemia prior to hip replacement and if so, is there any way to get her in ASAP?     Thanks!  Jovita Bauman PA-C

## 2025-06-05 ENCOUNTER — OFFICE VISIT (OUTPATIENT)
Dept: FAMILY MEDICINE | Facility: CLINIC | Age: 75
End: 2025-06-05
Payer: COMMERCIAL

## 2025-06-05 ENCOUNTER — E-VISIT (OUTPATIENT)
Dept: FAMILY MEDICINE | Facility: CLINIC | Age: 75
End: 2025-06-05
Payer: COMMERCIAL

## 2025-06-05 ENCOUNTER — VIRTUAL VISIT (OUTPATIENT)
Dept: HEMATOLOGY | Facility: CLINIC | Age: 75
End: 2025-06-05
Attending: INTERNAL MEDICINE
Payer: COMMERCIAL

## 2025-06-05 ENCOUNTER — MYC MEDICAL ADVICE (OUTPATIENT)
Dept: FAMILY MEDICINE | Facility: CLINIC | Age: 75
End: 2025-06-05
Payer: COMMERCIAL

## 2025-06-05 VITALS
TEMPERATURE: 99.6 F | BODY MASS INDEX: 24.98 KG/M2 | HEART RATE: 70 BPM | DIASTOLIC BLOOD PRESSURE: 59 MMHG | SYSTOLIC BLOOD PRESSURE: 106 MMHG | WEIGHT: 141 LBS | HEIGHT: 63 IN | OXYGEN SATURATION: 97 % | RESPIRATION RATE: 16 BRPM

## 2025-06-05 DIAGNOSIS — R69 DIAGNOSIS UNKNOWN: Primary | ICD-10-CM

## 2025-06-05 DIAGNOSIS — D53.9 MACROCYTIC ANEMIA: ICD-10-CM

## 2025-06-05 DIAGNOSIS — J06.9 UPPER RESPIRATORY TRACT INFECTION, UNSPECIFIED TYPE: Primary | ICD-10-CM

## 2025-06-05 ASSESSMENT — ENCOUNTER SYMPTOMS
GASTROINTESTINAL NEGATIVE: 1
WHEEZING: 0
FATIGUE: 1
SORE THROAT: 1
DIZZINESS: 0
COUGH: 1
FEVER: 1
SINUS PAIN: 0
RHINORRHEA: 1
UNEXPECTED WEIGHT CHANGE: 0
CHEST TIGHTNESS: 0
HEADACHES: 0
MYALGIAS: 1
SINUS PRESSURE: 0

## 2025-06-05 ASSESSMENT — PAIN SCALES - GENERAL: PAINLEVEL_OUTOF10: SEVERE PAIN (7)

## 2025-06-05 NOTE — PROGRESS NOTES
Assessment & Plan     Upper respiratory tract infection, unspecified type  2 days of URI symptoms. Overall reassuring exam. VSS and lungs clear. Home supportive care discussed. Reports she is currently able to rest well so declines need for additional cough suppressant. No concern for pneumonia at this point but strict return precautions discussed. Questions answered. Pt in agreement with plan.    Future Appointments   Date Time Provider Department Center   6/5/2025  3:30 PM Adair Bain MD [facility]       Kyree Kahn is a 74 year old, presenting for the following health issues:  Cough      6/5/2025     1:12 PM   Additional Questions   Roomed by anisa     Cough  Associated symptoms include rhinorrhea, sore throat and myalgias. Pertinent negatives include no chest pain, no headaches and no wheezing.   History of Present Illness       Reason for visit:  Cold  Symptom onset:  1-3 days ago  Symptoms include:  Cough, runny nose, fever, chills, aches,  Symptom intensity:  Severe  Symptom progression:  Staying the same  Had these symptoms before:  Yes  Has tried/received treatment for these symptoms:  Yes  What makes it worse:  No  What makes it better:  No   She is taking medications regularly.      Traveled to [place] last weekend for grandson's 8th grade grad. Family was on the tail end of illness.  Symptoms started 2 days ago. Highest temp at home 100.2 today.   On chronic prednisone for PMR. Down to 2mg.   Concerned as she had pneumonia in January. Has hip surgery at the end of the month and wants to be recovered.  Using Dayquil with good effect.      Review of Systems   Constitutional:  Positive for fatigue and fever. Negative for unexpected weight change.   HENT:  Positive for congestion, rhinorrhea and sore throat. Negative for sinus pressure and sinus pain.    Respiratory:  Positive for cough. Negative for chest tightness and wheezing.    Cardiovascular:  Negative for chest pain.  "  Gastrointestinal: Negative.    Genitourinary: Negative.    Musculoskeletal:  Positive for myalgias.   Skin:  Negative for rash.   Neurological:  Negative for dizziness and headaches.           Objective    /59 (BP Location: Right arm, Patient Position: Sitting, Cuff Size: Adult Regular)   Pulse 70   Temp 99.6  F (37.6  C)   Resp 16   Ht 1.6 m (5' 3\")   Wt 64 kg (141 lb)   LMP  (LMP Unknown)   SpO2 97%   BMI 24.98 kg/m    Body mass index is 24.98 kg/m .  Physical Exam  Constitutional:       Appearance: Normal appearance.   HENT:      Head: Normocephalic.      Right Ear: Tympanic membrane, ear canal and external ear normal.      Left Ear: Tympanic membrane, ear canal and external ear normal.      Nose: Nose normal.      Mouth/Throat:      Mouth: Mucous membranes are moist.      Pharynx: Oropharynx is clear. No oropharyngeal exudate or posterior oropharyngeal erythema.   Eyes:      General:         Right eye: No discharge.         Left eye: No discharge.      Conjunctiva/sclera: Conjunctivae normal.   Cardiovascular:      Rate and Rhythm: Normal rate and regular rhythm.      Heart sounds: Normal heart sounds.   Pulmonary:      Effort: Pulmonary effort is normal. No respiratory distress.      Breath sounds: Normal breath sounds. No wheezing.   Musculoskeletal:         General: Normal range of motion.   Lymphadenopathy:      Cervical: No cervical adenopathy.   Skin:     General: Skin is warm and dry.   Neurological:      General: No focal deficit present.      Mental Status: She is alert and oriented to person, place, and time.   Psychiatric:         Mood and Affect: Mood normal.         Behavior: Behavior normal.            Signed Electronically by: LEONOR Holland CNP    "

## 2025-06-05 NOTE — PROGRESS NOTES
[facility]  [address] [place] [id number]  Phone: [phone], Fax: [phone]      Outpatient Visit Note:    Patient: Anali Sanchez  MRN: [id number]  : 1950  ALEJANDRINA: 2025    Anali Sanchez is a 74 year old woman with a history of a bleeding diathesis who returns for routine follow up.      Assessment:  In summary, Anali Sanchez is a 74 year old woman with history of a bleeding diathesis, primarily with surgery, but unknown underlying etiology, who would benefit from tranexamic acid starting before her upcoming total hip replacement and continuing for 5 days, and still a good candidate for postoperative VTE prophylaxis, but with a delay of 3 to 4 days to allow for more complete healing and hemostasis before starting.    Recommendations:  I counseled the patient about my assessment of risk of bleeding and clotting as noted above for upcoming ALEXEY  Prior to surgery she should receive tranexamic acid 1000 mg IV 36 minutes prior to surgery  Postop, she should continue tranexamic acid 1000 mg IV every 8 hours.  At discharge, she should continue tranexamic acid 1300 mg p.o. 3 times a day for 5 days  Starting a day 3 or 4 postop, assuming no bleeding complications, she can start standard VTE prophylaxis per her orthopedic surgeon standard of care.  Options would include aspirin or low-dose apixaban (2.5 mg twice a day) or rivaroxaban (10 mg once a day).  I explained that both apixaban and rivaroxaban have short half-life, so she could interrupt these medications if she has any bleeding the effect of these therapies would resolve after about 24 hours.  I am happy to see her back on an as-needed basis    30 minutes spent by me on the date of the encounter doing chart review, patient visit, and documentation       Adair Bain MD  Associate Professor of Medicine, Division of Hematology, Oncology and Transplantation  [facility] [employer]     --------------------------------------------------------  Forward History:  She reports that she had no bleeding with both wisdom tooth extraction and tonsillectomy as a child.  She never had a history of heavy menstrual cycles during her childbearing years.  She had 3 pregnancies all which were delivered vaginally and had no bleeding complications.  She had Gabino-Parkinson-White syndrome ever since she was a child and had dealt with for many years without medical intervention, and then unfortunately, during her second pregnancy became very symptomatic and ultimately, following her third pregnancy went on to have an ablation procedure.  This occurred in 1987 and was complicated by what sounds to be both pericardial and parapneumonic hemorrhagic effusions.  She did have to have a repeat procedure and draining of these effusions.  The underlying cause was not determined.     She did well and had no procedures after this until 2002.  At that point she required a colectomy, which was done laparoscopically at [facility].  She reports that following the surgery she was in the recovery room and was very somnolent on so of several hours later she went back to the operating room for suspected bleeding.  This time, she had not open incision and bleeding was stopped.  However, she was never told that a bleeder was found or there was a clear etiology for this.  This then led to further evaluation at [facility].  Per notes from [facility] in February 2014, that evaluation showed prolonged PT/INR  with factor VII of 13%, factor to 49%, factor V 46% factor X 42%.  Her von Willebrand panel and PTT were normal.     May 2024, established with this clinic, where factor levels were all normal  June 2024 - TXA with carpal tunnel release, no bleeding complications    History: Anali Sanchez is a 74 year old woman with a history of surgical bleeding primarily, who presents again for follow-up prior to  total hip replacement.  Overall, she reports she is doing well.  She had no issues with bleeding with her last surgery.  She tolerated tranexamic acid well.    Medications are reviewed in the EMR     Objective:  Exam:  There is no height or weight on file to calculate BMI.   Constitutional: Appears well, no distress  Eyes: no discharge, injection or icterus  Respiratory: no cough or labored breathing  Skin: no rashes or petechiae  Neurological: no deficits appreciated, speech is fluent  Psych: affect is normal      Labs:  No new labs     Imaging:  none

## 2025-06-19 DIAGNOSIS — D69.9 HEMORRHAGIC DIATHESIS: Primary | ICD-10-CM

## 2025-06-19 NOTE — PROGRESS NOTES
[id number]  Anali Sanchez  74 year old female  CB[facility] Diagnosis: Bleeding diathesis with surgery  CBCD Provider: Odell     Received call from Dr. Martinez's office from [facility]. Patient is having ALEXEY surgery next Wenesday and they are wanting a INR/PTT drawn prior to this.  Patient did have mildly elevated INR of 1.24 with F13 =64% last year. Dr. Bain does not think either of these is is causing her bleeding diatheses and will make an addendum to his note.  Left message with Yolanda at [facility] [phone] with this information and asking for fax number so that we can send to them.  Made a lab appt for patient on Friday (tomorrow). I told her we could fax back on Monday in message.  Soraida Coyne, MSN, RN, PHN -Nurse Clinician, [facility] [phone]       From Dr. Bain recent note about surgery plan:  Assessment:  In summary, Anali Sanchez is a 74 year old woman with history of a bleeding diathesis, primarily with surgery, but unknown underlying etiology, who would benefit from tranexamic acid starting before her upcoming total hip replacement and continuing for 5 days, and still a good candidate for postoperative VTE prophylaxis, but with a delay of 3 to 4 days to allow for more complete healing and hemostasis before starting.     Recommendations:  I counseled the patient about my assessment of risk of bleeding and clotting as noted above for upcoming ALEXEY  Prior to surgery she should receive tranexamic acid 1000 mg IV 36 minutes prior to surgery  Postop, she should continue tranexamic acid 1000 mg IV every 8 hours.  At discharge, she should continue tranexamic acid 1300 mg p.o. 3 times a day for 5 days  Starting a day 3 or 4 postop, assuming no bleeding complications, she can start standard VTE prophylaxis per her orthopedic surgeon standard of care.  Options would include aspirin or low-dose apixaban (2.5 mg twice a day) or rivaroxaban (10 mg once a  day).  I explained that both apixaban and rivaroxaban have short half-life, so she could interrupt these medications if she has any bleeding the effect of these therapies would resolve after about 24 hours.

## 2025-06-20 ENCOUNTER — TELEPHONE (OUTPATIENT)
Dept: FAMILY MEDICINE | Facility: CLINIC | Age: 75
End: 2025-06-20

## 2025-06-20 DIAGNOSIS — F41.1 GENERALIZED ANXIETY DISORDER: ICD-10-CM

## 2025-06-20 NOTE — TELEPHONE ENCOUNTER
Per chart labs are still in process. Will fax once labs are finalized    Sita Shaver RN on 6/20/2025 at 1:54 PM

## 2025-06-20 NOTE — TELEPHONE ENCOUNTER
SAGAR Lee from [facility] calling. Patient is having surgery next week. We sent over pre-op.   Anesthesiologist is requesting two additional labs:    PTT, INR      Per chart review, hematology ordered these labs, and patient is having them drawn today and lab appointment in .     Routing to the primary care team, when they labs have resulted please fax over results to:  [phone]    Routing high priority as their surgery team will need to review before surgery next week.    Thanks,   Rachael Brooks RN

## 2025-06-20 NOTE — TELEPHONE ENCOUNTER
At this time, the labs are still processing. Writer will check back in Monday morning upon return to clinic    Sita Shaver RN on 6/20/2025 at 4:21 PM

## 2025-06-23 RX ORDER — SERTRALINE HYDROCHLORIDE 25 MG/1
25 TABLET, FILM COATED ORAL DAILY
Qty: 90 TABLET | Refills: 1 | Status: SHIPPED | OUTPATIENT
Start: 2025-06-23

## 2025-06-23 NOTE — TELEPHONE ENCOUNTER
Labs finalized. Routed INR, PTT, and CBC to fax number listed below. Fax confirmation received.    Sita Shaver RN on 6/23/2025 at 10:14 AM

## 2025-06-24 ENCOUNTER — MYC MEDICAL ADVICE (OUTPATIENT)
Dept: FAMILY MEDICINE | Facility: CLINIC | Age: 75
End: 2025-06-24
Payer: COMMERCIAL

## 2025-06-24 DIAGNOSIS — I10 ESSENTIAL HYPERTENSION: Primary | ICD-10-CM

## 2025-06-24 NOTE — PROVIDER NOTIFICATION
06/24/25 1343   Discharge Planning   Patient/Family Anticipates Transition to home with family   Concerns to be Addressed all concerns addressed in this encounter   Living Arrangements   People in Home spouse   Type of Residence Private Residence   Is your private residence a single family home or apartment? Single family home   Number of Stairs, Within Home, Primary greater than 10 stairs   Stair Railings, Within Home, Primary railings safe and in good condition  (tri level LR,Kitchen and DR on main level/Bedroom and bathroom upstairs)   Once home, are you able to live on one level? No   Which level?   (tri level LR,Kitchen and DR on main level/Bedroom and bathroom upstairs)   Which rooms are not on the main level? Bedroom;Bathroom   Bathroom Shower/Tub Walk-in shower   Equipment Currently Used at Home tub bench;raised toilet seat;grab bar, toilet;walker, rolling;walker, standard  (no grab bar but have seat and room for walker)   Support System   Support Systems Spouse/Significant Other   Do you have someone available to stay with you one or two nights once you are home? Yes   Blood   Known Bleeding Disorder or Coagulopathy Yes   Does the patient have any Jew/cultural preferences related to blood products? No   What are your blood product preferences? anti Jka red cell antibody   Education   Has the patient scheduled or completed pre-op total joint education, either in class or online, in the last 12 months? No  (sent her book in mail on 6/24 she will read through it and schedule class or watch video)   Patient attended total joint pre-op class/received pre-op teaching  online   Disability/Function   Does the patient have the assistive device with them yes  (told to bring walker)

## 2025-07-01 NOTE — PROGRESS NOTES
Ortho Note/ plan for surgery Monday 7/7/25.    Pre-op stat type and screen  Pre-op 1 gram IV TXA in pre-op area    Intra-op 1gram IV TXA    Post-op 1 gram IV TXA q 8hrs for 24 hrs    Discharge with 1300mg PO TXA TID for 5 days (7/8-13/25)  Post-op DVT pphx with 81mg po ASA BID Starting on POD 3 (7/10/25)    This plan was established in collaboration with her Hematologist.    Marybeth Rivera PA-C  [phone]

## 2025-07-02 ENCOUNTER — LAB (OUTPATIENT)
Dept: LAB | Facility: CLINIC | Age: 75
End: 2025-07-02
Payer: COMMERCIAL

## 2025-07-02 DIAGNOSIS — I10 ESSENTIAL HYPERTENSION: ICD-10-CM

## 2025-07-02 LAB
ANION GAP SERPL CALCULATED.3IONS-SCNC: 11 MMOL/L (ref 7–15)
BUN SERPL-MCNC: 26.7 MG/DL (ref 8–23)
CALCIUM SERPL-MCNC: 9.2 MG/DL (ref 8.8–10.4)
CHLORIDE SERPL-SCNC: 105 MMOL/L (ref 98–107)
CREAT SERPL-MCNC: 0.89 MG/DL (ref 0.51–0.95)
EGFRCR SERPLBLD CKD-EPI 2021: 68 ML/MIN/1.73M2
GLUCOSE SERPL-MCNC: 99 MG/DL (ref 70–99)
HCO3 SERPL-SCNC: 23 MMOL/L (ref 22–29)
POTASSIUM SERPL-SCNC: 4.2 MMOL/L (ref 3.4–5.3)
SODIUM SERPL-SCNC: 139 MMOL/L (ref 135–145)

## 2025-07-02 PROCEDURE — 36415 COLL VENOUS BLD VENIPUNCTURE: CPT

## 2025-07-02 PROCEDURE — 80048 BASIC METABOLIC PNL TOTAL CA: CPT

## 2025-07-03 ENCOUNTER — RESULTS FOLLOW-UP (OUTPATIENT)
Dept: FAMILY MEDICINE | Facility: CLINIC | Age: 75
End: 2025-07-03

## 2025-07-05 NOTE — PHARMACY-ADMISSION MEDICATION HISTORY
Medication history and patient interview completed by pre-admitting nurse (Antelmo Ha, RN). Reviewed by pharmacist. No further clarifications needed.    Everardokarl Castañeda [facility]  ------------------------------------------------------    Prior to Admission medications    Medication Sig Last Dose Taking? Auth Provider Long Term End Date   alendronate (FOSAMAX) 70 MG tablet Take 1 tablet (70 mg) by mouth every 7 days.  Yes Jovita Bauman PA-C Yes    Calcium-Vitamin D-Vitamin K 500-200-40 MG-UNT-MCG CHEW Takes 3 chewables daily  Yes Reported, Patient     levothyroxine (SYNTHROID/LEVOTHROID) 25 MCG tablet TAKE 1 TABLET(25 MCG) BY MOUTH DAILY  Yes Jovita Bauman PA-C Yes    olmesartan (BENICAR) 20 MG tablet TAKE ONE-HALF TABLET BY MOUTH DAILY  Yes Jovita Bauman PA-C Yes    predniSONE (DELTASONE) 1 MG tablet Take 2 mg by mouth daily.  Yes Reported, Patient No    rosuvastatin (CRESTOR) 20 MG tablet Take 1 tablet (20 mg) by mouth daily.  Yes Jovita Bauman PA-C Yes    sertraline (ZOLOFT) 25 MG tablet TAKE 1 TABLET(25 MG) BY MOUTH DAILY  Yes Jovita Bauman PA-C Yes

## 2025-07-07 ENCOUNTER — APPOINTMENT (OUTPATIENT)
Dept: GENERAL RADIOLOGY | Facility: CLINIC | Age: 75
End: 2025-07-07
Attending: PHYSICIAN ASSISTANT
Payer: COMMERCIAL

## 2025-07-07 ENCOUNTER — ANESTHESIA EVENT (OUTPATIENT)
Dept: SURGERY | Facility: CLINIC | Age: 75
End: 2025-07-07
Payer: COMMERCIAL

## 2025-07-07 ENCOUNTER — HOSPITAL ENCOUNTER (OUTPATIENT)
Facility: CLINIC | Age: 75
Discharge: HOME | End: 2025-07-08
Attending: ORTHOPAEDIC SURGERY | Admitting: ORTHOPAEDIC SURGERY
Payer: COMMERCIAL

## 2025-07-07 ENCOUNTER — ANESTHESIA (OUTPATIENT)
Dept: SURGERY | Facility: CLINIC | Age: 75
End: 2025-07-07
Payer: COMMERCIAL

## 2025-07-07 DIAGNOSIS — D69.9 HEMORRHAGIC DIATHESIS: ICD-10-CM

## 2025-07-07 DIAGNOSIS — Z96.641 S/P TOTAL RIGHT HIP ARTHROPLASTY: Primary | ICD-10-CM

## 2025-07-07 DIAGNOSIS — Z96.641 STATUS POST TOTAL REPLACEMENT OF RIGHT HIP: ICD-10-CM

## 2025-07-07 DIAGNOSIS — M16.11 PRIMARY OSTEOARTHRITIS OF RIGHT HIP: ICD-10-CM

## 2025-07-07 PROBLEM — Z96.649 S/P TOTAL HIP ARTHROPLASTY: Status: ACTIVE | Noted: 2025-07-07

## 2025-07-07 LAB
ABO + RH BLD: ABNORMAL
ANTIBODY UNIDENTIFIED: NORMAL
BLD GP AB SCN SERPL QL: POSITIVE
BLD PROD TYP BPU: NORMAL
BLOOD COMPONENT TYPE: NORMAL
CODING SYSTEM: NORMAL
CROSSMATCH: NORMAL
GLUCOSE BLDC GLUCOMTR-MCNC: 125 MG/DL (ref 70–99)
HGB BLD-MCNC: 9.5 G/DL (ref 11.7–15.7)
JK SUP(A) AG RBC QL: NEGATIVE
MCV RBC AUTO: 92 FL (ref 78–100)
SPECIMEN EXP DATE BLD: ABNORMAL
SPECIMEN EXP DATE BLD: NORMAL
SPECIMEN EXP DATE BLD: NORMAL
UNIT ABO/RH: NORMAL
UNIT NUMBER: NORMAL
UNIT STATUS: NORMAL
UNIT TYPE ISBT: 6200

## 2025-07-07 PROCEDURE — 82962 GLUCOSE BLOOD TEST: CPT

## 2025-07-07 PROCEDURE — 272N000001 HC OR GENERAL SUPPLY STERILE: Performed by: ORTHOPAEDIC SURGERY

## 2025-07-07 PROCEDURE — 85018 HEMOGLOBIN: CPT | Performed by: PHYSICIAN ASSISTANT

## 2025-07-07 PROCEDURE — 86905 BLOOD TYPING RBC ANTIGENS: CPT | Performed by: PHYSICIAN ASSISTANT

## 2025-07-07 PROCEDURE — 258N000003 HC RX IP 258 OP 636: Performed by: PHYSICIAN ASSISTANT

## 2025-07-07 PROCEDURE — 370N000017 HC ANESTHESIA TECHNICAL FEE, PER MIN: Performed by: ORTHOPAEDIC SURGERY

## 2025-07-07 PROCEDURE — 250N000009 HC RX 250: Performed by: NURSE ANESTHETIST, CERTIFIED REGISTERED

## 2025-07-07 PROCEDURE — 250N000011 HC RX IP 250 OP 636: Performed by: ANESTHESIOLOGY

## 2025-07-07 PROCEDURE — C1776 JOINT DEVICE (IMPLANTABLE): HCPCS | Performed by: ORTHOPAEDIC SURGERY

## 2025-07-07 PROCEDURE — 97530 THERAPEUTIC ACTIVITIES: CPT | Mod: GP | Performed by: PHYSICAL THERAPIST

## 2025-07-07 PROCEDURE — 250N000011 HC RX IP 250 OP 636: Performed by: PHYSICIAN ASSISTANT

## 2025-07-07 PROCEDURE — 86850 RBC ANTIBODY SCREEN: CPT | Performed by: PHYSICIAN ASSISTANT

## 2025-07-07 PROCEDURE — 258N000003 HC RX IP 258 OP 636: Performed by: NURSE ANESTHETIST, CERTIFIED REGISTERED

## 2025-07-07 PROCEDURE — 250N000009 HC RX 250: Performed by: PHYSICIAN ASSISTANT

## 2025-07-07 PROCEDURE — 710N000009 HC RECOVERY PHASE 1, LEVEL 1, PER MIN: Performed by: ORTHOPAEDIC SURGERY

## 2025-07-07 PROCEDURE — 258N000003 HC RX IP 258 OP 636: Performed by: ANESTHESIOLOGY

## 2025-07-07 PROCEDURE — 999N000141 HC STATISTIC PRE-PROCEDURE NURSING ASSESSMENT: Performed by: ORTHOPAEDIC SURGERY

## 2025-07-07 PROCEDURE — 250N000013 HC RX MED GY IP 250 OP 250 PS 637: Performed by: PHYSICIAN ASSISTANT

## 2025-07-07 PROCEDURE — 86922 COMPATIBILITY TEST ANTIGLOB: CPT | Performed by: PHYSICIAN ASSISTANT

## 2025-07-07 PROCEDURE — 36415 COLL VENOUS BLD VENIPUNCTURE: CPT | Performed by: PHYSICIAN ASSISTANT

## 2025-07-07 PROCEDURE — C1713 ANCHOR/SCREW BN/BN,TIS/BN: HCPCS | Performed by: ORTHOPAEDIC SURGERY

## 2025-07-07 PROCEDURE — 97116 GAIT TRAINING THERAPY: CPT | Mod: GP | Performed by: PHYSICAL THERAPIST

## 2025-07-07 PROCEDURE — 999N000065 XR PELVIS AND HIP PORTABLE RIGHT 1 VIEW

## 2025-07-07 PROCEDURE — 250N000009 HC RX 250: Performed by: ORTHOPAEDIC SURGERY

## 2025-07-07 PROCEDURE — 250N000025 HC SEVOFLURANE, PER MIN: Performed by: ORTHOPAEDIC SURGERY

## 2025-07-07 PROCEDURE — 86870 RBC ANTIBODY IDENTIFICATION: CPT | Performed by: PHYSICIAN ASSISTANT

## 2025-07-07 PROCEDURE — 360N000077 HC SURGERY LEVEL 4, PER MIN: Performed by: ORTHOPAEDIC SURGERY

## 2025-07-07 PROCEDURE — 250N000011 HC RX IP 250 OP 636: Performed by: NURSE ANESTHETIST, CERTIFIED REGISTERED

## 2025-07-07 PROCEDURE — 258N000001 HC RX 258: Performed by: ORTHOPAEDIC SURGERY

## 2025-07-07 PROCEDURE — 97161 PT EVAL LOW COMPLEX 20 MIN: CPT | Mod: GP | Performed by: PHYSICAL THERAPIST

## 2025-07-07 DEVICE — IMP SCR ZIM 6.5X25MM ACET CUP SELF TAP 00-6250-065-25: Type: IMPLANTABLE DEVICE | Site: HIP | Status: FUNCTIONAL

## 2025-07-07 DEVICE — IMPLANTABLE DEVICE
Type: IMPLANTABLE DEVICE | Site: HIP | Status: FUNCTIONAL
Brand: G7® ACETABULAR SYSTEM

## 2025-07-07 DEVICE — IMPLANTABLE DEVICE
Type: IMPLANTABLE DEVICE | Site: HIP | Status: FUNCTIONAL
Brand: G7® LONGEVITY®

## 2025-07-07 DEVICE — IMPLANTABLE DEVICE: Type: IMPLANTABLE DEVICE | Site: HIP | Status: FUNCTIONAL

## 2025-07-07 DEVICE — IMP HEAD FEM ZIM BIOLOX DELTA CER 36MM  -3.5 00-8775-036-01: Type: IMPLANTABLE DEVICE | Site: HIP | Status: FUNCTIONAL

## 2025-07-07 RX ORDER — NALOXONE HYDROCHLORIDE 0.4 MG/ML
0.2 INJECTION, SOLUTION INTRAMUSCULAR; INTRAVENOUS; SUBCUTANEOUS
Status: DISCONTINUED | OUTPATIENT
Start: 2025-07-07 | End: 2025-07-08 | Stop reason: HOSPADM

## 2025-07-07 RX ORDER — PREDNISONE 1 MG/1
2 TABLET ORAL DAILY
Status: DISCONTINUED | OUTPATIENT
Start: 2025-07-08 | End: 2025-07-08 | Stop reason: HOSPADM

## 2025-07-07 RX ORDER — HYDROXYZINE HYDROCHLORIDE 10 MG/1
10 TABLET, FILM COATED ORAL EVERY 6 HOURS PRN
Status: DISCONTINUED | OUTPATIENT
Start: 2025-07-07 | End: 2025-07-08 | Stop reason: HOSPADM

## 2025-07-07 RX ORDER — CEFAZOLIN SODIUM 1 G/3ML
1 INJECTION, POWDER, FOR SOLUTION INTRAMUSCULAR; INTRAVENOUS EVERY 8 HOURS
Status: COMPLETED | OUTPATIENT
Start: 2025-07-07 | End: 2025-07-08

## 2025-07-07 RX ORDER — ACETAMINOPHEN 325 MG/1
975 TABLET ORAL ONCE
Status: COMPLETED | OUTPATIENT
Start: 2025-07-07 | End: 2025-07-07

## 2025-07-07 RX ORDER — OXYCODONE HYDROCHLORIDE 10 MG/1
10 TABLET ORAL EVERY 4 HOURS PRN
Status: DISCONTINUED | OUTPATIENT
Start: 2025-07-07 | End: 2025-07-07

## 2025-07-07 RX ORDER — ONDANSETRON 2 MG/ML
4 INJECTION INTRAMUSCULAR; INTRAVENOUS EVERY 30 MIN PRN
Status: DISCONTINUED | OUTPATIENT
Start: 2025-07-07 | End: 2025-07-07 | Stop reason: HOSPADM

## 2025-07-07 RX ORDER — OXYCODONE HYDROCHLORIDE 5 MG/1
5-10 TABLET ORAL EVERY 4 HOURS PRN
Qty: 25 TABLET | Refills: 0 | Status: SHIPPED | OUTPATIENT
Start: 2025-07-07 | End: 2025-07-08

## 2025-07-07 RX ORDER — ROSUVASTATIN CALCIUM 20 MG/1
20 TABLET, COATED ORAL DAILY
Status: DISCONTINUED | OUTPATIENT
Start: 2025-07-08 | End: 2025-07-08 | Stop reason: HOSPADM

## 2025-07-07 RX ORDER — CELECOXIB 100 MG/1
100 CAPSULE ORAL 2 TIMES DAILY
Status: DISCONTINUED | OUTPATIENT
Start: 2025-07-07 | End: 2025-07-08 | Stop reason: HOSPADM

## 2025-07-07 RX ORDER — LABETALOL HYDROCHLORIDE 5 MG/ML
10 INJECTION, SOLUTION INTRAVENOUS EVERY 10 MIN PRN
Status: DISCONTINUED | OUTPATIENT
Start: 2025-07-07 | End: 2025-07-07 | Stop reason: HOSPADM

## 2025-07-07 RX ORDER — CEFAZOLIN SODIUM/WATER 2 G/20 ML
2 SYRINGE (ML) INTRAVENOUS
Status: COMPLETED | OUTPATIENT
Start: 2025-07-07 | End: 2025-07-07

## 2025-07-07 RX ORDER — OXYCODONE HYDROCHLORIDE 5 MG/1
5 TABLET ORAL EVERY 4 HOURS PRN
Status: DISCONTINUED | OUTPATIENT
Start: 2025-07-07 | End: 2025-07-07

## 2025-07-07 RX ORDER — PROPOFOL 10 MG/ML
INJECTION, EMULSION INTRAVENOUS CONTINUOUS PRN
Status: DISCONTINUED | OUTPATIENT
Start: 2025-07-07 | End: 2025-07-07

## 2025-07-07 RX ORDER — TRANEXAMIC ACID 10 MG/ML
1 INJECTION, SOLUTION INTRAVENOUS EVERY 8 HOURS
Status: DISCONTINUED | OUTPATIENT
Start: 2025-07-07 | End: 2025-07-08 | Stop reason: HOSPADM

## 2025-07-07 RX ORDER — HYDROMORPHONE HCL IN WATER/PF 6 MG/30 ML
0.1 PATIENT CONTROLLED ANALGESIA SYRINGE INTRAVENOUS EVERY 4 HOURS PRN
Status: DISCONTINUED | OUTPATIENT
Start: 2025-07-07 | End: 2025-07-08 | Stop reason: HOSPADM

## 2025-07-07 RX ORDER — HYDROMORPHONE HCL IN WATER/PF 6 MG/30 ML
0.2 PATIENT CONTROLLED ANALGESIA SYRINGE INTRAVENOUS EVERY 4 HOURS PRN
Status: DISCONTINUED | OUTPATIENT
Start: 2025-07-07 | End: 2025-07-08 | Stop reason: HOSPADM

## 2025-07-07 RX ORDER — LEVOTHYROXINE SODIUM 25 UG/1
25 TABLET ORAL DAILY
Status: DISCONTINUED | OUTPATIENT
Start: 2025-07-08 | End: 2025-07-08 | Stop reason: HOSPADM

## 2025-07-07 RX ORDER — PROCHLORPERAZINE MALEATE 5 MG/1
5 TABLET ORAL EVERY 6 HOURS PRN
Status: DISCONTINUED | OUTPATIENT
Start: 2025-07-07 | End: 2025-07-08 | Stop reason: HOSPADM

## 2025-07-07 RX ORDER — HYDROXYZINE HYDROCHLORIDE 10 MG/1
10 TABLET, FILM COATED ORAL EVERY 6 HOURS PRN
Qty: 30 TABLET | Refills: 0 | Status: SHIPPED | OUTPATIENT
Start: 2025-07-07

## 2025-07-07 RX ORDER — ONDANSETRON 2 MG/ML
4 INJECTION INTRAMUSCULAR; INTRAVENOUS EVERY 30 MIN PRN
Status: DISCONTINUED | OUTPATIENT
Start: 2025-07-07 | End: 2025-07-07

## 2025-07-07 RX ORDER — AMOXICILLIN 250 MG
1 CAPSULE ORAL 2 TIMES DAILY
Status: DISCONTINUED | OUTPATIENT
Start: 2025-07-07 | End: 2025-07-08 | Stop reason: HOSPADM

## 2025-07-07 RX ORDER — LIDOCAINE 40 MG/G
CREAM TOPICAL
Status: DISCONTINUED | OUTPATIENT
Start: 2025-07-07 | End: 2025-07-08 | Stop reason: HOSPADM

## 2025-07-07 RX ORDER — ONDANSETRON 2 MG/ML
INJECTION INTRAMUSCULAR; INTRAVENOUS PRN
Status: DISCONTINUED | OUTPATIENT
Start: 2025-07-07 | End: 2025-07-07

## 2025-07-07 RX ORDER — TRANEXAMIC ACID 10 MG/ML
1 INJECTION, SOLUTION INTRAVENOUS ONCE
Status: COMPLETED | OUTPATIENT
Start: 2025-07-07 | End: 2025-07-07

## 2025-07-07 RX ORDER — HYDROMORPHONE HCL IN WATER/PF 6 MG/30 ML
0.4 PATIENT CONTROLLED ANALGESIA SYRINGE INTRAVENOUS EVERY 5 MIN PRN
Status: DISCONTINUED | OUTPATIENT
Start: 2025-07-07 | End: 2025-07-07 | Stop reason: HOSPADM

## 2025-07-07 RX ORDER — POLYETHYLENE GLYCOL 3350 17 G/17G
17 POWDER, FOR SOLUTION ORAL DAILY
Status: DISCONTINUED | OUTPATIENT
Start: 2025-07-08 | End: 2025-07-08 | Stop reason: HOSPADM

## 2025-07-07 RX ORDER — PROPOFOL 10 MG/ML
INJECTION, EMULSION INTRAVENOUS PRN
Status: DISCONTINUED | OUTPATIENT
Start: 2025-07-07 | End: 2025-07-07

## 2025-07-07 RX ORDER — CEFAZOLIN SODIUM/WATER 2 G/20 ML
2 SYRINGE (ML) INTRAVENOUS SEE ADMIN INSTRUCTIONS
Status: DISCONTINUED | OUTPATIENT
Start: 2025-07-07 | End: 2025-07-07 | Stop reason: HOSPADM

## 2025-07-07 RX ORDER — DEXAMETHASONE SODIUM PHOSPHATE 4 MG/ML
4 INJECTION, SOLUTION INTRA-ARTICULAR; INTRALESIONAL; INTRAMUSCULAR; INTRAVENOUS; SOFT TISSUE
Status: DISCONTINUED | OUTPATIENT
Start: 2025-07-07 | End: 2025-07-07 | Stop reason: HOSPADM

## 2025-07-07 RX ORDER — AMOXICILLIN 250 MG
1-2 CAPSULE ORAL 2 TIMES DAILY
Qty: 30 TABLET | Refills: 0 | Status: SHIPPED | OUTPATIENT
Start: 2025-07-07

## 2025-07-07 RX ORDER — SODIUM CHLORIDE, SODIUM LACTATE, POTASSIUM CHLORIDE, CALCIUM CHLORIDE 600; 310; 30; 20 MG/100ML; MG/100ML; MG/100ML; MG/100ML
INJECTION, SOLUTION INTRAVENOUS CONTINUOUS
Status: DISCONTINUED | OUTPATIENT
Start: 2025-07-07 | End: 2025-07-08 | Stop reason: HOSPADM

## 2025-07-07 RX ORDER — DEXAMETHASONE SODIUM PHOSPHATE 4 MG/ML
4 INJECTION, SOLUTION INTRA-ARTICULAR; INTRALESIONAL; INTRAMUSCULAR; INTRAVENOUS; SOFT TISSUE
Status: DISCONTINUED | OUTPATIENT
Start: 2025-07-07 | End: 2025-07-07

## 2025-07-07 RX ORDER — ONDANSETRON 4 MG/1
4 TABLET, ORALLY DISINTEGRATING ORAL EVERY 30 MIN PRN
Status: DISCONTINUED | OUTPATIENT
Start: 2025-07-07 | End: 2025-07-07

## 2025-07-07 RX ORDER — ASPIRIN 81 MG/1
81 TABLET ORAL 2 TIMES DAILY
Qty: 60 TABLET | Refills: 0 | Status: SHIPPED | OUTPATIENT
Start: 2025-07-10 | End: 2025-07-08

## 2025-07-07 RX ORDER — NALOXONE HYDROCHLORIDE 0.4 MG/ML
0.4 INJECTION, SOLUTION INTRAMUSCULAR; INTRAVENOUS; SUBCUTANEOUS
Status: DISCONTINUED | OUTPATIENT
Start: 2025-07-07 | End: 2025-07-08 | Stop reason: HOSPADM

## 2025-07-07 RX ORDER — MEPERIDINE HYDROCHLORIDE 25 MG/ML
12.5 INJECTION INTRAMUSCULAR; INTRAVENOUS; SUBCUTANEOUS EVERY 5 MIN PRN
Status: DISCONTINUED | OUTPATIENT
Start: 2025-07-07 | End: 2025-07-07 | Stop reason: HOSPADM

## 2025-07-07 RX ORDER — ONDANSETRON 2 MG/ML
4 INJECTION INTRAMUSCULAR; INTRAVENOUS EVERY 6 HOURS PRN
Status: DISCONTINUED | OUTPATIENT
Start: 2025-07-07 | End: 2025-07-08 | Stop reason: HOSPADM

## 2025-07-07 RX ORDER — FENTANYL CITRATE 50 UG/ML
50 INJECTION, SOLUTION INTRAMUSCULAR; INTRAVENOUS EVERY 5 MIN PRN
Status: DISCONTINUED | OUTPATIENT
Start: 2025-07-07 | End: 2025-07-07 | Stop reason: HOSPADM

## 2025-07-07 RX ORDER — FAMOTIDINE 20 MG/1
20 TABLET, FILM COATED ORAL 2 TIMES DAILY
Status: DISCONTINUED | OUTPATIENT
Start: 2025-07-07 | End: 2025-07-08 | Stop reason: HOSPADM

## 2025-07-07 RX ORDER — ACETAMINOPHEN 325 MG/1
650 TABLET ORAL EVERY 4 HOURS PRN
Qty: 100 TABLET | Refills: 0 | Status: SHIPPED | OUTPATIENT
Start: 2025-07-07

## 2025-07-07 RX ORDER — FENTANYL CITRATE 50 UG/ML
25 INJECTION, SOLUTION INTRAMUSCULAR; INTRAVENOUS
Status: DISCONTINUED | OUTPATIENT
Start: 2025-07-07 | End: 2025-07-07

## 2025-07-07 RX ORDER — LIDOCAINE HYDROCHLORIDE 20 MG/ML
INJECTION, SOLUTION INFILTRATION; PERINEURAL PRN
Status: DISCONTINUED | OUTPATIENT
Start: 2025-07-07 | End: 2025-07-07

## 2025-07-07 RX ORDER — SODIUM CHLORIDE, SODIUM LACTATE, POTASSIUM CHLORIDE, CALCIUM CHLORIDE 600; 310; 30; 20 MG/100ML; MG/100ML; MG/100ML; MG/100ML
INJECTION, SOLUTION INTRAVENOUS CONTINUOUS
Status: DISCONTINUED | OUTPATIENT
Start: 2025-07-07 | End: 2025-07-07 | Stop reason: HOSPADM

## 2025-07-07 RX ORDER — CALCIUM CARBONATE 500 MG/1
500 TABLET, CHEWABLE ORAL 4 TIMES DAILY PRN
Status: DISCONTINUED | OUTPATIENT
Start: 2025-07-07 | End: 2025-07-08 | Stop reason: HOSPADM

## 2025-07-07 RX ORDER — BISACODYL 10 MG
10 SUPPOSITORY, RECTAL RECTAL DAILY PRN
Status: DISCONTINUED | OUTPATIENT
Start: 2025-07-07 | End: 2025-07-08 | Stop reason: HOSPADM

## 2025-07-07 RX ORDER — SERTRALINE HYDROCHLORIDE 25 MG/1
25 TABLET, FILM COATED ORAL DAILY
Status: DISCONTINUED | OUTPATIENT
Start: 2025-07-08 | End: 2025-07-08 | Stop reason: HOSPADM

## 2025-07-07 RX ORDER — ONDANSETRON 4 MG/1
4 TABLET, ORALLY DISINTEGRATING ORAL EVERY 30 MIN PRN
Status: DISCONTINUED | OUTPATIENT
Start: 2025-07-07 | End: 2025-07-07 | Stop reason: HOSPADM

## 2025-07-07 RX ORDER — ONDANSETRON 4 MG/1
4 TABLET, ORALLY DISINTEGRATING ORAL EVERY 6 HOURS PRN
Status: DISCONTINUED | OUTPATIENT
Start: 2025-07-07 | End: 2025-07-08 | Stop reason: HOSPADM

## 2025-07-07 RX ORDER — TRANEXAMIC ACID 650 MG/1
1300 TABLET ORAL 3 TIMES DAILY
Qty: 30 TABLET | Refills: 0 | Status: SHIPPED | OUTPATIENT
Start: 2025-07-08 | End: 2025-07-13

## 2025-07-07 RX ORDER — FENTANYL CITRATE 50 UG/ML
INJECTION, SOLUTION INTRAMUSCULAR; INTRAVENOUS PRN
Status: DISCONTINUED | OUTPATIENT
Start: 2025-07-07 | End: 2025-07-07

## 2025-07-07 RX ORDER — ACETAMINOPHEN 325 MG/1
975 TABLET ORAL EVERY 8 HOURS
Status: DISCONTINUED | OUTPATIENT
Start: 2025-07-07 | End: 2025-07-08 | Stop reason: HOSPADM

## 2025-07-07 RX ORDER — DEXAMETHASONE SODIUM PHOSPHATE 4 MG/ML
INJECTION, SOLUTION INTRA-ARTICULAR; INTRALESIONAL; INTRAMUSCULAR; INTRAVENOUS; SOFT TISSUE PRN
Status: DISCONTINUED | OUTPATIENT
Start: 2025-07-07 | End: 2025-07-07

## 2025-07-07 RX ORDER — LIDOCAINE 40 MG/G
CREAM TOPICAL
Status: DISCONTINUED | OUTPATIENT
Start: 2025-07-07 | End: 2025-07-07 | Stop reason: HOSPADM

## 2025-07-07 RX ORDER — OXYCODONE HYDROCHLORIDE 5 MG/1
5 TABLET ORAL EVERY 4 HOURS PRN
Status: DISCONTINUED | OUTPATIENT
Start: 2025-07-07 | End: 2025-07-08

## 2025-07-07 RX ORDER — ALBUTEROL SULFATE 0.83 MG/ML
2.5 SOLUTION RESPIRATORY (INHALATION) EVERY 4 HOURS PRN
Status: DISCONTINUED | OUTPATIENT
Start: 2025-07-07 | End: 2025-07-07 | Stop reason: HOSPADM

## 2025-07-07 RX ADMIN — PROPOFOL 40 MG: 10 INJECTION, EMULSION INTRAVENOUS at 07:49

## 2025-07-07 RX ADMIN — FENTANYL CITRATE 50 MCG: 50 INJECTION INTRAMUSCULAR; INTRAVENOUS at 07:28

## 2025-07-07 RX ADMIN — HYDROXYZINE HYDROCHLORIDE 10 MG: 10 TABLET, FILM COATED ORAL at 20:15

## 2025-07-07 RX ADMIN — SENNOSIDES AND DOCUSATE SODIUM 1 TABLET: 50; 8.6 TABLET ORAL at 20:15

## 2025-07-07 RX ADMIN — PHENYLEPHRINE HYDROCHLORIDE 150 MCG: 10 INJECTION INTRAVENOUS at 08:40

## 2025-07-07 RX ADMIN — PHENYLEPHRINE HYDROCHLORIDE 150 MCG: 10 INJECTION INTRAVENOUS at 08:34

## 2025-07-07 RX ADMIN — FENTANYL CITRATE 50 MCG: 50 INJECTION, SOLUTION INTRAMUSCULAR; INTRAVENOUS at 09:07

## 2025-07-07 RX ADMIN — SODIUM CHLORIDE, SODIUM LACTATE, POTASSIUM CHLORIDE, AND CALCIUM CHLORIDE: .6; .31; .03; .02 INJECTION, SOLUTION INTRAVENOUS at 12:20

## 2025-07-07 RX ADMIN — FENTANYL CITRATE 50 MCG: 50 INJECTION INTRAMUSCULAR; INTRAVENOUS at 07:42

## 2025-07-07 RX ADMIN — FENTANYL CITRATE 50 MCG: 50 INJECTION, SOLUTION INTRAMUSCULAR; INTRAVENOUS at 09:30

## 2025-07-07 RX ADMIN — LIDOCAINE HYDROCHLORIDE 50 MG: 20 INJECTION, SOLUTION INFILTRATION; PERINEURAL at 07:28

## 2025-07-07 RX ADMIN — SUGAMMADEX 200 MG: 100 INJECTION, SOLUTION INTRAVENOUS at 08:40

## 2025-07-07 RX ADMIN — SODIUM CHLORIDE, SODIUM LACTATE, POTASSIUM CHLORIDE, AND CALCIUM CHLORIDE: .6; .31; .03; .02 INJECTION, SOLUTION INTRAVENOUS at 07:11

## 2025-07-07 RX ADMIN — HYDROMORPHONE HYDROCHLORIDE 0.5 MG: 1 INJECTION, SOLUTION INTRAMUSCULAR; INTRAVENOUS; SUBCUTANEOUS at 07:52

## 2025-07-07 RX ADMIN — TRANEXAMIC ACID 1 G: 10 INJECTION, SOLUTION INTRAVENOUS at 17:49

## 2025-07-07 RX ADMIN — CELECOXIB 100 MG: 100 CAPSULE ORAL at 20:15

## 2025-07-07 RX ADMIN — PHENYLEPHRINE HYDROCHLORIDE 100 MCG: 10 INJECTION INTRAVENOUS at 08:28

## 2025-07-07 RX ADMIN — TRANEXAMIC ACID 1 G: 10 INJECTION, SOLUTION INTRAVENOUS at 07:22

## 2025-07-07 RX ADMIN — ONDANSETRON 4 MG: 2 INJECTION INTRAMUSCULAR; INTRAVENOUS at 08:39

## 2025-07-07 RX ADMIN — HYDROMORPHONE HYDROCHLORIDE 0.5 MG: 1 INJECTION, SOLUTION INTRAMUSCULAR; INTRAVENOUS; SUBCUTANEOUS at 07:50

## 2025-07-07 RX ADMIN — SODIUM CHLORIDE, SODIUM LACTATE, POTASSIUM CHLORIDE, AND CALCIUM CHLORIDE: .6; .31; .03; .02 INJECTION, SOLUTION INTRAVENOUS at 08:58

## 2025-07-07 RX ADMIN — ACETAMINOPHEN 975 MG: 325 TABLET ORAL at 06:16

## 2025-07-07 RX ADMIN — PROPOFOL 75 MCG/KG/MIN: 10 INJECTION, EMULSION INTRAVENOUS at 07:32

## 2025-07-07 RX ADMIN — FENTANYL CITRATE 50 MCG: 50 INJECTION, SOLUTION INTRAMUSCULAR; INTRAVENOUS at 10:08

## 2025-07-07 RX ADMIN — CEFAZOLIN 1 G: 1 INJECTION, POWDER, FOR SOLUTION INTRAMUSCULAR; INTRAVENOUS at 23:35

## 2025-07-07 RX ADMIN — FAMOTIDINE 20 MG: 20 TABLET, FILM COATED ORAL at 20:15

## 2025-07-07 RX ADMIN — PROPOFOL 160 MG: 10 INJECTION, EMULSION INTRAVENOUS at 07:28

## 2025-07-07 RX ADMIN — FAMOTIDINE 20 MG: 20 TABLET, FILM COATED ORAL at 12:19

## 2025-07-07 RX ADMIN — DEXAMETHASONE SODIUM PHOSPHATE 8 MG: 4 INJECTION, SOLUTION INTRA-ARTICULAR; INTRALESIONAL; INTRAMUSCULAR; INTRAVENOUS; SOFT TISSUE at 07:29

## 2025-07-07 RX ADMIN — ACETAMINOPHEN 975 MG: 325 TABLET ORAL at 12:12

## 2025-07-07 RX ADMIN — ROCURONIUM BROMIDE 50 MG: 50 INJECTION, SOLUTION INTRAVENOUS at 07:28

## 2025-07-07 RX ADMIN — DEXAMETHASONE SODIUM PHOSPHATE 4 MG: 4 INJECTION, SOLUTION INTRA-ARTICULAR; INTRALESIONAL; INTRAMUSCULAR; INTRAVENOUS; SOFT TISSUE at 07:37

## 2025-07-07 RX ADMIN — TRANEXAMIC ACID 1 G: 10 INJECTION, SOLUTION INTRAVENOUS at 08:38

## 2025-07-07 RX ADMIN — ACETAMINOPHEN 975 MG: 325 TABLET ORAL at 20:15

## 2025-07-07 RX ADMIN — PHENYLEPHRINE HYDROCHLORIDE 100 MCG: 10 INJECTION INTRAVENOUS at 08:05

## 2025-07-07 RX ADMIN — PHENYLEPHRINE HYDROCHLORIDE 150 MCG: 10 INJECTION INTRAVENOUS at 08:49

## 2025-07-07 RX ADMIN — PHENYLEPHRINE HYDROCHLORIDE 100 MCG: 10 INJECTION INTRAVENOUS at 08:17

## 2025-07-07 RX ADMIN — HYDROMORPHONE HYDROCHLORIDE 0.2 MG: 0.2 INJECTION, SOLUTION INTRAMUSCULAR; INTRAVENOUS; SUBCUTANEOUS at 12:13

## 2025-07-07 RX ADMIN — Medication 2 G: at 07:21

## 2025-07-07 RX ADMIN — OXYCODONE HYDROCHLORIDE 5 MG: 5 TABLET ORAL at 16:26

## 2025-07-07 RX ADMIN — SODIUM CHLORIDE, SODIUM LACTATE, POTASSIUM CHLORIDE, AND CALCIUM CHLORIDE: .6; .31; .03; .02 INJECTION, SOLUTION INTRAVENOUS at 21:47

## 2025-07-07 RX ADMIN — CEFAZOLIN 1 G: 1 INJECTION, POWDER, FOR SOLUTION INTRAMUSCULAR; INTRAVENOUS at 14:48

## 2025-07-07 ASSESSMENT — ACTIVITIES OF DAILY LIVING (ADL)
ADLS_ACUITY_SCORE: 21
ADLS_ACUITY_SCORE: 31
ADLS_ACUITY_SCORE: 32
ADLS_ACUITY_SCORE: 21
ADLS_ACUITY_SCORE: 21
ADLS_ACUITY_SCORE: 27
ADLS_ACUITY_SCORE: 27
ADLS_ACUITY_SCORE: 21
ADLS_ACUITY_SCORE: 21
ADLS_ACUITY_SCORE: 31
ADLS_ACUITY_SCORE: 27
ADLS_ACUITY_SCORE: 32
ADLS_ACUITY_SCORE: 27
ADLS_ACUITY_SCORE: 21
ADLS_ACUITY_SCORE: 27

## 2025-07-07 ASSESSMENT — COLUMBIA-SUICIDE SEVERITY RATING SCALE - C-SSRS
6. HAVE YOU EVER DONE ANYTHING, STARTED TO DO ANYTHING, OR PREPARED TO DO ANYTHING TO END YOUR LIFE?: NO
2. HAVE YOU ACTUALLY HAD ANY THOUGHTS OF KILLING YOURSELF IN THE PAST MONTH?: NO
1. IN THE PAST MONTH, HAVE YOU WISHED YOU WERE DEAD OR WISHED YOU COULD GO TO SLEEP AND NOT WAKE UP?: NO

## 2025-07-07 NOTE — ANESTHESIA POSTPROCEDURE EVALUATION
Patient: Anali Sanchez    Procedure: Procedure(s):  Right total hip arthroplasty       Anesthesia Type:  General    Note:     Postop Pain Control: Uneventful            Sign Out: Well controlled pain   PONV: No   Neuro/Psych: Uneventful            Sign Out: Acceptable/Baseline neuro status   Airway/Respiratory: Uneventful            Sign Out: Acceptable/Baseline resp. status   CV/Hemodynamics: Uneventful            Sign Out: Acceptable CV status   Other NRE: NONE   DID A NON-ROUTINE EVENT OCCUR? No           Last vitals:  Vitals Value Taken Time   BP 96/60 07/07/25 09:45   Temp 96.08  F (35.6  C) 07/07/25 09:53   Pulse 60 07/07/25 09:53   Resp 7 07/07/25 09:53   SpO2 99 % 07/07/25 09:53   Vitals shown include unfiled device data.    Electronically Signed By: David Dior MD  July 7, 2025  9:54 AM

## 2025-07-07 NOTE — PLAN OF CARE
/44 (BP Location: Right arm)   Pulse 56   Temp 97.5  F (36.4  C) (Temporal)   Resp 14   Wt 66.9 kg (147 lb 7.8 oz)   LMP  (LMP Unknown)   SpO2 100%   BMI 26.13 kg/m      Neuro: a/o x4  Cardiac: WNL  Lungs: WNL  GI: WNL  : due to void  Pain: 9/10- dilaudid given with some relief  IV: LR @75  Meds: ancef, dilaudid, oxy  Diet: reg  Activity: assist x1/gb/walker  Misc: dressing to R hip CDI. OT/PT/SW following   Plan: Currently resting in bed, able to make need known.           Problem: Delirium  Goal: Optimal Coping  Outcome: Progressing  Goal: Improved Behavioral Control  Outcome: Progressing  Goal: Improved Attention and Thought Clarity  Outcome: Progressing  Goal: Improved Sleep  Outcome: Progressing     Problem: Hip Arthroplasty  Goal: Optimal Coping  Outcome: Progressing  Goal: Absence of Bleeding  Outcome: Progressing  Goal: Effective Bowel Elimination  Outcome: Progressing  Goal: Fluid and Electrolyte Balance  Outcome: Progressing  Goal: Optimal Functional Ability  Outcome: Progressing  Intervention: Promote Optimal Functional Status  Recent Flowsheet Documentation  Taken 7/7/2025 1200 by Sheri Castaneda RN  Assistive Device Utilized:   cane   walker   gait belt  Activity Management: activity adjusted per tolerance  Goal: Absence of Infection Signs and Symptoms  Outcome: Progressing  Goal: Intact Neurovascular Status  Outcome: Progressing  Goal: Anesthesia/Sedation Recovery  Outcome: Progressing  Intervention: Optimize Anesthesia Recovery  Recent Flowsheet Documentation  Taken 7/7/2025 1200 by Sheri Castaneda RN  Safety Promotion/Fall Prevention:   activity supervised   assistive device/personal items within reach   clutter free environment maintained   nonskid shoes/slippers when out of bed   safety round/check completed  Goal: Optimal Pain Control and Function  Outcome: Progressing  Intervention: Prevent or Manage Pain  Recent Flowsheet Documentation  Taken 7/7/2025 1213 by Sheri Castaneda  "RN  Pain Management Interventions: medication (see MAR)  Goal: Nausea and Vomiting Relief  Outcome: Progressing  Goal: Effective Urinary Elimination  Outcome: Progressing  Goal: Effective Oxygenation and Ventilation  Outcome: Progressing  Intervention: Optimize Oxygenation and Ventilation  Recent Flowsheet Documentation  Taken 7/7/2025 1200 by Sheri Castaneda RN  Activity Management: activity adjusted per tolerance  Head of Bed (HOB) Positioning: HOB at 20-30 degrees     Problem: Adult Inpatient Plan of Care  Goal: Plan of Care Review  Description: The Plan of Care Review/Shift note should be completed every shift.  The Outcome Evaluation is a brief statement about your assessment that the patient is improving, declining, or no change.  This information will be displayed automatically on your shift  note.  Outcome: Progressing  Flowsheets (Taken 7/7/2025 1440)  Outcome Evaluation: pain management  Plan of Care Reviewed With: patient  Overall Patient Progress: improving  Goal: Patient-Specific Goal (Individualized)  Description: You can add care plan individualizations to a care plan. Examples of Individualization might be:  \"Parent requests to be called daily at 9am for status\", \"I have a hard time hearing out of my right ear\", or \"Do not touch me to wake me up as it startles  me\".  Outcome: Progressing  Goal: Absence of Hospital-Acquired Illness or Injury  Outcome: Progressing  Intervention: Identify and Manage Fall Risk  Recent Flowsheet Documentation  Taken 7/7/2025 1200 by Sheri Castaneda RN  Safety Promotion/Fall Prevention:   activity supervised   assistive device/personal items within reach   clutter free environment maintained   nonskid shoes/slippers when out of bed   safety round/check completed  Intervention: Prevent Skin Injury  Recent Flowsheet Documentation  Taken 7/7/2025 1200 by Sheri Castaneda, RN  Body Position: position changed independently  Intervention: Prevent and Manage VTE (Venous " Thromboembolism) Risk  Recent Flowsheet Documentation  Taken 7/7/2025 1200 by Sheri Castaneda RN  VTE Prevention/Management: SCDs on (sequential compression devices)  Goal: Optimal Comfort and Wellbeing  Outcome: Progressing  Intervention: Monitor Pain and Promote Comfort  Recent Flowsheet Documentation  Taken 7/7/2025 1213 by Sheri Castaneda RN  Pain Management Interventions: medication (see MAR)  Goal: Readiness for Transition of Care  Outcome: Progressing  Intervention: Mutually Develop Transition Plan  Recent Flowsheet Documentation  Taken 7/7/2025 1300 by Sheri Castaneda RN  Equipment Currently Used at Home:   tub bench   raised toilet seat   grab bar, toilet   walker, rolling   walker, standard         Goal Outcome Evaluation:      Plan of Care Reviewed With: patient    Overall Patient Progress: improvingOverall Patient Progress: improving    Outcome Evaluation: pain management

## 2025-07-07 NOTE — PROGRESS NOTES
07/07/25 1645   Appointment Info   Signing Clinician's Name / Credentials (PT) Jennifer Gonzalez DPT   Rehab Comments (PT) RLE: WBAT, posterior hip precautions   Quick Adds   Quick Adds Certification   Living Environment   People in Home spouse   Current Living Arrangements house   Home Accessibility stairs to enter home;stairs within home   Number of Stairs, Main Entrance 2  (2 platform style step)   Number of Stairs, Within Home, Primary seven  (7+7)   Stair Railings, Within Home, Primary railings safe and in good condition  (with 1 rail)   Living Environment Comments Prior to surgery was using SPC in community, no AD in house; has 2 walkers for post-op; basement level bathroom has walk in shower with built in seat, no grab bars; IND with basic ADLs and IADLs   Self-Care   Usual Activity Tolerance good   Current Activity Tolerance moderate   Equipment Currently Used at Home cane, straight;walker, standard;shower chair   Fall history within last six months no   General Information   Onset of Illness/Injury or Date of Surgery 07/07/25   Referring Physician Marybeth Rivera PA-C   Patient/Family Therapy Goals Statement (PT) home tomorrow   Pertinent History of Current Problem (include personal factors and/or comorbidities that impact the POC) Pt POD #0 s/p elective R ALEXEY   Existing Precautions/Restrictions fall;no hip IR;no hip ADD past midline;90 degree hip flexion;no pivoting or twisting   Weight-Bearing Status - RLE weight-bearing as tolerated   General Observations sitting in recliner, nAD   Cognition   Affect/Mental Status (Cognition) WFL   Orientation Status (Cognition) oriented x 4   Follows Commands (Cognition) WFL   Pain Assessment   Patient Currently in Pain   (minimal pain at rest; did not rate 0-10)   Integumentary/Edema   Integumentary/Edema Comments dressing not observed by this PT   Posture    Posture Forward head position;Protracted shoulders   Range of Motion (ROM)   Range of Motion ROM  deficits secondary to surgical procedure   Strength (Manual Muscle Testing)   Strength (Manual Muscle Testing) strength is WFL   Bed Mobility   Comment, (Bed Mobility) not assessed, in recliner at beginning of session and requested to return to recliner at end   Transfers   Comment, (Transfers) CGA sit <> stand with 2WW and cues for hand placement   Gait/Stairs (Locomotion)   [place] Level (Gait) contact guard   Assistive Device (Gait) walker, front-wheeled   Distance in Feet (Gait) 5' for eval; add'l for treat   Pattern (Gait) step-through   Balance   Balance Comments No LOB/lateral path deviation noted throughout   Sensory Examination   Sensory Perception patient reports no sensory changes   Clinical Impression   Criteria for Skilled Therapeutic Intervention Yes, treatment indicated   PT Diagnosis (PT) decreased functional mobility   Influenced by the following impairments pain, post-op precautions, impaired gait, impaired balance   Functional limitations due to impairments bed mobility (anticipated), transfers, ambulation, stair climbing   Clinical Presentation (PT Evaluation Complexity) stable   Clinical Presentation Rationale clinical judgement   Clinical Decision Making (Complexity) low complexity   Planned Therapy Interventions (PT) balance training;bed mobility training;gait training;home exercise program;neuromuscular re-education;ROM (range of motion);stair training;strengthening;transfer training;progressive activity/exercise;risk factor education;home program guidelines   Risk & Benefits of therapy have been explained evaluation/treatment results reviewed;care plan/treatment goals reviewed;risks/benefits reviewed;current/potential barriers reviewed;participants voiced agreement with care plan;participants included;patient   PT Total Evaluation Time   PT Eval, Low Complexity Minutes (90901) 7   Therapy Certification   Start of care date 07/07/25   Certification date from 07/07/25   Certification date  to 07/08/25   Medical Diagnosis R ALEXEY   Physical Therapy Goals   PT Frequency 2x/day   PT Predicted Duration/Target Date for Goal Attainment 07/08/25   PT Goals Bed Mobility;Transfers;Gait;Stairs   PT: Bed Mobility Supervision/stand-by assist;Supine to/from sit;Within precautions   PT: Transfers Sit to/from stand;Bed to/from chair;Assistive device;Supervision/stand-by assist;Within precautions   PT: Gait Supervision/stand-by assist;Standard walker;Greater than 200 feet   PT: Stairs Minimal assist;Assistive device  (2 platform style steps with 2WW and 7 steps with 1 rail and SPC)   PT Discharge Planning   PT Plan review hip precautions, assess bed mobility, issue HEP PRN, trial stairs (x2 platform style steps and x7 with 1 rail and SPC)   PT Discharge Recommendation (DC Rec)   (defer to ortho)   PT Rationale for DC Rec Pt is mobilizing well POD #0.  Anticipate with add'l PT, pt will demo adequate mobility for safe discharge home with spouse support   PT Brief overview of current status Ax1 with 2WW   PT Total Distance Amb During Session (feet) 200   Physical Therapy Time and Intention   Timed Code Treatment Minutes 24   Total Session Time (sum of timed and untimed services) 31       [facility]                                                                                   OUTPATIENT PHYSICAL THERAPY    PLAN OF TREATMENT FOR OUTPATIENT REHABILITATION   Patient's Last Name, First Name, Anali Garcia YOB: 1950   Provider's Name   [facility]   Medical Record No.  [id number]     Onset Date: 07/07/25 Start of Care Date: 07/07/25     Medical Diagnosis:  R ALEXEY               PT Diagnosis:  decreased functional mobility Certification Dates:  From: 07/07/25  To: 07/08/25       See note for plan of treatment, functional goals, and certification details.    I CERTIFY THE NEED FOR THESE SERVICES FURNISHED UNDER        THIS PLAN OF  TREATMENT AND WHILE UNDER MY CARE (Physician co-signature of this document indicates review and certification of the therapy plan).

## 2025-07-07 NOTE — ANESTHESIA CARE TRANSFER NOTE
Patient: Anali Sanchez    Procedure: Procedure(s):  Right total hip arthroplasty       Diagnosis: Primary osteoarthritis of right hip [M16.11]  Diagnosis Additional Information: No value filed.    Anesthesia Type:   General     Note:    Oropharynx: oropharynx clear of all foreign objects  Level of Consciousness: awake  Oxygen Supplementation: face mask  Level of Supplemental Oxygen (L/min / FiO2): 6 lpm  Independent Airway: airway patency satisfactory and stable  Dentition: dentition unchanged  Vital Signs Stable: post-procedure vital signs reviewed and stable  Report to RN Given: handoff report given  Patient transferred to: PACU  Comments: Patient oral suctioned. Patient with spontaneous respirations and adequate tidal volumes. Patient awake and responsive. Extubated in OR to 6L facemask. To PACU ventilating well. VSS. Report given.  Handoff Report: Identifed the Patient, Identified the Reponsible Provider, Reviewed the pertinent medical history, Discussed the surgical course, Reviewed Intra-OP anesthesia mangement and issues during anesthesia, Set expectations for post-procedure period and Allowed opportunity for questions and acknowledgement of understanding      Vitals:  Vitals Value Taken Time   /48 07/07/25 09:05   Temp 96.62  F (35.9  C) 07/07/25 09:06   Pulse 60 07/07/25 09:06   Resp 14 07/07/25 09:06   SpO2 100 % 07/07/25 09:06   Vitals shown include unfiled device data.    Electronically Signed By: LEONOR Dao CRNA  July 7, 2025  9:07 AM

## 2025-07-07 NOTE — BRIEF OP NOTE
S/P ALEXEY for OA  Juan  EBL see dictated op note  No specimens  No anticipated complications

## 2025-07-07 NOTE — OP NOTE
Preoperative diagnosis:  Hip Osteoarthritis  Postoperative Diagnosis:  same  Procedure: Right Total Hip Arthroplasty  Surgeon: Nabil Martinez MD  Assistant: ASIF Kemp  Anesthesia:  Choice (see MDA notes)  EBL: see below  Drains: none  Specimens: none  Complication: none  Dictation of Operation:  The patient was taken to the operating room, where after TXA and antibiotic prophylaxis, the leg was prepped and draped.  A decubitus position was used followed by a mini posterior approach, splitting the ITB and reflecting the short ER with the capsule as a sleeve, leaving the piriformis attached. The hip was dislocated followed by a neck cut based on preop planning and templating. Retractors were carefully placed and the sciatic nerve was palpated and protected followed by sequential reaming to one mm under the final cup size. Socket fixation was solid and the poly liner was placed.   Attention was directed to the femur; after reaming and broaching, the stem was carefully impacted. After trialing, the appropriate 36 mm ceramic head was selected and placed based on the requirement of acceptable stability. Leg lengths were carefully assessed and restored based on templating, multiple intra-op measures and the need for adequate stability. Extensive Betadine soak was done. A capsular closure was accomplished. After extensive lavage, the remaining layered anatomic closure was accomplished.    Implants:   Pretty Avenir: 3 HO  Acetabulum G7: 50  Screws: 1  Insert: HW  Head: Ceramic 36 mm Head with -3.5 mm length    Notable Findings and Technical Aspects of procedure:    Vanco powder N  Stability 90/30/80  Leg length short 2-3 mm preop; stem sat 3 mm above calcar; LL restored with sl lengthening as stem 1 size smaller than template and sat proud with minus head

## 2025-07-07 NOTE — ANESTHESIA PROCEDURE NOTES
Airway       Patient location during procedure: OR       Procedure Start/Stop Times: 7/7/2025 7:31 AM  Staff -        CRNA: Rajeev Cabezas APRN CRNA       Performed By: CRNA  Consent for Airway        Urgency: elective  Indications and Patient Condition       Indications for airway management: yu-procedural       Induction type:intravenous       Mask difficulty assessment: 1 - vent by mask    Final Airway Details       Final airway type: endotracheal airway       Successful airway: ETT - single  Endotracheal Airway Details        ETT size (mm): 7.0       Cuffed: yes       Cuff volume (mL): 5       Successful intubation technique: direct laryngoscopy       DL Blade Type: Izagu[place]re 2       Grade View of Cords: 1       Adjucts: stylet       Position: Right       Measured from: lips       Secured at (cm): 22       Bite block used: None    Post intubation assessment        Placement verified by: capnometry, equal breath sounds and chest rise        Number of attempts at approach: 1       Number of other approaches attempted: 0       Secured with: tape       Ease of procedure: easy       Dentition: Intact and Unchanged    Medication(s) Administered   Medication Administration Time: 7/7/2025 7:31 AM

## 2025-07-07 NOTE — ANESTHESIA PREPROCEDURE EVALUATION
Anesthesia Pre-Procedure Evaluation    Patient: Anali Sanchez   MRN: [id number] : 1950          Procedure : Procedure(s):  Right total hip arthroplasty         Past Medical History:   Diagnosis Date     Anemia      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37     Coagulation disorder     hx hemorrhage after heart and after colon surg     Daily consumption of alcohol 2024     Factor VII deficiency (H) 2024    She was seen by hematology ([facility]) in 2014 for clearance prior to D&C. 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 There was no evidence of von Willebrand's or a factor inhibitor at that time. They completed additional labs including INR, PTT, fibrinogen level, an     History of atrial flutter 2023    She has a history of WPW status post surgical ablation of a left lateral electrical pathway at age 37. She did not have any recurrent palpitations until . At that time, she was seen at [facility] in [place] and had ECG consistent with atrial flutter. This episode of atrial flutter lasted several hours and terminated spontaneously. She saw cardiology after that and plan was for obser     History of blood transfusion     anti Jka red cell antibody     History of Gabino-Parkinson-White (WPW) syndrome 2023    She has a history of WPW status post surgical ablation of a left lateral electrical pathway at age 37.      Hypertension      Osteoma of face 2022    Added automatically from request for surgery [id number]       Other and unspecified nonspecific immunological findings     anti Jka red cell antibody     Thyroid disease       Past Surgical History:   Procedure Laterality Date     ABDOMEN SURGERY       APPENDECTOMY  1968     CARDIAC SURGERY  age 37    WPW surg; open ablation; complicated with hemorrhage     COLECTOMY  age 52    7-8 inches  rectum remain. cx with hemorrhage. no cancer. slow transit.(2 surgeries; first for low transit; second due to the bleeding)     DILATION AND CURETTAGE, HYSTEROSCOPY DIAGNOSTIC, COMBINED  02/21/2014    Procedure: COMBINED DILATION AND CURETTAGE, HYSTEROSCOPY DIAGNOSTIC;   DILATION AND CURETTAGE, HYSTEROSCOPY DIAGNOSTIC ;  Surgeon: Willie Osborn MD;  Location: [facility] OR     ENT SURGERY      t and a     EXCISE LESION FACE N/A 01/11/2023    Procedure: incision and removal of osteoma on forehead;  Surgeon: Dl Davenport MD;  Location: [facility] OR      Allergies   Allergen Reactions     Benadryl [Diphenhydramine]      Given in the hospital and had a difficult time waking as expected.     External Allergen Needs Reconciliation - See Comment      Please reconcile the Patient's allergy reported as LEAD ACETATEMORPHINE SULFATE and update accordingly     Morphine      Cerner Allergy Text Annotation: morphine     Protamine      sob     Tylox [Oxycodone-Acetaminophen]      unknown     Ciprofloxacin Rash     Escitalopram Other (See Comments)     Brain fog, forgetfulness      Social History     Tobacco Use     Smoking status: Never     Passive exposure: Never     Smokeless tobacco: Never   Substance Use Topics     Alcohol use: Yes     Alcohol/week: 4.0 - 5.0 standard drinks of alcohol     Types: 4 - 5 Glasses of wine per week     Comment: A glass of wine or beer 4 days week      Wt Readings from Last 1 Encounters:   07/07/25 64.5 kg (142 lb 1.6 oz)        Anesthesia Evaluation   Pt has had prior anesthetic. Type: General.    No history of anesthetic complications       ROS/MED HX  ENT/Pulmonary:  - neg pulmonary ROS     Neurologic:  - neg neurologic ROS     Cardiovascular:     (+)  hypertension- -   -  - -                                      METS/Exercise Tolerance:     Hematologic:  - neg hematologic  ROS     Musculoskeletal:  - neg musculoskeletal ROS     GI/Hepatic:  - neg GI/hepatic ROS     Renal/Genitourinary:  - neg  "Renal ROS     Endo:     (+)          thyroid problem, hypothyroidism,           Psychiatric/Substance Use:     (+) psychiatric history anxiety       Infectious Disease:  - neg infectious disease ROS     Malignancy:  - neg malignancy ROS     Other:  - neg other ROS            Physical Exam  Airway  Mallampati: III  TM distance: >3 FB  Neck ROM: full  Upper bite lip test: I  Mouth opening: >= 4 cm    Cardiovascular - normal exam  Rhythm: regular  Rate: normal rate     Dental     Pulmonary - normal examBreath sounds clear to auscultation        Neurological - normal exam  She appears awake, alert and oriented x3.    Other Findings       OUTSIDE LABS:  CBC:   Lab Results   Component Value Date    WBC 9.9 06/20/2025    WBC 7.9 05/27/2025    HGB 10.5 (L) 06/20/2025    HGB 10.9 (L) 05/27/2025    HCT 31.9 (L) 06/20/2025    HCT 32.3 (L) 05/27/2025     06/20/2025     05/27/2025     BMP:   Lab Results   Component Value Date     07/02/2025     05/27/2025    POTASSIUM 4.2 07/02/2025    POTASSIUM 4.7 05/27/2025    CHLORIDE 105 07/02/2025    CHLORIDE 103 05/27/2025    CO2 23 07/02/2025    CO2 25 05/27/2025    BUN 26.7 (H) 07/02/2025    BUN 24.7 (H) 05/27/2025    CR 0.89 07/02/2025    CR 0.92 05/27/2025    GLC 99 07/02/2025    GLC 89 05/27/2025     COAGS:   Lab Results   Component Value Date    PTT 30 06/20/2025    INR 1.17 (H) 06/20/2025     POC: No results found for: \"BGM\", \"HCG\", \"HCGS\"  HEPATIC:   Lab Results   Component Value Date    ALBUMIN 4.1 11/19/2024    PROTTOTAL 6.9 11/19/2024    ALT 13 11/19/2024    AST 19 11/19/2024    ALKPHOS 38 (L) 11/19/2024    BILITOTAL 0.5 11/19/2024     OTHER:   Lab Results   Component Value Date    A1C 6.1 (H) 05/27/2025    MITCH 9.2 07/02/2025    LIPASE 25 11/19/2024    TSH 2.17 05/27/2025    T4 0.67 (L) 01/08/2024    SED 9 10/08/2024       Anesthesia Plan    ASA Status:  2      NPO Status: NPO Appropriate   Anesthesia Type: General.  Airway: oral.  Induction: " intravenous.   Techniques and Equipment:       - Monitoring Plan: standard ASA monitoring     Consents    Anesthesia Plan(s) and associated risks, benefits, and realistic alternatives discussed. Questions answered and patient/representative(s) expressed understanding.     - Discussed: anesthesiologist     - Discussed with:  Patient          Blood Consent:      - Discussed with: patient.     - Consented: consented to blood products     Postoperative Care    Pain management: plan for postoperative opioid use, multimodal analgesia.     Comments:                 David Dior MD    I have reviewed the pertinent notes and labs in the chart from the past 30 days and (re)examined the patient.  Any updates or changes from those notes are reflected in this note.    Clinically Significant Risk Factors Present on Admission                   # Hypertension: Noted on problem list

## 2025-07-08 VITALS
HEART RATE: 57 BPM | SYSTOLIC BLOOD PRESSURE: 114 MMHG | WEIGHT: 147.49 LBS | RESPIRATION RATE: 18 BRPM | OXYGEN SATURATION: 98 % | DIASTOLIC BLOOD PRESSURE: 48 MMHG | TEMPERATURE: 97.7 F | BODY MASS INDEX: 26.13 KG/M2

## 2025-07-08 LAB
FASTING STATUS PATIENT QL REPORTED: NO
GLUCOSE SERPL-MCNC: 150 MG/DL (ref 70–99)
HGB BLD-MCNC: 7.9 G/DL (ref 11.7–15.7)
MCV RBC AUTO: 93 FL (ref 78–100)

## 2025-07-08 PROCEDURE — 250N000009 HC RX 250: Performed by: PHYSICIAN ASSISTANT

## 2025-07-08 PROCEDURE — 36415 COLL VENOUS BLD VENIPUNCTURE: CPT | Performed by: ORTHOPAEDIC SURGERY

## 2025-07-08 PROCEDURE — 250N000013 HC RX MED GY IP 250 OP 250 PS 637: Performed by: PHYSICIAN ASSISTANT

## 2025-07-08 PROCEDURE — 97116 GAIT TRAINING THERAPY: CPT | Mod: GP | Performed by: PHYSICAL THERAPIST

## 2025-07-08 PROCEDURE — 250N000013 HC RX MED GY IP 250 OP 250 PS 637: Performed by: HOSPITALIST

## 2025-07-08 PROCEDURE — 97165 OT EVAL LOW COMPLEX 30 MIN: CPT | Mod: GO

## 2025-07-08 PROCEDURE — 85018 HEMOGLOBIN: CPT | Performed by: PHYSICIAN ASSISTANT

## 2025-07-08 PROCEDURE — 250N000012 HC RX MED GY IP 250 OP 636 PS 637: Performed by: PHYSICIAN ASSISTANT

## 2025-07-08 PROCEDURE — 97535 SELF CARE MNGMENT TRAINING: CPT | Mod: GO

## 2025-07-08 PROCEDURE — 97530 THERAPEUTIC ACTIVITIES: CPT | Mod: GP | Performed by: PHYSICAL THERAPIST

## 2025-07-08 PROCEDURE — 82947 ASSAY GLUCOSE BLOOD QUANT: CPT | Performed by: ORTHOPAEDIC SURGERY

## 2025-07-08 RX ORDER — METHOCARBAMOL 500 MG/1
500 TABLET, FILM COATED ORAL 3 TIMES DAILY
Qty: 60 TABLET | Refills: 0 | Status: SHIPPED | OUTPATIENT
Start: 2025-07-08

## 2025-07-08 RX ORDER — METHOCARBAMOL 500 MG/1
500 TABLET, FILM COATED ORAL 3 TIMES DAILY
Status: DISCONTINUED | OUTPATIENT
Start: 2025-07-08 | End: 2025-07-08 | Stop reason: HOSPADM

## 2025-07-08 RX ORDER — HYDROMORPHONE HYDROCHLORIDE 2 MG/1
2 TABLET ORAL EVERY 4 HOURS PRN
Qty: 30 TABLET | Refills: 0 | Status: SHIPPED | OUTPATIENT
Start: 2025-07-08

## 2025-07-08 RX ORDER — METHOCARBAMOL 500 MG/1
500 TABLET, FILM COATED ORAL 3 TIMES DAILY
Status: DISCONTINUED | OUTPATIENT
Start: 2025-07-08 | End: 2025-07-08

## 2025-07-08 RX ORDER — ASPIRIN 81 MG/1
81 TABLET ORAL 2 TIMES DAILY
Qty: 60 TABLET | Refills: 0 | Status: SHIPPED | OUTPATIENT
Start: 2025-07-10

## 2025-07-08 RX ORDER — METHOCARBAMOL 500 MG/1
500 TABLET, FILM COATED ORAL 3 TIMES DAILY PRN
Status: DISCONTINUED | OUTPATIENT
Start: 2025-07-08 | End: 2025-07-08

## 2025-07-08 RX ORDER — HYDROMORPHONE HYDROCHLORIDE 2 MG/1
2 TABLET ORAL
Refills: 0 | Status: DISCONTINUED | OUTPATIENT
Start: 2025-07-08 | End: 2025-07-08 | Stop reason: HOSPADM

## 2025-07-08 RX ADMIN — CELECOXIB 100 MG: 100 CAPSULE ORAL at 08:05

## 2025-07-08 RX ADMIN — HYDROMORPHONE HYDROCHLORIDE 2 MG: 2 TABLET ORAL at 06:17

## 2025-07-08 RX ADMIN — SENNOSIDES AND DOCUSATE SODIUM 1 TABLET: 50; 8.6 TABLET ORAL at 08:05

## 2025-07-08 RX ADMIN — ROSUVASTATIN 20 MG: 20 TABLET, FILM COATED ORAL at 08:05

## 2025-07-08 RX ADMIN — HYDROXYZINE HYDROCHLORIDE 10 MG: 10 TABLET, FILM COATED ORAL at 06:17

## 2025-07-08 RX ADMIN — LEVOTHYROXINE SODIUM 25 MCG: 0.03 TABLET ORAL at 08:05

## 2025-07-08 RX ADMIN — SERTRALINE HYDROCHLORIDE 25 MG: 25 TABLET ORAL at 08:05

## 2025-07-08 RX ADMIN — METHOCARBAMOL 500 MG: 500 TABLET ORAL at 08:05

## 2025-07-08 RX ADMIN — TRANEXAMIC ACID 1 G: 10 INJECTION, SOLUTION INTRAVENOUS at 09:09

## 2025-07-08 RX ADMIN — HYDROMORPHONE HYDROCHLORIDE 2 MG: 2 TABLET ORAL at 02:42

## 2025-07-08 RX ADMIN — ACETAMINOPHEN 975 MG: 325 TABLET ORAL at 03:10

## 2025-07-08 RX ADMIN — PREDNISONE 2 MG: 1 TABLET ORAL at 08:13

## 2025-07-08 RX ADMIN — METHOCARBAMOL 500 MG: 500 TABLET ORAL at 03:10

## 2025-07-08 RX ADMIN — TRANEXAMIC ACID 1 G: 10 INJECTION, SOLUTION INTRAVENOUS at 02:07

## 2025-07-08 RX ADMIN — FAMOTIDINE 20 MG: 20 TABLET, FILM COATED ORAL at 08:05

## 2025-07-08 ASSESSMENT — ACTIVITIES OF DAILY LIVING (ADL)
ADLS_ACUITY_SCORE: 32
ADLS_ACUITY_SCORE: 33
ADLS_ACUITY_SCORE: 32
ADLS_ACUITY_SCORE: 33

## 2025-07-08 NOTE — PROGRESS NOTES
07/08/25 0900   Appointment Info   Signing Clinician's Name / Credentials (OT) Aidee Mcmullen, OTD, OTR/L   Rehab Comments (OT) R ALEXEY, WBAT, posterior hip precautions   Quick Adds   Quick Adds Certification   Living Environment   People in Home spouse   Current Living Arrangements house   Home Accessibility stairs to enter home;stairs within home   Number of Stairs, Main Entrance 2   Number of Stairs, Within Home, Primary seven   Stair Railings, Within Home, Primary railings safe and in good condition   Living Environment Comments Prior to surgery was using SPC in community, no AD in house; has 2 walkers for post-op; basement level bathroom has walk in shower with built in seat, no grab bars; IND with basic ADLs and IADLs   Self-Care   Usual Activity Tolerance good   Current Activity Tolerance moderate   Equipment Currently Used at Home cane, straight;walker, standard;shower chair   Fall history within last six months no   Activity/Exercise/Self-Care Comment Pt IND with ADLs and IADLs at baseline.   General Information   Onset of Illness/Injury or Date of Surgery 07/07/25   Referring Physician Marybeth Rivera PA-C   Patient/Family Therapy Goal Statement (OT) return home   Additional Occupational Profile Info/Pertinent History of Current Problem Right total hip arthroplasty.   Existing Precautions/Restrictions fall;weight bearing;no hip IR;no hip ADD past midline;90 degree hip flexion   Right Lower Extremity (Weight-bearing Status) weight-bearing as tolerated (WBAT)   Cognitive Status Examination   Orientation Status orientation to person, place and time   Visual Perception   Visual Impairment/Limitations corrective lenses full-time   Sensory   Sensory Quick Adds sensation intact   Pain Assessment   Patient Currently in Pain Yes, see Vital Sign flowsheet   Posture   Posture not impaired   Range of Motion Comprehensive   General Range of Motion no range of motion deficits identified   Strength Comprehensive  (MMT)   Comment, General Manual Muscle Testing (MMT) Assessment General weakness s/p surgical intervention   Bed Mobility   Comment (Bed Mobility) Defer to PT-in chair at eval   Transfers   Transfer Comments CGA and FWW   Balance   Balance Comments Steady with FWW   Activities of Daily Living   BADL Assessment/Intervention bathing;lower body dressing;toileting   Bathing Assessment/Intervention   Comment, (Bathing) CGA and FWW   Lower Body Dressing Assessment/Training   Comment, (Lower Body Dressing) Mod A without AE   Toileting   Comment, (Toileting) CGA and FWW   Clinical Impression   Criteria for Skilled Therapeutic Interventions Met (OT) Yes, treatment indicated   OT Diagnosis impaired ADLs   Influenced by the following impairments Right total hip arthroplasty.   OT Problem List-Impairments impacting ADL problems related to;activity tolerance impaired;pain;strength;mobility   Assessment of Occupational Performance 1-3 Performance Deficits   Identified Performance Deficits LB dressing, bathing, taxing IADLs and home mgmt   Planned Therapy Interventions (OT) ADL retraining;strengthening;transfer training;progressive activity/exercise   Clinical Decision Making Complexity (OT) problem focused assessment/low complexity   Risk & Benefits of therapy have been explained evaluation/treatment results reviewed;care plan/treatment goals reviewed;risks/benefits reviewed;current/potential barriers reviewed;participants voiced agreement with care plan;participants included;patient;spouse/significant other   OT Total Evaluation Time   OT Eval, Low Complexity Minutes (31572) 5   Therapy Certification   Start of Care Date 07/08/25   Certification date from 07/08/25   Certification date to 07/08/25   Medical Diagnosis R ALEXEY   OT Goals   Therapy Frequency (OT) One time eval and treatment   OT Predicted Duration/Target Date for Goal Attainment 07/08/25   OT Goals Hygiene/Grooming;Lower Body Dressing;Lower Body Bathing;Toilet  Transfer/Toileting   OT: Hygiene/Grooming modified independent;Goal Met   OT: Lower Body Dressing Supervision/stand-by assist;using adaptive equipment;within precautions;Goal Met   OT: Lower Body Bathing Supervision/stand-by assist;Goal Met   OT: Toilet Transfer/Toileting Modified independent;toilet transfer;cleaning and garment management;using adaptive equipment;within precautions;Goal Met   Interventions   Interventions Quick Adds Self-Care/Home Management   Self-Care/Home Management   Self-Care/Home Mgmt/ADL, Compensatory, Meal Prep Minutes (38390) 28   Symptoms Noted During/After Treatment (Meal Preparation/Planning Training) increased pain   Treatment Detail/Skilled Intervention Pt greeted seated in recliner, spouse present. Pt able to recall and demosntrate posterior hip precautions throughout session. Time spent educating pt on proper body mechanics and ADLs within precautions with use of AE/DME. Pt receptive to education and asked appropirate questions. Pt able to demo transfers from toielt and chair functinoally with SBA progressing to mod I with FWW. Pt particiapted in FB dressing with clothing retrieval with mod A at LB without AE-educated on AE or spouse support at home. Educated on shower transfer with AE and use of shower chair, pt receptive. Left in chair with all needs met and concerns addressed.   OT Discharge Planning   OT Plan d/c   OT Discharge Recommendation (DC Rec) other (see comments)   OT Rationale for DC Rec Defer to ortho   OT Brief overview of current status mod I ADLs   Total Session Time   Timed Code Treatment Minutes 28   Total Session Time (sum of timed and untimed services) [address] [place] [address] Rehabilitation Services                                                                                   OUTPATIENT OCCUPATIONAL THERAPY    PLAN OF TREATMENT FOR OUTPATIENT REHABILITATION   Patient's Last Name, First Name, Anali Garcia YOB: 1950   Provider's  Name   [facility]   Medical Record No.  [id number]     Onset Date: 07/07/25 Start of Care Date: 07/08/25     Medical Diagnosis:  R ALEXEY               OT Diagnosis:  impaired ADLs Certification Dates:  From: 07/08/25  To: 07/08/25     See note for plan of treatment, functional goals, and certification details.    I CERTIFY THE NEED FOR THESE SERVICES FURNISHED UNDER        THIS PLAN OF TREATMENT AND WHILE UNDER MY CARE (Physician co-signature of this document indicates review and certification of the therapy plan).

## 2025-07-08 NOTE — PLAN OF CARE
Physical Therapy Discharge Summary    Reason for therapy discharge:    All goals and outcomes met, no further needs identified.    Progress towards therapy goal(s). See goals on Care Plan in [facility] electronic health record for goal details.  Goals met    Therapy recommendation(s):    Continued therapy is recommended.  Rationale/Recommendations:  Defer to ortho.

## 2025-07-08 NOTE — PLAN OF CARE
Having increased pain to her post op site, feels like a spasm. Will add scheduled robaxin for now. Reporting some nightmares/hallucinations on the oxy, requesting to switch off it but pain poorly controlled so change to oral dilauded. Some petechia on her post op leg, await repeat Hgb later this morning but exam was reassuring.

## 2025-07-08 NOTE — PROGRESS NOTES
Orthopedic Surgery  7/8/2025  POD 1    S: Patient voices no unexpected ortho complaints today. Denies chest pain or shortness of breath. Did have trouble with Oxycodone last night. Changed to PO dilaudid.     O: Blood pressure (!) 104/39, pulse 76, temperature 98.3  F (36.8  C), temperature source Temporal, resp. rate 14, weight 66.9 kg (147 lb 7.8 oz), SpO2 96%, not currently breastfeeding.  Lab Results   Component Value Date    HGB 9.5 07/07/2025    HGB 12.6 12/02/2019     Lab Results   Component Value Date    INR 1.17 06/20/2025     I/O last 3 completed shifts:  In: 1480 [P.O.:480; I.V.:1000]  Out: 295 [Urine:195; Blood:100]  Distal extremity CMSI bilaterally.  Calves are negative bilaterally, both soft and nontender.  The dressing is C/D/I.      A: Ms. Sanchez is doing well status post Procedure(s):  Right total hip arthroplasty.    P: Continue physical therapy. To balance and minimize risk of hematoma and DVT, as well as plan with hematologist will hold ASA until POD 3. Will continue with IV tranexamic acid q 8 while in hospital and then switch to PO at discharge. Awaiting Hgb today. Outpatient PT is set up. Anticipate discharge to home with family later today or tomorrow depending on progress.    Marybeth Rivera PA-C  [phone]

## 2025-07-08 NOTE — PLAN OF CARE
Occupational Therapy Discharge Summary    Reason for therapy discharge:    All goals and outcomes met, no further needs identified.    Progress towards therapy goal(s). See goals on Care Plan in [facility] electronic health record for goal details.  Goals met    Therapy recommendation(s):    Defer to ortho

## 2025-07-08 NOTE — PLAN OF CARE
A/o x4. IV removed. Discharge instructions gone over and understanding verbalized. All belongings gathered and sent with patient. Meds (aspirin, tylenol, senna, tranexamic acid, atarax, robaxin, dilaudid) given to patient to take home.  to transport home.         Problem: Delirium  Goal: Optimal Coping  Outcome: Met  Goal: Improved Behavioral Control  Outcome: Met  Goal: Improved Attention and Thought Clarity  Outcome: Met  Goal: Improved Sleep  Outcome: Met     Problem: Hip Arthroplasty  Goal: Optimal Coping  Outcome: Met  Goal: Absence of Bleeding  Outcome: Met  Goal: Effective Bowel Elimination  Outcome: Met  Goal: Fluid and Electrolyte Balance  Outcome: Met  Goal: Optimal Functional Ability  Outcome: Met  Intervention: Promote Optimal Functional Status  Recent Flowsheet Documentation  Taken 7/8/2025 0907 by Sheri Castaneda RN  Assistive Device Utilized:   gait belt   walker  Activity Management: ambulated to bathroom  Taken 7/8/2025 0808 by Sheri Castaneda RN  Assistive Device Utilized:   gait belt   walker  Activity Management: activity adjusted per tolerance  Goal: Absence of Infection Signs and Symptoms  Outcome: Met  Goal: Intact Neurovascular Status  Outcome: Met  Goal: Anesthesia/Sedation Recovery  Outcome: Met  Intervention: Optimize Anesthesia Recovery  Recent Flowsheet Documentation  Taken 7/8/2025 0808 by Sheri Castaneda RN  Safety Promotion/Fall Prevention:   activity supervised   assistive device/personal items within reach   clutter free environment maintained   nonskid shoes/slippers when out of bed   safety round/check completed  Goal: Optimal Pain Control and Function  Outcome: Met  Intervention: Prevent or Manage Pain  Recent Flowsheet Documentation  Taken 7/8/2025 0808 by Sheri Castaneda RN  Pain Management Interventions: medication (see MAR)  Goal: Nausea and Vomiting Relief  Outcome: Met  Goal: Effective Urinary Elimination  Outcome: Met  Goal: Effective Oxygenation and  "Ventilation  Outcome: Met  Intervention: Optimize Oxygenation and Ventilation  Recent Flowsheet Documentation  Taken 7/8/2025 0907 by Sheri Castaneda RN  Activity Management: ambulated to bathroom  Taken 7/8/2025 0808 by Sheri Castaneda RN  Activity Management: activity adjusted per tolerance     Problem: Adult Inpatient Plan of Care  Goal: Plan of Care Review  Description: The Plan of Care Review/Shift note should be completed every shift.  The Outcome Evaluation is a brief statement about your assessment that the patient is improving, declining, or no change.  This information will be displayed automatically on your shift  note.  Outcome: Met  Flowsheets (Taken 7/8/2025 0957)  Outcome Evaluation: discharging home  Plan of Care Reviewed With: patient  Overall Patient Progress: improving  Goal: Patient-Specific Goal (Individualized)  Description: You can add care plan individualizations to a care plan. Examples of Individualization might be:  \"Parent requests to be called daily at 9am for status\", \"I have a hard time hearing out of my right ear\", or \"Do not touch me to wake me up as it startles  me\".  Outcome: Met  Goal: Absence of Hospital-Acquired Illness or Injury  Outcome: Met  Intervention: Identify and Manage Fall Risk  Recent Flowsheet Documentation  Taken 7/8/2025 0808 by Sheri Castaneda RN  Safety Promotion/Fall Prevention:   activity supervised   assistive device/personal items within reach   clutter free environment maintained   nonskid shoes/slippers when out of bed   safety round/check completed  Intervention: Prevent Skin Injury  Recent Flowsheet Documentation  Taken 7/8/2025 0808 by Sheri Castaneda RN  Body Position: position changed independently  Intervention: Prevent and Manage VTE (Venous Thromboembolism) Risk  Recent Flowsheet Documentation  Taken 7/8/2025 0808 by Sheri Castaneda RN  VTE Prevention/Management: (up in chair) SCDs off (sequential compression devices)  Intervention: Prevent " Infection  Recent Flowsheet Documentation  Taken 7/8/2025 0808 by Sheri Castaneda, RN  Infection Prevention:   single patient room provided   rest/sleep promoted   hand hygiene promoted  Goal: Optimal Comfort and Wellbeing  Outcome: Met  Intervention: Monitor Pain and Promote Comfort  Recent Flowsheet Documentation  Taken 7/8/2025 0808 by Sheri Castaneda, RN  Pain Management Interventions: medication (see MAR)  Goal: Readiness for Transition of Care  Outcome: Met         Goal Outcome Evaluation:      Plan of Care Reviewed With: patient    Overall Patient Progress: improvingOverall Patient Progress: improving    Outcome Evaluation: discharging home

## 2025-07-08 NOTE — CONSULTS
Care Management Discharge Note    Discharge Date: 07/08/2025     Discharge Disposition:  home w/spouse     Discharge Services:  OP PT     Discharge Transportation: car, drives self, family or friend will provide    Private pay costs discussed: Not applicable    Does the patient's insurance plan have a 3 day qualifying hospital stay waiver?  No    Additional Information:  CM/SW consulted for care coordination/discharge planning per ortho order set. Per chart review pt is going home w/ assist from spouse and orders for outpatient therapies. No discharge needs identified at this time. Please re-consult care management if discharge needs arise.       Myla Johnson, RN  Care Coordinator  [facility]  [phone]

## 2025-07-08 NOTE — PLAN OF CARE
Goal Outcome Evaluation:      Plan of Care Reviewed With: patient    Overall Patient Progress: improvingOverall Patient Progress: improving    Outcome Evaluation: POD1. Pt AOx4. RA. Ax1 walker/GB; WBAT. CMS intact. On IV TXA infusion. Pain managed with celebrex, PO dilaudid, atarax, robaxin, ice and tylenol. Discharge TBD.    Problem: Delirium  Goal: Optimal Coping  Outcome: Progressing  Intervention: Optimize Psychosocial Adjustment to Delirium  Recent Flowsheet Documentation  Taken 7/8/2025 0258 by Dorina Wagner RN  Supportive Measures:   active listening utilized   self-care encouraged   verbalization of feelings encouraged  Goal: Improved Behavioral Control  Outcome: Progressing  Goal: Improved Attention and Thought Clarity  Outcome: Progressing  Goal: Improved Sleep  Outcome: Progressing     Problem: Hip Arthroplasty  Goal: Optimal Coping  Outcome: Progressing  Intervention: Support Psychosocial Response to Surgery and Mobility Changes  Recent Flowsheet Documentation  Taken 7/8/2025 0258 by Dorina Wagner RN  Supportive Measures:   active listening utilized   self-care encouraged   verbalization of feelings encouraged  Goal: Absence of Bleeding  Outcome: Progressing  Goal: Effective Bowel Elimination  Outcome: Progressing  Intervention: Enhance Bowel Motility and Elimination  Recent Flowsheet Documentation  Taken 7/8/2025 0258 by Dorina Wagner RN  Bowel Motility Enhancement:   ambulation promoted   fluid intake encouraged  Goal: Fluid and Electrolyte Balance  Outcome: Progressing  Goal: Optimal Functional Ability  Outcome: Progressing  Goal: Absence of Infection Signs and Symptoms  Outcome: Progressing  Goal: Intact Neurovascular Status  Outcome: Progressing  Goal: Anesthesia/Sedation Recovery  Outcome: Progressing  Goal: Optimal Pain Control and Function  Outcome: Progressing  Goal: Nausea and Vomiting Relief  Outcome: Progressing  Goal: Effective Urinary Elimination  Outcome:  "Progressing  Goal: Effective Oxygenation and Ventilation  Outcome: Progressing  Intervention: Optimize Oxygenation and Ventilation  Recent Flowsheet Documentation  Taken 7/8/2025 0258 by Dorina Wagner RN  Cough And Deep Breathing: done independently per patient     Problem: Adult Inpatient Plan of Care  Goal: Plan of Care Review  Description: The Plan of Care Review/Shift note should be completed every shift.  The Outcome Evaluation is a brief statement about your assessment that the patient is improving, declining, or no change.  This information will be displayed automatically on your shift  note.  Outcome: Progressing  Flowsheets (Taken 7/8/2025 0553)  Outcome Evaluation:   POD1. Pt AOx4. RA. Ax1 walker/GB   WBAT. CMS intact. On IV TXA infusion. Pain managed with celebrex, PO dilaudid, atarax, robaxin, ice and tylenol. Discharge TBD.  Plan of Care Reviewed With: patient  Overall Patient Progress: improving  Goal: Patient-Specific Goal (Individualized)  Description: You can add care plan individualizations to a care plan. Examples of Individualization might be:  \"Parent requests to be called daily at 9am for status\", \"I have a hard time hearing out of my right ear\", or \"Do not touch me to wake me up as it startles  me\".  Outcome: Progressing  Goal: Absence of Hospital-Acquired Illness or Injury  Outcome: Progressing  Intervention: Prevent Skin Injury  Recent Flowsheet Documentation  Taken 7/8/2025 0258 by Dorina Wagner RN  Skin Protection:   adhesive use limited   incontinence pads utilized  Intervention: Prevent and Manage VTE (Venous Thromboembolism) Risk  Recent Flowsheet Documentation  Taken 7/8/2025 0258 by Dorina Wagner RN  VTE Prevention/Management: SCDs on (sequential compression devices)  Intervention: Prevent Infection  Recent Flowsheet Documentation  Taken 7/8/2025 0258 by Dorina Wagner RN  Infection Prevention:   single patient room provided   rest/sleep " promoted   hand hygiene promoted  Goal: Optimal Comfort and Wellbeing  Outcome: Progressing  Goal: Readiness for Transition of Care  Outcome: Progressing

## 2025-07-08 NOTE — PROVIDER NOTIFICATION
"Paged crosscover - \"Could you please come assess pt. She is complaining of pain in her groin and there's some kind of bruise/petechiae on her right thigh. Also, could you change her PRN oxy to PO dilaudid because she says the oxycodone is causing her hallucinations.\"    Response - provider came up to assess. Changed prn oxycodone to PO dilaudid and added scheduled robaxin.   "

## 2025-07-09 ASSESSMENT — ACTIVITIES OF DAILY LIVING (ADL)
WALKING_DOWN_STEEP_HILLS: UNABLE TO DO
HOS_ADL_SCORE(%): 16.18
STANDING_FOR_15_MINUTES: EXTREME DIFFICULTY
HEAVY_WORK: UNABLE TO DO
STANDING FOR 15 MINUTES: EXTREME DIFFICULTY
WALKING_UP_STEEP_HILLS: UNABLE TO DO
HOS_ADL_HIGHEST_POTENTIAL_SCORE: 68
WALKING_15_MINUTES_OR_GREATER: UNABLE TO DO
DEEP SQUATTING: UNABLE TO DO
RECREATIONAL_ACTIVITIES: UNABLE TO DO
ADL_HIGHEST_POTENTIAL_SCORE: 68
ROLLING_OVER_IN_BED: UNABLE TO DO
STARTING_AND_STOPPING_QUICKLY: UNABLE TO DO
GETTING_INTO_AND_OUT_OF_A_BATHTUB: UNABLE TO DO
HOW_WOULD_YOU_RATE_YOUR_CURRENT_LEVEL_OF_FUNCTION_DURING_YOUR_SPORTS_RELATED_ACTIVITIES_FROM_0_TO_100_WITH_100_BEING_YOUR_LEVEL_OF_FUNCTION_PRIOR_TO_YOUR_HIP_PROBLEM_AND_0_BEING_THE_INABILITY_TO_PERFORM_ANY_OF_YOUR_USUAL_DAILY_ACTIVITIES?: 0
PLEASE_INDICATE_YOR_PRIMARY_REASON_FOR_REFERRAL_TO_THERAPY:: HIP
WALKING_APPROXIMATELY_10_MINUTES: EXTREME DIFFICULTY
LANDING: UNABLE TO DO
WALKING_15_MINUTES_OR_GREATER: UNABLE TO DO
GOING DOWN 1 FLIGHT OF STAIRS: MODERATE DIFFICULTY
GOING_UP_1_FLIGHT_OF_STAIRS: MODERATE DIFFICULTY
PUTTING ON SOCKS AND SHOES: UNABLE TO DO
WALKING_DOWN_STEEP_HILLS: UNABLE TO DO
SPORTS_SCORE(%): 0
WALKING_INITIALLY: MODERATE DIFFICULTY
LOW_IMPACT_ACTIVITIES_LIKE_FAST_WALKING: UNABLE TO DO
SPORTS_TOTAL_ITEM_SCORE: 0
ADL_COUNT: 17
DEEP_SQUATTING: UNABLE TO DO
ROLLING OVER IN BED: UNABLE TO DO
STEPPING UP AND DOWN CURBS: MODERATE DIFFICULTY
TWISTING/PIVOTING_ON_INVOLVED_LEG: UNABLE TO DO
ABILITY_TO_PARTICIPATE_IN_YOUR_DESIRED_SPORT_AS_LONG_AS_YOU_WOULD_LIKE: UNABLE TO DO
CUTTING/LATERAL_MOVEMENTS: UNABLE TO DO
GETTING_INTO_AND_OUT_OF_A_BATHTUB: UNABLE TO DO
TWISTING/PIVOTING ON INVOLVED LEG: UNABLE TO DO
GETTING INTO AND OUT OF AN AVERAGE CAR: EXTREME DIFFICULTY
WALKING_UP_STEEP_HILLS: UNABLE TO DO
PUTTING_ON_SOCKS_AND_SHOES: UNABLE TO DO
SPORTS_COUNT: 9
GETTING_INTO_AND_OUT_OF_AN_AVERAGE_CAR: EXTREME DIFFICULTY
LIGHT_TO_MODERATE_WORK: UNABLE TO DO
ADL_TOTAL_ITEM_SCORE: 0
SPORTS_HIGHEST_POTENTIAL_SCORE: 36
HOW_WOULD_YOU_RATE_YOUR_CURRENT_LEVEL_OF_FUNCTION_DURING_YOUR_USUAL_ACTIVITIES_OF_DAILY_LIVING_FROM_0_TO_100_WITH_100_BEING_YOUR_LEVEL_OF_FUNCTION_PRIOR_TO_YOUR_HIP_PROBLEM_AND_0_BEING_THE_INABILITY_TO_PERFORM_ANY_OF_YOUR_USUAL_DAILY_ACTIVITIES?: 10
SITTING FOR 15 MINUTES: SLIGHT DIFFICULTY
HEAVY_WORK: UNABLE TO DO
GOING_DOWN_1_FLIGHT_OF_STAIRS: MODERATE DIFFICULTY
RECREATIONAL ACTIVITIES: UNABLE TO DO
ADL_SCORE(%): 0
ABILITY_TO_PERFORM_ACTIVITY_WITH_YOUR_NORMAL_TECHNIQUE: UNABLE TO DO
GOING UP 1 FLIGHT OF STAIRS: MODERATE DIFFICULTY
WALKING_INITIALLY: MODERATE DIFFICULTY
JUMPING: UNABLE TO DO
WALKING_FOR_APPROXIMATELY_10_MINUTES: EXTREME DIFFICULTY
HOW_WOULD_YOU_RATE_YOUR_CURRENT_LEVEL_OF_FUNCTION_DURING_YOUR_USUAL_ACTIVITIES_OF_DAILY_LIVING_FROM_0_TO_100_WITH_100_BEING_YOUR_LEVEL_OF_FUNCTION_PRIOR_TO_YOUR_HIP_PROBLEM_AND_0_BEING_THE_INABILITY_TO_PERFORM_ANY_OF_YOUR_USUAL_DAILY_ACTIVITIES?: 10
HOS_ADL_ITEM_SCORE_TOTAL: 11
SITTING_FOR_15_MINUTES: SLIGHT DIFFICULTY
SWINGING_OBJECTS_LIKE_A_GOLF_CLUB: UNABLE TO DO
RUNNING_ONE_MILE: UNABLE TO DO
STEPPING_UP_AND_DOWN_CURBS: MODERATE DIFFICULTY
HOW_WOULD_YOU_RATE_YOUR_CURRENT_LEVEL_OF_FUNCTION?: SEVERELY ABNORMAL
LIGHT_TO_MODERATE_WORK: UNABLE TO DO

## 2025-07-10 ENCOUNTER — THERAPY VISIT (OUTPATIENT)
Dept: PHYSICAL THERAPY | Facility: CLINIC | Age: 75
End: 2025-07-10
Payer: COMMERCIAL

## 2025-07-10 DIAGNOSIS — Z47.1 AFTERCARE FOLLOWING RIGHT HIP JOINT REPLACEMENT SURGERY: Primary | ICD-10-CM

## 2025-07-10 DIAGNOSIS — Z96.641 AFTERCARE FOLLOWING RIGHT HIP JOINT REPLACEMENT SURGERY: Primary | ICD-10-CM

## 2025-07-10 NOTE — PROGRESS NOTES
PHYSICAL THERAPY EVALUATION  Type of Visit: Evaluation       Fall Risk Screen:  Have you fallen 2 or more times in the past year?: No  Have you fallen and had an injury in the past year?: No    Subjective     Pt c/o R hip pain S/P R ALEXEY 7/7/2025.  States has been having significant spasms yesterday and today.  Also starting running a low grade fever this AM (advised to contract MD).  Performing discharge HEP with fair tolerance (unable to do SAQ).  Limited evaluation today due to pain and general intolerance to activity today.        Presenting condition or subjective complaint: Hip replacement  Date of onset: 07/07/25    Relevant medical history: Anemia; Arthritis; Bladder or bowel problems; Overweight; Rheumatoid arthritis; Thyroid problems   Dates & types of surgery: Colectomy , 2004  Heart surgery, 1988,  hand surgery, 2024    Prior diagnostic imaging/testing results: MRI; X-ray     Prior therapy history for the same diagnosis, illness or injury: Yes With Dl SOLORIO,    Prior Level of Function  Transfers: Independent  Ambulation: Independent  ADL: Independent  IADL: Driving, Finances, Housekeeping, Laundry, Meal preparation, Medication management    Living Environment  Social support: With a significant other or spouse   Type of home: House   Stairs to enter the home: Yes       Ramp: No   Stairs inside the home: Yes       Help at home: None  Equipment owned: Straight Cane; Walker; Walker with wheels; Grab bars; Raised toilet seat     Employment: No    Hobbies/Interests: Piano, gardening    Patient goals for therapy: Life activities    Pain assessment: Pain present     Objective   HIP EVALUATION  PAIN: Pain Level at Rest: 5/10  Pain Level with Use: 8/10  Pain Location: hip  Pain Quality: Aching, Sharp, Shooting, and Stabbing  Pain Frequency: constant  Pain is Worst: daytime or nighttime  Pain is Exacerbated By: standing, walking, transfers, bend/squat  Pain is Relieved By: cold, otc medications, rest, and use  Pain  Progression: Unchanged  INTEGUMENTARY (edema, incisions): swelling consistent with S/P R ALEXEY dx at incision and into R L/E  POSTURE:   GAIT:   Weightbearing Status: WBAT  Assistive Device(s): Walker (front wheeled)  Gait Deviations: Antalgic  Stride length decreased  Nya decreased  BALANCE/PROPRIOCEPTION: Single Leg Stance Eyes Open (seconds): R unable  WEIGHTBEARING ALIGNMENT:   NON-WEIGHTBEARING ALIGNMENT:    ROM: R hip ROM flex to 908 +/-, abd 30*    PELVIC/SI SCREEN:   STRENGTH: able to perform standing SLR flex and abd.  Glute set without pain increase  LE FLEXIBILITY: decreased gastroc   SPECIAL TESTS:   FUNCTIONAL TESTS:   PALPATION: mild incisional tenderness, no drainage noted  JOINT MOBILITY:     Assessment & Plan   CLINICAL IMPRESSIONS  Medical Diagnosis: Aftercare following right hip joint replacement surgery    Treatment Diagnosis: Aftercare following right hip joint replacement surgery, R hip pain, decreased ROM, strength and impaired gait and balance   Impression/Assessment: Patient is a 74 year old female with S/P R ALEXEY complaints.  The following significant findings have been identified: Pain, Decreased ROM/flexibility, Decreased joint mobility, Decreased strength, Decreased proprioception, Impaired gait, Impaired muscle performance, Decreased activity tolerance, and Impaired posture. These impairments interfere with their ability to perform self care tasks, recreational activities, household chores, driving , household mobility, and community mobility as compared to previous level of function.     Clinical Decision Making (Complexity):  Clinical Presentation: Stable/Uncomplicated  Clinical Presentation Rationale: based on medical and personal factors listed in PT evaluation  Clinical Decision Making (Complexity): Low complexity    PLAN OF CARE  Treatment Interventions:  Modalities: Cryotherapy  Interventions: Gait Training, Neuromuscular Re-education, Therapeutic Activity, Therapeutic  Exercise    Long Term Goals     PT Goal 1  Goal Identifier: Ambulation  Goal Description: Be able to ambulate 30+ minutes without deviation or AD on all surfaces  Rationale:  (for safe household ambulation;for safe community ambulation;to maintain proper body mechanics/posture while ambulating to avoid additional compensatory injury due to improper gait mechanics;to promote a healthy and active lifestyle)  Target Date: 10/02/25      Frequency of Treatment: 2x/week decreasing to 1x/week to every other week  Duration of Treatment: 12 weeks    Recommended Referrals to Other Professionals:   Education Assessment:   Learner/Method: Patient;Demonstration;Pictures/Video    Risks and benefits of evaluation/treatment have been explained.   Patient/Family/caregiver agrees with Plan of Care.     Evaluation Time:     PT Eval, Low Complexity Minutes (94359): 12       Signing Clinician: MONTY Lomas [facility]                                                                                   OUTPATIENT PHYSICAL THERAPY      PLAN OF TREATMENT FOR OUTPATIENT REHABILITATION   Patient's Last Name, First Name, RAULPRECIOUS  LaruaAnali YOB: 1950   Provider's Name   [facility]   Medical Record No.  [id number]     Onset Date: 07/07/25  Start of Care Date: 07/10/25     Medical Diagnosis:  Aftercare following right hip joint replacement surgery      PT Treatment Diagnosis:  Aftercare following right hip joint replacement surgery, R hip pain, decreased ROM, strength and impaired gait and balance Plan of Treatment  Frequency/Duration: 2x/week decreasing to 1x/week to every other week/ 12 weeks    Certification date from 07/10/25 to 10/02/25         See note for plan of treatment details and functional goals     Jefferson Will, PT                         I CERTIFY THE NEED FOR THESE SERVICES FURNISHED UNDER        THIS PLAN OF TREATMENT AND  WHILE UNDER MY CARE .             Physician Signature               Date    X_____________________________________________________                  Referring Provider:  Nabil Martinez    Initial Assessment  See Epic Evaluation- Start of Care Date: 07/10/25

## 2025-07-14 ENCOUNTER — THERAPY VISIT (OUTPATIENT)
Dept: PHYSICAL THERAPY | Facility: CLINIC | Age: 75
End: 2025-07-14
Payer: COMMERCIAL

## 2025-07-14 DIAGNOSIS — Z96.641 AFTERCARE FOLLOWING RIGHT HIP JOINT REPLACEMENT SURGERY: Primary | ICD-10-CM

## 2025-07-14 DIAGNOSIS — Z47.1 AFTERCARE FOLLOWING RIGHT HIP JOINT REPLACEMENT SURGERY: Primary | ICD-10-CM

## 2025-07-14 PROCEDURE — 97110 THERAPEUTIC EXERCISES: CPT | Mod: GP | Performed by: PHYSICAL THERAPIST

## 2025-07-14 PROCEDURE — 97116 GAIT TRAINING THERAPY: CPT | Mod: GP | Performed by: PHYSICAL THERAPIST

## 2025-07-21 ENCOUNTER — THERAPY VISIT (OUTPATIENT)
Dept: PHYSICAL THERAPY | Facility: CLINIC | Age: 75
End: 2025-07-21
Payer: COMMERCIAL

## 2025-07-21 DIAGNOSIS — Z47.1 AFTERCARE FOLLOWING RIGHT HIP JOINT REPLACEMENT SURGERY: Primary | ICD-10-CM

## 2025-07-21 DIAGNOSIS — Z96.641 AFTERCARE FOLLOWING RIGHT HIP JOINT REPLACEMENT SURGERY: Primary | ICD-10-CM

## 2025-07-21 PROCEDURE — 97110 THERAPEUTIC EXERCISES: CPT | Mod: GP | Performed by: PHYSICAL THERAPIST

## 2025-07-24 ENCOUNTER — THERAPY VISIT (OUTPATIENT)
Dept: PHYSICAL THERAPY | Facility: CLINIC | Age: 75
End: 2025-07-24
Payer: COMMERCIAL

## 2025-07-24 DIAGNOSIS — Z47.1 AFTERCARE FOLLOWING RIGHT HIP JOINT REPLACEMENT SURGERY: Primary | ICD-10-CM

## 2025-07-24 DIAGNOSIS — Z96.641 AFTERCARE FOLLOWING RIGHT HIP JOINT REPLACEMENT SURGERY: Primary | ICD-10-CM

## 2025-08-07 ENCOUNTER — THERAPY VISIT (OUTPATIENT)
Dept: PHYSICAL THERAPY | Facility: CLINIC | Age: 75
End: 2025-08-07
Payer: COMMERCIAL

## 2025-08-07 DIAGNOSIS — Z96.641 AFTERCARE FOLLOWING RIGHT HIP JOINT REPLACEMENT SURGERY: Primary | ICD-10-CM

## 2025-08-07 DIAGNOSIS — Z47.1 AFTERCARE FOLLOWING RIGHT HIP JOINT REPLACEMENT SURGERY: Primary | ICD-10-CM

## 2025-08-10 DIAGNOSIS — E03.9 HYPOTHYROIDISM, UNSPECIFIED TYPE: ICD-10-CM

## 2025-08-11 RX ORDER — LEVOTHYROXINE SODIUM 25 UG/1
25 TABLET ORAL DAILY
Qty: 90 TABLET | Refills: 2 | Status: SHIPPED | OUTPATIENT
Start: 2025-08-11

## 2025-08-12 ENCOUNTER — THERAPY VISIT (OUTPATIENT)
Dept: PHYSICAL THERAPY | Facility: CLINIC | Age: 75
End: 2025-08-12
Payer: COMMERCIAL

## 2025-08-12 DIAGNOSIS — Z96.641 AFTERCARE FOLLOWING RIGHT HIP JOINT REPLACEMENT SURGERY: Primary | ICD-10-CM

## 2025-08-12 DIAGNOSIS — Z47.1 AFTERCARE FOLLOWING RIGHT HIP JOINT REPLACEMENT SURGERY: Primary | ICD-10-CM

## 2025-08-12 PROCEDURE — 97110 THERAPEUTIC EXERCISES: CPT | Mod: GP | Performed by: PHYSICAL THERAPIST

## 2025-08-12 PROCEDURE — 97530 THERAPEUTIC ACTIVITIES: CPT | Mod: GP | Performed by: PHYSICAL THERAPIST

## 2025-08-19 ENCOUNTER — THERAPY VISIT (OUTPATIENT)
Dept: PHYSICAL THERAPY | Facility: CLINIC | Age: 75
End: 2025-08-19
Payer: COMMERCIAL

## 2025-08-19 DIAGNOSIS — Z47.1 AFTERCARE FOLLOWING RIGHT HIP JOINT REPLACEMENT SURGERY: Primary | ICD-10-CM

## 2025-08-19 DIAGNOSIS — Z96.641 AFTERCARE FOLLOWING RIGHT HIP JOINT REPLACEMENT SURGERY: Primary | ICD-10-CM

## 2025-08-19 PROCEDURE — 97110 THERAPEUTIC EXERCISES: CPT | Mod: GP | Performed by: PHYSICAL THERAPIST

## 2025-08-19 PROCEDURE — 97530 THERAPEUTIC ACTIVITIES: CPT | Mod: GP | Performed by: PHYSICAL THERAPIST

## 2025-08-26 ENCOUNTER — THERAPY VISIT (OUTPATIENT)
Dept: PHYSICAL THERAPY | Facility: CLINIC | Age: 75
End: 2025-08-26
Payer: COMMERCIAL

## 2025-08-26 DIAGNOSIS — Z47.1 AFTERCARE FOLLOWING RIGHT HIP JOINT REPLACEMENT SURGERY: Primary | ICD-10-CM

## 2025-08-26 DIAGNOSIS — Z96.641 AFTERCARE FOLLOWING RIGHT HIP JOINT REPLACEMENT SURGERY: Primary | ICD-10-CM

## 2025-08-26 PROCEDURE — 97110 THERAPEUTIC EXERCISES: CPT | Mod: GP | Performed by: PHYSICAL THERAPIST

## (undated) DEVICE — LINEN HALF SHEET 5512

## (undated) DEVICE — SOLUTION IV IRRIGATION 0.9% NACL 3L R8206

## (undated) DEVICE — PACK TOTAL HIP RIDGES LATEX PO15HIFSG

## (undated) DEVICE — ESU ELEC BLADE 2.75" COATED/INSULATED E1455

## (undated) DEVICE — SOL NACL 0.9% IRRIG 500ML BOTTLE 2F7123

## (undated) DEVICE — BLADE SAW SAGITTAL STRK 18X90X1.27MM HD SYS 6 6118-127-090

## (undated) DEVICE — LABEL MEDICATION SYSTEM 3303-P

## (undated) DEVICE — SUTURE VICRYL+ 2-0 36IN CT-1 UND VCP945H

## (undated) DEVICE — SUCTION MANIFOLD NEPTUNE 2 SYS 4 PORT 0702-020-000

## (undated) DEVICE — SET HANDPIECE INTERPULSE W/COAXIAL FAN SPRAY TIP 0210118000

## (undated) DEVICE — GLOVE PROTEXIS POWDER FREE 8.5 ORTHO LIME 2D73ET85

## (undated) DEVICE — SU VICRYL 4-0 PS-2 18" UND J496G

## (undated) DEVICE — PREP CHLORAPREP 26ML TINTED HI-LITE ORANGE 930815

## (undated) DEVICE — SU MONOCRYL 5-0 P-3 18" UND Y493G

## (undated) DEVICE — SU STRATAFIX MONOCRYL 3-0 SPIRAL PS-1 45CM SXMP1B102

## (undated) DEVICE — Device

## (undated) DEVICE — PREP SKIN SCRUB TRAY 4461A

## (undated) DEVICE — LINEN FULL SHEET 5511

## (undated) DEVICE — BAG CLEAR TRASH 1.3M 39X33" P4040C

## (undated) DEVICE — GLOVE PROTEXIS POWDER FREE 7.0 ORTHO LIME 2D73ET70

## (undated) DEVICE — SU DERMABOND ADVANCED .7ML DNX12

## (undated) DEVICE — GLOVE PROTEXIS POWDER FREE SMT 7.5  2D72PT75X

## (undated) DEVICE — PREP POVIDONE IODINE SOLUTION 10% 4OZ BOTTLE 29906-004

## (undated) DEVICE — PREP POVIDONE-IODINE 7.5% SCRUB 4OZ BOTTLE MDS093945

## (undated) DEVICE — LINEN DRAPE 54X72" 5467

## (undated) DEVICE — GLOVE PROTEXIS BLUE W/NEU-THERA 8.5  2D73EB85

## (undated) DEVICE — PACK ENT MINOR CUSTOM ASC

## (undated) DEVICE — GLOVE PROTEXIS BLUE W/NEU-THERA 7.0  2D73EB70

## (undated) DEVICE — SU STRATAFIX PDS PLUS 1 CT-1 12" SXPP1A443

## (undated) DEVICE — SUCTION MANIFOLD NEPTUNE 2 SYS 1 PORT 702-025-000

## (undated) DEVICE — SU VICRYL 3-0 SH 8X18" UND J864D

## (undated) DEVICE — SU ETHIBOND 0 CT-1 CR 8X18" CX21D

## (undated) DEVICE — LINEN ORTHO ACL PACK 5447

## (undated) DEVICE — LINEN TOWEL PACK X5 5464

## (undated) DEVICE — SOL WATER IRRIG 500ML BOTTLE 2F7113

## (undated) DEVICE — DRILL BIT BIOM QUICK CONNECTING RING LOCK 3.2X30MM 31-323230

## (undated) DEVICE — ESU GROUND PAD ADULT W/CORD E7507

## (undated) RX ORDER — PROPOFOL 10 MG/ML
INJECTION, EMULSION INTRAVENOUS
Status: DISPENSED
Start: 2023-01-11

## (undated) RX ORDER — CEFAZOLIN SODIUM/WATER 2 G/20 ML
SYRINGE (ML) INTRAVENOUS
Status: DISPENSED
Start: 2025-07-07

## (undated) RX ORDER — ONDANSETRON 2 MG/ML
INJECTION INTRAMUSCULAR; INTRAVENOUS
Status: DISPENSED
Start: 2023-01-11

## (undated) RX ORDER — BUPIVACAINE HYDROCHLORIDE AND EPINEPHRINE 2.5; 5 MG/ML; UG/ML
INJECTION, SOLUTION EPIDURAL; INFILTRATION; INTRACAUDAL; PERINEURAL
Status: DISPENSED
Start: 2025-07-07

## (undated) RX ORDER — CEFAZOLIN SODIUM 1 G/3ML
INJECTION, POWDER, FOR SOLUTION INTRAMUSCULAR; INTRAVENOUS
Status: DISPENSED
Start: 2023-01-11

## (undated) RX ORDER — FENTANYL CITRATE 50 UG/ML
INJECTION, SOLUTION INTRAMUSCULAR; INTRAVENOUS
Status: DISPENSED
Start: 2025-07-07

## (undated) RX ORDER — PROPOFOL 10 MG/ML
INJECTION, EMULSION INTRAVENOUS
Status: DISPENSED
Start: 2025-07-07

## (undated) RX ORDER — ONDANSETRON 2 MG/ML
INJECTION INTRAMUSCULAR; INTRAVENOUS
Status: DISPENSED
Start: 2025-07-07

## (undated) RX ORDER — TRANEXAMIC ACID 10 MG/ML
INJECTION, SOLUTION INTRAVENOUS
Status: DISPENSED
Start: 2025-07-07

## (undated) RX ORDER — LIDOCAINE HYDROCHLORIDE 10 MG/ML
INJECTION, SOLUTION EPIDURAL; INFILTRATION; INTRACAUDAL; PERINEURAL
Status: DISPENSED
Start: 2025-07-07

## (undated) RX ORDER — FENTANYL CITRATE-0.9 % NACL/PF 10 MCG/ML
PLASTIC BAG, INJECTION (ML) INTRAVENOUS
Status: DISPENSED
Start: 2025-07-07

## (undated) RX ORDER — DEXAMETHASONE SODIUM PHOSPHATE 4 MG/ML
INJECTION, SOLUTION INTRA-ARTICULAR; INTRALESIONAL; INTRAMUSCULAR; INTRAVENOUS; SOFT TISSUE
Status: DISPENSED
Start: 2025-07-07

## (undated) RX ORDER — FENTANYL CITRATE 50 UG/ML
INJECTION, SOLUTION INTRAMUSCULAR; INTRAVENOUS
Status: DISPENSED
Start: 2023-01-11

## (undated) RX ORDER — ACETAMINOPHEN 325 MG/1
TABLET ORAL
Status: DISPENSED
Start: 2025-07-07

## (undated) RX ORDER — ACETAMINOPHEN 325 MG/1
TABLET ORAL
Status: DISPENSED
Start: 2023-01-11

## (undated) RX ORDER — DEXAMETHASONE SODIUM PHOSPHATE 4 MG/ML
INJECTION, SOLUTION INTRA-ARTICULAR; INTRALESIONAL; INTRAMUSCULAR; INTRAVENOUS; SOFT TISSUE
Status: DISPENSED
Start: 2023-01-11

## (undated) RX ORDER — LIDOCAINE HYDROCHLORIDE AND EPINEPHRINE 10; 10 MG/ML; UG/ML
INJECTION, SOLUTION INFILTRATION; PERINEURAL
Status: DISPENSED
Start: 2023-01-11